# Patient Record
Sex: FEMALE | Race: WHITE | Employment: UNEMPLOYED | URBAN - NONMETROPOLITAN AREA
[De-identification: names, ages, dates, MRNs, and addresses within clinical notes are randomized per-mention and may not be internally consistent; named-entity substitution may affect disease eponyms.]

---

## 2017-01-25 ENCOUNTER — APPOINTMENT (OUTPATIENT)
Dept: ULTRASOUND IMAGING | Age: 32
End: 2017-01-25
Payer: COMMERCIAL

## 2017-01-25 ENCOUNTER — HOSPITAL ENCOUNTER (EMERGENCY)
Age: 32
Discharge: HOME | End: 2017-01-25
Attending: EMERGENCY MEDICINE
Payer: COMMERCIAL

## 2017-01-25 VITALS
HEART RATE: 106 BPM | DIASTOLIC BLOOD PRESSURE: 77 MMHG | OXYGEN SATURATION: 99 % | BODY MASS INDEX: 25.52 KG/M2 | SYSTOLIC BLOOD PRESSURE: 102 MMHG | TEMPERATURE: 98.6 F | WEIGHT: 130 LBS | HEIGHT: 60 IN | RESPIRATION RATE: 18 BRPM

## 2017-01-25 DIAGNOSIS — Z33.1 IUP (INTRAUTERINE PREGNANCY), INCIDENTAL: Primary | ICD-10-CM

## 2017-01-25 LAB
ANION GAP SERPL CALCULATED.3IONS-SCNC: 14 MMOL/L (ref 7–19)
BACTERIA: ABNORMAL /HPF
BASOPHILS ABSOLUTE: 0 K/UL (ref 0–0.2)
BASOPHILS RELATIVE PERCENT: 0.1 % (ref 0–1)
BILIRUBIN URINE: NEGATIVE
BLOOD, URINE: NEGATIVE
BUN BLDV-MCNC: 11 MG/DL (ref 6–20)
CALCIUM SERPL-MCNC: 9.4 MG/DL (ref 8.6–10)
CHLORIDE BLD-SCNC: 99 MMOL/L (ref 98–111)
CLARITY: ABNORMAL
CO2: 23 MMOL/L (ref 22–29)
COLOR: YELLOW
CREAT SERPL-MCNC: 0.5 MG/DL (ref 0.5–0.9)
EOSINOPHILS ABSOLUTE: 0.3 K/UL (ref 0–0.6)
EOSINOPHILS RELATIVE PERCENT: 2.9 % (ref 0–5)
EPITHELIAL CELLS, UA: 4 /HPF (ref 0–5)
GFR NON-AFRICAN AMERICAN: >60
GLUCOSE BLD-MCNC: 76 MG/DL (ref 74–109)
GLUCOSE URINE: NEGATIVE MG/DL
GONADOTROPIN, CHORIONIC (HCG) QUANT: ABNORMAL MIU/ML (ref 0–5.3)
HCT VFR BLD CALC: 39.8 % (ref 37–47)
HEMOGLOBIN: 13.5 G/DL (ref 12–16)
HYALINE CASTS: 6 /HPF (ref 0–8)
KETONES, URINE: NEGATIVE MG/DL
LEUKOCYTE ESTERASE, URINE: ABNORMAL
LYMPHOCYTES ABSOLUTE: 1.3 K/UL (ref 1.1–4.5)
LYMPHOCYTES RELATIVE PERCENT: 12.6 % (ref 20–40)
MCH RBC QN AUTO: 30.7 PG (ref 27–31)
MCHC RBC AUTO-ENTMCNC: 33.9 G/DL (ref 33–37)
MCV RBC AUTO: 90.5 FL (ref 81–99)
MONOCYTES ABSOLUTE: 0.5 K/UL (ref 0–0.9)
MONOCYTES RELATIVE PERCENT: 5.2 % (ref 0–10)
NEUTROPHILS ABSOLUTE: 8 K/UL (ref 1.5–7.5)
NEUTROPHILS RELATIVE PERCENT: 78.8 % (ref 50–65)
NITRITE, URINE: POSITIVE
PDW BLD-RTO: 12.6 % (ref 11.5–14.5)
PH UA: 7.5
PLATELET # BLD: 178 K/UL (ref 130–400)
PMV BLD AUTO: 10.3 FL (ref 7.4–10.4)
POTASSIUM SERPL-SCNC: 4.1 MMOL/L (ref 3.5–5)
PROTEIN UA: NEGATIVE MG/DL
RBC # BLD: 4.4 M/UL (ref 4.2–5.4)
RBC UA: 2 /HPF (ref 0–4)
SODIUM BLD-SCNC: 136 MMOL/L (ref 136–145)
SPECIFIC GRAVITY UA: 1.01
UROBILINOGEN, URINE: 0.2 E.U./DL
WBC # BLD: 10.2 K/UL (ref 4.8–10.8)
WBC UA: 32 /HPF (ref 0–5)

## 2017-01-25 PROCEDURE — 84702 CHORIONIC GONADOTROPIN TEST: CPT

## 2017-01-25 PROCEDURE — 76815 OB US LIMITED FETUS(S): CPT

## 2017-01-25 PROCEDURE — 99284 EMERGENCY DEPT VISIT MOD MDM: CPT

## 2017-01-25 PROCEDURE — 85025 COMPLETE CBC W/AUTO DIFF WBC: CPT

## 2017-01-25 PROCEDURE — 81001 URINALYSIS AUTO W/SCOPE: CPT

## 2017-01-25 PROCEDURE — 99282 EMERGENCY DEPT VISIT SF MDM: CPT | Performed by: EMERGENCY MEDICINE

## 2017-01-25 PROCEDURE — 87185 SC STD ENZYME DETCJ PER NZM: CPT

## 2017-01-25 PROCEDURE — 80048 BASIC METABOLIC PNL TOTAL CA: CPT

## 2017-01-25 PROCEDURE — 36415 COLL VENOUS BLD VENIPUNCTURE: CPT

## 2017-01-25 PROCEDURE — 87086 URINE CULTURE/COLONY COUNT: CPT

## 2017-01-25 ASSESSMENT — ENCOUNTER SYMPTOMS
EYES NEGATIVE: 1
ALLERGIC/IMMUNOLOGIC NEGATIVE: 1
GASTROINTESTINAL NEGATIVE: 1
RESPIRATORY NEGATIVE: 1

## 2017-01-27 LAB
ORGANISM: ABNORMAL
URINE CULTURE, ROUTINE: ABNORMAL
URINE CULTURE, ROUTINE: ABNORMAL

## 2017-02-21 LAB
EXTERNAL ABO GROUPING: NORMAL
EXTERNAL ANTIBODY SCREEN: NEGATIVE
EXTERNAL CHLAMYDIA SCREEN: NEGATIVE
EXTERNAL GONORRHEA SCREEN: NEGATIVE
EXTERNAL GROUP B STREP ANTIGEN: NEGATIVE
EXTERNAL HEPATITIS B SURFACE ANTIGEN: NEGATIVE
EXTERNAL HERPES PCR: NORMAL
EXTERNAL RH FACTOR: POSITIVE
EXTERNAL RUBELLA QUALITATIVE: NORMAL
EXTERNAL SYPHILIS RPR SCREEN: NEGATIVE
HIV1 AB SPEC QL IA.RAPID: NEGATIVE

## 2017-03-05 ENCOUNTER — HOSPITAL ENCOUNTER (INPATIENT)
Age: 32
LOS: 2 days | Discharge: HOME | DRG: 194 | End: 2017-03-07
Attending: EMERGENCY MEDICINE | Admitting: FAMILY MEDICINE
Payer: COMMERCIAL

## 2017-03-05 ENCOUNTER — APPOINTMENT (OUTPATIENT)
Dept: GENERAL RADIOLOGY | Age: 32
DRG: 194 | End: 2017-03-05
Payer: COMMERCIAL

## 2017-03-05 DIAGNOSIS — E87.1 HYPONATREMIA: ICD-10-CM

## 2017-03-05 DIAGNOSIS — J04.0 LARYNGITIS: ICD-10-CM

## 2017-03-05 DIAGNOSIS — J10.1 INFLUENZA B: Primary | ICD-10-CM

## 2017-03-05 DIAGNOSIS — R06.1 STRIDOR: ICD-10-CM

## 2017-03-05 PROBLEM — J02.9 ACUTE PHARYNGITIS: Status: ACTIVE | Noted: 2017-03-05

## 2017-03-05 LAB
ALBUMIN SERPL-MCNC: 3.6 G/DL (ref 3.5–5.2)
ALP BLD-CCNC: 50 U/L (ref 35–104)
ALT SERPL-CCNC: 18 U/L (ref 5–33)
ANION GAP SERPL CALCULATED.3IONS-SCNC: 16 MMOL/L (ref 7–19)
AST SERPL-CCNC: 24 U/L (ref 5–32)
BILIRUB SERPL-MCNC: 0.3 MG/DL (ref 0.2–1.2)
BILIRUBIN URINE: NEGATIVE
BLOOD, URINE: NEGATIVE
BUN BLDV-MCNC: 6 MG/DL (ref 6–20)
CALCIUM SERPL-MCNC: 7.9 MG/DL (ref 8.6–10)
CHLORIDE BLD-SCNC: 92 MMOL/L (ref 98–111)
CLARITY: CLEAR
CO2: 20 MMOL/L (ref 22–29)
COLOR: YELLOW
CREAT SERPL-MCNC: 0.4 MG/DL (ref 0.5–0.9)
GFR NON-AFRICAN AMERICAN: >60
GLOBULIN: 2.7 G/DL
GLUCOSE BLD-MCNC: 95 MG/DL (ref 74–109)
GLUCOSE URINE: NEGATIVE MG/DL
HCT VFR BLD CALC: 35.8 % (ref 37–47)
HEMOGLOBIN: 11.9 G/DL (ref 12–16)
KETONES, URINE: 40 MG/DL
LEUKOCYTE ESTERASE, URINE: NEGATIVE
MCH RBC QN AUTO: 30.6 PG (ref 27–31)
MCHC RBC AUTO-ENTMCNC: 33.2 G/DL (ref 33–37)
MCV RBC AUTO: 92 FL (ref 81–99)
NITRITE, URINE: NEGATIVE
PDW BLD-RTO: 13.7 % (ref 11.5–14.5)
PERFORMED ON: NORMAL
PH UA: 6
PLATELET # BLD: 138 K/UL (ref 130–400)
PMV BLD AUTO: 10.8 FL (ref 7.4–10.4)
POC TROPONIN I: 0 NG/ML (ref 0–0.08)
POTASSIUM SERPL-SCNC: 3.6 MMOL/L (ref 3.5–5)
PROTEIN UA: NEGATIVE MG/DL
RAPID INFLUENZA  B AGN: POSITIVE
RAPID INFLUENZA A AGN: NEGATIVE
RBC # BLD: 3.89 M/UL (ref 4.2–5.4)
S PYO AG THROAT QL: NEGATIVE
SODIUM BLD-SCNC: 128 MMOL/L (ref 136–145)
SPECIFIC GRAVITY UA: 1
TOTAL PROTEIN: 6.3 G/DL (ref 6.6–8.7)
UROBILINOGEN, URINE: 0.2 E.U./DL
WBC # BLD: 11.3 K/UL (ref 4.8–10.8)

## 2017-03-05 PROCEDURE — 84484 ASSAY OF TROPONIN QUANT: CPT

## 2017-03-05 PROCEDURE — 70360 X-RAY EXAM OF NECK: CPT

## 2017-03-05 PROCEDURE — 99223 1ST HOSP IP/OBS HIGH 75: CPT | Performed by: FAMILY MEDICINE

## 2017-03-05 PROCEDURE — 80053 COMPREHEN METABOLIC PANEL: CPT

## 2017-03-05 PROCEDURE — 6370000000 HC RX 637 (ALT 250 FOR IP): Performed by: FAMILY MEDICINE

## 2017-03-05 PROCEDURE — 6360000002 HC RX W HCPCS: Performed by: EMERGENCY MEDICINE

## 2017-03-05 PROCEDURE — 87081 CULTURE SCREEN ONLY: CPT

## 2017-03-05 PROCEDURE — 87880 STREP A ASSAY W/OPTIC: CPT

## 2017-03-05 PROCEDURE — 87804 INFLUENZA ASSAY W/OPTIC: CPT

## 2017-03-05 PROCEDURE — 2580000003 HC RX 258: Performed by: EMERGENCY MEDICINE

## 2017-03-05 PROCEDURE — 96365 THER/PROPH/DIAG IV INF INIT: CPT

## 2017-03-05 PROCEDURE — 6370000000 HC RX 637 (ALT 250 FOR IP): Performed by: EMERGENCY MEDICINE

## 2017-03-05 PROCEDURE — 1210000000 HC MED SURG R&B

## 2017-03-05 PROCEDURE — 36415 COLL VENOUS BLD VENIPUNCTURE: CPT

## 2017-03-05 PROCEDURE — 99284 EMERGENCY DEPT VISIT MOD MDM: CPT

## 2017-03-05 PROCEDURE — 71020 XR CHEST STANDARD TWO VW: CPT

## 2017-03-05 PROCEDURE — 85027 COMPLETE CBC AUTOMATED: CPT

## 2017-03-05 PROCEDURE — 99284 EMERGENCY DEPT VISIT MOD MDM: CPT | Performed by: EMERGENCY MEDICINE

## 2017-03-05 PROCEDURE — 93005 ELECTROCARDIOGRAM TRACING: CPT

## 2017-03-05 PROCEDURE — 2580000003 HC RX 258: Performed by: FAMILY MEDICINE

## 2017-03-05 PROCEDURE — 96375 TX/PRO/DX INJ NEW DRUG ADDON: CPT

## 2017-03-05 PROCEDURE — 94640 AIRWAY INHALATION TREATMENT: CPT

## 2017-03-05 RX ORDER — NITROFURANTOIN 25; 75 MG/1; MG/1
100 CAPSULE ORAL 2 TIMES DAILY
Status: ON HOLD | COMMUNITY
End: 2017-03-07 | Stop reason: HOSPADM

## 2017-03-05 RX ORDER — NITROFURANTOIN 25; 75 MG/1; MG/1
100 CAPSULE ORAL 2 TIMES DAILY
Status: DISCONTINUED | OUTPATIENT
Start: 2017-03-05 | End: 2017-03-07 | Stop reason: HOSPADM

## 2017-03-05 RX ORDER — 0.9 % SODIUM CHLORIDE 0.9 %
500 INTRAVENOUS SOLUTION INTRAVENOUS ONCE
Status: COMPLETED | OUTPATIENT
Start: 2017-03-05 | End: 2017-03-05

## 2017-03-05 RX ORDER — ONDANSETRON 2 MG/ML
4 INJECTION INTRAMUSCULAR; INTRAVENOUS EVERY 6 HOURS PRN
Status: DISCONTINUED | OUTPATIENT
Start: 2017-03-05 | End: 2017-03-07 | Stop reason: HOSPADM

## 2017-03-05 RX ORDER — PRENATAL VIT/IRON FUM/FOLIC AC 27MG-0.8MG
1 TABLET ORAL DAILY
Status: DISCONTINUED | OUTPATIENT
Start: 2017-03-06 | End: 2017-03-07 | Stop reason: HOSPADM

## 2017-03-05 RX ORDER — DIPHENHYDRAMINE HCL 25 MG
25 TABLET ORAL NIGHTLY PRN
Status: DISCONTINUED | OUTPATIENT
Start: 2017-03-05 | End: 2017-03-07 | Stop reason: HOSPADM

## 2017-03-05 RX ORDER — SODIUM CHLORIDE 0.9 % (FLUSH) 0.9 %
10 SYRINGE (ML) INJECTION EVERY 12 HOURS SCHEDULED
Status: DISCONTINUED | OUTPATIENT
Start: 2017-03-05 | End: 2017-03-07 | Stop reason: HOSPADM

## 2017-03-05 RX ORDER — ACETAMINOPHEN 325 MG/1
650 TABLET ORAL EVERY 4 HOURS PRN
Status: DISCONTINUED | OUTPATIENT
Start: 2017-03-05 | End: 2017-03-07 | Stop reason: HOSPADM

## 2017-03-05 RX ORDER — SODIUM CHLORIDE 9 MG/ML
INJECTION, SOLUTION INTRAVENOUS CONTINUOUS
Status: DISCONTINUED | OUTPATIENT
Start: 2017-03-05 | End: 2017-03-07 | Stop reason: HOSPADM

## 2017-03-05 RX ORDER — ACETAMINOPHEN 325 MG/1
650 TABLET ORAL ONCE
Status: COMPLETED | OUTPATIENT
Start: 2017-03-05 | End: 2017-03-05

## 2017-03-05 RX ORDER — OSELTAMIVIR PHOSPHATE 75 MG/1
75 CAPSULE ORAL 2 TIMES DAILY
Status: DISCONTINUED | OUTPATIENT
Start: 2017-03-05 | End: 2017-03-07 | Stop reason: HOSPADM

## 2017-03-05 RX ORDER — DEXAMETHASONE SODIUM PHOSPHATE 10 MG/ML
10 INJECTION INTRAMUSCULAR; INTRAVENOUS ONCE
Status: COMPLETED | OUTPATIENT
Start: 2017-03-05 | End: 2017-03-05

## 2017-03-05 RX ORDER — SODIUM CHLORIDE 0.9 % (FLUSH) 0.9 %
10 SYRINGE (ML) INJECTION PRN
Status: DISCONTINUED | OUTPATIENT
Start: 2017-03-05 | End: 2017-03-07 | Stop reason: HOSPADM

## 2017-03-05 RX ADMIN — DEXAMETHASONE SODIUM PHOSPHATE 10 MG: 10 INJECTION INTRAMUSCULAR; INTRAVENOUS at 20:09

## 2017-03-05 RX ADMIN — OSELTAMIVIR PHOSPHATE 75 MG: 75 CAPSULE ORAL at 20:36

## 2017-03-05 RX ADMIN — DIPHENHYDRAMINE HCL 25 MG: 25 TABLET ORAL at 23:37

## 2017-03-05 RX ADMIN — CEFTRIAXONE SODIUM 2 G: 1 INJECTION, POWDER, FOR SOLUTION INTRAMUSCULAR; INTRAVENOUS at 20:45

## 2017-03-05 RX ADMIN — NITROFURANTOIN (MONOHYDRATE/MACROCRYSTALS) 100 MG: 75; 25 CAPSULE ORAL at 22:19

## 2017-03-05 RX ADMIN — ACETAMINOPHEN 650 MG: 325 TABLET, FILM COATED ORAL at 20:09

## 2017-03-05 RX ADMIN — SODIUM CHLORIDE: 9 INJECTION, SOLUTION INTRAVENOUS at 22:18

## 2017-03-05 RX ADMIN — SODIUM CHLORIDE 500 ML: 9 INJECTION, SOLUTION INTRAVENOUS at 20:38

## 2017-03-05 RX ADMIN — RACEPINEPHRINE HYDROCHLORIDE 11.25 MG: 11.25 SOLUTION RESPIRATORY (INHALATION) at 20:06

## 2017-03-05 RX ADMIN — BENZOCAINE 6 MG-MENTHOL 10 MG LOZENGES 1 LOZENGE: at 20:50

## 2017-03-05 ASSESSMENT — ENCOUNTER SYMPTOMS
SHORTNESS OF BREATH: 1
EYE PAIN: 0
COUGH: 1
VOMITING: 0
SORE THROAT: 1
VOICE CHANGE: 1
ABDOMINAL PAIN: 0
DIARRHEA: 0

## 2017-03-05 ASSESSMENT — PAIN SCALES - GENERAL
PAINLEVEL_OUTOF10: 9
PAINLEVEL_OUTOF10: 0

## 2017-03-06 PROBLEM — E87.6 HYPOKALEMIA: Status: ACTIVE | Noted: 2017-03-06

## 2017-03-06 PROBLEM — I95.9 HYPOTENSION: Status: ACTIVE | Noted: 2017-03-06

## 2017-03-06 PROBLEM — Z3A.20 20 WEEKS GESTATION OF PREGNANCY: Status: ACTIVE | Noted: 2017-03-06

## 2017-03-06 PROBLEM — J04.0 ACUTE LARYNGITIS: Status: ACTIVE | Noted: 2017-03-06

## 2017-03-06 LAB
ANION GAP SERPL CALCULATED.3IONS-SCNC: 11 MMOL/L (ref 7–19)
BASOPHILS ABSOLUTE: 0 K/UL (ref 0–0.2)
BASOPHILS RELATIVE PERCENT: 0.1 % (ref 0–1)
BUN BLDV-MCNC: 4 MG/DL (ref 6–20)
CALCIUM SERPL-MCNC: 7.7 MG/DL (ref 8.6–10)
CHLORIDE BLD-SCNC: 103 MMOL/L (ref 98–111)
CO2: 22 MMOL/L (ref 22–29)
CREAT SERPL-MCNC: 0.4 MG/DL (ref 0.5–0.9)
EOSINOPHILS ABSOLUTE: 0 K/UL (ref 0–0.6)
EOSINOPHILS RELATIVE PERCENT: 0 % (ref 0–5)
GFR NON-AFRICAN AMERICAN: >60
GLUCOSE BLD-MCNC: 157 MG/DL (ref 74–109)
HCT VFR BLD CALC: 35 % (ref 37–47)
HEMOGLOBIN: 11.6 G/DL (ref 12–16)
LYMPHOCYTES ABSOLUTE: 0.5 K/UL (ref 1.1–4.5)
LYMPHOCYTES RELATIVE PERCENT: 4.1 % (ref 20–40)
MCH RBC QN AUTO: 30.4 PG (ref 27–31)
MCHC RBC AUTO-ENTMCNC: 33.1 G/DL (ref 33–37)
MCV RBC AUTO: 91.9 FL (ref 81–99)
MONOCYTES ABSOLUTE: 0.3 K/UL (ref 0–0.9)
MONOCYTES RELATIVE PERCENT: 2.8 % (ref 0–10)
NEUTROPHILS ABSOLUTE: 10.1 K/UL (ref 1.5–7.5)
NEUTROPHILS RELATIVE PERCENT: 92.5 % (ref 50–65)
PDW BLD-RTO: 13.9 % (ref 11.5–14.5)
PLATELET # BLD: 139 K/UL (ref 130–400)
PMV BLD AUTO: 10.6 FL (ref 7.4–10.4)
POTASSIUM SERPL-SCNC: 3.4 MMOL/L (ref 3.5–5)
RBC # BLD: 3.81 M/UL (ref 4.2–5.4)
SODIUM BLD-SCNC: 136 MMOL/L (ref 136–145)
WBC # BLD: 10.9 K/UL (ref 4.8–10.8)

## 2017-03-06 PROCEDURE — 36415 COLL VENOUS BLD VENIPUNCTURE: CPT

## 2017-03-06 PROCEDURE — 99253 IP/OBS CNSLTJ NEW/EST LOW 45: CPT | Performed by: OTOLARYNGOLOGY

## 2017-03-06 PROCEDURE — 0CJS8ZZ INSPECTION OF LARYNX, VIA NATURAL OR ARTIFICIAL OPENING ENDOSCOPIC: ICD-10-PCS | Performed by: OTOLARYNGOLOGY

## 2017-03-06 PROCEDURE — 85025 COMPLETE CBC W/AUTO DIFF WBC: CPT

## 2017-03-06 PROCEDURE — 6370000000 HC RX 637 (ALT 250 FOR IP): Performed by: FAMILY MEDICINE

## 2017-03-06 PROCEDURE — 87070 CULTURE OTHR SPECIMN AEROBIC: CPT

## 2017-03-06 PROCEDURE — 80048 BASIC METABOLIC PNL TOTAL CA: CPT

## 2017-03-06 PROCEDURE — 99232 SBSQ HOSP IP/OBS MODERATE 35: CPT | Performed by: HOSPITALIST

## 2017-03-06 PROCEDURE — 2580000003 HC RX 258: Performed by: PHYSICIAN ASSISTANT

## 2017-03-06 PROCEDURE — 6370000000 HC RX 637 (ALT 250 FOR IP): Performed by: EMERGENCY MEDICINE

## 2017-03-06 PROCEDURE — 6360000002 HC RX W HCPCS: Performed by: FAMILY MEDICINE

## 2017-03-06 PROCEDURE — 6370000000 HC RX 637 (ALT 250 FOR IP): Performed by: OBSTETRICS & GYNECOLOGY

## 2017-03-06 PROCEDURE — 1210000000 HC MED SURG R&B

## 2017-03-06 PROCEDURE — 81003 URINALYSIS AUTO W/O SCOPE: CPT

## 2017-03-06 PROCEDURE — 2580000003 HC RX 258: Performed by: FAMILY MEDICINE

## 2017-03-06 PROCEDURE — 99253 IP/OBS CNSLTJ NEW/EST LOW 45: CPT | Performed by: NURSE PRACTITIONER

## 2017-03-06 RX ORDER — 0.9 % SODIUM CHLORIDE 0.9 %
500 INTRAVENOUS SOLUTION INTRAVENOUS ONCE
Status: COMPLETED | OUTPATIENT
Start: 2017-03-06 | End: 2017-03-06

## 2017-03-06 RX ORDER — POTASSIUM CHLORIDE 20 MEQ/1
40 TABLET, EXTENDED RELEASE ORAL ONCE
Status: COMPLETED | OUTPATIENT
Start: 2017-03-06 | End: 2017-03-06

## 2017-03-06 RX ADMIN — OSELTAMIVIR PHOSPHATE 75 MG: 75 CAPSULE ORAL at 20:48

## 2017-03-06 RX ADMIN — SODIUM CHLORIDE 500 ML: 9 INJECTION, SOLUTION INTRAVENOUS at 08:51

## 2017-03-06 RX ADMIN — POTASSIUM CHLORIDE 40 MEQ: 20 TABLET, EXTENDED RELEASE ORAL at 08:52

## 2017-03-06 RX ADMIN — ENOXAPARIN SODIUM 40 MG: 40 INJECTION SUBCUTANEOUS at 08:51

## 2017-03-06 RX ADMIN — OSELTAMIVIR PHOSPHATE 75 MG: 75 CAPSULE ORAL at 08:52

## 2017-03-06 RX ADMIN — PRENATAL VIT W/ FE FUMARATE-FA TAB 27-0.8 MG 1 TABLET: 27-0.8 TAB at 08:52

## 2017-03-06 RX ADMIN — NITROFURANTOIN (MONOHYDRATE/MACROCRYSTALS) 100 MG: 75; 25 CAPSULE ORAL at 08:52

## 2017-03-06 RX ADMIN — NITROFURANTOIN (MONOHYDRATE/MACROCRYSTALS) 100 MG: 75; 25 CAPSULE ORAL at 20:48

## 2017-03-06 RX ADMIN — CHLORASEPTIC 1 SPRAY: 1.5 LIQUID ORAL at 19:35

## 2017-03-06 RX ADMIN — CEFTRIAXONE SODIUM 1 G: 1 INJECTION, POWDER, FOR SOLUTION INTRAMUSCULAR; INTRAVENOUS at 20:48

## 2017-03-06 RX ADMIN — Medication 10 ML: at 08:53

## 2017-03-06 RX ADMIN — SODIUM CHLORIDE 500 ML: 9 INJECTION, SOLUTION INTRAVENOUS at 19:35

## 2017-03-07 VITALS
BODY MASS INDEX: 28.07 KG/M2 | RESPIRATION RATE: 18 BRPM | OXYGEN SATURATION: 94 % | WEIGHT: 143 LBS | TEMPERATURE: 99 F | DIASTOLIC BLOOD PRESSURE: 53 MMHG | HEART RATE: 103 BPM | HEIGHT: 60 IN | SYSTOLIC BLOOD PRESSURE: 82 MMHG

## 2017-03-07 LAB
ANION GAP SERPL CALCULATED.3IONS-SCNC: 12 MMOL/L (ref 7–19)
BUN BLDV-MCNC: 2 MG/DL (ref 6–20)
CALCIUM SERPL-MCNC: 7.2 MG/DL (ref 8.6–10)
CHLORIDE BLD-SCNC: 102 MMOL/L (ref 98–111)
CO2: 20 MMOL/L (ref 22–29)
CREAT SERPL-MCNC: 0.3 MG/DL (ref 0.5–0.9)
GFR NON-AFRICAN AMERICAN: >60
GLUCOSE BLD-MCNC: 95 MG/DL (ref 74–109)
HCT VFR BLD CALC: 31.3 % (ref 37–47)
HEMOGLOBIN: 10.3 G/DL (ref 12–16)
MCH RBC QN AUTO: 30.5 PG (ref 27–31)
MCHC RBC AUTO-ENTMCNC: 32.9 G/DL (ref 33–37)
MCV RBC AUTO: 92.6 FL (ref 81–99)
PDW BLD-RTO: 14.3 % (ref 11.5–14.5)
PLATELET # BLD: 128 K/UL (ref 130–400)
PMV BLD AUTO: 10.9 FL (ref 7.4–10.4)
POTASSIUM SERPL-SCNC: 3.7 MMOL/L (ref 3.5–5)
RBC # BLD: 3.38 M/UL (ref 4.2–5.4)
S PYO THROAT QL CULT: NORMAL
SODIUM BLD-SCNC: 134 MMOL/L (ref 136–145)
WBC # BLD: 6.3 K/UL (ref 4.8–10.8)

## 2017-03-07 PROCEDURE — 6370000000 HC RX 637 (ALT 250 FOR IP): Performed by: FAMILY MEDICINE

## 2017-03-07 PROCEDURE — 6370000000 HC RX 637 (ALT 250 FOR IP): Performed by: EMERGENCY MEDICINE

## 2017-03-07 PROCEDURE — 80048 BASIC METABOLIC PNL TOTAL CA: CPT

## 2017-03-07 PROCEDURE — 85027 COMPLETE CBC AUTOMATED: CPT

## 2017-03-07 PROCEDURE — 2580000003 HC RX 258: Performed by: FAMILY MEDICINE

## 2017-03-07 PROCEDURE — 99239 HOSP IP/OBS DSCHRG MGMT >30: CPT | Performed by: HOSPITALIST

## 2017-03-07 PROCEDURE — 6360000002 HC RX W HCPCS: Performed by: FAMILY MEDICINE

## 2017-03-07 PROCEDURE — 36415 COLL VENOUS BLD VENIPUNCTURE: CPT

## 2017-03-07 RX ORDER — ACETAMINOPHEN 325 MG/1
650 TABLET ORAL EVERY 4 HOURS PRN
Qty: 120 TABLET | Refills: 0 | Status: SHIPPED | OUTPATIENT
Start: 2017-03-07 | End: 2017-08-01 | Stop reason: ALTCHOICE

## 2017-03-07 RX ORDER — OSELTAMIVIR PHOSPHATE 75 MG/1
75 CAPSULE ORAL 2 TIMES DAILY
Qty: 4 CAPSULE | Refills: 0 | Status: SHIPPED | OUTPATIENT
Start: 2017-03-07 | End: 2017-03-12

## 2017-03-07 RX ADMIN — NITROFURANTOIN (MONOHYDRATE/MACROCRYSTALS) 100 MG: 75; 25 CAPSULE ORAL at 08:30

## 2017-03-07 RX ADMIN — OSELTAMIVIR PHOSPHATE 75 MG: 75 CAPSULE ORAL at 08:30

## 2017-03-07 RX ADMIN — ENOXAPARIN SODIUM 40 MG: 40 INJECTION SUBCUTANEOUS at 08:30

## 2017-03-07 RX ADMIN — Medication 10 ML: at 08:30

## 2017-03-07 RX ADMIN — PRENATAL VIT W/ FE FUMARATE-FA TAB 27-0.8 MG 1 TABLET: 27-0.8 TAB at 08:30

## 2017-03-09 LAB — THROAT CULTURE: NORMAL

## 2017-03-13 LAB
EKG P AXIS: 52 DEGREES
EKG P-R INTERVAL: 146 MS
EKG Q-T INTERVAL: 300 MS
EKG QRS DURATION: 82 MS
EKG QTC CALCULATION (BAZETT): 406 MS
EKG T AXIS: 20 DEGREES

## 2017-04-05 ENCOUNTER — TELEPHONE (OUTPATIENT)
Dept: OTOLARYNGOLOGY | Age: 32
End: 2017-04-05

## 2017-05-01 ENCOUNTER — HOSPITAL ENCOUNTER (OUTPATIENT)
Facility: HOSPITAL | Age: 32
Setting detail: OBSERVATION
Discharge: HOME | End: 2017-05-01
Attending: OBSTETRICS & GYNECOLOGY | Admitting: OBSTETRICS & GYNECOLOGY

## 2017-05-01 VITALS
RESPIRATION RATE: 18 BRPM | HEART RATE: 103 BPM | TEMPERATURE: 97.4 F | BODY MASS INDEX: 29.84 KG/M2 | WEIGHT: 152 LBS | SYSTOLIC BLOOD PRESSURE: 99 MMHG | HEIGHT: 60 IN | DIASTOLIC BLOOD PRESSURE: 61 MMHG

## 2017-05-01 PROBLEM — Z34.90 PREGNANCY: Status: ACTIVE | Noted: 2017-05-01

## 2017-05-01 PROCEDURE — 25010000002 ONDANSETRON PER 1 MG: Performed by: OBSTETRICS & GYNECOLOGY

## 2017-05-01 PROCEDURE — 96374 THER/PROPH/DIAG INJ IV PUSH: CPT

## 2017-05-01 PROCEDURE — G0378 HOSPITAL OBSERVATION PER HR: HCPCS

## 2017-05-01 PROCEDURE — G0463 HOSPITAL OUTPT CLINIC VISIT: HCPCS

## 2017-05-01 RX ORDER — ACETAMINOPHEN 325 MG/1
650 TABLET ORAL EVERY 4 HOURS PRN
Status: DISCONTINUED | OUTPATIENT
Start: 2017-05-01 | End: 2017-05-01 | Stop reason: HOSPADM

## 2017-05-01 RX ORDER — PRENATAL VIT NO.126/IRON/FOLIC 28MG-0.8MG
1 TABLET ORAL DAILY
COMMUNITY

## 2017-05-01 RX ADMIN — SODIUM CHLORIDE, POTASSIUM CHLORIDE, SODIUM LACTATE AND CALCIUM CHLORIDE 1000 ML: 600; 310; 30; 20 INJECTION, SOLUTION INTRAVENOUS at 01:58

## 2017-05-01 RX ADMIN — ONDANSETRON HYDROCHLORIDE 6 MG: 2 SOLUTION INTRAMUSCULAR; INTRAVENOUS at 01:57

## 2017-06-03 ENCOUNTER — HOSPITAL ENCOUNTER (OUTPATIENT)
Facility: HOSPITAL | Age: 32
Setting detail: OBSERVATION
Discharge: HOME | End: 2017-06-03
Attending: OBSTETRICS & GYNECOLOGY | Admitting: OBSTETRICS & GYNECOLOGY

## 2017-06-03 VITALS
TEMPERATURE: 98.2 F | HEIGHT: 60 IN | WEIGHT: 160.6 LBS | SYSTOLIC BLOOD PRESSURE: 109 MMHG | OXYGEN SATURATION: 99 % | BODY MASS INDEX: 31.53 KG/M2 | HEART RATE: 84 BPM | RESPIRATION RATE: 16 BRPM | DIASTOLIC BLOOD PRESSURE: 62 MMHG

## 2017-06-03 PROBLEM — O47.9 THREATENED LABOR: Status: ACTIVE | Noted: 2017-06-03

## 2017-06-03 LAB
BACTERIA UR QL AUTO: ABNORMAL /HPF
BILIRUB UR QL STRIP: NEGATIVE
CLARITY UR: CLEAR
COLOR UR: YELLOW
GLUCOSE UR STRIP-MCNC: NEGATIVE MG/DL
HGB UR QL STRIP.AUTO: ABNORMAL
HYALINE CASTS UR QL AUTO: ABNORMAL /LPF
KETONES UR QL STRIP: ABNORMAL
LEUKOCYTE ESTERASE UR QL STRIP.AUTO: ABNORMAL
NITRITE UR QL STRIP: NEGATIVE
PH UR STRIP.AUTO: 6.5 [PH] (ref 5–8)
PROT UR QL STRIP: NEGATIVE
RBC # UR: ABNORMAL /HPF
REF LAB TEST METHOD: ABNORMAL
SP GR UR STRIP: 1.01 (ref 1–1.03)
SQUAMOUS #/AREA URNS HPF: ABNORMAL /HPF
UROBILINOGEN UR QL STRIP: ABNORMAL
WBC UR QL AUTO: ABNORMAL /HPF

## 2017-06-03 PROCEDURE — G0463 HOSPITAL OUTPT CLINIC VISIT: HCPCS

## 2017-06-03 PROCEDURE — G0378 HOSPITAL OBSERVATION PER HR: HCPCS

## 2017-06-03 PROCEDURE — 81001 URINALYSIS AUTO W/SCOPE: CPT | Performed by: OBSTETRICS & GYNECOLOGY

## 2017-06-03 RX ORDER — SODIUM CHLORIDE, SODIUM LACTATE, POTASSIUM CHLORIDE, CALCIUM CHLORIDE 600; 310; 30; 20 MG/100ML; MG/100ML; MG/100ML; MG/100ML
999 INJECTION, SOLUTION INTRAVENOUS CONTINUOUS
Status: DISCONTINUED | OUTPATIENT
Start: 2017-06-03 | End: 2017-06-03 | Stop reason: HOSPADM

## 2017-06-03 RX ORDER — CHOLECALCIFEROL (VITAMIN D3) 125 MCG
500 CAPSULE ORAL DAILY
COMMUNITY
End: 2017-07-23 | Stop reason: HOSPADM

## 2017-06-03 RX ADMIN — SODIUM CHLORIDE, POTASSIUM CHLORIDE, SODIUM LACTATE AND CALCIUM CHLORIDE 999 ML/HR: 600; 310; 30; 20 INJECTION, SOLUTION INTRAVENOUS at 02:30

## 2017-07-13 ENCOUNTER — APPOINTMENT (OUTPATIENT)
Dept: PREADMISSION TESTING | Facility: HOSPITAL | Age: 32
End: 2017-07-13

## 2017-07-20 ENCOUNTER — HOSPITAL ENCOUNTER (OUTPATIENT)
Facility: HOSPITAL | Age: 32
Setting detail: OBSERVATION
Discharge: LEFT BEFORE DISCHARGE | End: 2017-07-20
Attending: OBSTETRICS & GYNECOLOGY | Admitting: OBSTETRICS & GYNECOLOGY

## 2017-07-20 VITALS — OXYGEN SATURATION: 98 % | HEIGHT: 60 IN | TEMPERATURE: 98.2 F | WEIGHT: 171.5 LBS | BODY MASS INDEX: 33.67 KG/M2

## 2017-07-20 LAB — A1 MICROGLOB PLACENTAL VAG QL: POSITIVE

## 2017-07-20 PROCEDURE — G0463 HOSPITAL OUTPT CLINIC VISIT: HCPCS

## 2017-07-20 PROCEDURE — 84112 EVAL AMNIOTIC FLUID PROTEIN: CPT | Performed by: OBSTETRICS & GYNECOLOGY

## 2017-07-20 PROCEDURE — G0378 HOSPITAL OBSERVATION PER HR: HCPCS

## 2017-07-20 NOTE — NURSING NOTE
"JIGNESH Lind, SALAZAR and ANNMARIE Horta RN at bedside for the following conversations.  0240:  Discussed with patient Dr. Cotto's plan of care to proceed with  Section.  Patient states \"No.  I refuse to have an unnecessary .\"  Informed patient that Dr. Cotto would not start pitocin because she has had a previous  delivery, and since the patient is ruptured and not in labor, a repeat  delivery is advised. Risks and benefits explained. Patient adamant that she will not have the  and states \"I understand all the potential risks.\"  Informed the patient that she has the right to refuse treatment and may sign out against medical advice.    0243:  Dr. Cotto notified of patient's refusal of  Section.  AMA form printed.    0245:  AMA form presented to patient.  Patient read and signed form, stating she understands the risks and is comfortable leaving.  Risks again discussed with patient, and suggested to seek care for the health of herself and her baby.  "

## 2017-07-20 NOTE — NURSING NOTE
0230:  updated on patient arrival and want to .  stated she informed the patient she would not start pitocin on the patient if she was not already in labor.  stated the patient was well aware and they discussed this in the office.  says to get her ready for C/S.

## 2017-07-21 ENCOUNTER — ANESTHESIA (OUTPATIENT)
Dept: LABOR AND DELIVERY | Facility: HOSPITAL | Age: 32
End: 2017-07-21

## 2017-07-21 ENCOUNTER — ANESTHESIA EVENT (OUTPATIENT)
Dept: LABOR AND DELIVERY | Facility: HOSPITAL | Age: 32
End: 2017-07-21

## 2017-07-21 ENCOUNTER — HOSPITAL ENCOUNTER (INPATIENT)
Facility: HOSPITAL | Age: 32
LOS: 2 days | Discharge: HOME | End: 2017-07-23
Attending: OBSTETRICS & GYNECOLOGY | Admitting: OBSTETRICS & GYNECOLOGY

## 2017-07-21 DIAGNOSIS — Z3A.40 40 WEEKS GESTATION OF PREGNANCY: Primary | ICD-10-CM

## 2017-07-21 DIAGNOSIS — Z98.891 S/P REPEAT LOW TRANSVERSE C-SECTION: ICD-10-CM

## 2017-07-21 DIAGNOSIS — O41.1230: ICD-10-CM

## 2017-07-21 PROBLEM — O47.9 THREATENED LABOR: Status: RESOLVED | Noted: 2017-06-03 | Resolved: 2017-07-21

## 2017-07-21 LAB
A1 MICROGLOB PLACENTAL VAG QL: POSITIVE
ABO GROUP BLD: NORMAL
AMPHET+METHAMPHET UR QL: NEGATIVE
BARBITURATES UR QL SCN: NEGATIVE
BENZODIAZ UR QL SCN: NEGATIVE
BLD GP AB SCN SERPL QL: NEGATIVE
CANNABINOIDS SERPL QL: NEGATIVE
COCAINE UR QL: NEGATIVE
DEPRECATED RDW RBC AUTO: 45.5 FL (ref 40–54)
ERYTHROCYTE [DISTWIDTH] IN BLOOD BY AUTOMATED COUNT: 14.1 % (ref 12–15)
HCT VFR BLD AUTO: 35.3 % (ref 37–47)
HGB BLD-MCNC: 11.5 G/DL (ref 12–16)
MCH RBC QN AUTO: 28.7 PG (ref 28–32)
MCHC RBC AUTO-ENTMCNC: 32.6 G/DL (ref 33–36)
MCV RBC AUTO: 88 FL (ref 82–98)
METHADONE UR QL SCN: NEGATIVE
OPIATES UR QL: NEGATIVE
PCP UR QL SCN: NEGATIVE
PLATELET # BLD AUTO: 223 10*3/MM3 (ref 130–400)
PMV BLD AUTO: 10.9 FL (ref 6–12)
RBC # BLD AUTO: 4.01 10*6/MM3 (ref 4.2–5.4)
RH BLD: POSITIVE
WBC NRBC COR # BLD: 13.25 10*3/MM3 (ref 4.8–10.8)

## 2017-07-21 PROCEDURE — 80307 DRUG TEST PRSMV CHEM ANLYZR: CPT | Performed by: OBSTETRICS & GYNECOLOGY

## 2017-07-21 PROCEDURE — 88307 TISSUE EXAM BY PATHOLOGIST: CPT | Performed by: OBSTETRICS & GYNECOLOGY

## 2017-07-21 PROCEDURE — 87070 CULTURE OTHR SPECIMN AEROBIC: CPT | Performed by: OBSTETRICS & GYNECOLOGY

## 2017-07-21 PROCEDURE — 86850 RBC ANTIBODY SCREEN: CPT | Performed by: OBSTETRICS & GYNECOLOGY

## 2017-07-21 PROCEDURE — 86900 BLOOD TYPING SEROLOGIC ABO: CPT | Performed by: OBSTETRICS & GYNECOLOGY

## 2017-07-21 PROCEDURE — 25010000002 KETOROLAC TROMETHAMINE PER 15 MG: Performed by: NURSE ANESTHETIST, CERTIFIED REGISTERED

## 2017-07-21 PROCEDURE — 86901 BLOOD TYPING SEROLOGIC RH(D): CPT | Performed by: OBSTETRICS & GYNECOLOGY

## 2017-07-21 PROCEDURE — 63710000001 PROMETHAZINE PER 25 MG: Performed by: OBSTETRICS & GYNECOLOGY

## 2017-07-21 PROCEDURE — C1765 ADHESION BARRIER: HCPCS | Performed by: OBSTETRICS & GYNECOLOGY

## 2017-07-21 PROCEDURE — 25010000002 GENTAMICIN PER 80 MG: Performed by: OBSTETRICS & GYNECOLOGY

## 2017-07-21 PROCEDURE — 25010000002 HYDROMORPHONE PER 4 MG: Performed by: NURSE ANESTHETIST, CERTIFIED REGISTERED

## 2017-07-21 PROCEDURE — 36415 COLL VENOUS BLD VENIPUNCTURE: CPT | Performed by: OBSTETRICS & GYNECOLOGY

## 2017-07-21 PROCEDURE — 87176 TISSUE HOMOGENIZATION CULTR: CPT | Performed by: OBSTETRICS & GYNECOLOGY

## 2017-07-21 PROCEDURE — 25010000002 FENTANYL CITRATE (PF) 100 MCG/2ML SOLUTION: Performed by: NURSE ANESTHETIST, CERTIFIED REGISTERED

## 2017-07-21 PROCEDURE — 87205 SMEAR GRAM STAIN: CPT | Performed by: OBSTETRICS & GYNECOLOGY

## 2017-07-21 PROCEDURE — 85027 COMPLETE CBC AUTOMATED: CPT | Performed by: OBSTETRICS & GYNECOLOGY

## 2017-07-21 PROCEDURE — 94799 UNLISTED PULMONARY SVC/PX: CPT

## 2017-07-21 PROCEDURE — 87075 CULTR BACTERIA EXCEPT BLOOD: CPT | Performed by: OBSTETRICS & GYNECOLOGY

## 2017-07-21 PROCEDURE — 25010000002 ONDANSETRON PER 1 MG: Performed by: NURSE ANESTHETIST, CERTIFIED REGISTERED

## 2017-07-21 PROCEDURE — 51703 INSERT BLADDER CATH COMPLEX: CPT

## 2017-07-21 PROCEDURE — 84112 EVAL AMNIOTIC FLUID PROTEIN: CPT | Performed by: OBSTETRICS & GYNECOLOGY

## 2017-07-21 RX ORDER — BUTORPHANOL TARTRATE 1 MG/ML
0.5 INJECTION, SOLUTION INTRAMUSCULAR; INTRAVENOUS EVERY 6 HOURS PRN
Status: DISCONTINUED | OUTPATIENT
Start: 2017-07-21 | End: 2017-07-23 | Stop reason: HOSPADM

## 2017-07-21 RX ORDER — LIDOCAINE HYDROCHLORIDE 10 MG/ML
5 INJECTION, SOLUTION INFILTRATION; PERINEURAL AS NEEDED
Status: DISCONTINUED | OUTPATIENT
Start: 2017-07-21 | End: 2017-07-21 | Stop reason: HOSPADM

## 2017-07-21 RX ORDER — ONDANSETRON 2 MG/ML
4 INJECTION INTRAMUSCULAR; INTRAVENOUS EVERY 6 HOURS PRN
Status: DISCONTINUED | OUTPATIENT
Start: 2017-07-21 | End: 2017-07-21

## 2017-07-21 RX ORDER — MISOPROSTOL 200 UG/1
800 TABLET ORAL AS NEEDED
Status: DISCONTINUED | OUTPATIENT
Start: 2017-07-21 | End: 2017-07-21 | Stop reason: HOSPADM

## 2017-07-21 RX ORDER — PRENATAL VIT/IRON FUM/FOLIC AC 27MG-0.8MG
1 TABLET ORAL DAILY
Status: DISCONTINUED | OUTPATIENT
Start: 2017-07-22 | End: 2017-07-23 | Stop reason: HOSPADM

## 2017-07-21 RX ORDER — NALOXONE HCL 0.4 MG/ML
0.4 VIAL (ML) INJECTION
Status: ACTIVE | OUTPATIENT
Start: 2017-07-21 | End: 2017-07-22

## 2017-07-21 RX ORDER — IBUPROFEN 200 MG
600 TABLET ORAL EVERY 8 HOURS PRN
Status: DISCONTINUED | OUTPATIENT
Start: 2017-07-21 | End: 2017-07-23 | Stop reason: HOSPADM

## 2017-07-21 RX ORDER — POLYETHYLENE GLYCOL 3350 17 G/17G
17 POWDER, FOR SOLUTION ORAL DAILY
Status: DISCONTINUED | OUTPATIENT
Start: 2017-07-22 | End: 2017-07-23 | Stop reason: HOSPADM

## 2017-07-21 RX ORDER — BUPIVACAINE HYDROCHLORIDE 7.5 MG/ML
INJECTION, SOLUTION EPIDURAL; RETROBULBAR AS NEEDED
Status: DISCONTINUED | OUTPATIENT
Start: 2017-07-21 | End: 2017-07-21 | Stop reason: SURG

## 2017-07-21 RX ORDER — OXYCODONE AND ACETAMINOPHEN 10; 325 MG/1; MG/1
1 TABLET ORAL EVERY 4 HOURS PRN
Status: DISCONTINUED | OUTPATIENT
Start: 2017-07-22 | End: 2017-07-23 | Stop reason: HOSPADM

## 2017-07-21 RX ORDER — FENTANYL CITRATE 50 UG/ML
INJECTION, SOLUTION INTRAMUSCULAR; INTRAVENOUS AS NEEDED
Status: DISCONTINUED | OUTPATIENT
Start: 2017-07-21 | End: 2017-07-21 | Stop reason: SURG

## 2017-07-21 RX ORDER — PROMETHAZINE HYDROCHLORIDE 25 MG/ML
12.5 INJECTION, SOLUTION INTRAMUSCULAR; INTRAVENOUS EVERY 6 HOURS PRN
Status: DISCONTINUED | OUTPATIENT
Start: 2017-07-21 | End: 2017-07-23 | Stop reason: HOSPADM

## 2017-07-21 RX ORDER — ONDANSETRON 2 MG/ML
INJECTION INTRAMUSCULAR; INTRAVENOUS AS NEEDED
Status: DISCONTINUED | OUTPATIENT
Start: 2017-07-21 | End: 2017-07-21 | Stop reason: SURG

## 2017-07-21 RX ORDER — SODIUM CHLORIDE, SODIUM LACTATE, POTASSIUM CHLORIDE, CALCIUM CHLORIDE 600; 310; 30; 20 MG/100ML; MG/100ML; MG/100ML; MG/100ML
125 INJECTION, SOLUTION INTRAVENOUS CONTINUOUS
Status: DISCONTINUED | OUTPATIENT
Start: 2017-07-21 | End: 2017-07-23 | Stop reason: HOSPADM

## 2017-07-21 RX ORDER — CLINDAMYCIN PHOSPHATE 900 MG/50ML
900 INJECTION INTRAVENOUS ONCE
Status: COMPLETED | OUTPATIENT
Start: 2017-07-21 | End: 2017-07-21

## 2017-07-21 RX ORDER — DIPHENHYDRAMINE HCL 25 MG
25 CAPSULE ORAL EVERY 4 HOURS PRN
Status: DISCONTINUED | OUTPATIENT
Start: 2017-07-21 | End: 2017-07-23 | Stop reason: HOSPADM

## 2017-07-21 RX ORDER — OXYTOCIN 10 [USP'U]/ML
INJECTION, SOLUTION INTRAMUSCULAR; INTRAVENOUS AS NEEDED
Status: DISCONTINUED | OUTPATIENT
Start: 2017-07-21 | End: 2017-07-21 | Stop reason: SURG

## 2017-07-21 RX ORDER — MISOPROSTOL 200 UG/1
600 TABLET ORAL ONCE
Status: DISCONTINUED | OUTPATIENT
Start: 2017-07-21 | End: 2017-07-23 | Stop reason: HOSPADM

## 2017-07-21 RX ORDER — PHENYLEPHRINE HCL IN 0.9% NACL 0.8MG/10ML
SYRINGE (ML) INTRAVENOUS AS NEEDED
Status: DISCONTINUED | OUTPATIENT
Start: 2017-07-21 | End: 2017-07-21 | Stop reason: SURG

## 2017-07-21 RX ORDER — OXYCODONE AND ACETAMINOPHEN 7.5; 325 MG/1; MG/1
1 TABLET ORAL EVERY 4 HOURS PRN
Status: ACTIVE | OUTPATIENT
Start: 2017-07-21 | End: 2017-07-22

## 2017-07-21 RX ORDER — CALCIUM CARBONATE 200(500)MG
2 TABLET,CHEWABLE ORAL EVERY 6 HOURS PRN
Status: DISCONTINUED | OUTPATIENT
Start: 2017-07-21 | End: 2017-07-23 | Stop reason: HOSPADM

## 2017-07-21 RX ORDER — HYDROMORPHONE HCL 110MG/55ML
2 PATIENT CONTROLLED ANALGESIA SYRINGE INTRAVENOUS EVERY 4 HOURS PRN
Status: DISCONTINUED | OUTPATIENT
Start: 2017-07-22 | End: 2017-07-23 | Stop reason: HOSPADM

## 2017-07-21 RX ORDER — KETOROLAC TROMETHAMINE 30 MG/ML
INJECTION, SOLUTION INTRAMUSCULAR; INTRAVENOUS AS NEEDED
Status: DISCONTINUED | OUTPATIENT
Start: 2017-07-21 | End: 2017-07-21 | Stop reason: SURG

## 2017-07-21 RX ORDER — METHYLERGONOVINE MALEATE 0.2 MG/ML
200 INJECTION INTRAVENOUS AS NEEDED
Status: DISCONTINUED | OUTPATIENT
Start: 2017-07-21 | End: 2017-07-21 | Stop reason: HOSPADM

## 2017-07-21 RX ORDER — CARBOPROST TROMETHAMINE 250 UG/ML
250 INJECTION, SOLUTION INTRAMUSCULAR AS NEEDED
Status: DISCONTINUED | OUTPATIENT
Start: 2017-07-21 | End: 2017-07-21 | Stop reason: HOSPADM

## 2017-07-21 RX ORDER — SIMETHICONE 80 MG
80 TABLET,CHEWABLE ORAL 4 TIMES DAILY PRN
Status: DISCONTINUED | OUTPATIENT
Start: 2017-07-21 | End: 2017-07-23 | Stop reason: HOSPADM

## 2017-07-21 RX ORDER — PROMETHAZINE HYDROCHLORIDE 12.5 MG/1
12.5 SUPPOSITORY RECTAL EVERY 6 HOURS PRN
Status: DISCONTINUED | OUTPATIENT
Start: 2017-07-21 | End: 2017-07-23 | Stop reason: HOSPADM

## 2017-07-21 RX ORDER — DOCUSATE SODIUM 100 MG/1
100 CAPSULE, LIQUID FILLED ORAL 2 TIMES DAILY PRN
Status: DISCONTINUED | OUTPATIENT
Start: 2017-07-21 | End: 2017-07-23 | Stop reason: HOSPADM

## 2017-07-21 RX ORDER — ACETAMINOPHEN 325 MG/1
650 TABLET ORAL EVERY 4 HOURS PRN
Status: DISCONTINUED | OUTPATIENT
Start: 2017-07-21 | End: 2017-07-23 | Stop reason: HOSPADM

## 2017-07-21 RX ORDER — NALOXONE HCL 0.4 MG/ML
0.1 VIAL (ML) INJECTION
Status: DISCONTINUED | OUTPATIENT
Start: 2017-07-22 | End: 2017-07-23 | Stop reason: HOSPADM

## 2017-07-21 RX ORDER — NALBUPHINE HCL 10 MG/ML
2.5 AMPUL (ML) INJECTION EVERY 4 HOURS PRN
Status: ACTIVE | OUTPATIENT
Start: 2017-07-21 | End: 2017-07-22

## 2017-07-21 RX ORDER — CARBOPROST TROMETHAMINE 250 UG/ML
250 INJECTION, SOLUTION INTRAMUSCULAR ONCE
Status: DISCONTINUED | OUTPATIENT
Start: 2017-07-21 | End: 2017-07-23 | Stop reason: HOSPADM

## 2017-07-21 RX ORDER — METHYLERGONOVINE MALEATE 0.2 MG/ML
200 INJECTION INTRAVENOUS ONCE
Status: DISCONTINUED | OUTPATIENT
Start: 2017-07-21 | End: 2017-07-23 | Stop reason: HOSPADM

## 2017-07-21 RX ORDER — PRENATAL VIT/IRON FUM/FOLIC AC 27MG-0.8MG
1 TABLET ORAL DAILY
Status: DISCONTINUED | OUTPATIENT
Start: 2017-07-22 | End: 2017-07-21 | Stop reason: SDUPTHER

## 2017-07-21 RX ORDER — SODIUM CHLORIDE, SODIUM LACTATE, POTASSIUM CHLORIDE, CALCIUM CHLORIDE 600; 310; 30; 20 MG/100ML; MG/100ML; MG/100ML; MG/100ML
125 INJECTION, SOLUTION INTRAVENOUS CONTINUOUS
Status: DISCONTINUED | OUTPATIENT
Start: 2017-07-21 | End: 2017-07-22

## 2017-07-21 RX ORDER — FAMOTIDINE 10 MG/ML
20 INJECTION, SOLUTION INTRAVENOUS ONCE AS NEEDED
Status: COMPLETED | OUTPATIENT
Start: 2017-07-21 | End: 2017-07-21

## 2017-07-21 RX ORDER — TRISODIUM CITRATE DIHYDRATE AND CITRIC ACID MONOHYDRATE 500; 334 MG/5ML; MG/5ML
15 SOLUTION ORAL ONCE
Status: COMPLETED | OUTPATIENT
Start: 2017-07-21 | End: 2017-07-21

## 2017-07-21 RX ORDER — OXYCODONE AND ACETAMINOPHEN 7.5; 325 MG/1; MG/1
2 TABLET ORAL EVERY 4 HOURS PRN
Status: DISPENSED | OUTPATIENT
Start: 2017-07-21 | End: 2017-07-22

## 2017-07-21 RX ORDER — ONDANSETRON 2 MG/ML
4 INJECTION INTRAMUSCULAR; INTRAVENOUS EVERY 6 HOURS PRN
Status: DISCONTINUED | OUTPATIENT
Start: 2017-07-21 | End: 2017-07-23 | Stop reason: HOSPADM

## 2017-07-21 RX ORDER — PROMETHAZINE HYDROCHLORIDE 25 MG/1
25 TABLET ORAL EVERY 6 HOURS PRN
Status: DISCONTINUED | OUTPATIENT
Start: 2017-07-21 | End: 2017-07-23 | Stop reason: HOSPADM

## 2017-07-21 RX ORDER — ONDANSETRON 4 MG/1
4 TABLET, FILM COATED ORAL EVERY 8 HOURS PRN
Status: DISCONTINUED | OUTPATIENT
Start: 2017-07-21 | End: 2017-07-23 | Stop reason: HOSPADM

## 2017-07-21 RX ORDER — SODIUM CHLORIDE 0.9 % (FLUSH) 0.9 %
1-10 SYRINGE (ML) INJECTION AS NEEDED
Status: DISCONTINUED | OUTPATIENT
Start: 2017-07-21 | End: 2017-07-21 | Stop reason: HOSPADM

## 2017-07-21 RX ORDER — OXYCODONE HYDROCHLORIDE AND ACETAMINOPHEN 5; 325 MG/1; MG/1
1 TABLET ORAL EVERY 4 HOURS PRN
Status: DISCONTINUED | OUTPATIENT
Start: 2017-07-22 | End: 2017-07-23 | Stop reason: HOSPADM

## 2017-07-21 RX ADMIN — ONDANSETRON HYDROCHLORIDE 4 MG: 2 SOLUTION INTRAMUSCULAR; INTRAVENOUS at 17:50

## 2017-07-21 RX ADMIN — CLINDAMYCIN PHOSPHATE 900 MG: 18 INJECTION, SOLUTION INTRAVENOUS at 16:31

## 2017-07-21 RX ADMIN — GENTAMICIN SULFATE 290 MG: 40 INJECTION, SOLUTION INTRAMUSCULAR; INTRAVENOUS at 16:57

## 2017-07-21 RX ADMIN — KETOROLAC TROMETHAMINE 30 MG: 30 INJECTION, SOLUTION INTRAMUSCULAR at 17:50

## 2017-07-21 RX ADMIN — OXYTOCIN 20 UNITS: 10 INJECTION INTRAVENOUS at 17:22

## 2017-07-21 RX ADMIN — SODIUM CHLORIDE, POTASSIUM CHLORIDE, SODIUM LACTATE AND CALCIUM CHLORIDE 125 ML/HR: 600; 310; 30; 20 INJECTION, SOLUTION INTRAVENOUS at 15:59

## 2017-07-21 RX ADMIN — PROMETHAZINE HYDROCHLORIDE 25 MG: 25 TABLET ORAL at 20:17

## 2017-07-21 RX ADMIN — SODIUM CHLORIDE, POTASSIUM CHLORIDE, SODIUM LACTATE AND CALCIUM CHLORIDE: 600; 310; 30; 20 INJECTION, SOLUTION INTRAVENOUS at 17:22

## 2017-07-21 RX ADMIN — OXYTOCIN 10 UNITS: 10 INJECTION INTRAVENOUS at 17:09

## 2017-07-21 RX ADMIN — DEXTROSE MONOHYDRATE 450 MG: 50 INJECTION, SOLUTION INTRAVENOUS at 22:22

## 2017-07-21 RX ADMIN — OXYTOCIN 10 UNITS: 10 INJECTION INTRAVENOUS at 17:10

## 2017-07-21 RX ADMIN — BUPIVACAINE HYDROCHLORIDE 1.5 ML: 7.5 INJECTION, SOLUTION EPIDURAL; RETROBULBAR at 16:46

## 2017-07-21 RX ADMIN — OXYCODONE HYDROCHLORIDE AND ACETAMINOPHEN 1 TABLET: 7.5; 325 TABLET ORAL at 19:50

## 2017-07-21 RX ADMIN — HYDROMORPHONE HYDROCHLORIDE 100 MCG: 1 INJECTION, SOLUTION INTRAMUSCULAR; INTRAVENOUS; SUBCUTANEOUS at 16:46

## 2017-07-21 RX ADMIN — FAMOTIDINE 20 MG: 10 INJECTION INTRAVENOUS at 16:31

## 2017-07-21 RX ADMIN — SODIUM CHLORIDE, POTASSIUM CHLORIDE, SODIUM LACTATE AND CALCIUM CHLORIDE 125 ML/HR: 600; 310; 30; 20 INJECTION, SOLUTION INTRAVENOUS at 22:18

## 2017-07-21 RX ADMIN — Medication 80 MCG: at 17:25

## 2017-07-21 RX ADMIN — CETIRIZINE HYDROCHLORIDE 10 MG: 1 SOLUTION ORAL at 16:31

## 2017-07-21 RX ADMIN — SODIUM CITRATE AND CITRIC ACID MONOHYDRATE 15 ML: 500; 334 SOLUTION ORAL at 16:31

## 2017-07-21 RX ADMIN — FENTANYL CITRATE 15 MCG: 50 INJECTION, SOLUTION INTRAMUSCULAR; INTRAVENOUS at 16:46

## 2017-07-21 RX ADMIN — SODIUM CHLORIDE, POTASSIUM CHLORIDE, SODIUM LACTATE AND CALCIUM CHLORIDE 125 ML/HR: 600; 310; 30; 20 INJECTION, SOLUTION INTRAVENOUS at 16:34

## 2017-07-21 NOTE — ANESTHESIA PREPROCEDURE EVALUATION
Anesthesia Evaluation     Patient summary reviewed and Nursing notes reviewed   no history of anesthetic complications:  NPO Solid Status: > 6 hours  NPO Liquid Status: < 2 hours     Airway   Mallampati: II  TM distance: >3 FB  Neck ROM: full  Dental      Pulmonary    (+) a smoker Former,   Cardiovascular - negative cardio ROS        Neuro/Psych  GI/Hepatic/Renal/Endo    (+)  diabetes mellitus gestational,     Musculoskeletal     Abdominal    Substance History      OB/GYN    (+) Pregnant,     Comment: SROM at 40wks      Other                                 Lab Results   Component Value Date    WBC 13.25 (H) 07/21/2017    HGB 11.5 (L) 07/21/2017    HCT 35.3 (L) 07/21/2017    MCV 88.0 07/21/2017     07/21/2017              Anesthesia Plan    ASA 2     spinal     Anesthetic plan and risks discussed with patient and spouse/significant other.

## 2017-07-21 NOTE — NURSING NOTE
Housekeeping staff, Ana Maria and myself moved patient belongings from birthing room 4 to recovery room [facility]. We doubled check room for more belongings and noted none left in birthing room 4.

## 2017-07-21 NOTE — ANESTHESIA PROCEDURE NOTES
Spinal Block    Patient location during procedure: OB  Performed By  CRNA: LO HAWLEY  Preanesthetic Checklist  Completed: patient identified, surgical consent, pre-op evaluation, timeout performed, IV checked, risks and benefits discussed and monitors and equipment checked  Spinal Block Prep:  Patient Position:sitting  Sterile Tech:cap, gloves, mask and sterile barriers  Prep:Chloraprep  Patient Monitoring:blood pressure monitoring and continuous pulse oximetry  Spinal Block Procedure  Approach:midline  Guidance:palpation technique  Location:L3-L4  Needle Type:Pencan  Needle Gauge:25 G  Placement of Spinal needle event:Free flowing CSF  Paresthesia: no  Fluid Appearance:clear  Post Assessment  Patient Tolerance:patient tolerated the procedure well with no apparent complications  Complications no

## 2017-07-21 NOTE — H&P
[facility]  Obstetric History and Physical    Chief Complaint   Patient presents with   • Rupture of Membranes     Ruptured 17 at 2000.  Verified in LDR        Subjective 33    Patient is a 31 y.o. female  currently at 40w3d, who presents with ROM since 2017 at 10pm,without any temp or subsequent contractions..    Her prenatal care is complicated by  prior   desires  with ROM for 36 hours now without progressing to active labor. Patient sign out AMA  yesterday and comes back today stating she knows it is in the best interest of the baby to proceed with a repeat  section.  Her previous obstetric/gynecological history is noted for prior  section for failure to progress and a prior vaginal delivery.    The following portions of the patients history were reviewed and updated as appropriate: current medications, allergies, past medical history, past surgical history, past family history, past social history and problem list .       Prenatal Information:   Maternal Prenatal Labs  Blood Type No results found for: ABO   Rh Status No results found for: RH   Antibody Screen No results found for: ABSCRN   Gonnorhea No results found for: GCCX   Chlamydia No results found for: CLAMYDCU   RPR No results found for: RPR   Syphilis Antibody No results found for: SYPHILIS   Rubella No results found for: RUBELLAIGGIN   Hepatitis B Surface Antigen No results found for: HEPBSAG   HIV-1 Antibody No results found for: LABHIV1   Hepatitis C Antibody No results found for: HEPCAB   Rapid Urin Drug Screen No results found for: AMPMETHU, BARBITSCNUR, LABBENZSCN, LABMETHSCN, LABOPIASCN, THCURSCR, COCAINEUR, AMPHETSCREEN, PROPOXSCN, BUPRENORSCNU, METAMPSCNUR, OXYCODONESCN, TRICYCLICSCN   Group B Strep Culture No results found for: GBSANTIGEN           External Prenatal Results         Pregnancy Outside Results - these were transcribed from office records.  See scanned records for details. Date Time    Hgb      Hct      ABO ^ O  17    Rh ^ Positive  17    Antibody Screen ^ Negative  17    Glucose Fasting GTT      Glucose Tolerance Test 1 hour      Glucose Tolerance Test 3 hour      Gonorrhea (discrete) ^ Negative  17    Chlamydia (discrete) ^ Negative  17    RPR ^ Negative  17    VDRL      Syphillis Antibody      Rubella ^ Immune  17    HBsAg ^ Negative  17    Herpes Simplex Virus PCR ^ HSV 1  17    Herpes Simplex VIrus Culture      HIV ^ Negative  17    Hep C RNA Quant PCR      Hep C Antibody      Urine Drug Screen      AFP      Group B Strep ^ Negative  17    GBS Susceptibility to Clindamycin      GBS Susceptibility to Eythromycin      Fetal Fibronectin      Genetic Testing, Maternal Blood             Legend: ^: Historical            Past OB History:     Obstetric History       T2      TAB0   SAB2   E0   M0   L2       # Outcome Date GA Lbr Paresh/2nd Weight Sex Delivery Anes PTL Lv   5 Current            4 SAB 13           3 Term 07/29/10    M CS-LTranv Spinal  Y   2 SAB 09           1 Term 05     Vag-Spont EPI  Y          Past Medical History: Past Medical History:   Diagnosis Date   • Anemia    • Gestational diabetes     with pregnancy      Past Surgical History Past Surgical History:   Procedure Laterality Date   •  SECTION        Family History: Family History   Problem Relation Age of Onset   • Stroke Mother    • Coronary artery disease Mother       Social History:  reports that she quit smoking about 4 weeks ago. She does not have any smokeless tobacco history on file.   reports that she does not drink alcohol.   reports that she does not use illicit drugs.          ROS:   Constitutional: negative for chills, fatigue, fevers, night sweats and weight loss   Ears, nose, mouth, throat, and face: negative for earaches, hearing loss and  sore throat   Respiratory: negative for cough, dyspnea on exertion and  pleurisy/chest pain    Cardiovascular: negative for chest pain, chest pressure/discomfort, exertional  chest pressure/discomfort, fatigue, irregular heart beat, lower extremity edema,  orthopnea, palpitations, syncope and tachypnea   Gastrointestinal: negative for change in bowel habits, constipation, diarrhea,  dyspepsia, dysphagia, jaundice, melena, nausea and vomiting   Genitourinary:negative for genital lesions, hot flashes, sexual problems and  vaginal discharge   Integument/breast: negative for breast lump, breast tenderness, dryness, nipple  discharge, skin color change and skin lesion(s)   Hematologic/lymphatic: negative for easy bruising and lymphadenopathy   Musculoskeletal:negative for arthralgias, back pain, muscle weakness,  myalgias, neck pain and stiff joints   Neurological: negative for coordination problems, dizziness, gait problems,  headaches, seizures, speech problems, tremors, vertigo and weakness   Endocrine: negative for diabetic symptoms including blurry vision, polydipsia,  polyuria, pruritus and skin dryness   Allergic/Immunologic: negative     Objective     Vital Signs Range for the last 24 hours  Temperature:     Temp Source:     BP:     Pulse:     Respirations:     SPO2:     O2 Amount (l/min):     O2 Devices O2 Device: room air   Weight: Weight:  [171 lb (77.6 kg)] 171 lb (77.6 kg)     Physical Examination: General appearance - alert, well appearing, and in no distress and oriented to person, place, and time  Mental status - alert, oriented to person, place, and time  Eyes - pupils equal and reactive, extraocular eye movements intact  Ears - bilateral TM's and external ear canals normal  Nose - normal and patent, no erythema, discharge or polyps  Mouth - mucous membranes moist, pharynx normal without lesions  Neck - supple, no significant adenopathy, thyroid exam: thyroid is normal in size without nodules or tenderness  Lymphatics - no palpable lymphadenopathy, no  hepatosplenomegaly  Chest - clear to auscultation, no wheezes, rales or rhonchi, symmetric air entry  Heart - normal rate and regular rhythm  Abdomen - soft, nontender, nondistended, no masses or organomegaly. Uterus minimally tender, irregular contractions, vertex presentation. EFW 7 pounds  Breasts - breasts appear normal, no suspicious masses, no skin or nipple changes or axillary nodes  Pelvic - normal external genitalia, vulva, vagina, cervix, uterus and adnexa  Rectal - normal rectal, no masses  Back exam - full range of motion, no tenderness, palpable spasm or pain on motion  Neurological - alert, oriented, normal speech, no focal findings or movement disorder noted  Musculoskeletal - no joint tenderness, deformity or swelling  Extremities - peripheral pulses normal, no pedal edema, no clubbing or cyanosis  Skin - normal coloration and turgor, no rashes, no suspicious skin lesions noted    Presentation: vertex   Cervix: Exam by: Method: sterile exam per RN   Dilation: Dilation: 4   Effacement: Cervical Effacement: 70%   Station: Station: -2     Fetal Heart Rate Assessment   Method: Fetal HR Assessment Method: external   Beats/min: Fetal HR (Beats/Min): 130   Baseline: Fetal HR Baseline: normal range (110-160 bpm)   Varibility: Fetal HR Variability: moderate (amplitude range 6 to 25 bpm)   Accels: Fetal HR Accelerations: greater than/equal to 15 bpm, lasting at least 15 seconds   Decels: Fetal HR Decelerations: early   Tracing Category:       Uterine Assessment   Method: Method: TOCO (external toco transducer), palpation   Frequency (min):     Ctx Count in 10 min:     Duration:     Intensity: Contraction Intensity: mild by palpation   Intensity by IUPC:     Resting Tone: Uterine Resting Tone: soft by palpation   Resting Tone by IUPC:     [place] Units:       Laboratory Results: Reviewed  Radiology Review: Yes      Assessment/Plan       Assessment:  1.  Intrauterine pregnancy at 40w3d weeks gestation with  nonreactive fetal status, repeat  section, prolonged ROM.    2.  PPROM  rule out chorioamnionitis  3.  Obstetrical history significant for is remarkable for .primary  section secondary to failure to progress 7 years ago. Now with ROM for 36 hours not in active labor. Pt agrees to proceed on with a repeat  section for the benefit of the fetus.  4.  GBS status:negative    Plan:  1. Antibiotics for prolong ROM and prophylaxis  2. Plan of care has been reviewed with patient and significant other.  Options and alternative treatments have been discussed. She states she knows she needs a repeat  section now.  3.  Risks, benefits of treatment plan have been discussed.  4.  All questions have been answered.  5.  Repeat section no tubal at this time.      Chato Ramirez MD  2017  4:11 PM

## 2017-07-21 NOTE — NURSING NOTE
Pt presents with ROM and contractions.  Pt left AMA on 17 .  Rupture verified in LDR and pt refused

## 2017-07-21 NOTE — L&D DELIVERY NOTE
[facility]  Vaginal Delivery Note    Delivery     Delivery: , Low Transverse     YOB: 2016    Time of Birth: 5:08 PM      Anesthesia: Spinal     Delivering clinician: Chato Ramirez    Forceps?   Yes  Forcep Delivery   Forceps type:      Application location: Outlet    Number of pulls:      Total application time:      Applied by: CHATO RAMIREZ MD    Failed? No         Vacuum? No    Shoulder dystocia present: No        Delivery narrative:  Repeat  section    Infant    Findings: male  infant     Infant observations: Weight: 7 lb 0.9 oz (3.2 kg)   Length:    in  Observations/Comments:         Apgars: 5   @ 1 minute /    8   @ 5 minutes   Infant Name:      Placenta, Cord, and Fluid    Placenta delivered  Spontaneous  at     5:09 PM     Cord: 3 vessels  present.   Nuchal Cord?  no   Cord blood obtained: Yes    Cord gases obtained:  No    Cord gas results: Venous:  No results found for: PHCVEN    Arterial:  No results found for: PHCART     Repair    Episiotomy: Not recorded    Lacerations: No   Estimated Blood Loss:  750 ccs       Suture used for repair: 0 Vicryl and 2-0 chromic gut     Complications  Prolong ROM  In a repeat  section    Disposition  Mother to Mother Baby/Postpartum  in stable condition currently.  Baby to remains with mom  in stable condition currently.      Chato Ramirez MD  17  5:43 PM

## 2017-07-21 NOTE — PROGRESS NOTES
SW has been notified of PT leaving AMA on 7/20/17 with confirmed ROM. SW has notified state  of potential risk of harm to get documentation started, as it is late afternoon on Friday (reference # [id number]). SW will follow and notify the state accordingly if this child is delivered with any mal-effects from delay in care due to mother leaving AMA. Please notify SW of any concerns or issus as they arise. Janice Hollins MSW

## 2017-07-21 NOTE — OP NOTE
[facility]  Taylor Galindo  : 1985  MRN: [id number]  CSN: [id number]  Date: 2017    Operative Note     SECTION REPEAT      Pre-op Diagnosis:  PREVIOUS  DELIVERY    Post-op Diagnosis:  Same with viable male infant   Procedure: Procedure(s):   SECTION REPEAT WITHOUT TUBAL LIGATION    Surgeon: Dr Ramirez           Estimated Blood Loss: 750   mls   Fluids: 1500   mls   UOP: 350   mls   Drains: none   ABx: yes     Specimens:  placenta   Findings: Live viable male, abdominal pelvic adhesions, uterine fibroids   Complications: none      INDICATION: Taylor Galindo is a 31 y.o. female  At term, ROM, early labor, repeat  section.     PROCEDURE: After informed consent was obtained, the patient was taken to the operating room where spinal anesthesia was administered. Time out procedure was completed and perioperative antibiotics were administered. She was placed in the supine position with leftward tilt and her abdomen was prepped and draped in normal sterile fashion after a Rosario catheter was placed by nursing staff.   After confirming adequate anesthesia, a Pfannenstiel incision was made with a scalpel 2 fingerbreadths above the pubic symphysis.  This was carried down sharply to the underlying fascia which was incised in the midline.  The fascial incision was extended laterally with Ridley scissors.  The fascial edges were then elevated and the underlying rectus muscles dissected off with sharp technique.  The rectus muscles were sharply  in the midline and the underlying peritoneum identified and entered sharply. Noted multiple peritoneal adhesion and bladder flap.  The peritoneal incision was then extended and the bladder blade inserted.  The vesicouterine peritoneum was sharply incised with Metzenbaum scissors to create a bladder flap.  A low transverse uterine incision was made with a clean scalpel.  The uterine incision was bluntly extended and no amniotic fluid was  noted..  The head of the infant was delivered through the incision with the aide of forceps and the remainder the infant delivered with fundal pressure.  The infant was handed over to the  nurses, the cord was clamped and cut. Cultures were taken on the amnion side and the chorion side.  The placenta spontaneously expressed.The uterus was exteriorized and cleared of clot and debris with a moist laparotomy sponge.  The uterine incision was closed with a running inter-locked suture of 0 Vicryl.  A second imbricating layer of 0 Vicryl was placed for reinforcement.  The uterine incision was hemostatic.  The cul-de-sac was then irrigated with normal saline and the uterus gently returned to the abdomen.  The uterine incision was reinspected with hemostasis noted.  The parietal peritoneum was then closed with a running suture of 0 Chromic.  The subfascial spaces and rectus muscles were inspected with hemostasis noted.  The fascia was then closed with a running suture of 0 Chromic.  The subcutaneous tissues were irrigated and made hemostatic with Bovie cautery.  The subcutaneous tissues were reapproximated with running suture of 2-0 Vicryl.  The skin was closed with metallic clips.  A sterile dressing was then applied.    After expressing the uterus the patient was transitioned to the stretcher and taken to the recovery room in stable condition.  She tolerated the procedure well without complications.  All sponge, needle, and instrument counts were correct ×3 per the OR staff.    Chato Ramirez MD   2017  5:48 PM

## 2017-07-22 LAB
BASOPHILS # BLD AUTO: 0.01 10*3/MM3 (ref 0–0.2)
BASOPHILS NFR BLD AUTO: 0.1 % (ref 0–2)
DEPRECATED RDW RBC AUTO: 46.1 FL (ref 40–54)
EOSINOPHIL # BLD AUTO: 0.08 10*3/MM3 (ref 0–0.7)
EOSINOPHIL NFR BLD AUTO: 0.6 % (ref 0–4)
ERYTHROCYTE [DISTWIDTH] IN BLOOD BY AUTOMATED COUNT: 14.2 % (ref 12–15)
HCT VFR BLD AUTO: 30.1 % (ref 37–47)
HGB BLD-MCNC: 9.8 G/DL (ref 12–16)
IMM GRANULOCYTES # BLD: 0.05 10*3/MM3 (ref 0–0.03)
IMM GRANULOCYTES NFR BLD: 0.4 % (ref 0–5)
LYMPHOCYTES # BLD AUTO: 1.02 10*3/MM3 (ref 0.72–4.86)
LYMPHOCYTES NFR BLD AUTO: 7.9 % (ref 15–45)
MCH RBC QN AUTO: 29 PG (ref 28–32)
MCHC RBC AUTO-ENTMCNC: 32.6 G/DL (ref 33–36)
MCV RBC AUTO: 89.1 FL (ref 82–98)
MONOCYTES # BLD AUTO: 0.49 10*3/MM3 (ref 0.19–1.3)
MONOCYTES NFR BLD AUTO: 3.8 % (ref 4–12)
NEUTROPHILS # BLD AUTO: 11.22 10*3/MM3 (ref 1.87–8.4)
NEUTROPHILS NFR BLD AUTO: 87.2 % (ref 39–78)
PLATELET # BLD AUTO: 181 10*3/MM3 (ref 130–400)
PMV BLD AUTO: 10.5 FL (ref 6–12)
RBC # BLD AUTO: 3.38 10*6/MM3 (ref 4.2–5.4)
WBC NRBC COR # BLD: 12.87 10*3/MM3 (ref 4.8–10.8)

## 2017-07-22 PROCEDURE — 85025 COMPLETE CBC W/AUTO DIFF WBC: CPT | Performed by: OBSTETRICS & GYNECOLOGY

## 2017-07-22 PROCEDURE — 94799 UNLISTED PULMONARY SVC/PX: CPT

## 2017-07-22 PROCEDURE — 25010000002 GENTAMICIN PER 80 MG: Performed by: OBSTETRICS & GYNECOLOGY

## 2017-07-22 RX ORDER — MEPERIDINE HYDROCHLORIDE 50 MG/1
50 TABLET ORAL EVERY 4 HOURS PRN
Status: DISCONTINUED | OUTPATIENT
Start: 2017-07-22 | End: 2017-07-23 | Stop reason: HOSPADM

## 2017-07-22 RX ADMIN — DEXTROSE MONOHYDRATE 450 MG: 50 INJECTION, SOLUTION INTRAVENOUS at 16:26

## 2017-07-22 RX ADMIN — GENTAMICIN SULFATE 60 MG: 40 INJECTION, SOLUTION INTRAMUSCULAR; INTRAVENOUS at 18:22

## 2017-07-22 RX ADMIN — SODIUM CHLORIDE, POTASSIUM CHLORIDE, SODIUM LACTATE AND CALCIUM CHLORIDE 125 ML/HR: 600; 310; 30; 20 INJECTION, SOLUTION INTRAVENOUS at 10:09

## 2017-07-22 RX ADMIN — DEXTROSE MONOHYDRATE 450 MG: 50 INJECTION, SOLUTION INTRAVENOUS at 11:42

## 2017-07-22 RX ADMIN — PRENATAL VIT W/ FE FUMARATE-FA TAB 27-0.8 MG 1 TABLET: 27-0.8 TAB at 10:00

## 2017-07-22 RX ADMIN — OXYCODONE HYDROCHLORIDE AND ACETAMINOPHEN 2 TABLET: 7.5; 325 TABLET ORAL at 10:00

## 2017-07-22 RX ADMIN — GENTAMICIN SULFATE 60 MG: 40 INJECTION, SOLUTION INTRAMUSCULAR; INTRAVENOUS at 01:24

## 2017-07-22 RX ADMIN — DOCUSATE SODIUM 100 MG: 100 CAPSULE, LIQUID FILLED ORAL at 10:00

## 2017-07-22 RX ADMIN — OXYCODONE HYDROCHLORIDE AND ACETAMINOPHEN 2 TABLET: 7.5; 325 TABLET ORAL at 14:46

## 2017-07-22 RX ADMIN — MEPERIDINE HYDROCHLORIDE 50 MG: 50 TABLET ORAL at 20:06

## 2017-07-22 RX ADMIN — GENTAMICIN SULFATE 60 MG: 40 INJECTION, SOLUTION INTRAMUSCULAR; INTRAVENOUS at 10:00

## 2017-07-22 RX ADMIN — IBUPROFEN 600 MG: 200 TABLET, FILM COATED ORAL at 17:56

## 2017-07-22 RX ADMIN — DEXTROSE MONOHYDRATE 450 MG: 50 INJECTION, SOLUTION INTRAVENOUS at 04:02

## 2017-07-22 RX ADMIN — OXYCODONE HYDROCHLORIDE AND ACETAMINOPHEN 2 TABLET: 7.5; 325 TABLET ORAL at 03:00

## 2017-07-22 RX ADMIN — IBUPROFEN 600 MG: 200 TABLET, FILM COATED ORAL at 10:00

## 2017-07-22 NOTE — LACTATION NOTE
This note was copied from a baby's chart.  Male infant, Derrick, delivered via  at 1708. Birth weight 7-0.9 (3200g). Called to LDR to assist. Mother reports she breast fed her second child for 3.5 years. Infant very sleepy. Hand expressed colostrum into infant's mouth. Infant latched after several attempts. Intermittent sucking and swallowing noted. Constant stimulation needed. Gave and reviewed initial breastfeeding packet. Encouraged frequent feedings, hand expression, and skin to skin. Questions denied.    Maternal Hx: , Former Smoker, Anemia, Gestational Diabetes  Prenatal Medications: PNV, Vit-B12  Pump: No. Rx faxed to [facility].

## 2017-07-22 NOTE — PROGRESS NOTES
[facility]   PROGRESS NOTE    Post-Op Day 1 S/P   Subjective     Patient reports:  Pain is well controlled with ibuprofen (OTC).  She is  ambulating. Tolerating diet. Tolerating po -- normal.  Intake -- c/o of tolerating po solids.   Voiding - without difficulty; no flatus reported..  Vaginal bleeding is as much as expected.      Objective      Vitals: Vital Signs Range for the last 24 hours  Temperature: Temp:  [97.3 °F (36.3 °C)-98.6 °F (37 °C)] 98 °F (36.7 °C)   Temp Source: Temp src: Temporal Artery    BP: BP: ()/(47-75) 92/54   Pulse: Heart Rate:  [] 87   Respirations: Resp:  [16-18] 16   SPO2: SpO2:  [94 %-99 %] 97 %   O2 Amount (l/min):     O2 Devices O2 Device: room air   Weight: Weight:  [171 lb (77.6 kg)] 171 lb (77.6 kg)            Physical Exam    Lungs clear to auscultation bilaterally   Abdomen Soft, non-tender, normal bowel sounds; no bruits, organomegaly or masses.   Incision  healing well   Extremities extremities normal, atraumatic, no cyanosis or edema     Labs:   WBC WBC   Date Value Ref Range Status   2017 12.87 (H) 4.80 - 10.80 10*3/mm3 Final   2017 13.25 (H) 4.80 - 10.80 10*3/mm3 Final      HGB Hemoglobin   Date Value Ref Range Status   2017 9.8 (L) 12.0 - 16.0 g/dL Final   2017 11.5 (L) 12.0 - 16.0 g/dL Final      HCT Hematocrit   Date Value Ref Range Status   2017 30.1 (L) 37.0 - 47.0 % Final   2017 35.3 (L) 37.0 - 47.0 % Final      Platlets Platelets   Date Value Ref Range Status   2017 181 130 - 400 10*3/mm3 Final   2017 223 130 - 400 10*3/mm3 Final            I reviewed the patient's new clinical results.    Assessment/Plan      Active Problems:    Pregnancy    S/P repeat low transverse       Assessment:    Taylor M Young is Day 1  post-partum  , Low Transverse    .       Plan:  saline lock IV fluids, advance diet  normal diet as tolerated, continue post op care and make sure patient gets her  stool softners - colace, senekot, and bambi..        Chato Ramirez MD  7/22/2017  6:32 AM

## 2017-07-22 NOTE — ANESTHESIA POSTPROCEDURE EVALUATION
"Patient: Taylor Galindo    Procedure Summary     Date Anesthesia Start Anesthesia Stop Room / Location    17 1508 1548 [facility] LABOR DELIVERY 1 /  [facility] LABOR DELIVERY       Procedure Diagnosis Surgeon Provider     SECTION REPEAT WITHOUT TUBAL LIGATION  (N/A Abdomen) (PREVIOUS  DELIVERY ) MD Seema Dai, CRNA          Anesthesia Type: spinal  Last vitals  BP        Temp        Pulse       Resp        SpO2          Post Anesthesia Care and Evaluation    Patient location during evaluation: bedside  Patient participation: complete - patient participated  Level of consciousness: awake and alert  Pain management: adequate  Airway patency: patent  Anesthetic complications: No anesthetic complications    Cardiovascular status: acceptable  Respiratory status: acceptable  Hydration status: acceptable  Post Neuraxial Block status: Motor and sensory function returned to baseline and No signs or symptoms of PDPH  Comments: Blood pressure 98/60, pulse 96, temperature 98 °F (36.7 °C), temperature source Temporal Artery , resp. rate 16, height 60\" (152.4 cm), weight 171 lb (77.6 kg), SpO2 98 %, currently breastfeeding.        "

## 2017-07-22 NOTE — PLAN OF CARE
Problem: Patient Care Overview (Adult)  Goal: Plan of Care Review  Outcome: Ongoing (interventions implemented as appropriate)    17 0604   Coping/Psychosocial Response Interventions   Plan Of Care Reviewed With patient   Patient Care Overview   Progress improving   Outcome Evaluation   Outcome Summary/Follow up Plan vitals stable, pain control with PO meds, breastfeeding well, infant in room at all times,  care education continues       Goal: Adult Individualization and Mutuality  Outcome: Ongoing (interventions implemented as appropriate)  Goal: Discharge Needs Assessment  Outcome: Ongoing (interventions implemented as appropriate)    Problem: Labor (Cervical Ripen, Induct, Augment) (Adult,Obstetrics,Pediatric)  Goal: Signs and Symptoms of Listed Potential Problems Will be Absent or Manageable (Labor)  Outcome: Outcome(s) achieved Date Met:  17    Problem: Postpartum ( Delivery) (Adult)  Goal: Signs and Symptoms of Listed Potential Problems Will be Absent or Manageable (Postpartum)  Outcome: Ongoing (interventions implemented as appropriate)    Problem: Anesthesia/Analgesia, Neuraxial (Obstetrics)  Goal: Signs and Symptoms of Listed Potential Problems Will be Absent or Manageable (Anesthesia/Analgesia, Neuraxial)  Outcome: Outcome(s) achieved Date Met:  17    Problem: Fall Risk  (Adult,Obstetrics,Pediatric)  Goal: Identify Related Risk Factors and Signs and Symptoms  Outcome: Ongoing (interventions implemented as appropriate)  Goal: Absence of Maternal Fall  Outcome: Ongoing (interventions implemented as appropriate)  Goal: Absence of Elora Fall/Drop  Outcome: Ongoing (interventions implemented as appropriate)    Problem: Breastfeeding (Adult,NICU,,Obstetrics,Pediatric)  Goal: Signs and Symptoms of Listed Potential Problems Will be Absent or Manageable (Breastfeeding)  Outcome: Ongoing (interventions implemented as appropriate)

## 2017-07-23 VITALS
OXYGEN SATURATION: 96 % | HEART RATE: 95 BPM | BODY MASS INDEX: 33.57 KG/M2 | TEMPERATURE: 97.7 F | SYSTOLIC BLOOD PRESSURE: 113 MMHG | WEIGHT: 171 LBS | HEIGHT: 60 IN | DIASTOLIC BLOOD PRESSURE: 67 MMHG | RESPIRATION RATE: 18 BRPM

## 2017-07-23 PROBLEM — O42.90 RUPTURE OF MEMBRANES WITH DELAY OF DELIVERY: Status: ACTIVE | Noted: 2017-07-23

## 2017-07-23 PROCEDURE — 94799 UNLISTED PULMONARY SVC/PX: CPT

## 2017-07-23 RX ORDER — IBUPROFEN 600 MG/1
600 TABLET ORAL EVERY 8 HOURS PRN
Qty: 30 TABLET | Refills: 0 | Status: SHIPPED | OUTPATIENT
Start: 2017-07-23

## 2017-07-23 RX ORDER — MEPERIDINE HYDROCHLORIDE 50 MG/1
50 TABLET ORAL EVERY 6 HOURS PRN
Qty: 30 TABLET | Refills: 0 | Status: SHIPPED | OUTPATIENT
Start: 2017-07-23 | End: 2017-09-28

## 2017-07-23 RX ADMIN — IBUPROFEN 600 MG: 200 TABLET, FILM COATED ORAL at 06:53

## 2017-07-23 RX ADMIN — IBUPROFEN 600 MG: 200 TABLET, FILM COATED ORAL at 15:46

## 2017-07-23 RX ADMIN — PRENATAL VIT W/ FE FUMARATE-FA TAB 27-0.8 MG 1 TABLET: 27-0.8 TAB at 09:55

## 2017-07-23 RX ADMIN — DOCUSATE SODIUM 100 MG: 100 CAPSULE, LIQUID FILLED ORAL at 09:55

## 2017-07-23 RX ADMIN — MEPERIDINE HYDROCHLORIDE 50 MG: 50 TABLET ORAL at 11:01

## 2017-07-23 RX ADMIN — MEPERIDINE HYDROCHLORIDE 50 MG: 50 TABLET ORAL at 15:46

## 2017-07-23 RX ADMIN — MEPERIDINE HYDROCHLORIDE 50 MG: 50 TABLET ORAL at 00:13

## 2017-07-23 RX ADMIN — MEPERIDINE HYDROCHLORIDE 50 MG: 50 TABLET ORAL at 06:53

## 2017-07-23 NOTE — PLAN OF CARE
Problem: Patient Care Overview (Adult)  Goal: Adult Individualization and Mutuality  Outcome: Ongoing (interventions implemented as appropriate)  Goal: Discharge Needs Assessment  Outcome: Ongoing (interventions implemented as appropriate)    Problem: Postpartum ( Delivery) (Adult)  Goal: Signs and Symptoms of Listed Potential Problems Will be Absent or Manageable (Postpartum)  Outcome: Ongoing (interventions implemented as appropriate)    Problem: Breastfeeding (Adult,NICU,,Obstetrics,Pediatric)  Goal: Signs and Symptoms of Listed Potential Problems Will be Absent or Manageable (Breastfeeding)  Outcome: Ongoing (interventions implemented as appropriate)

## 2017-07-23 NOTE — PROGRESS NOTES
[facility]   PROGRESS NOTE    Post-Op Day 2 S/P   Subjective     Patient reports:  Pain is well controlled with narcotic analgesics including demerol.  She is  ambulating. Tolerating diet. Tolerating po -- normal.  Intake -- c/o of tolerating po solids.   Voiding - without difficulty; flatus reported..  Vaginal bleeding is as much as expected.      Objective      Vitals: Vital Signs Range for the last 24 hours  Temperature: Temp:  [97.8 °F (36.6 °C)-98.1 °F (36.7 °C)] 98 °F (36.7 °C)   Temp Source: Temp src: Temporal Artery    BP: BP: (95-98)/(54-61) 95/55   Pulse: Heart Rate:  [84-99] 84   Respirations: Resp:  [16-18] 18   SPO2: SpO2:  [98 %-100 %] 99 %   O2 Amount (l/min):     O2 Devices O2 Device: room air   Weight:              Physical Exam    Lungs clear to auscultation bilaterally   Abdomen Soft, non-tender, normal bowel sounds; no bruits, organomegaly or masses.   Incision  healing well   Extremities extremities normal, atraumatic, no cyanosis or edema     Labs:   WBC WBC   Date Value Ref Range Status   2017 12.87 (H) 4.80 - 10.80 10*3/mm3 Final   2017 13.25 (H) 4.80 - 10.80 10*3/mm3 Final      HGB Hemoglobin   Date Value Ref Range Status   2017 9.8 (L) 12.0 - 16.0 g/dL Final   2017 11.5 (L) 12.0 - 16.0 g/dL Final      HCT Hematocrit   Date Value Ref Range Status   2017 30.1 (L) 37.0 - 47.0 % Final   2017 35.3 (L) 37.0 - 47.0 % Final      Platlets Platelets   Date Value Ref Range Status   2017 181 130 - 400 10*3/mm3 Final   2017 223 130 - 400 10*3/mm3 Final            I reviewed the patient's new clinical results.    Assessment/Plan      Active Problems:    Pregnancy    S/P repeat low transverse       Assessment:    Taylor Galindo is Day 2  post-partum  , Low Transverse    .       Plan:  plan for discharge today.        Chato Ramirez MD  2017  6:48 AM

## 2017-07-23 NOTE — DISCHARGE SUMMARY
Discharge Summary     [place]  Taylor Galindo  : 1985  MRN: [id number]  CSN: [id number]    Date of Admission: 2017   Date of Discharge:  2017   Delivering Physician: Isadora Cotto MD       Admission Diagnosis: 1. Pregnancy [Z33.1]   Discharge Diagnosis: 1. Pregnancy at 40w3d - delivered       Procedures: 1. Repeat     Hospital Course  See the completed delivery note for details regarding antepartum course and delivery.    Her post-partum course was unremarkable.  On POD # 2 she felt like she ready ready for D/C.  She was evaluated by , who agreed she was ready to be discharged home.  She had no febrile morbidity. She had normal bowel and bladder function and was hemodynamically stable.  Her wound was healing well without obvious signs of infections.    Infant  male  fetus weighing 7 lb 0.9 oz (3.2 kg)   Apgars -  5  @ 1 minute /  8  @ 5 minutes.    Discharge labs  Lab Results   Component Value Date    WBC 12.87 (H) 2017    HGB 9.8 (L) 2017    HCT 30.1 (L) 2017     2017       Discharge Medications   Taylor Galindo   Home Medication Instructions JIGAR:[id number]    Printed on:17 0649   Medication Information                      Prenatal Vit-Fe Fumarate-FA (PRENATAL, CLASSIC, VITAMIN) 28-0.8 MG tablet tablet  Take 1 tablet by mouth.             vitamin B-12 (CYANOCOBALAMIN) 500 MCG tablet  Take 500 mcg by mouth Daily.                 Discharge Disposition    Condition on Discharge: good   Follow-up: Call office next week for an appointment to remove clips.      Chato Ramirez MD  2017

## 2017-07-23 NOTE — NURSING NOTE
1900  To pt room for pain med adm explained order for 2 percocet 7.5 is no longer active  But can have one percocet 7.5 pt states the 2 tablets did not do any good last time so it is pointless to take one.  Reported back to pt's nurse what pt states

## 2017-07-23 NOTE — PLAN OF CARE
Problem: Patient Care Overview (Adult)  Goal: Plan of Care Review  Outcome: Ongoing (interventions implemented as appropriate)    17 0206   Coping/Psychosocial Response Interventions   Plan Of Care Reviewed With patient   Patient Care Overview   Progress improving   Outcome Evaluation   Outcome Summary/Follow up Plan VSS, po pain meds changed and working better, FF-[id number]-ML small rubra, voiding, up ad demi, breastfeeding       Goal: Adult Individualization and Mutuality  Outcome: Ongoing (interventions implemented as appropriate)  Goal: Discharge Needs Assessment  Outcome: Ongoing (interventions implemented as appropriate)    Problem: Postpartum ( Delivery) (Adult)  Goal: Signs and Symptoms of Listed Potential Problems Will be Absent or Manageable (Postpartum)  Outcome: Ongoing (interventions implemented as appropriate)    Problem: Breastfeeding (Adult,NICU,Talmo,Obstetrics,Pediatric)  Goal: Signs and Symptoms of Listed Potential Problems Will be Absent or Manageable (Breastfeeding)  Outcome: Ongoing (interventions implemented as appropriate)

## 2017-07-23 NOTE — PLAN OF CARE
Problem: Patient Care Overview (Adult)  Goal: Plan of Care Review  Outcome: Ongoing (interventions implemented as appropriate)  Refusing percocet this pm. States not helping. Did take motrin though and used heat to abd. States it helped some. Was going to take it till  came back. Anesthesia saw and Dr. Ramirez called. No change in orders. Voiding well. Had shower. Incision healing well. Staples is place. Not been out in [place] as yet. Had nap earlier. Breastfeeding all the time.    07/22/17 1887   Coping/Psychosocial Response Interventions   Plan Of Care Reviewed With patient;spouse   Patient Care Overview   Progress progress toward functional goals as expected

## 2017-07-24 NOTE — LACTATION NOTE
Pt, man, and infant presented for weight check and BFing help.  They were not scheduled. Mother w difficulty ambulating due to c/s.  Both were frustrated; did not have DC instructions. I offered to look at same to verify where they should be. Saw pt in outpatient clinic for weight check at their request. Infant, Derrick Riley, weighed 6-11; was 6-12.4 at DC. Was 7-0.0 at birth. Mother says milk has come in today. She latched infant in my presence well, but no feednig cues by infant even though he was awake. She then remembered that she nursed infant while in lobby. Gave her lactation card for future appointment if desired. Found DC instructions per [facility], noting that appointment had [facility] at 10:30.  Called them to inform them of all. Spoke with Sarah Camerno.  Gave father [facility] number to reschedule missed appointment also. Informed Nisha of weights.

## 2017-07-24 NOTE — PAYOR COMM NOTE
"[facility]    TEX    [phone]  OR   [phone]    Taylor Galindo (31 y.o. Female)     Date of Birth Social Security Number Address Home Phone MRN    1985  [address] [place] [address] [phone] [id number]    [employer] Marital Status          None Single       Admission Date Admission Type Admitting Provider Attending Provider Department, Room/Bed    17 Elective Isadora Cotto MD  [facility] MOTHER BABY 2A, M223/1    Discharge Date Discharge Disposition Discharge Destination        2017 Home or Self Care             Attending Provider: (none)    Allergies:  Penicillins, Zoloft [Sertraline Hcl]    Isolation:  None   Infection:  None   Code Status:  Prior    Ht:  60\" (152.4 cm)   Wt:  171 lb (77.6 kg)    Admission Cmt:  None   Principal Problem:  None                Active Insurance as of 2017     Primary Coverage     Payor Plan Insurance Group Employer/Plan Group    PASSPORT PASSPORT      Payor Plan Address Payor Plan Phone Number Effective From Effective To    [address] [phone] 2017     [place] [address]       Subscriber Name Subscriber Birth Date Member ID       TAYLOR GALINDO 1985 [id number]                 Emergency Contacts      (Rel.) Home Phone Work Phone Mobile Phone    Marco Antonio Mcbride (Significant Other) [phone] -- [phone]        Chato Ramirez MD Physician Signed OB Delivered L&D Delivery Note Date of Service: 2017  5:43 PM           [facility]  Vaginal Delivery Note     Delivery      Delivery: , Low Transverse     YOB: 2016    Time of Birth: 5:08 PM    Anesthesia: Spinal     Delivering clinician: Chato Ramirez    Forceps?   Yes  Forcep Delivery   Forceps type:      Application location: Outlet    Number of pulls:      Total application time:      Applied by: CHATO RAMIREZ MD    Failed? No        Vacuum? No  "   Shoulder dystocia present: No         Delivery narrative:  Repeat  section     Infant     Findings: male  infant   Infant observations: Weight: 7 lb 0.9 oz (3.2 kg)   Length:    in  Observations/Comments:         Apgars: 5   @ 1 minute /     8   @ 5 minutes   Infant Name:              Placenta, Cord, and Fluid      Placenta delivered  Spontaneous  at     5:09 PM      Cord: 3 vessels  present.   Nuchal Cord?  no    Cord blood obtained: Yes     Cord gases obtained:                    No     Cord gas results: Venous:  No results found for: PHCVEN      Arterial:  No results found for: PHCART       Repair     Episiotomy: Not recorded    Lacerations: No   Estimated Blood Loss:  750 ccs      Suture used for repair: 0 Vicryl and 2-0 chromic gut      Complications  Prolong ROM  In a repeat  section     Disposition  Mother to Mother Baby/Postpartum  in stable condition currently.  Baby to remains with mom  in stable condition currently.        Chato Ramirez MD  17  5:43 PM                            MOM AND INFANT DISCHARGED ON 17         History & Physical      Chato Ramirez MD at 2017  4:11 PM          [facility]  Obstetric History and Physical    Chief Complaint   Patient presents with   • Rupture of Membranes     Ruptured 17 at 2000.  Verified in LDR        Subjective 33    Patient is a 31 y.o. female  currently at 40w3d, who presents with ROM since 2017 at 10pm,without any temp or subsequent contractions..    Her prenatal care is complicated by  prior   desires  with ROM for 36 hours now without progressing to active labor. Patient sign out AMA  yesterday and comes back today stating she knows it is in the best interest of the baby to proceed with a repeat  section.  Her previous obstetric/gynecological history is noted for prior  section for failure to progress and a prior vaginal delivery.    The following portions of the  patients history were reviewed and updated as appropriate: current medications, allergies, past medical history, past surgical history, past family history, past social history and problem list .       Prenatal Information:   Maternal Prenatal Labs  Blood Type No results found for: ABO   Rh Status No results found for: RH   Antibody Screen No results found for: ABSCRN   Gonnorhea No results found for: GCCX   Chlamydia No results found for: CLAMYDCU   RPR No results found for: RPR   Syphilis Antibody No results found for: SYPHILIS   Rubella No results found for: RUBELLAIGGIN   Hepatitis B Surface Antigen No results found for: HEPBSAG   HIV-1 Antibody No results found for: LABHIV1   Hepatitis C Antibody No results found for: HEPCAB   Rapid Urin Drug Screen No results found for: AMPMETHU, BARBITSCNUR, LABBENZSCN, LABMETHSCN, LABOPIASCN, THCURSCR, COCAINEUR, AMPHETSCREEN, PROPOXSCN, BUPRENORSCNU, METAMPSCNUR, OXYCODONESCN, TRICYCLICSCN   Group B Strep Culture No results found for: GBSANTIGEN           External Prenatal Results         Pregnancy Outside Results - these were transcribed from office records.  See scanned records for details. Date Time   Hgb      Hct      ABO ^ O  02/21/17    Rh ^ Positive  02/21/17    Antibody Screen ^ Negative  02/21/17    Glucose Fasting GTT      Glucose Tolerance Test 1 hour      Glucose Tolerance Test 3 hour      Gonorrhea (discrete) ^ Negative  02/21/17    Chlamydia (discrete) ^ Negative  02/21/17    RPR ^ Negative  02/21/17    VDRL      Syphillis Antibody      Rubella ^ Immune  02/21/17    HBsAg ^ Negative  02/21/17    Herpes Simplex Virus PCR ^ HSV 1  02/21/17    Herpes Simplex VIrus Culture      HIV ^ Negative  02/21/17    Hep C RNA Quant PCR      Hep C Antibody      Urine Drug Screen      AFP      Group B Strep ^ Negative  02/21/17    GBS Susceptibility to Clindamycin      GBS Susceptibility to Eythromycin      Fetal Fibronectin      Genetic Testing, Maternal Blood              Legend: ^: Historical            Past OB History:     Obstetric History       T2      TAB0   SAB2   E0   M0   L2       # Outcome Date GA Lbr Paresh/2nd Weight Sex Delivery Anes PTL Lv   5 Current            4 SAB 13           3 Term 07/29/10    M CS-LTranv Spinal  Y   2 SAB 09           1 Term 05     Vag-Spont EPI  Y          Past Medical History: Past Medical History:   Diagnosis Date   • Anemia    • Gestational diabetes     with pregnancy      Past Surgical History Past Surgical History:   Procedure Laterality Date   •  SECTION        Family History: Family History   Problem Relation Age of Onset   • Stroke Mother    • Coronary artery disease Mother       Social History:  reports that she quit smoking about 4 weeks ago. She does not have any smokeless tobacco history on file.   reports that she does not drink alcohol.   reports that she does not use illicit drugs.          ROS:   Constitutional: negative for chills, fatigue, fevers, night sweats and weight loss   Ears, nose, mouth, throat, and face: negative for earaches, hearing loss and  sore throat   Respiratory: negative for cough, dyspnea on exertion and pleurisy/chest pain    Cardiovascular: negative for chest pain, chest pressure/discomfort, exertional  chest pressure/discomfort, fatigue, irregular heart beat, lower extremity edema,  orthopnea, palpitations, syncope and tachypnea   Gastrointestinal: negative for change in bowel habits, constipation, diarrhea,  dyspepsia, dysphagia, jaundice, melena, nausea and vomiting   Genitourinary:negative for genital lesions, hot flashes, sexual problems and  vaginal discharge   Integument/breast: negative for breast lump, breast tenderness, dryness, nipple  discharge, skin color change and skin lesion(s)   Hematologic/lymphatic: negative for easy bruising and lymphadenopathy   Musculoskeletal:negative for arthralgias, back pain, muscle weakness,  myalgias, neck pain and stiff  joints   Neurological: negative for coordination problems, dizziness, gait problems,  headaches, seizures, speech problems, tremors, vertigo and weakness   Endocrine: negative for diabetic symptoms including blurry vision, polydipsia,  polyuria, pruritus and skin dryness   Allergic/Immunologic: negative     Objective     Vital Signs Range for the last 24 hours  Temperature:     Temp Source:     BP:     Pulse:     Respirations:     SPO2:     O2 Amount (l/min):     O2 Devices O2 Device: room air   Weight: Weight:  [171 lb (77.6 kg)] 171 lb (77.6 kg)     Physical Examination: General appearance - alert, well appearing, and in no distress and oriented to person, place, and time  Mental status - alert, oriented to person, place, and time  Eyes - pupils equal and reactive, extraocular eye movements intact  Ears - bilateral TM's and external ear canals normal  Nose - normal and patent, no erythema, discharge or polyps  Mouth - mucous membranes moist, pharynx normal without lesions  Neck - supple, no significant adenopathy, thyroid exam: thyroid is normal in size without nodules or tenderness  Lymphatics - no palpable lymphadenopathy, no hepatosplenomegaly  Chest - clear to auscultation, no wheezes, rales or rhonchi, symmetric air entry  Heart - normal rate and regular rhythm  Abdomen - soft, nontender, nondistended, no masses or organomegaly. Uterus minimally tender, irregular contractions, vertex presentation. EFW 7 pounds  Breasts - breasts appear normal, no suspicious masses, no skin or nipple changes or axillary nodes  Pelvic - normal external genitalia, vulva, vagina, cervix, uterus and adnexa  Rectal - normal rectal, no masses  Back exam - full range of motion, no tenderness, palpable spasm or pain on motion  Neurological - alert, oriented, normal speech, no focal findings or movement disorder noted  Musculoskeletal - no joint tenderness, deformity or swelling  Extremities - peripheral pulses normal, no pedal edema,  no clubbing or cyanosis  Skin - normal coloration and turgor, no rashes, no suspicious skin lesions noted    Presentation: vertex   Cervix: Exam by: Method: sterile exam per RN   Dilation: Dilation: 4   Effacement: Cervical Effacement: 70%   Station: Station: -2     Fetal Heart Rate Assessment   Method: Fetal HR Assessment Method: external   Beats/min: Fetal HR (Beats/Min): 130   Baseline: Fetal HR Baseline: normal range (110-160 bpm)   Varibility: Fetal HR Variability: moderate (amplitude range 6 to 25 bpm)   Accels: Fetal HR Accelerations: greater than/equal to 15 bpm, lasting at least 15 seconds   Decels: Fetal HR Decelerations: early   Tracing Category:       Uterine Assessment   Method: Method: TOCO (external toco transducer), palpation   Frequency (min):     Ctx Count in 10 min:     Duration:     Intensity: Contraction Intensity: mild by palpation   Intensity by IUPC:     Resting Tone: Uterine Resting Tone: soft by palpation   Resting Tone by IUPC:     [place] Units:       Laboratory Results: Reviewed  Radiology Review: Yes      Assessment/Plan       Assessment:  1.  Intrauterine pregnancy at 40w3d weeks gestation with nonreactive fetal status, repeat  section, prolonged ROM.    2.  PPROM  rule out chorioamnionitis  3.  Obstetrical history significant for is remarkable for .primary  section secondary to failure to progress 7 years ago. Now with ROM for 36 hours not in active labor. Pt agrees to proceed on with a repeat  section for the benefit of the fetus.  4.  GBS status:negative    Plan:  1. Antibiotics for prolong ROM and prophylaxis  2. Plan of care has been reviewed with patient and significant other.  Options and alternative treatments have been discussed. She states she knows she needs a repeat  section now.  3.  Risks, benefits of treatment plan have been discussed.  4.  All questions have been answered.  5.  Repeat section no tubal at this time.      Chato Paris  MD [place]  7/21/2017  4:11 PM         Electronically signed by Chato Ramirez MD at 7/21/2017  4:39 PM

## 2017-07-26 LAB
BACTERIA SPEC AEROBE CULT: NORMAL
BACTERIA SPEC ANAEROBE CULT: NORMAL
CYTO UR: NORMAL
GRAM STN SPEC: NORMAL
GRAM STN SPEC: NORMAL
LAB AP CASE REPORT: NORMAL
LAB AP CLINICAL INFORMATION: NORMAL
Lab: NORMAL
PATH REPORT.FINAL DX SPEC: NORMAL
PATH REPORT.GROSS SPEC: NORMAL

## 2017-07-27 ENCOUNTER — HOSPITAL ENCOUNTER (OUTPATIENT)
Dept: LACTATION | Facility: HOSPITAL | Age: 32
Discharge: HOME | End: 2017-07-27

## 2017-08-01 ENCOUNTER — HOSPITAL ENCOUNTER (EMERGENCY)
Age: 32
Discharge: HOME | End: 2017-08-01
Attending: EMERGENCY MEDICINE
Payer: COMMERCIAL

## 2017-08-01 VITALS
SYSTOLIC BLOOD PRESSURE: 122 MMHG | HEART RATE: 78 BPM | OXYGEN SATURATION: 99 % | BODY MASS INDEX: 31.41 KG/M2 | RESPIRATION RATE: 20 BRPM | TEMPERATURE: 98 F | WEIGHT: 160 LBS | DIASTOLIC BLOOD PRESSURE: 76 MMHG | HEIGHT: 60 IN

## 2017-08-01 DIAGNOSIS — T81.31XA DEHISCENCE OF OPERATIVE WOUND, INITIAL ENCOUNTER: Primary | ICD-10-CM

## 2017-08-01 PROCEDURE — 99284 EMERGENCY DEPT VISIT MOD MDM: CPT | Performed by: EMERGENCY MEDICINE

## 2017-08-01 PROCEDURE — 99283 EMERGENCY DEPT VISIT LOW MDM: CPT

## 2017-08-02 ASSESSMENT — ENCOUNTER SYMPTOMS
DIARRHEA: 0
ABDOMINAL PAIN: 1
VOMITING: 0

## 2017-08-04 ENCOUNTER — OFFICE VISIT (OUTPATIENT)
Dept: OBGYN | Age: 32
End: 2017-08-04
Payer: COMMERCIAL

## 2017-08-04 VITALS
HEART RATE: 83 BPM | SYSTOLIC BLOOD PRESSURE: 113 MMHG | DIASTOLIC BLOOD PRESSURE: 80 MMHG | HEIGHT: 60 IN | TEMPERATURE: 98.7 F | WEIGHT: 168 LBS | BODY MASS INDEX: 32.98 KG/M2

## 2017-08-04 DIAGNOSIS — Z76.89 ESTABLISHING CARE WITH NEW DOCTOR, ENCOUNTER FOR: Primary | ICD-10-CM

## 2017-08-04 DIAGNOSIS — Z98.891 STATUS POST CESAREAN DELIVERY: ICD-10-CM

## 2017-08-04 PROCEDURE — 99203 OFFICE O/P NEW LOW 30 MIN: CPT | Performed by: NURSE PRACTITIONER

## 2017-08-04 ASSESSMENT — ENCOUNTER SYMPTOMS
CONSTIPATION: 0
APNEA: 0
NAUSEA: 0
TROUBLE SWALLOWING: 0
WHEEZING: 0
DIARRHEA: 0
SHORTNESS OF BREATH: 0
SORE THROAT: 0
ABDOMINAL PAIN: 0

## 2017-09-01 ENCOUNTER — OFFICE VISIT (OUTPATIENT)
Dept: OBGYN | Age: 32
End: 2017-09-01
Payer: COMMERCIAL

## 2017-09-01 ENCOUNTER — TELEPHONE (OUTPATIENT)
Dept: OBGYN | Age: 32
End: 2017-09-01

## 2017-09-01 VITALS
BODY MASS INDEX: 30.23 KG/M2 | HEART RATE: 72 BPM | HEIGHT: 60 IN | SYSTOLIC BLOOD PRESSURE: 113 MMHG | WEIGHT: 154 LBS | DIASTOLIC BLOOD PRESSURE: 72 MMHG | RESPIRATION RATE: 18 BRPM

## 2017-09-01 DIAGNOSIS — Z98.891 STATUS POST CESAREAN SECTION: ICD-10-CM

## 2017-09-01 DIAGNOSIS — Z30.8 ENCOUNTER FOR TUBAL LIGATION COUNSELING: Primary | ICD-10-CM

## 2017-09-01 ASSESSMENT — ENCOUNTER SYMPTOMS
APNEA: 0
ABDOMINAL PAIN: 0
SORE THROAT: 0
TROUBLE SWALLOWING: 0
CONSTIPATION: 0
WHEEZING: 0
NAUSEA: 0
SHORTNESS OF BREATH: 0
DIARRHEA: 0

## 2017-09-18 ENCOUNTER — TELEPHONE (OUTPATIENT)
Dept: OBGYN | Age: 32
End: 2017-09-18

## 2017-09-28 ENCOUNTER — APPOINTMENT (OUTPATIENT)
Dept: PREADMISSION TESTING | Facility: HOSPITAL | Age: 32
End: 2017-09-28

## 2017-09-28 VITALS
WEIGHT: 153 LBS | HEIGHT: 62 IN | HEART RATE: 89 BPM | RESPIRATION RATE: 18 BRPM | DIASTOLIC BLOOD PRESSURE: 70 MMHG | BODY MASS INDEX: 28.16 KG/M2 | OXYGEN SATURATION: 98 % | SYSTOLIC BLOOD PRESSURE: 110 MMHG

## 2017-09-28 LAB
ALBUMIN SERPL-MCNC: 4.3 G/DL (ref 3.5–5)
ALBUMIN/GLOB SERPL: 1.6 G/DL (ref 1.1–2.5)
ALP SERPL-CCNC: 85 U/L (ref 24–120)
ALT SERPL W P-5'-P-CCNC: 49 U/L (ref 0–54)
ANION GAP SERPL CALCULATED.3IONS-SCNC: 13 MMOL/L (ref 4–13)
AST SERPL-CCNC: 31 U/L (ref 7–45)
BASOPHILS # BLD AUTO: 0.03 10*3/MM3 (ref 0–0.2)
BASOPHILS NFR BLD AUTO: 0.3 % (ref 0–2)
BILIRUB SERPL-MCNC: 0.3 MG/DL (ref 0.1–1)
BUN BLD-MCNC: 17 MG/DL (ref 5–21)
BUN/CREAT SERPL: 19.1 (ref 7–25)
CALCIUM SPEC-SCNC: 9.5 MG/DL (ref 8.4–10.4)
CHLORIDE SERPL-SCNC: 103 MMOL/L (ref 98–110)
CO2 SERPL-SCNC: 26 MMOL/L (ref 24–31)
CREAT BLD-MCNC: 0.89 MG/DL (ref 0.5–1.4)
DEPRECATED RDW RBC AUTO: 51.3 FL (ref 40–54)
EOSINOPHIL # BLD AUTO: 0.41 10*3/MM3 (ref 0–0.7)
EOSINOPHIL NFR BLD AUTO: 4.2 % (ref 0–4)
ERYTHROCYTE [DISTWIDTH] IN BLOOD BY AUTOMATED COUNT: 15.9 % (ref 12–15)
GFR SERPL CREATININE-BSD FRML MDRD: 74 ML/MIN/1.73
GLOBULIN UR ELPH-MCNC: 2.7 GM/DL
GLUCOSE BLD-MCNC: 97 MG/DL (ref 70–100)
HCT VFR BLD AUTO: 40.7 % (ref 37–47)
HGB BLD-MCNC: 13.1 G/DL (ref 12–16)
IMM GRANULOCYTES # BLD: 0.02 10*3/MM3 (ref 0–0.03)
IMM GRANULOCYTES NFR BLD: 0.2 % (ref 0–5)
LYMPHOCYTES # BLD AUTO: 1.41 10*3/MM3 (ref 0.72–4.86)
LYMPHOCYTES NFR BLD AUTO: 14.3 % (ref 15–45)
MCH RBC QN AUTO: 28.5 PG (ref 28–32)
MCHC RBC AUTO-ENTMCNC: 32.2 G/DL (ref 33–36)
MCV RBC AUTO: 88.5 FL (ref 82–98)
MONOCYTES # BLD AUTO: 0.59 10*3/MM3 (ref 0.19–1.3)
MONOCYTES NFR BLD AUTO: 6 % (ref 4–12)
NEUTROPHILS # BLD AUTO: 7.37 10*3/MM3 (ref 1.87–8.4)
NEUTROPHILS NFR BLD AUTO: 75 % (ref 39–78)
PLATELET # BLD AUTO: 222 10*3/MM3 (ref 130–400)
PMV BLD AUTO: 10.8 FL (ref 6–12)
POTASSIUM BLD-SCNC: 4.5 MMOL/L (ref 3.5–5.3)
PROT SERPL-MCNC: 7 G/DL (ref 6.3–8.7)
RBC # BLD AUTO: 4.6 10*6/MM3 (ref 4.2–5.4)
SODIUM BLD-SCNC: 142 MMOL/L (ref 135–145)
WBC NRBC COR # BLD: 9.83 10*3/MM3 (ref 4.8–10.8)

## 2017-09-28 PROCEDURE — 85025 COMPLETE CBC W/AUTO DIFF WBC: CPT | Performed by: OBSTETRICS & GYNECOLOGY

## 2017-09-28 PROCEDURE — 80053 COMPREHEN METABOLIC PANEL: CPT | Performed by: OBSTETRICS & GYNECOLOGY

## 2017-10-02 ENCOUNTER — ANESTHESIA (OUTPATIENT)
Dept: PERIOP | Facility: HOSPITAL | Age: 32
End: 2017-10-02

## 2017-10-02 ENCOUNTER — HOSPITAL ENCOUNTER (OUTPATIENT)
Facility: HOSPITAL | Age: 32
Setting detail: HOSPITAL OUTPATIENT SURGERY
Discharge: HOME | End: 2017-10-02
Attending: OBSTETRICS & GYNECOLOGY | Admitting: OBSTETRICS & GYNECOLOGY

## 2017-10-02 ENCOUNTER — ANESTHESIA EVENT (OUTPATIENT)
Dept: PERIOP | Facility: HOSPITAL | Age: 32
End: 2017-10-02

## 2017-10-02 VITALS
OXYGEN SATURATION: 100 % | SYSTOLIC BLOOD PRESSURE: 102 MMHG | RESPIRATION RATE: 20 BRPM | HEART RATE: 73 BPM | DIASTOLIC BLOOD PRESSURE: 71 MMHG | TEMPERATURE: 96.6 F

## 2017-10-02 DIAGNOSIS — Z30.2 REQUEST FOR STERILIZATION: ICD-10-CM

## 2017-10-02 PROBLEM — Z98.891 S/P REPEAT LOW TRANSVERSE C-SECTION: Status: RESOLVED | Noted: 2017-07-21 | Resolved: 2017-10-02

## 2017-10-02 PROBLEM — Z34.90 PREGNANCY: Status: RESOLVED | Noted: 2017-05-01 | Resolved: 2017-10-02

## 2017-10-02 PROBLEM — O42.90 RUPTURE OF MEMBRANES WITH DELAY OF DELIVERY: Status: RESOLVED | Noted: 2017-07-23 | Resolved: 2017-10-02

## 2017-10-02 LAB — B-HCG UR QL: NEGATIVE

## 2017-10-02 PROCEDURE — 88302 TISSUE EXAM BY PATHOLOGIST: CPT | Performed by: OBSTETRICS & GYNECOLOGY

## 2017-10-02 PROCEDURE — 81025 URINE PREGNANCY TEST: CPT | Performed by: OBSTETRICS & GYNECOLOGY

## 2017-10-02 PROCEDURE — 25010000002 ONDANSETRON PER 1 MG: Performed by: ANESTHESIOLOGY

## 2017-10-02 PROCEDURE — 25010000002 SUCCINYLCHOLINE PER 20 MG: Performed by: NURSE ANESTHETIST, CERTIFIED REGISTERED

## 2017-10-02 PROCEDURE — 25010000002 HYDROMORPHONE PER 4 MG: Performed by: ANESTHESIOLOGY

## 2017-10-02 PROCEDURE — 25010000002 FENTANYL CITRATE (PF) 100 MCG/2ML SOLUTION: Performed by: NURSE ANESTHETIST, CERTIFIED REGISTERED

## 2017-10-02 PROCEDURE — 25010000002 DEXAMETHASONE PER 1 MG: Performed by: ANESTHESIOLOGY

## 2017-10-02 PROCEDURE — 25010000002 PROPOFOL 10 MG/ML EMULSION: Performed by: NURSE ANESTHETIST, CERTIFIED REGISTERED

## 2017-10-02 PROCEDURE — 25010000002 MIDAZOLAM PER 1 MG: Performed by: ANESTHESIOLOGY

## 2017-10-02 RX ORDER — MIDAZOLAM HYDROCHLORIDE 1 MG/ML
2 INJECTION INTRAMUSCULAR; INTRAVENOUS
Status: DISCONTINUED | OUTPATIENT
Start: 2017-10-02 | End: 2017-10-02 | Stop reason: HOSPADM

## 2017-10-02 RX ORDER — METOCLOPRAMIDE HYDROCHLORIDE 5 MG/ML
5 INJECTION INTRAMUSCULAR; INTRAVENOUS
Status: DISCONTINUED | OUTPATIENT
Start: 2017-10-02 | End: 2017-10-02 | Stop reason: HOSPADM

## 2017-10-02 RX ORDER — FENTANYL CITRATE 50 UG/ML
INJECTION, SOLUTION INTRAMUSCULAR; INTRAVENOUS AS NEEDED
Status: DISCONTINUED | OUTPATIENT
Start: 2017-10-02 | End: 2017-10-02 | Stop reason: SURG

## 2017-10-02 RX ORDER — SODIUM CHLORIDE, SODIUM LACTATE, POTASSIUM CHLORIDE, CALCIUM CHLORIDE 600; 310; 30; 20 MG/100ML; MG/100ML; MG/100ML; MG/100ML
1000 INJECTION, SOLUTION INTRAVENOUS CONTINUOUS
Status: DISCONTINUED | OUTPATIENT
Start: 2017-10-02 | End: 2017-10-02 | Stop reason: HOSPADM

## 2017-10-02 RX ORDER — SODIUM CHLORIDE 0.9 % (FLUSH) 0.9 %
1-10 SYRINGE (ML) INJECTION AS NEEDED
Status: DISCONTINUED | OUTPATIENT
Start: 2017-10-02 | End: 2017-10-02 | Stop reason: HOSPADM

## 2017-10-02 RX ORDER — ONDANSETRON 2 MG/ML
4 INJECTION INTRAMUSCULAR; INTRAVENOUS AS NEEDED
Status: DISCONTINUED | OUTPATIENT
Start: 2017-10-02 | End: 2017-10-02 | Stop reason: HOSPADM

## 2017-10-02 RX ORDER — DEXAMETHASONE SODIUM PHOSPHATE 4 MG/ML
4 INJECTION, SOLUTION INTRA-ARTICULAR; INTRALESIONAL; INTRAMUSCULAR; INTRAVENOUS; SOFT TISSUE ONCE AS NEEDED
Status: COMPLETED | OUTPATIENT
Start: 2017-10-02 | End: 2017-10-02

## 2017-10-02 RX ORDER — OXYCODONE HYDROCHLORIDE AND ACETAMINOPHEN 5; 325 MG/1; MG/1
1 TABLET ORAL ONCE
Status: COMPLETED | OUTPATIENT
Start: 2017-10-02 | End: 2017-10-02

## 2017-10-02 RX ORDER — MORPHINE SULFATE 2 MG/ML
2 INJECTION, SOLUTION INTRAMUSCULAR; INTRAVENOUS AS NEEDED
Status: DISCONTINUED | OUTPATIENT
Start: 2017-10-02 | End: 2017-10-02 | Stop reason: HOSPADM

## 2017-10-02 RX ORDER — SUCCINYLCHOLINE CHLORIDE 20 MG/ML
INJECTION INTRAMUSCULAR; INTRAVENOUS AS NEEDED
Status: DISCONTINUED | OUTPATIENT
Start: 2017-10-02 | End: 2017-10-02 | Stop reason: SURG

## 2017-10-02 RX ORDER — LIDOCAINE HYDROCHLORIDE 20 MG/ML
INJECTION, SOLUTION INFILTRATION; PERINEURAL AS NEEDED
Status: DISCONTINUED | OUTPATIENT
Start: 2017-10-02 | End: 2017-10-02 | Stop reason: SURG

## 2017-10-02 RX ORDER — LABETALOL HYDROCHLORIDE 5 MG/ML
5 INJECTION, SOLUTION INTRAVENOUS
Status: DISCONTINUED | OUTPATIENT
Start: 2017-10-02 | End: 2017-10-02 | Stop reason: HOSPADM

## 2017-10-02 RX ORDER — FLUMAZENIL 0.1 MG/ML
0.2 INJECTION INTRAVENOUS AS NEEDED
Status: DISCONTINUED | OUTPATIENT
Start: 2017-10-02 | End: 2017-10-02 | Stop reason: HOSPADM

## 2017-10-02 RX ORDER — GLYCOPYRROLATE 0.2 MG/ML
INJECTION INTRAMUSCULAR; INTRAVENOUS AS NEEDED
Status: DISCONTINUED | OUTPATIENT
Start: 2017-10-02 | End: 2017-10-02 | Stop reason: SURG

## 2017-10-02 RX ORDER — NALOXONE HCL 0.4 MG/ML
0.04 VIAL (ML) INJECTION AS NEEDED
Status: DISCONTINUED | OUTPATIENT
Start: 2017-10-02 | End: 2017-10-02 | Stop reason: HOSPADM

## 2017-10-02 RX ORDER — ROCURONIUM BROMIDE 10 MG/ML
INJECTION, SOLUTION INTRAVENOUS AS NEEDED
Status: DISCONTINUED | OUTPATIENT
Start: 2017-10-02 | End: 2017-10-02 | Stop reason: SURG

## 2017-10-02 RX ORDER — MEPERIDINE HYDROCHLORIDE 25 MG/ML
12.5 INJECTION INTRAMUSCULAR; INTRAVENOUS; SUBCUTANEOUS
Status: DISCONTINUED | OUTPATIENT
Start: 2017-10-02 | End: 2017-10-02 | Stop reason: HOSPADM

## 2017-10-02 RX ORDER — SODIUM CHLORIDE, SODIUM LACTATE, POTASSIUM CHLORIDE, CALCIUM CHLORIDE 600; 310; 30; 20 MG/100ML; MG/100ML; MG/100ML; MG/100ML
100 INJECTION, SOLUTION INTRAVENOUS CONTINUOUS
Status: DISCONTINUED | OUTPATIENT
Start: 2017-10-02 | End: 2017-10-02 | Stop reason: HOSPADM

## 2017-10-02 RX ORDER — IBUPROFEN 600 MG/1
600 TABLET ORAL EVERY 6 HOURS PRN
Qty: 50 TABLET | Refills: 0 | Status: SHIPPED | OUTPATIENT
Start: 2017-10-02

## 2017-10-02 RX ORDER — MIDAZOLAM HYDROCHLORIDE 1 MG/ML
1 INJECTION INTRAMUSCULAR; INTRAVENOUS
Status: DISCONTINUED | OUTPATIENT
Start: 2017-10-02 | End: 2017-10-02 | Stop reason: HOSPADM

## 2017-10-02 RX ORDER — OXYCODONE HYDROCHLORIDE AND ACETAMINOPHEN 5; 325 MG/1; MG/1
1 TABLET ORAL EVERY 4 HOURS PRN
Qty: 20 TABLET | Refills: 0 | Status: SHIPPED | OUTPATIENT
Start: 2017-10-02

## 2017-10-02 RX ORDER — IPRATROPIUM BROMIDE AND ALBUTEROL SULFATE 2.5; .5 MG/3ML; MG/3ML
3 SOLUTION RESPIRATORY (INHALATION) ONCE AS NEEDED
Status: DISCONTINUED | OUTPATIENT
Start: 2017-10-02 | End: 2017-10-02 | Stop reason: HOSPADM

## 2017-10-02 RX ORDER — LIDOCAINE HYDROCHLORIDE 40 MG/ML
SOLUTION TOPICAL AS NEEDED
Status: DISCONTINUED | OUTPATIENT
Start: 2017-10-02 | End: 2017-10-02 | Stop reason: SURG

## 2017-10-02 RX ORDER — PROPOFOL 10 MG/ML
VIAL (ML) INTRAVENOUS AS NEEDED
Status: DISCONTINUED | OUTPATIENT
Start: 2017-10-02 | End: 2017-10-02 | Stop reason: SURG

## 2017-10-02 RX ORDER — SODIUM CHLORIDE 0.9 % (FLUSH) 0.9 %
3 SYRINGE (ML) INJECTION AS NEEDED
Status: DISCONTINUED | OUTPATIENT
Start: 2017-10-02 | End: 2017-10-02 | Stop reason: HOSPADM

## 2017-10-02 RX ORDER — HYDRALAZINE HYDROCHLORIDE 20 MG/ML
5 INJECTION INTRAMUSCULAR; INTRAVENOUS
Status: DISCONTINUED | OUTPATIENT
Start: 2017-10-02 | End: 2017-10-02 | Stop reason: HOSPADM

## 2017-10-02 RX ADMIN — HYDROMORPHONE HYDROCHLORIDE 1 MG: 1 INJECTION, SOLUTION INTRAMUSCULAR; INTRAVENOUS; SUBCUTANEOUS at 13:08

## 2017-10-02 RX ADMIN — FENTANYL CITRATE 100 MCG: 50 INJECTION, SOLUTION INTRAMUSCULAR; INTRAVENOUS at 12:01

## 2017-10-02 RX ADMIN — ONDANSETRON 4 MG: 2 INJECTION, SOLUTION INTRAMUSCULAR; INTRAVENOUS at 12:58

## 2017-10-02 RX ADMIN — SODIUM CHLORIDE, POTASSIUM CHLORIDE, SODIUM LACTATE AND CALCIUM CHLORIDE 1000 ML: 600; 310; 30; 20 INJECTION, SOLUTION INTRAVENOUS at 11:16

## 2017-10-02 RX ADMIN — LIDOCAINE HYDROCHLORIDE 0.5 ML: 10 INJECTION, SOLUTION EPIDURAL; INFILTRATION; INTRACAUDAL; PERINEURAL at 11:15

## 2017-10-02 RX ADMIN — LIDOCAINE HYDROCHLORIDE 47 MG: 20 INJECTION, SOLUTION INFILTRATION; PERINEURAL at 12:01

## 2017-10-02 RX ADMIN — MIDAZOLAM HYDROCHLORIDE 2 MG: 1 INJECTION, SOLUTION INTRAMUSCULAR; INTRAVENOUS at 11:55

## 2017-10-02 RX ADMIN — SUCCINYLCHOLINE CHLORIDE 100 MG: 20 INJECTION, SOLUTION INTRAMUSCULAR; INTRAVENOUS at 12:01

## 2017-10-02 RX ADMIN — GLYCOPYRROLATE 0.2 MG: 0.2 INJECTION, SOLUTION INTRAMUSCULAR; INTRAVENOUS at 12:00

## 2017-10-02 RX ADMIN — LIDOCAINE HYDROCHLORIDE 1 EACH: 40 SOLUTION TOPICAL at 12:01

## 2017-10-02 RX ADMIN — DEXAMETHASONE SODIUM PHOSPHATE 4 MG: 4 INJECTION, SOLUTION INTRAMUSCULAR; INTRAVENOUS at 11:55

## 2017-10-02 RX ADMIN — PROPOFOL 200 MG: 10 INJECTION, EMULSION INTRAVENOUS at 12:01

## 2017-10-02 RX ADMIN — OXYCODONE HYDROCHLORIDE AND ACETAMINOPHEN 1 TABLET: 5; 325 TABLET ORAL at 14:32

## 2017-10-02 RX ADMIN — ROCURONIUM BROMIDE 10 MG: 10 INJECTION INTRAVENOUS at 12:01

## 2017-10-02 NOTE — ANESTHESIA PREPROCEDURE EVALUATION
Anesthesia Evaluation     Patient summary reviewed   no history of anesthetic complications:  NPO Solid Status: > 8 hours  NPO Liquid Status: > 8 hours     Airway   Mallampati: II  TM distance: >3 FB  Neck ROM: full  no difficulty expected  Dental - normal exam     Pulmonary - normal exam   (+) a smoker (< 1 pack per week) Current Smoked day of surgery,   (-) asthma, sleep apnea  Cardiovascular - normal exam  Exercise tolerance: good (4-7 METS)        Neuro/Psych  (-) seizures, TIA, CVA  GI/Hepatic/Renal/Endo    (-) liver disease, no renal disease, diabetes    Musculoskeletal     Abdominal    Substance History      OB/GYN          Other                                        Anesthesia Plan    ASA 2     general     intravenous induction   Anesthetic plan and risks discussed with patient.

## 2017-10-02 NOTE — DISCHARGE INSTRUCTIONS

## 2017-10-02 NOTE — ANESTHESIA PROCEDURE NOTES
Airway  Airway not difficult    General Information and Staff    Patient location during procedure: OR  CRNA: KORI ROSE    Indications and Patient Condition  Indications for airway management: airway protection    Preoxygenated: yes  MILS not maintained throughout  Mask difficulty assessment: 1 - vent by mask    Final Airway Details  Final airway type: endotracheal airway      Successful airway: ETT  Cuffed: yes   Successful intubation technique: direct laryngoscopy  Facilitating devices/methods: intubating stylet  Endotracheal tube insertion site: oral  Blade: Easley  Blade size: #2  ETT size: 7.5 mm  Cormack-Lehane Classification: grade I - full view of glottis  Placement verified by: chest auscultation and bronchoscopy   Measured from: lips  ETT to lips (cm): 21  Number of attempts at approach: 1

## 2017-10-02 NOTE — PLAN OF CARE
Problem: Patient Care Overview (Adult)  Goal: Plan of Care Review  Outcome: Ongoing (interventions implemented as appropriate)    10/02/17 1330   Coping/Psychosocial Response Interventions   Plan Of Care Reviewed With patient   Patient Care Overview   Progress improving   Outcome Evaluation   Outcome Summary/Follow up Plan dc criteria met at transfer         Problem: Perioperative Period (Adult)  Goal: Signs and Symptoms of Listed Potential Problems Will be Absent or Manageable (Perioperative Period)  Outcome: Ongoing (interventions implemented as appropriate)

## 2017-10-02 NOTE — PLAN OF CARE
Problem: Patient Care Overview (Adult)  Goal: Plan of Care Review  Outcome: Ongoing (interventions implemented as appropriate)    10/02/17 1154   Coping/Psychosocial Response Interventions   Plan Of Care Reviewed With patient   Patient Care Overview   Progress no change         Problem: Perioperative Period (Adult)  Goal: Signs and Symptoms of Listed Potential Problems Will be Absent or Manageable (Perioperative Period)  Outcome: Ongoing (interventions implemented as appropriate)    10/02/17 1154   Perioperative Period   Problems Assessed (Perioperative Period) hypothermia;physiologic stress response;situational response   Problems Present (Perioperative Period) none

## 2017-10-02 NOTE — PLAN OF CARE
Problem: Patient Care Overview (Adult)  Goal: Plan of Care Review  Outcome: Outcome(s) achieved Date Met:  10/02/17    10/02/17 3990   Coping/Psychosocial Response Interventions   Plan Of Care Reviewed With patient;significant other   Patient Care Overview   Progress improving   Outcome Evaluation   Outcome Summary/Follow up Plan Meets criteria for DC. Waiting for ride home. Pain tolerable. Voided. Lidoderm patch x 2 to abdomen. Jaylin pad in place. Home soon.          Problem: Perioperative Period (Adult)  Goal: Signs and Symptoms of Listed Potential Problems Will be Absent or Manageable (Perioperative Period)  Outcome: Outcome(s) achieved Date Met:  10/02/17

## 2017-10-02 NOTE — ANESTHESIA POSTPROCEDURE EVALUATION
Patient: Taylor Galindo    Procedure Summary     Date Anesthesia Start Anesthesia Stop Room / Location    10/02/17 1159 1236 [facility] OR 14 / [facility] OR       Procedure Diagnosis Surgeon Provider    SALPINGECTOMY LAPAROSCOPIC (Bilateral Abdomen) (STERILIZATION) MD Vin Guevara CRNA          Anesthesia Type: general  Last vitals  BP   102/71 (10/02/17 1445)    Temp        Pulse   73 (10/02/17 1445)   Resp   20 (10/02/17 1446)    SpO2   100 % (10/02/17 1445)      Post Anesthesia Care and Evaluation      Comments: Patient d/c from PACU prior to anes eval based on Roger score.  Please see RN notes for details of d/c criteria.

## 2017-10-04 LAB
CYTO UR: NORMAL
LAB AP CASE REPORT: NORMAL
Lab: NORMAL
Lab: NORMAL
PATH REPORT.FINAL DX SPEC: NORMAL

## 2018-04-18 ENCOUNTER — OFFICE VISIT (OUTPATIENT)
Dept: PRIMARY CARE CLINIC | Age: 33
End: 2018-04-18
Payer: COMMERCIAL

## 2018-04-18 VITALS
HEIGHT: 62 IN | TEMPERATURE: 97.6 F | OXYGEN SATURATION: 98 % | WEIGHT: 148.38 LBS | BODY MASS INDEX: 27.3 KG/M2 | HEART RATE: 86 BPM | SYSTOLIC BLOOD PRESSURE: 118 MMHG | DIASTOLIC BLOOD PRESSURE: 60 MMHG

## 2018-04-18 DIAGNOSIS — R07.0 THROAT PAIN: ICD-10-CM

## 2018-04-18 DIAGNOSIS — Z76.89 ENCOUNTER TO ESTABLISH CARE: ICD-10-CM

## 2018-04-18 DIAGNOSIS — H92.02 LEFT EAR PAIN: Primary | ICD-10-CM

## 2018-04-18 PROCEDURE — 99203 OFFICE O/P NEW LOW 30 MIN: CPT | Performed by: FAMILY MEDICINE

## 2018-04-18 ASSESSMENT — ENCOUNTER SYMPTOMS
EYE ITCHING: 0
DIARRHEA: 0
EYE REDNESS: 0
RHINORRHEA: 0
CHEST TIGHTNESS: 0
VOMITING: 0
EYE DISCHARGE: 0
COUGH: 0
SORE THROAT: 1
SINUS PRESSURE: 0
COLOR CHANGE: 0
NAUSEA: 0
WHEEZING: 0
ABDOMINAL PAIN: 0

## 2018-04-18 ASSESSMENT — PATIENT HEALTH QUESTIONNAIRE - PHQ9
SUM OF ALL RESPONSES TO PHQ9 QUESTIONS 1 & 2: 0
SUM OF ALL RESPONSES TO PHQ QUESTIONS 1-9: 0
2. FEELING DOWN, DEPRESSED OR HOPELESS: 0
1. LITTLE INTEREST OR PLEASURE IN DOING THINGS: 0

## 2018-04-19 ENCOUNTER — TELEPHONE (OUTPATIENT)
Dept: OTOLARYNGOLOGY | Age: 33
End: 2018-04-19

## 2018-06-11 ENCOUNTER — OFFICE VISIT (OUTPATIENT)
Dept: PRIMARY CARE CLINIC | Age: 33
End: 2018-06-11
Payer: COMMERCIAL

## 2018-06-11 VITALS
WEIGHT: 145 LBS | HEIGHT: 62 IN | BODY MASS INDEX: 26.68 KG/M2 | DIASTOLIC BLOOD PRESSURE: 70 MMHG | TEMPERATURE: 97.2 F | OXYGEN SATURATION: 98 % | RESPIRATION RATE: 20 BRPM | HEART RATE: 107 BPM | SYSTOLIC BLOOD PRESSURE: 106 MMHG

## 2018-06-11 DIAGNOSIS — F90.9 ATTENTION DEFICIT HYPERACTIVITY DISORDER (ADHD), UNSPECIFIED ADHD TYPE: Primary | ICD-10-CM

## 2018-06-11 PROCEDURE — 99213 OFFICE O/P EST LOW 20 MIN: CPT | Performed by: FAMILY MEDICINE

## 2018-06-11 ASSESSMENT — ENCOUNTER SYMPTOMS
COLOR CHANGE: 0
NAUSEA: 0
WHEEZING: 0
COUGH: 0
EYE DISCHARGE: 0
VOMITING: 0
DIARRHEA: 0
BACK PAIN: 0
ABDOMINAL PAIN: 0

## 2018-07-16 ENCOUNTER — OFFICE VISIT (OUTPATIENT)
Dept: PSYCHIATRY | Age: 33
End: 2018-07-16
Payer: COMMERCIAL

## 2018-07-16 VITALS
HEART RATE: 103 BPM | BODY MASS INDEX: 28.86 KG/M2 | HEIGHT: 60 IN | OXYGEN SATURATION: 98 % | WEIGHT: 147 LBS | SYSTOLIC BLOOD PRESSURE: 115 MMHG | DIASTOLIC BLOOD PRESSURE: 74 MMHG

## 2018-07-16 DIAGNOSIS — F40.10 PHOBIA, SOCIAL: Primary | ICD-10-CM

## 2018-07-16 PROCEDURE — 90791 PSYCH DIAGNOSTIC EVALUATION: CPT | Performed by: PSYCHOLOGIST

## 2018-08-28 ENCOUNTER — OFFICE VISIT (OUTPATIENT)
Dept: PSYCHIATRY | Age: 33
End: 2018-08-28
Payer: COMMERCIAL

## 2018-08-28 DIAGNOSIS — F90.9 ADULT ADHD: Primary | ICD-10-CM

## 2018-08-28 PROCEDURE — 96101 PR PSYCHOLOGIC TESTING BY PSYCH/PHYS: CPT | Performed by: PSYCHOLOGIST

## 2018-08-28 PROCEDURE — 99999 PR OFFICE/OUTPT VISIT,PROCEDURE ONLY: CPT | Performed by: PSYCHOLOGIST

## 2018-10-01 ENCOUNTER — OFFICE VISIT (OUTPATIENT)
Dept: PSYCHIATRY | Age: 33
End: 2018-10-01
Payer: COMMERCIAL

## 2018-10-01 DIAGNOSIS — F90.9 ADULT ADHD: Primary | ICD-10-CM

## 2018-10-01 PROCEDURE — 90832 PSYTX W PT 30 MINUTES: CPT | Performed by: PSYCHOLOGIST

## 2018-10-15 ENCOUNTER — OFFICE VISIT (OUTPATIENT)
Dept: PSYCHIATRY | Age: 33
End: 2018-10-15
Payer: COMMERCIAL

## 2018-10-15 VITALS
WEIGHT: 141 LBS | DIASTOLIC BLOOD PRESSURE: 76 MMHG | BODY MASS INDEX: 26.62 KG/M2 | HEIGHT: 61 IN | HEART RATE: 100 BPM | SYSTOLIC BLOOD PRESSURE: 116 MMHG | OXYGEN SATURATION: 97 %

## 2018-10-15 DIAGNOSIS — F41.9 ANXIETY: Primary | ICD-10-CM

## 2018-10-15 DIAGNOSIS — F90.0 ATTENTION DEFICIT HYPERACTIVITY DISORDER (ADHD), PREDOMINANTLY INATTENTIVE TYPE: ICD-10-CM

## 2018-10-15 PROBLEM — E87.6 HYPOKALEMIA: Status: RESOLVED | Noted: 2017-03-06 | Resolved: 2018-10-15

## 2018-10-15 PROBLEM — Z3A.20 20 WEEKS GESTATION OF PREGNANCY: Status: RESOLVED | Noted: 2017-03-06 | Resolved: 2018-10-15

## 2018-10-15 PROBLEM — I95.9 HYPOTENSION: Status: RESOLVED | Noted: 2017-03-06 | Resolved: 2018-10-15

## 2018-10-15 PROBLEM — J10.1 INFLUENZA B: Status: RESOLVED | Noted: 2017-03-05 | Resolved: 2018-10-15

## 2018-10-15 PROBLEM — J04.0 ACUTE LARYNGITIS: Status: RESOLVED | Noted: 2017-03-06 | Resolved: 2018-10-15

## 2018-10-15 PROBLEM — I95.0 IDIOPATHIC HYPOTENSION: Status: ACTIVE | Noted: 2017-03-06

## 2018-10-15 LAB
AMPHETAMINE SCREEN, URINE: NEGATIVE
BARBITURATE SCREEN URINE: NEGATIVE
BENZODIAZEPINE SCREEN, URINE: NEGATIVE
CANNABINOID SCREEN URINE: NEGATIVE
COCAINE METABOLITE SCREEN URINE: NEGATIVE
Lab: NORMAL
OPIATE SCREEN URINE: NEGATIVE

## 2018-10-15 PROCEDURE — 90792 PSYCH DIAG EVAL W/MED SRVCS: CPT | Performed by: NURSE PRACTITIONER

## 2018-10-15 RX ORDER — METHYLPHENIDATE HYDROCHLORIDE 5 MG/1
5 TABLET ORAL 2 TIMES DAILY
Qty: 60 TABLET | Refills: 0 | Status: SHIPPED | OUTPATIENT
Start: 2018-10-15 | End: 2018-11-14

## 2018-10-15 NOTE — PROGRESS NOTES
Place of service: [facility]    Date: 10/15/2018  Time: 2:17 PM    Identifying information: Yennifer Mcneal is a , female who is 29y.o. years old    CC:   Chief Complaint   Patient presents with    ADHD    Anxiety    Psychiatric Evaluation     \"I am here for ADHD medications. \"       History of Present Illness: The patient reported current symptoms of anxiety and inattentive, problems with attention, including: recent testing showing ADHD diagnosis. Psychosocial Stressors: At risk for anxiety/stress issues and At risk for social isolation    The patient previously received psychiatric care by [facility] for depression and anxiety in adolescence. Prior psychiatric admission:  1-5 admissions    Personal History:   Social History     Social History    Marital status: Single     Spouse name: N/A    Number of children: 3    Years of education: 12+     Occupational History               Social History Main Topics    Smoking status: Current Some Day Smoker     Packs/day: 0.25    Smokeless tobacco: Never Used    Alcohol use 2.4 oz/week     2 Cans of beer, 2 Shots of liquor per week    Drug use: Yes     Types: Marijuana      Comment: as a teenager    Sexual activity: Yes     Partners: Male     Birth control/ protection: Surgical     Other Topics Concern    None     Social History Narrative    Was admitted to an inpatient mental health setting as a teenager. Was admitted in [place]. I was having breakup with boyfriend and stress with mother. Was in the crisis clinic several times in the same unit. 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 \"        Has been on a few different antidepressants. \"I don't respond well to SSRIs. I have been on a few antianxiety medications. \"         I don't tend to be too pelletier unless there is a lot going on. My sleep in a little touch and go. I have a lot of to do list. 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                 Family History:   Family History   Problem Relation Age of Onset    Heart Attack Mother     Stroke Mother     High Blood Pressure Mother     High Cholesterol Mother     Diabetes Mother     Cancer Mother         thyroid    No Known Problems Father     Alcohol Abuse Brother         recently out of rehab from what she was told    Diabetes Maternal Grandmother     Cancer Maternal Grandmother         Pancreatic     ADHD Son         with Trouettes    No Known Problems Son        Medical History:   Past Medical History:   Diagnosis Date    Anxiety     Hx of partial seizures     UTI (urinary tract infection)     PT states she gets constant UTI's \"That is pretty much undercontrol now. \"         Allergies: Allergies   Allergen Reactions    Penicillins Shortness Of Breath    Zoloft [Sertraline Hcl] Itching       ROS: Review of Systems - Negative except as listed  History obtained from chart review and the patient  General ROS: positive for  - sleep disturbance  Psychological ROS: positive for - anxiety, concentration difficulties and sleep disturbances    EXAM    /76 (Site: Right Upper Arm, Position: Sitting, Cuff Size: Medium Adult)   Pulse 100   Ht 5' 1\" (1.549 m)   Wt 141 lb (64 kg)   SpO2 97%   BMI 26.64 kg/m²     General Appearance: Appears healthy. Alert; in no acute distress. Pleasant. Mood & Affect: brightens    Orientation:  person, place, time/date, situation, day of week, month of year and year    Speech:  Within normal limits    Thought Process: goal directed    Thought Content: normal    Hallucinations: Absent    Delusions: Absent    Suicidal: denies suicidal ideation    Homicidal: Negative for homicidal ideation       Memory: recent:  good and remote:  good    Estimated Intelligence: average    Attention/Concentration: able to follow flow of conversation and provide appropriate responses    Judgment/Insight: good    Gait and station: normal gait and

## 2018-11-16 ENCOUNTER — OFFICE VISIT (OUTPATIENT)
Dept: PSYCHIATRY | Age: 33
End: 2018-11-16
Payer: COMMERCIAL

## 2018-11-16 VITALS
WEIGHT: 138 LBS | BODY MASS INDEX: 26.06 KG/M2 | SYSTOLIC BLOOD PRESSURE: 136 MMHG | HEART RATE: 127 BPM | DIASTOLIC BLOOD PRESSURE: 77 MMHG | OXYGEN SATURATION: 94 % | HEIGHT: 61 IN

## 2018-11-16 DIAGNOSIS — F90.0 ADHD (ATTENTION DEFICIT HYPERACTIVITY DISORDER), INATTENTIVE TYPE: Primary | ICD-10-CM

## 2018-11-16 PROCEDURE — 99213 OFFICE O/P EST LOW 20 MIN: CPT | Performed by: NURSE PRACTITIONER

## 2018-11-16 RX ORDER — METHYLPHENIDATE HYDROCHLORIDE 27 MG/1
27 TABLET ORAL DAILY
Qty: 30 TABLET | Refills: 0 | Status: SHIPPED | OUTPATIENT
Start: 2018-11-16 | End: 2018-12-14 | Stop reason: SDUPTHER

## 2018-11-16 NOTE — PROGRESS NOTES
Progress Note    11/16/2018          Dominique Friends 1985     Time in: 0920   Time out: 0930      Chief Complaint   Patient presents with    Medication Refill       History obtained via chart review and patient interview    Subjective:  Patient is a 29 y.o. 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 Farhana Rojas is not reporting any side effects from prescribed medications and is  medication complaint. Today Farhana Rojas states, \"I did not feel much difference with the medications. I could feel it wear off in about 3 hours. I had some limited focus and was able to get a little bit more done than usually. I was more sound sensitive. \"    Psychosocial Stressors: None identified    Review of Systems - Review of Systems - Negative except as listed  Psychological ROS: positive for - concentration difficulties    Objective:     Family History   Problem Relation Age of Onset    Heart Attack Mother     Stroke Mother     High Blood Pressure Mother     High Cholesterol Mother     Diabetes Mother     Cancer Mother         thyroid    No Known Problems Father     Alcohol Abuse Brother         recently out of rehab from what she was told    Diabetes Maternal Grandmother     Cancer Maternal Grandmother         Pancreatic     ADHD Son         with Trouettes    No Known Problems Son      Social History     Social History    Marital status: Single     Spouse name: N/A    Number of children: 3    Years of education: 12+     Occupational History               Social History Main Topics    Smoking status: Current Some Day Smoker     Packs/day: 0.25    Smokeless tobacco: Never Used    Alcohol use 2.4 oz/week     2 Cans of beer, 2 Shots of liquor per week    Drug use: Yes     Types: Marijuana      Comment: as a teenager    Sexual activity: Yes     Partners: Male     Birth control/ protection: Surgical     Other Topics Concern    None     Social History Narrative    Was admitted to an inpatient mental health setting as a teenager. Was admitted in [place]. I was having breakup with boyfriend and stress with mother. Was in the crisis clinic several times in the same unit. 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 \"        Has been on a few different antidepressants. \"I don't respond well to SSRIs. I have been on a few antianxiety medications. \"         I don't tend to be too pelletier unless there is a lot going on. My sleep in a little touch and go. I have a lot of to do list. 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 Labs: reviewed No results for input(s): WBC, HGB, HCT, MCV, PLT in the last 720 hours. No results found for: LABA1C  No results found for: EAG     Lab Results   Component Value Date     (L) 03/07/2017    K 3.7 03/07/2017     03/07/2017    CO2 20 (L) 03/07/2017    BUN 2 (L) 03/07/2017    CREATININE 0.3 (L) 03/07/2017    GLUCOSE 95 03/07/2017    CALCIUM 7.2 (L) 03/07/2017    PROT 6.3 (L) 03/05/2017    LABALBU 3.6 03/05/2017    BILITOT 0.3 03/05/2017    ALKPHOS 50 03/05/2017    AST 24 03/05/2017    ALT 18 03/05/2017    LABGLOM >60 03/07/2017    GLOB 2.7 03/05/2017         /77 (Site: Right Upper Arm, Position: Sitting, Cuff Size: Medium Adult)   Pulse 127   Ht 5' 1\" (1.549 m)   Wt 138 lb (62.6 kg)   SpO2 94%   BMI 26.07 kg/m²       Current Meds:    No current outpatient prescriptions on file. No current facility-administered medications for this visit. MSE:  General Appearance: Appears healthy. Alert; in no acute distress. Pleasant. Mood & Affect: brightens    Orientation:  person, place, time/date, situation, day of week, month of year and year    Speech:  Within normal limits    Thought Process: within

## 2018-12-14 ENCOUNTER — OFFICE VISIT (OUTPATIENT)
Dept: PSYCHIATRY | Age: 33
End: 2018-12-14
Payer: COMMERCIAL

## 2018-12-14 VITALS
HEART RATE: 112 BPM | WEIGHT: 140 LBS | HEIGHT: 61 IN | SYSTOLIC BLOOD PRESSURE: 127 MMHG | OXYGEN SATURATION: 98 % | DIASTOLIC BLOOD PRESSURE: 72 MMHG | BODY MASS INDEX: 26.43 KG/M2

## 2018-12-14 DIAGNOSIS — F90.0 ADHD (ATTENTION DEFICIT HYPERACTIVITY DISORDER), INATTENTIVE TYPE: Primary | ICD-10-CM

## 2018-12-14 DIAGNOSIS — F40.10 SOCIAL ANXIETY DISORDER: ICD-10-CM

## 2018-12-14 PROCEDURE — 99213 OFFICE O/P EST LOW 20 MIN: CPT | Performed by: NURSE PRACTITIONER

## 2018-12-14 RX ORDER — METHYLPHENIDATE HYDROCHLORIDE 36 MG/1
36 TABLET ORAL DAILY
Qty: 30 TABLET | Refills: 0 | Status: SHIPPED | OUTPATIENT
Start: 2018-12-14 | End: 2019-01-11 | Stop reason: SDUPTHER

## 2018-12-14 NOTE — PROGRESS NOTES
Partners: Male     Birth control/ protection: Surgical     Other Topics Concern    Not on file     Social History Narrative    Was admitted to an inpatient mental health setting as a teenager. Was admitted in [place]. I was having breakup with boyfriend and stress with mother. Was in the crisis clinic several times in the same unit. 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 \"        Has been on a few different antidepressants. \"I don't respond well to SSRIs. I have been on a few antianxiety medications. \"         I don't tend to be too pelletier unless there is a lot going on. My sleep in a little touch and go. I have a lot of to do list. 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 Labs: reviewed No results for input(s): WBC, HGB, HCT, MCV, PLT in the last 720 hours. No results found for: LABA1C  No results found for: EAG     Lab Results   Component Value Date     (L) 03/07/2017    K 3.7 03/07/2017     03/07/2017    CO2 20 (L) 03/07/2017    BUN 2 (L) 03/07/2017    CREATININE 0.3 (L) 03/07/2017    GLUCOSE 95 03/07/2017    CALCIUM 7.2 (L) 03/07/2017    PROT 6.3 (L) 03/05/2017    LABALBU 3.6 03/05/2017    BILITOT 0.3 03/05/2017    ALKPHOS 50 03/05/2017    AST 24 03/05/2017    ALT 18 03/05/2017    LABGLOM >60 03/07/2017    GLOB 2.7 03/05/2017         There were no vitals taken for this visit. Current Meds:    Current Outpatient Prescriptions   Medication Sig Dispense Refill    methylphenidate (CONCERTA) 27 MG extended release tablet Take 1 tablet by mouth daily for 30 days. . 30 tablet 0     No current facility-administered medications for this visit. MSE:  General Appearance: Appears healthy. Alert; in no acute distress. Pleasant. Mood & Affect: brightens, calm and spontaneous    Orientation:  person, place, time/date, situation, day of week, month of year and year    Speech:  Within normal limits    Thought Process: within normal limits    Thought Content:

## 2019-01-11 DIAGNOSIS — F90.0 ADHD (ATTENTION DEFICIT HYPERACTIVITY DISORDER), INATTENTIVE TYPE: ICD-10-CM

## 2019-01-11 RX ORDER — METHYLPHENIDATE HYDROCHLORIDE 36 MG/1
36 TABLET ORAL DAILY
Qty: 30 TABLET | Refills: 0 | Status: SHIPPED | OUTPATIENT
Start: 2019-01-11 | End: 2019-02-08 | Stop reason: SDUPTHER

## 2019-02-08 ENCOUNTER — OFFICE VISIT (OUTPATIENT)
Dept: PSYCHIATRY | Age: 34
End: 2019-02-08
Payer: COMMERCIAL

## 2019-02-08 VITALS
SYSTOLIC BLOOD PRESSURE: 119 MMHG | WEIGHT: 130 LBS | BODY MASS INDEX: 24.55 KG/M2 | OXYGEN SATURATION: 96 % | DIASTOLIC BLOOD PRESSURE: 75 MMHG | HEIGHT: 61 IN | HEART RATE: 117 BPM

## 2019-02-08 DIAGNOSIS — F90.0 ADHD (ATTENTION DEFICIT HYPERACTIVITY DISORDER), INATTENTIVE TYPE: ICD-10-CM

## 2019-02-08 DIAGNOSIS — F90.2 ATTENTION DEFICIT HYPERACTIVITY DISORDER (ADHD), COMBINED TYPE: Primary | ICD-10-CM

## 2019-02-08 DIAGNOSIS — F40.10 SOCIAL PHOBIA: ICD-10-CM

## 2019-02-08 DIAGNOSIS — Z79.899 HIGH RISK MEDICATION USE: ICD-10-CM

## 2019-02-08 PROCEDURE — 99213 OFFICE O/P EST LOW 20 MIN: CPT | Performed by: NURSE PRACTITIONER

## 2019-02-08 RX ORDER — METHYLPHENIDATE HYDROCHLORIDE 36 MG/1
36 TABLET ORAL DAILY
Qty: 30 TABLET | Refills: 0 | Status: SHIPPED | OUTPATIENT
Start: 2019-02-08 | End: 2019-03-08 | Stop reason: SDUPTHER

## 2019-03-08 ENCOUNTER — OFFICE VISIT (OUTPATIENT)
Dept: PSYCHIATRY | Age: 34
End: 2019-03-08
Payer: COMMERCIAL

## 2019-03-08 VITALS
WEIGHT: 124 LBS | DIASTOLIC BLOOD PRESSURE: 75 MMHG | BODY MASS INDEX: 23.41 KG/M2 | SYSTOLIC BLOOD PRESSURE: 115 MMHG | HEART RATE: 116 BPM | OXYGEN SATURATION: 100 % | HEIGHT: 61 IN

## 2019-03-08 DIAGNOSIS — Z79.899 HIGH RISK MEDICATION USE: ICD-10-CM

## 2019-03-08 DIAGNOSIS — F90.2 ATTENTION DEFICIT HYPERACTIVITY DISORDER (ADHD), COMBINED TYPE: Primary | ICD-10-CM

## 2019-03-08 DIAGNOSIS — F90.0 ADHD (ATTENTION DEFICIT HYPERACTIVITY DISORDER), INATTENTIVE TYPE: ICD-10-CM

## 2019-03-08 DIAGNOSIS — F40.10 SOCIAL PHOBIA: ICD-10-CM

## 2019-03-08 PROCEDURE — 99213 OFFICE O/P EST LOW 20 MIN: CPT | Performed by: NURSE PRACTITIONER

## 2019-03-08 RX ORDER — METHYLPHENIDATE HYDROCHLORIDE 36 MG/1
36 TABLET ORAL DAILY
Qty: 30 TABLET | Refills: 0 | Status: SHIPPED | OUTPATIENT
Start: 2019-03-08 | End: 2019-04-02 | Stop reason: SDUPTHER

## 2019-03-08 RX ORDER — TRAZODONE HYDROCHLORIDE 50 MG/1
25 TABLET ORAL NIGHTLY PRN
Qty: 15 TABLET | Refills: 1 | Status: SHIPPED | OUTPATIENT
Start: 2019-03-08 | End: 2019-05-02 | Stop reason: SDUPTHER

## 2019-03-08 RX ORDER — METHYLPHENIDATE HYDROCHLORIDE 5 MG/1
5 TABLET ORAL DAILY
Qty: 30 TABLET | Refills: 0 | Status: SHIPPED | OUTPATIENT
Start: 2019-03-08 | End: 2019-04-02 | Stop reason: SDUPTHER

## 2019-04-02 ENCOUNTER — OFFICE VISIT (OUTPATIENT)
Dept: PSYCHIATRY | Age: 34
End: 2019-04-02
Payer: COMMERCIAL

## 2019-04-02 VITALS
BODY MASS INDEX: 22.45 KG/M2 | OXYGEN SATURATION: 98 % | SYSTOLIC BLOOD PRESSURE: 108 MMHG | HEART RATE: 101 BPM | DIASTOLIC BLOOD PRESSURE: 73 MMHG | WEIGHT: 122 LBS | HEIGHT: 62 IN

## 2019-04-02 DIAGNOSIS — F90.2 ATTENTION DEFICIT HYPERACTIVITY DISORDER (ADHD), COMBINED TYPE: ICD-10-CM

## 2019-04-02 DIAGNOSIS — F90.0 ADHD (ATTENTION DEFICIT HYPERACTIVITY DISORDER), INATTENTIVE TYPE: ICD-10-CM

## 2019-04-02 DIAGNOSIS — F90.0 ATTENTION DEFICIT HYPERACTIVITY DISORDER (ADHD), PREDOMINANTLY INATTENTIVE TYPE: Primary | ICD-10-CM

## 2019-04-02 PROCEDURE — 99213 OFFICE O/P EST LOW 20 MIN: CPT | Performed by: NURSE PRACTITIONER

## 2019-04-02 RX ORDER — METHYLPHENIDATE HYDROCHLORIDE 5 MG/1
5 TABLET ORAL DAILY
Qty: 30 TABLET | Refills: 0 | Status: SHIPPED | OUTPATIENT
Start: 2019-04-02 | End: 2019-06-04 | Stop reason: SDUPTHER

## 2019-04-02 RX ORDER — METHYLPHENIDATE HYDROCHLORIDE 36 MG/1
36 TABLET ORAL DAILY
Qty: 30 TABLET | Refills: 0 | Status: SHIPPED | OUTPATIENT
Start: 2019-04-02 | End: 2019-05-16 | Stop reason: SDUPTHER

## 2019-04-02 NOTE — PROGRESS NOTES
Worry: None     Inability: None    Transportation needs:     Medical: None     Non-medical: None   Tobacco Use    Smoking status: Current Some Day Smoker     Packs/day: 0.25    Smokeless tobacco: Never Used   Substance and Sexual Activity    Alcohol use: Yes     Alcohol/week: 2.4 oz     Types: 2 Cans of beer, 2 Shots of liquor per week    Drug use: Yes     Types: Marijuana     Comment: as a teenager    Sexual activity: Yes     Partners: Male     Birth control/protection: Surgical   Lifestyle    Physical activity:     Days per week: None     Minutes per session: None    Stress: None   Relationships    Social connections:     Talks on phone: None     Gets together: None     Attends [employer] service: None     Active member of club or organization: None     Attends meetings of clubs or organizations: None     Relationship status: None    Intimate partner violence:     Fear of current or ex partner: None     Emotionally abused: None     Physically abused: None     Forced sexual activity: None   Other Topics Concern    None   Social History Narrative    Was admitted to an inpatient mental health setting as a teenager. Was admitted in [place]. I was having breakup with boyfriend and stress with mother. Was in the crisis clinic several times in the same unit. 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 \"        Has been on a few different antidepressants. \"I don't respond well to SSRIs. I have been on a few antianxiety medications. \"         I don't tend to be too pelletier unless there is a lot going on. My sleep in a little touch and go. I have a lot of to do list. 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 Labs: reviewed No results for input(s): WBC, HGB, HCT, MCV, PLT in the last 720 hours.    No results found for: LABA1C  No results found for: EAG     Lab Results   Component Value Date     (L) 03/07/2017    K 3.7 03/07/2017     03/07/2017 CO2 20 (L) 03/07/2017    BUN 2 (L) 03/07/2017    CREATININE 0.3 (L) 03/07/2017    GLUCOSE 95 03/07/2017    CALCIUM 7.2 (L) 03/07/2017    PROT 6.3 (L) 03/05/2017    LABALBU 3.6 03/05/2017    BILITOT 0.3 03/05/2017    ALKPHOS 50 03/05/2017    AST 24 03/05/2017    ALT 18 03/05/2017    LABGLOM >60 03/07/2017    GLOB 2.7 03/05/2017         /73 (Site: Right Upper Arm, Position: Sitting, Cuff Size: Medium Adult)   Pulse 101   Ht 5' 2\" (1.575 m)   Wt 122 lb (55.3 kg)   SpO2 98%   BMI 22.31 kg/m²       Current Meds:    Current Outpatient Medications   Medication Sig Dispense Refill    methylphenidate (CONCERTA) 36 MG extended release tablet Take 1 tablet by mouth daily for 30 days. 30 tablet 0    traZODone (DESYREL) 50 MG tablet Take 0.5 tablets by mouth nightly as needed for Sleep 15 tablet 1    methylphenidate (RITALIN) 5 MG tablet Take 1 tablet by mouth daily for 30 days. Take at 1:00 pm daily 30 tablet 0     No current facility-administered medications for this visit. MSE:  General Appearance: Appears healthy. Alert; in no acute distress. Pleasant. Mood & Affect: brightens    Orientation:  person, place, time/date, situation, day of week, month of year and year    Speech: Within normal limits    Thought Process: within normal limits    Thought Content: normal    Hallucinations: Absent    Delusions: Absent    Suicidal: denies suicidal ideation    Homicidal: Negative for homicidal ideation       Memory: recent:  good and remote:  good    Estimated Intelligence: average    Attention/Concentration: good. 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  The risks,

## 2019-05-02 RX ORDER — TRAZODONE HYDROCHLORIDE 50 MG/1
25 TABLET ORAL NIGHTLY PRN
Qty: 15 TABLET | Refills: 1 | Status: SHIPPED | OUTPATIENT
Start: 2019-05-02 | End: 2021-07-08

## 2019-05-02 NOTE — TELEPHONE ENCOUNTER
education: 12+    Highest education level: Not on file   Occupational History     Comment:    Social Needs    Financial resource strain: Not on file    Food insecurity:     Worry: Not on file     Inability: Not on file    Transportation needs:     Medical: Not on file     Non-medical: Not on file   Tobacco Use    Smoking status: Current Some Day Smoker     Packs/day: 0.25    Smokeless tobacco: Never Used   Substance and Sexual Activity    Alcohol use: Yes     Alcohol/week: 2.4 oz     Types: 2 Cans of beer, 2 Shots of liquor per week    Drug use: Yes     Types: Marijuana     Comment: as a teenager    Sexual activity: Yes     Partners: Male     Birth control/protection: Surgical   Lifestyle    Physical activity:     Days per week: Not on file     Minutes per session: Not on file    Stress: Not on file   Relationships    Social connections:     Talks on phone: Not on file     Gets together: Not on file     Attends [employer] service: Not on file     Active member of club or organization: Not on file     Attends meetings of clubs or organizations: Not on file     Relationship status: Not on file    Intimate partner violence:     Fear of current or ex partner: Not on file     Emotionally abused: Not on file     Physically abused: Not on file     Forced sexual activity: Not on file   Other Topics Concern    Not on file   Social History Narrative    Was admitted to an inpatient mental health setting as a teenager. Was admitted in [place]. I was having breakup with boyfriend and stress with mother. Was in the crisis clinic several times in the same unit. 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 \"        Has been on a few different antidepressants. \"I don't respond well to SSRIs. I have been on a few antianxiety medications. \"         I don't tend to be too pellteier unless there is a lot going on. My sleep in a little touch and go. I have a lot of to do list. 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 Labs: reviewed No results for input(s): WBC, HGB, HCT, MCV, PLT in the last 720 hours. No results found for: LABA1C  No results found for: EAG     Lab Results   Component Value Date     (L) 03/07/2017    K 3.7 03/07/2017     03/07/2017    CO2 20 (L) 03/07/2017    BUN 2 (L) 03/07/2017    CREATININE 0.3 (L) 03/07/2017    GLUCOSE 95 03/07/2017    CALCIUM 7.2 (L) 03/07/2017    PROT 6.3 (L) 03/05/2017    LABALBU 3.6 03/05/2017    BILITOT 0.3 03/05/2017    ALKPHOS 50 03/05/2017    AST 24 03/05/2017    ALT 18 03/05/2017    LABGLOM >60 03/07/2017    GLOB 2.7 03/05/2017         /73 (Site: Right Upper Arm, Position: Sitting, Cuff Size: Medium Adult)   Pulse 101   Ht 5' 2\" (1.575 m)   Wt 122 lb (55.3 kg)   SpO2 98%   BMI 22.31 kg/m²       Current Meds:    Current Outpatient Medications   Medication Sig Dispense Refill    methylphenidate (RITALIN) 5 MG tablet Take 1 tablet by mouth daily for 30 days. Take at 1:00 pm daily 30 tablet 0    methylphenidate (CONCERTA) 36 MG extended release tablet Take 1 tablet by mouth daily for 30 days. 30 tablet 0    traZODone (DESYREL) 50 MG tablet Take 0.5 tablets by mouth nightly as needed for Sleep 15 tablet 1     No current facility-administered medications for this visit. MSE:  General Appearance: Appears healthy. Alert; in no acute distress. Pleasant. Mood & Affect: brightens    Orientation:  person, place, time/date, situation, day of week, month of year and year    Speech: Within normal limits    Thought Process: within normal limits    Thought Content: normal    Hallucinations: Absent    Delusions: Absent    Suicidal: denies suicidal ideation    Homicidal: Negative for homicidal ideation       Memory: recent:  good and remote:  good    Estimated Intelligence: average    Attention/Concentration: good.  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 The risks, benefits, side effects, indications, contraindications, and adverse effects of the medications have been discussed. 2. [facility]  has verbalized understanding and has capacity to give informed consent. 3. The [facility] report has been reviewed according to [facility] regulations. 4. Has not started individual therapy but plans to in the future  5. Follow up in 2 months  6. The patient has been advised to call with any problems.   7.The above listed medications have been continued, changes in meds and other orders/labs as follows:       Continue Concerta 36 mg daily with Ritalin 5 mg in the afternoon    Has Trazodone PRN for sleep

## 2019-05-03 NOTE — TELEPHONE ENCOUNTER
Sent to pharmacy as: traZODone HCl 50 MG Oral Tablet    E-Prescribing Status: Receipt confirmed by pharmacy (5/2/2019  4:50 PM CDT)

## 2019-05-15 DIAGNOSIS — F90.0 ADHD (ATTENTION DEFICIT HYPERACTIVITY DISORDER), INATTENTIVE TYPE: ICD-10-CM

## 2019-05-15 NOTE — TELEPHONE ENCOUNTER
Last OV:  04.02.19  Next OV:   06.04.19    Requested Prescriptions     Pending Prescriptions Disp Refills    methylphenidate (CONCERTA) 36 MG extended release tablet 30 tablet 0     Sig: Take 1 tablet by mouth daily for 30 days. Progress Note    5/15/2019          Laura Elizabeth 1985     Time in: 7088   Time out: 46      Chief Complaint   Patient presents with    Medication Refill       History obtained via chart review and patient interview     Subjective:  Patient is a 29 y.o. 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 Annabelle Parra is not reporting any side effects from prescribed medications and is  medication complaint. Today Annabelle Parra state she is doing well and is here for medication refills. Sleeping okay, \"I think all my sleeps issues are child related. \"    Psychosocial Stressors:  At risk for social isolation    Review of Systems - Review of Systems - Negative except as listed  History obtained from chart review and the patient  General ROS: positive for  - sleep disturbance  Psychological ROS: positive for - concentration difficulties and sleep disturbances    Objective:     Family History   Problem Relation Age of Onset    Heart Attack Mother     Stroke Mother     High Blood Pressure Mother     High Cholesterol Mother     Diabetes Mother     Cancer Mother         thyroid    No Known Problems Father     Alcohol Abuse Brother         recently out of rehab from what she was told    Diabetes Maternal Grandmother     Cancer Maternal Grandmother         Pancreatic     ADHD Son         with [place]    No Known Problems Son      Social History     Socioeconomic History    Marital status: Single     Spouse name: Not on file    Number of children: 3  Years of education: 12+    Highest education level: Not on file   Occupational History     Comment:    Social Needs    Financial resource strain: Not on file    Food insecurity:     Worry: Not on file     Inability: Not on file    Transportation needs:     Medical: Not on file     Non-medical: Not on file   Tobacco Use    Smoking status: Current Some Day Smoker     Packs/day: 0.25    Smokeless tobacco: Never Used   Substance and Sexual Activity    Alcohol use: Yes     Alcohol/week: 2.4 oz     Types: 2 Cans of beer, 2 Shots of liquor per week    Drug use: Yes     Types: Marijuana     Comment: as a teenager    Sexual activity: Yes     Partners: Male     Birth control/protection: Surgical   Lifestyle    Physical activity:     Days per week: Not on file     Minutes per session: Not on file    Stress: Not on file   Relationships    Social connections:     Talks on phone: Not on file     Gets together: Not on file     Attends Mandaeism service: Not on file     Active member of club or organization: Not on file     Attends meetings of clubs or organizations: Not on file     Relationship status: Not on file    Intimate partner violence:     Fear of current or ex partner: Not on file     Emotionally abused: Not on file     Physically abused: Not on file     Forced sexual activity: Not on file   Other Topics Concern    Not on file   Social History Narrative    Was admitted to an inpatient mental health setting as a teenager. Was admitted in [place]. I was having breakup with boyfriend and stress with mother. Was in the crisis clinic several times in the same unit. 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 \"        Has been on a few different antidepressants. \"I don't respond well to SSRIs. I have been on a few antianxiety medications. \"         I don't tend to be too pelletier unless there is a lot going on. My sleep in a little touch and go. I have a lot of to do list. 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 Labs: reviewed No results for input(s): WBC, HGB, HCT, MCV, PLT in the last 720 hours. No results found for: LABA1C  No results found for: EAG     Lab Results   Component Value Date     (L) 03/07/2017    K 3.7 03/07/2017     03/07/2017    CO2 20 (L) 03/07/2017    BUN 2 (L) 03/07/2017    CREATININE 0.3 (L) 03/07/2017    GLUCOSE 95 03/07/2017    CALCIUM 7.2 (L) 03/07/2017    PROT 6.3 (L) 03/05/2017    LABALBU 3.6 03/05/2017    BILITOT 0.3 03/05/2017    ALKPHOS 50 03/05/2017    AST 24 03/05/2017    ALT 18 03/05/2017    LABGLOM >60 03/07/2017    GLOB 2.7 03/05/2017         /73 (Site: Right Upper Arm, Position: Sitting, Cuff Size: Medium Adult)   Pulse 101   Ht 5' 2\" (1.575 m)   Wt 122 lb (55.3 kg)   SpO2 98%   BMI 22.31 kg/m²       Current Meds:    Current Outpatient Medications   Medication Sig Dispense Refill    traZODone (DESYREL) 50 MG tablet Take 0.5 tablets by mouth nightly as needed for Sleep 15 tablet 1    methylphenidate (RITALIN) 5 MG tablet Take 1 tablet by mouth daily for 30 days. Take at 1:00 pm daily 30 tablet 0    methylphenidate (CONCERTA) 36 MG extended release tablet Take 1 tablet by mouth daily for 30 days. 30 tablet 0     No current facility-administered medications for this visit. MSE:  General Appearance: Appears healthy. Alert; in no acute distress. Pleasant. Mood & Affect: brightens    Orientation:  person, place, time/date, situation, day of week, month of year and year    Speech: Within normal limits    Thought Process: within normal limits    Thought Content: normal    Hallucinations: Absent    Delusions: Absent    Suicidal: denies suicidal ideation    Homicidal: Negative for homicidal ideation       Memory: recent:  good and remote:  good    Estimated Intelligence: average    Attention/Concentration: good.  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 The risks, benefits, side effects, indications, contraindications, and adverse effects of the medications have been discussed. 2. Fernanda [place]  has verbalized understanding and has capacity to give informed consent. 3. The Nara Shahid report has been reviewed according to [facility] regulations. 4. Has not started individual therapy but plans to in the future  5. Follow up in 2 months  6. The patient has been advised to call with any problems.   7.The above listed medications have been continued, changes in meds and other orders/labs as follows:       Continue Concerta 36 mg daily with Ritalin 5 mg in the afternoon    Has Trazodone PRN for sleep

## 2019-05-16 RX ORDER — METHYLPHENIDATE HYDROCHLORIDE 36 MG/1
36 TABLET ORAL DAILY
Qty: 30 TABLET | Refills: 0 | Status: SHIPPED | OUTPATIENT
Start: 2019-05-16 | End: 2019-06-18 | Stop reason: ALTCHOICE

## 2019-06-04 ENCOUNTER — OFFICE VISIT (OUTPATIENT)
Dept: PSYCHIATRY | Age: 34
End: 2019-06-04
Payer: COMMERCIAL

## 2019-06-04 VITALS
HEART RATE: 103 BPM | HEIGHT: 62 IN | WEIGHT: 117 LBS | OXYGEN SATURATION: 100 % | DIASTOLIC BLOOD PRESSURE: 80 MMHG | SYSTOLIC BLOOD PRESSURE: 121 MMHG | BODY MASS INDEX: 21.53 KG/M2

## 2019-06-04 DIAGNOSIS — F90.2 ATTENTION DEFICIT HYPERACTIVITY DISORDER (ADHD), COMBINED TYPE: ICD-10-CM

## 2019-06-04 DIAGNOSIS — F32.A DEPRESSION, UNSPECIFIED DEPRESSION TYPE: ICD-10-CM

## 2019-06-04 DIAGNOSIS — F32.A DEPRESSION, UNSPECIFIED DEPRESSION TYPE: Primary | ICD-10-CM

## 2019-06-04 LAB — TSH REFLEX FT4: 1.23 UIU/ML (ref 0.35–5.5)

## 2019-06-04 PROCEDURE — 99214 OFFICE O/P EST MOD 30 MIN: CPT | Performed by: NURSE PRACTITIONER

## 2019-06-04 RX ORDER — METHYLPHENIDATE HYDROCHLORIDE 5 MG/1
5 TABLET ORAL DAILY
Qty: 30 TABLET | Refills: 0 | Status: SHIPPED | OUTPATIENT
Start: 2019-06-04 | End: 2019-07-26 | Stop reason: SDUPTHER

## 2019-06-04 SDOH — HEALTH STABILITY: MENTAL HEALTH: HOW MANY STANDARD DRINKS CONTAINING ALCOHOL DO YOU HAVE ON A TYPICAL DAY?: 1 OR 2

## 2019-06-04 SDOH — HEALTH STABILITY: MENTAL HEALTH: HOW OFTEN DO YOU HAVE A DRINK CONTAINING ALCOHOL?: 2-4 TIMES A MONTH

## 2019-06-04 NOTE — PROGRESS NOTES
6/4/2019 9:04 AM   Progress Note    IN:  6153  OUT: 0900      Cyndy Lundborg 1985      Chief Complaint   Patient presents with    Other     poor focus, concentration         Subjective:  Patient is a 29 y.o. female diagnosed with Adult ADHD and presents today for follow-up. Last seen in clinic on 4/2/19 with Nat Irving and prior records were reviewed. Today patient states, \"Definitely better on meds than off, but I'm still buddy scattered. \"    Patient reports side effects as follows: none. No evidence of EPS, no cogwheeling or abnormal motor movements. Absent  suicidal ideation. Reports compliance with medications as good .      Current Substance Use:  See history    BP: /80 (Site: Right Upper Arm, Position: Sitting, Cuff Size: Medium Adult)   Pulse 103   Ht 5' 2\" (1.575 m)   Wt 117 lb (53.1 kg)   SpO2 100%   BMI 21.40 kg/m²       Review of Systems - 14 point review:  Negative except for: inattention, scattered thoughts, insomnia    Constitutional: (fevers, chills, night sweats, wt loss/gain, change in appetite, fatigue, somnolence)    HEENT: (ear pain or discharge, hearing loss, ear ringing, sinus pressure, nosebleed, nasal discharge, sore throat, oral sores, tooth pain, bleeding gums, hoarse voice, neck pain)      Cardiovascular: (HTN, chest pain, elevated cholesterol/lipids, palpitations, leg swelling, leg pain with walking)    Respiratory: (cough, wheezing, snoring, SOB with activity (dyspnea), SOB while lying flat (orthopnea), awakening with severe SOB (paroxysmal nocturnal dyspnea))    Gastrointestinal: (NVD, constipation, abdominal pain, bright red stools, black tarry stools, stool incontinence)     Genitourinary:  (pelvic pain, burning or frequency of urination, urinary urgency, blood in urine incomplete bladder emptying, urinary incontinence, STD; MEN: testicular pain or swelling, erectile dysfunction; WOMEN: LMP, heavy menstrual bleeding (menorrhagia), irregular periods, postmenopausal bleeding, menstrual pain (dymenorrhea, vaginal discharge)    Musculoskeletal: (bone pain/fracture, joint pain or swelling, musle pain)    Integumentary: (rashes, acne, non-healing sores, itching, breast lumps, breast pain, nipple discharge, hair loss)    Neurologic: (HA, muscle weakness, paresthesias (numbness, coldness, crawling or prickling), memory loss, seizure, dizziness)    Psychiatric:  (anxiety, sadness, irritability/anger, insomnia, suicidality)    Endocrine: (heat or cold intolerance, excessive thirst (polydipsia), excessive hunger (polyphagia))    Immune/Allergic: (hives, seasonal or environmental allergies, HIV exposure)    Hematologic/Lymphatic: (lymph node enlargement, easy bleeding or bruising)    History obtained via chart review and patient    PCP is Kaiser Gates MD       Current Meds:    Prior to Admission medications    Medication Sig Start Date End Date Taking? Authorizing Provider   methylphenidate (RITALIN) 5 MG tablet Take 1 tablet by mouth daily for 30 days. Take at 1:00 pm daily 6/4/19 7/4/19 Yes OLIVIA Coates NP   methylphenidate (CONCERTA) 36 MG extended release tablet Take 1 tablet by mouth daily for 30 days. 5/16/19 6/15/19 Yes OLIVIA Coates NP   traZODone (DESYREL) 50 MG tablet Take 0.5 tablets by mouth nightly as needed for Sleep 5/2/19 6/1/19  OLIVIA Burks         MSE:  Patient is  A & O x 4. Appearance:  well-appearing appropriately dressed for season and age. Cognition:  Recent memory intact , remote memory intact , good fund of knowledge, average  intelligence level. Speech:  normal  Language: Naming: intact;  Word Finding: intact  Conversation no evidence of delusions  Behavior:  Cooperative and Good eye contact  Mood: euthymic  Affect: congruent with mood  Thought Content: no evidence of overt psychosis, delusional thought or suicidal /homicidal ideation or plan  Thought Process: linear, goal directed, coherent and scattered, having trouble making cohesive sentences  Associations: logical connections  Attention Span and concentration: HX of ADHD   Judgement Insight:  normal and appropriate  Gait and Station:normal gait and station   Sleep: avg 8-9 hrs   Appetite: ok    Cognition: Can spell \"world\" backwards: Yes                    Can do serial 7's: Yes    Lab Results   Component Value Date     (L) 03/07/2017    K 3.7 03/07/2017     03/07/2017    CO2 20 (L) 03/07/2017    BUN 2 (L) 03/07/2017    CREATININE 0.3 (L) 03/07/2017    GLUCOSE 95 03/07/2017    CALCIUM 7.2 (L) 03/07/2017    PROT 6.3 (L) 03/05/2017    LABALBU 3.6 03/05/2017    BILITOT 0.3 03/05/2017    ALKPHOS 50 03/05/2017    AST 24 03/05/2017    ALT 18 03/05/2017    LABGLOM >60 03/07/2017    GLOB 2.7 03/05/2017     Lab Results   Component Value Date     03/07/2017    K 3.7 03/07/2017     03/07/2017    CO2 20 03/07/2017    BUN 2 03/07/2017    CREATININE 0.3 03/07/2017    GLUCOSE 95 03/07/2017    CALCIUM 7.2 03/07/2017      No results found for: CHOL  No results found for: TRIG  No results found for: HDL  No results found for: LDLCHOLESTEROL, LDLCALC  No results found for: LABVLDL, VLDL  No results found for: CHOLHDLRATIO  No results found for: LABA1C  No results found for: EAG  No results found for: TSHFT4, TSH  No results found for: VITD25    Assessments Administered:    PHQ9: 7, mild    Assessment:   1. Depression, unspecified depression type    2. Attention deficit hyperactivity disorder (ADHD), combined type        Plan:  Continue:  Concerta, 49RL, daily in the morning (at the next refill, she wants to try Adderall XR)  Ritalin, 5mg, daily in the afternoon  Trazodone, 50mg, 1/2 tab as needed for sleep      Follow up: Return in about 3 months (around 9/4/2019). 1. The risks, benefits, side effects, indications, contraindications, and adverse effects of the medications have been discussed.  Yes.  2. The pt has verbalized understanding and has capacity to give informed consent. 3. The Violette Hamper report has been reviewed according to [facility] regulations. 4. Supportive therapy offered. 5. Follow up: Return in about 3 months (around 9/4/2019). 6. The patient has been advised to call with any problems. 7. Controlled substance Treatment Plan: this is a maintenance dose. .  8. The above listed medications have been continued, modifications in meds and other orders/labs as follows:      Orders Placed This Encounter   Medications    methylphenidate (RITALIN) 5 MG tablet     Sig: Take 1 tablet by mouth daily for 30 days. Take at 1:00 pm daily     Dispense:  30 tablet     Refill:  0        Orders Placed This Encounter   Procedures    TSH WITH REFLEX TO FT4     Standing Status:   Future     Standing Expiration Date:   6/4/2020       9. Additional comments: Will have her thyroid checked as she has a family history of thyroid, Hashimoto's thyroid and the patient's eyes are open very wide. Her PHQ9 indicates mild depression, so will check thyroid to see if there is a connection. She would like to switch to Adderall to see if it works better than Concerta. She takes the afternoon methylphenidate to help her manage her children in the afternoon. Will follow up in 3 months. 10.Over 50% of the total visit time of   20  minutes was spent on counseling and/or coordination of care of  Adult ADHD.                                     Psychotherapy Topics: mood/medication effectiveness       Lc Rodríguez PMHNP-BC

## 2019-06-18 ENCOUNTER — TELEPHONE (OUTPATIENT)
Dept: PSYCHIATRY | Age: 34
End: 2019-06-18

## 2019-06-18 DIAGNOSIS — F90.9 ADULT ADHD: Primary | ICD-10-CM

## 2019-06-18 RX ORDER — DEXTROAMPHETAMINE SACCHARATE, AMPHETAMINE ASPARTATE MONOHYDRATE, DEXTROAMPHETAMINE SULFATE AND AMPHETAMINE SULFATE 5; 5; 5; 5 MG/1; MG/1; MG/1; MG/1
20 CAPSULE, EXTENDED RELEASE ORAL EVERY MORNING
Qty: 30 CAPSULE | Refills: 0 | Status: SHIPPED | OUTPATIENT
Start: 2019-06-18 | End: 2019-07-26 | Stop reason: SDUPTHER

## 2019-06-19 NOTE — PROGRESS NOTES
6/18/19 2100    Switched Concerta, 22SX, to Adderall XR, 20mg, at the patient's request. Will need to switch Methylphenidate, 5mg, to Adderall 10mg at the next refill of Methylphenidate.     OLIVIA Mejia-NP

## 2019-06-19 NOTE — TELEPHONE ENCOUNTER
6/18/19 2100    Switched Concerta, 65ID, to Adderall XR, 20mg. Printed script. Will need to switch Methylphenidate, 5mg, to Adderall 10mg at the next refill. Will ask office staff to notify the patient to call when her script of Methylphenidate expires.     OLIVIA Rincon-NP

## 2019-07-26 ENCOUNTER — TELEPHONE (OUTPATIENT)
Dept: PSYCHIATRY | Age: 34
End: 2019-07-26

## 2019-07-26 DIAGNOSIS — F90.9 ADULT ADHD: ICD-10-CM

## 2019-07-26 DIAGNOSIS — F90.2 ATTENTION DEFICIT HYPERACTIVITY DISORDER (ADHD), COMBINED TYPE: ICD-10-CM

## 2019-07-26 RX ORDER — DEXTROAMPHETAMINE SACCHARATE, AMPHETAMINE ASPARTATE MONOHYDRATE, DEXTROAMPHETAMINE SULFATE AND AMPHETAMINE SULFATE 5; 5; 5; 5 MG/1; MG/1; MG/1; MG/1
20 CAPSULE, EXTENDED RELEASE ORAL EVERY MORNING
Qty: 30 CAPSULE | Refills: 0 | Status: SHIPPED | OUTPATIENT
Start: 2019-07-26 | End: 2020-04-01 | Stop reason: SDUPTHER

## 2019-07-26 RX ORDER — METHYLPHENIDATE HYDROCHLORIDE 5 MG/1
10 TABLET ORAL DAILY
Qty: 30 TABLET | Refills: 0 | Status: SHIPPED | OUTPATIENT
Start: 2019-07-26 | End: 2019-07-31 | Stop reason: ALTCHOICE

## 2019-07-31 DIAGNOSIS — F90.9 ADULT ADHD: Primary | ICD-10-CM

## 2019-07-31 RX ORDER — DEXTROAMPHETAMINE SACCHARATE, AMPHETAMINE ASPARTATE, DEXTROAMPHETAMINE SULFATE AND AMPHETAMINE SULFATE 1.25; 1.25; 1.25; 1.25 MG/1; MG/1; MG/1; MG/1
5 TABLET ORAL DAILY
Qty: 30 TABLET | Refills: 0 | Status: SHIPPED | OUTPATIENT
Start: 2019-07-31 | End: 2020-04-01 | Stop reason: SDUPTHER

## 2020-04-01 ENCOUNTER — TELEMEDICINE (OUTPATIENT)
Dept: PRIMARY CARE CLINIC | Age: 35
End: 2020-04-01
Payer: COMMERCIAL

## 2020-04-01 PROCEDURE — 99213 OFFICE O/P EST LOW 20 MIN: CPT | Performed by: FAMILY MEDICINE

## 2020-04-01 RX ORDER — DEXTROAMPHETAMINE SACCHARATE, AMPHETAMINE ASPARTATE MONOHYDRATE, DEXTROAMPHETAMINE SULFATE AND AMPHETAMINE SULFATE 5; 5; 5; 5 MG/1; MG/1; MG/1; MG/1
20 CAPSULE, EXTENDED RELEASE ORAL EVERY MORNING
Qty: 30 CAPSULE | Refills: 0 | Status: SHIPPED | OUTPATIENT
Start: 2020-04-01 | End: 2020-04-29 | Stop reason: SDUPTHER

## 2020-04-01 RX ORDER — DEXTROAMPHETAMINE SACCHARATE, AMPHETAMINE ASPARTATE, DEXTROAMPHETAMINE SULFATE AND AMPHETAMINE SULFATE 1.25; 1.25; 1.25; 1.25 MG/1; MG/1; MG/1; MG/1
5 TABLET ORAL DAILY
Qty: 30 TABLET | Refills: 0 | Status: SHIPPED | OUTPATIENT
Start: 2020-04-01 | End: 2020-05-30 | Stop reason: SDUPTHER

## 2020-04-01 ASSESSMENT — ENCOUNTER SYMPTOMS
ABDOMINAL PAIN: 0
WHEEZING: 0
EYE DISCHARGE: 0
VOMITING: 0
COUGH: 0
COLOR CHANGE: 0
BACK PAIN: 0
NAUSEA: 0
DIARRHEA: 0

## 2020-04-01 NOTE — PROGRESS NOTES
[address]  Phone:  [phone]      2020     TELEHEALTH EVALUATION -- Audio/Visual (During [id number] public health emergency)    HPI:    Angelo Johnson (:  1985) has requested an audio/video evaluation for the following concern(s):    Patient was seen today via video visit for getting her medications filled. She states that she has been seeing behavioral health but the psychiatry nurse practitioner that she had been seeing has quit. She has been on Adderall XR 20 mg in the morning and 5 mg in the afternoon for several years. She states that this dose has worked fairly well for her. She would like to continue this current dose if possible. She states that she does not feel like the dose needs to be adjusted. 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 She states that she does not have any side effects the medication. She states that she does not have any chest pain or palpitations. She states that her appetite is good. Review of Systems   Constitutional: Negative for activity change, appetite change and fever. HENT: Negative for congestion and nosebleeds. Eyes: Negative for discharge. Respiratory: Negative for cough and wheezing. Cardiovascular: Negative for chest pain and leg swelling. Gastrointestinal: Negative for abdominal pain, diarrhea, nausea and vomiting. Genitourinary: Negative for difficulty urinating, frequency and urgency. Musculoskeletal: Negative for back pain and gait problem. Skin: Negative for color change and rash. Neurological: Negative for dizziness and headaches. Hematological: Does not bruise/bleed easily. Psychiatric/Behavioral: Negative for sleep disturbance and suicidal ideas. Prior to Visit Medications    Medication Sig Taking?  Authorizing Provider   amphetamine-dextroamphetamine (ADDERALL XR) 20 MG extended release capsule Take 1 capsule by mouth every morning for 30 days. Yes Wanda Kolb MD   amphetamine-dextroamphetamine (ADDERALL, 5MG,) 5 MG tablet Take 1 tablet by mouth daily for 30 days. Yes Wanda Kolb MD   traZODone (DESYREL) 50 MG tablet Take 0.5 tablets by mouth nightly as needed for Sleep  [place] [facility], APRN       Social History     Tobacco Use    Smoking status: Current Some Day Smoker     Packs/day: 0.25    Smokeless tobacco: Never Used    Tobacco comment: working on quitting as of 6/4/19   Substance Use Topics    Alcohol use: Yes     Alcohol/week: 4.0 standard drinks     Types: 2 Cans of beer, 2 Shots of liquor per week     Frequency: 2-4 times a month     Drinks per session: 1 or 2    Drug use: Not Currently     Types: Marijuana     Comment: last use was around age 33            PHYSICAL EXAMINATION:  Physical Exam  Constitutional:       General: She is not in acute distress. Appearance: Normal appearance. She is not ill-appearing. HENT:      Head: Normocephalic and atraumatic. Nose: Nose normal.   Eyes:      Extraocular Movements: Extraocular movements intact. Conjunctiva/sclera: Conjunctivae normal.   Neck:      Musculoskeletal: Normal range of motion. Skin:     Findings: No rash. Neurological:      General: No focal deficit present. Mental Status: She is alert and oriented to person, place, and time. Mental status is at baseline. Cranial Nerves: No cranial nerve deficit. Psychiatric:         Mood and Affect: Mood normal.         Behavior: Behavior normal.         Thought Content: Thought content normal.         Judgment: Judgment normal.         Due to this being a TeleHealth encounter, evaluation of the following organ systems is limited: Vitals/Constitutional/EENT/Resp/CV/GI//MS/Neuro/Skin/Heme-Lymph-Imm. ASSESSMENT/PLAN:  1. Adult ADHD  Continue current meds. I have refilled these today.    - amphetamine-dextroamphetamine (ADDERALL XR) 20 MG extended release capsule; Take 1 capsule by mouth every morning for 30 days. Dispense: 30 capsule; Refill: 0  - amphetamine-dextroamphetamine (ADDERALL, 5MG,) 5 MG tablet; Take 1 tablet by mouth daily for 30 days. Dispense: 30 tablet; Refill: 0      Return in about 6 months (around 10/1/2020) for 6 month follow up for adhd. An  electronic signature was used to authenticate this note. --Moni Plummer MD on 4/1/2020 at 2:00 PM      Pursuant to the emergency declaration under the [address] [place] [address], [facility] waiver authority and the [employer] Act, this Virtual  Visit was conducted, with patient's consent, to reduce the patient's risk of exposure to COVID-19 and provide continuity of care for an established patient. Services were provided through a video synchronous discussion virtually to substitute for in-person clinic visit.

## 2020-04-30 RX ORDER — DEXTROAMPHETAMINE SACCHARATE, AMPHETAMINE ASPARTATE MONOHYDRATE, DEXTROAMPHETAMINE SULFATE AND AMPHETAMINE SULFATE 5; 5; 5; 5 MG/1; MG/1; MG/1; MG/1
20 CAPSULE, EXTENDED RELEASE ORAL EVERY MORNING
Qty: 30 CAPSULE | Refills: 0 | Status: SHIPPED | OUTPATIENT
Start: 2020-04-30 | End: 2020-05-30 | Stop reason: SDUPTHER

## 2020-06-01 RX ORDER — DEXTROAMPHETAMINE SACCHARATE, AMPHETAMINE ASPARTATE, DEXTROAMPHETAMINE SULFATE AND AMPHETAMINE SULFATE 1.25; 1.25; 1.25; 1.25 MG/1; MG/1; MG/1; MG/1
5 TABLET ORAL DAILY
Qty: 30 TABLET | Refills: 0 | Status: SHIPPED | OUTPATIENT
Start: 2020-06-01 | End: 2020-12-17 | Stop reason: SDUPTHER

## 2020-06-01 RX ORDER — DEXTROAMPHETAMINE SACCHARATE, AMPHETAMINE ASPARTATE MONOHYDRATE, DEXTROAMPHETAMINE SULFATE AND AMPHETAMINE SULFATE 5; 5; 5; 5 MG/1; MG/1; MG/1; MG/1
20 CAPSULE, EXTENDED RELEASE ORAL EVERY MORNING
Qty: 30 CAPSULE | Refills: 0 | Status: SHIPPED | OUTPATIENT
Start: 2020-06-01 | End: 2020-07-01 | Stop reason: SDUPTHER

## 2020-07-01 RX ORDER — DEXTROAMPHETAMINE SACCHARATE, AMPHETAMINE ASPARTATE MONOHYDRATE, DEXTROAMPHETAMINE SULFATE AND AMPHETAMINE SULFATE 5; 5; 5; 5 MG/1; MG/1; MG/1; MG/1
20 CAPSULE, EXTENDED RELEASE ORAL EVERY MORNING
Qty: 30 CAPSULE | Refills: 0 | Status: SHIPPED | OUTPATIENT
Start: 2020-07-01 | End: 2020-08-14 | Stop reason: SDUPTHER

## 2020-08-14 RX ORDER — DEXTROAMPHETAMINE SACCHARATE, AMPHETAMINE ASPARTATE MONOHYDRATE, DEXTROAMPHETAMINE SULFATE AND AMPHETAMINE SULFATE 5; 5; 5; 5 MG/1; MG/1; MG/1; MG/1
20 CAPSULE, EXTENDED RELEASE ORAL EVERY MORNING
Qty: 30 CAPSULE | Refills: 0 | Status: SHIPPED | OUTPATIENT
Start: 2020-08-14 | End: 2020-09-15 | Stop reason: SDUPTHER

## 2020-09-15 RX ORDER — DEXTROAMPHETAMINE SACCHARATE, AMPHETAMINE ASPARTATE MONOHYDRATE, DEXTROAMPHETAMINE SULFATE AND AMPHETAMINE SULFATE 5; 5; 5; 5 MG/1; MG/1; MG/1; MG/1
20 CAPSULE, EXTENDED RELEASE ORAL EVERY MORNING
Qty: 30 CAPSULE | Refills: 0 | Status: SHIPPED | OUTPATIENT
Start: 2020-09-15 | End: 2020-10-17 | Stop reason: SDUPTHER

## 2020-10-19 RX ORDER — DEXTROAMPHETAMINE SACCHARATE, AMPHETAMINE ASPARTATE MONOHYDRATE, DEXTROAMPHETAMINE SULFATE AND AMPHETAMINE SULFATE 5; 5; 5; 5 MG/1; MG/1; MG/1; MG/1
20 CAPSULE, EXTENDED RELEASE ORAL EVERY MORNING
Qty: 30 CAPSULE | Refills: 0 | Status: SHIPPED | OUTPATIENT
Start: 2020-10-19 | End: 2020-12-17 | Stop reason: SDUPTHER

## 2020-12-17 ENCOUNTER — OFFICE VISIT (OUTPATIENT)
Dept: PRIMARY CARE CLINIC | Age: 35
End: 2020-12-17
Payer: COMMERCIAL

## 2020-12-17 VITALS
RESPIRATION RATE: 16 BRPM | TEMPERATURE: 99.1 F | BODY MASS INDEX: 28.62 KG/M2 | OXYGEN SATURATION: 97 % | HEIGHT: 60 IN | SYSTOLIC BLOOD PRESSURE: 104 MMHG | DIASTOLIC BLOOD PRESSURE: 62 MMHG | WEIGHT: 145.8 LBS | HEART RATE: 94 BPM

## 2020-12-17 LAB
ALCOHOL URINE: NORMAL
AMPHETAMINE SCREEN, URINE: NORMAL
BARBITURATE SCREEN, URINE: NORMAL
BENZODIAZEPINE SCREEN, URINE: NORMAL
BUPRENORPHINE URINE: NORMAL
C-REACTIVE PROTEIN: 0.04 MG/DL (ref 0–0.5)
COCAINE METABOLITE SCREEN URINE: NORMAL
FENTANYL SCREEN, URINE: NORMAL
GABAPENTIN SCREEN, URINE: NORMAL
MDMA URINE: NORMAL
METHADONE SCREEN, URINE: NORMAL
METHAMPHETAMINE, URINE: NORMAL
OPIATE SCREEN URINE: NORMAL
OXYCODONE SCREEN URINE: NORMAL
PHENCYCLIDINE SCREEN URINE: NORMAL
PROPOXYPHENE SCREEN, URINE: NORMAL
RHEUMATOID FACTOR: <10 IU/ML
SEDIMENTATION RATE, ERYTHROCYTE: 3 MM/HR (ref 0–20)
SYNTHETIC CANNABINOIDS(K2) SCREEN, URINE: NORMAL
THC SCREEN, URINE: NORMAL
TRAMADOL SCREEN URINE: NORMAL
TRICYCLIC ANTIDEPRESSANTS, UR: NORMAL

## 2020-12-17 PROCEDURE — 80305 DRUG TEST PRSMV DIR OPT OBS: CPT | Performed by: NURSE PRACTITIONER

## 2020-12-17 PROCEDURE — 99213 OFFICE O/P EST LOW 20 MIN: CPT | Performed by: NURSE PRACTITIONER

## 2020-12-17 PROCEDURE — 36415 COLL VENOUS BLD VENIPUNCTURE: CPT | Performed by: NURSE PRACTITIONER

## 2020-12-17 RX ORDER — IBUPROFEN 800 MG/1
800 TABLET ORAL EVERY 6 HOURS PRN
COMMUNITY
End: 2022-01-10

## 2020-12-17 RX ORDER — DEXTROAMPHETAMINE SACCHARATE, AMPHETAMINE ASPARTATE MONOHYDRATE, DEXTROAMPHETAMINE SULFATE AND AMPHETAMINE SULFATE 5; 5; 5; 5 MG/1; MG/1; MG/1; MG/1
20 CAPSULE, EXTENDED RELEASE ORAL EVERY MORNING
Qty: 30 CAPSULE | Refills: 0 | Status: SHIPPED | OUTPATIENT
Start: 2020-12-17 | End: 2021-01-20 | Stop reason: SDUPTHER

## 2020-12-17 RX ORDER — ACETAMINOPHEN 500 MG
500 TABLET ORAL EVERY 6 HOURS PRN
COMMUNITY
End: 2022-02-21

## 2020-12-17 RX ORDER — DEXTROAMPHETAMINE SACCHARATE, AMPHETAMINE ASPARTATE, DEXTROAMPHETAMINE SULFATE AND AMPHETAMINE SULFATE 1.25; 1.25; 1.25; 1.25 MG/1; MG/1; MG/1; MG/1
5 TABLET ORAL DAILY
Qty: 30 TABLET | Refills: 0 | Status: SHIPPED | OUTPATIENT
Start: 2020-12-17 | End: 2021-01-20 | Stop reason: SDUPTHER

## 2020-12-17 ASSESSMENT — PATIENT HEALTH QUESTIONNAIRE - PHQ9
2. FEELING DOWN, DEPRESSED OR HOPELESS: 0
1. LITTLE INTEREST OR PLEASURE IN DOING THINGS: 0
SUM OF ALL RESPONSES TO PHQ QUESTIONS 1-9: 0
SUM OF ALL RESPONSES TO PHQ9 QUESTIONS 1 & 2: 0

## 2020-12-17 ASSESSMENT — ENCOUNTER SYMPTOMS
EYE DISCHARGE: 0
COUGH: 0
VOMITING: 0
DIARRHEA: 0
EYE PAIN: 0
ABDOMINAL PAIN: 0
NAUSEA: 0
EYE ITCHING: 0
SHORTNESS OF BREATH: 0
SORE THROAT: 0
ALLERGIC/IMMUNOLOGIC NEGATIVE: 1

## 2020-12-17 NOTE — PATIENT INSTRUCTIONS
Patient Education        Hand Arthritis: Exercises  Introduction  Here are some examples of exercises for you to try. The exercises may be suggested for a condition or for rehabilitation. Start each exercise slowly. Ease off the exercises if you start to have pain. You will be told when to start these exercises and which ones will work best for you. How to do the exercises  Tendon glides   1. In this exercise, the steps follow one another to a make a continuous movement. 2. With your affected hand, point your fingers and thumb straight up. Your wrist should be relaxed, following the line of your fingers and thumb. 3. Curl your fingers so that the top two joints in them are bent, and your fingers wrap down. Your fingertips should touch or be near the base of your fingers. Your fingers will look like a hook. 4. Make a fist by bending your knuckles. Your thumb can gently rest against your index (pointing) finger. 5. Unwind your fingers slightly so that your fingertips can touch the base of your palm. Your thumb can rest against your index finger. 6. Move back to your starting position, with your fingers and thumb pointing up. 7. Repeat the series of motions 8 to 12 times. 8. Switch hands and repeat steps 1 through 6, even if only one hand is sore. Intrinsic flexion   1. Rest your affected hand on a table and bend the large joints where your fingers connect to your hand. Keep your thumb and the other joints in your fingers straight. 2. Slowly straighten your fingers. Your wrist should be relaxed, following the line of your fingers and thumb. 3. Move back to your starting position, with your hand bent. 4. Repeat 8 to 12 times. 5. Switch hands and repeat steps 1 through 4, even if only one hand is sore. Finger extension   1. Place your affected hand flat on a table. 2. Lift and then lower one finger at a time off the table. 3. Repeat 8 to 12 times. 4. Switch hands and repeat steps 1 through 3, even if only one hand is sore. MP extension   1. Place your good hand on a table, palm up. Put your affected hand on top of your good hand with your fingers wrapped around the thumb of your good hand like you are making a fist.  2. Slowly uncurl the joints of your affected hand where your fingers connect to your hand so that only the top two joints of your fingers are bent. Your fingers will look like a hook. 3. Move back to your starting position, with your fingers wrapped around your good thumb. 4. Repeat 8 to 12 times. 5. Switch hands and repeat steps 1 through 4, even if only one hand is sore. PIP extension (with MP extension)   1. Place your good hand on a table, palm up. Put your affected hand on top of your good hand, palm up. 2. Use the thumb and fingers of your good hand to grasp below the middle joint of one finger of your affected hand. 3. Straighten the last two joints of that finger. 4. Repeat 8 to 12 times. 5. Repeat steps 1 through 4 with each finger. 6. Switch hands and repeat steps 1 through 5, even if only one hand is sore. DIP flexion   1. With your good hand, grasp one finger of your affected hand. Your thumb will be on the top side of your finger just below the joint that is closest to your fingernail. 2. Slowly bend your affected finger only at the joint closest to your fingernail. 3. Repeat 8 to 12 times. 4. Repeat steps 1 through 3 with each finger. 5. Switch hands and repeat steps 1 through 4, even if only one hand is sore. Follow-up care is a key part of your treatment and safety. Be sure to make and go to all appointments, and call your doctor if you are having problems. It's also a good idea to know your test results and keep a list of the medicines you take. Where can you learn more? Go to [email or web address] and sign in to your [employer] account. Enter A265 in the [facility] box to learn more about \"Hand Arthritis: Exercises. \"     If you do not have an account, please click on the \"Sign Up Now\" link. Current as of: March 2, 2020               Content Version: 12.6  © 6958-4482 [employer]. Care instructions adapted under license by [employer] ([facility]). If you have questions about a medical condition or this instruction, always ask your healthcare professional. [address] any warranty or liability for your use of this information. Patient Education         Exercising Safely With Arthritis (02:02)  Your health professional recommends that you watch this short online health video. Learn how activity can help reduce joint pain and how to exercise safely when you have arthritis. How to watch the video    Scan the QR code   OR Visit the website    [email or web address]   Current as of: December 9, 2019               Content Version: 12.6  © 2006-2020 [employer]. Care instructions adapted under license by [employer] ([facility]). If you have questions about a medical condition or this instruction, always ask your healthcare professional. [address] any warranty or liability for your use of this information. Volaten cream to site per box instructions.     Bio Freeze

## 2020-12-17 NOTE — PROGRESS NOTES
[facility] PHYSICIAN SERVICES  [facility]  [address] [place] [address]  [address] [place] [address]  Dept: [phone]  Dept Fax: [phone]  Loc: [phone]    Parth Estevez is a 30 y.o. female who presents today for her medical conditions/complaints as noted below. Parth Estevez is c/o of Medication Refill (Pt is here to have  Adderal refilled. Pt cites no concerns.)        HPI:     HPI pt presents today for medication refills of her Adderal.she states that she has been on this medication for some time and it is effective but feels she still has some trouble focusing with the kids at home doing virtual learning. She does report feeling a bit overwhelmed but does not feel that she is hypervigilant on negative issues. She states that she is eating well and sleeping ok. She denies any concerns with her medication. She does report concerns with worsening arthritis. She states that she was diagnosed while a teenager but does not know what type of arthritis she has. She takes ibuprofen 800 mg multiple times a day and her  told her she had to get something done. Chief Complaint   Patient presents with    Medication Refill     Pt is here to have  Adderal refilled. Pt cites no concerns. Past Medical History:   Diagnosis Date    Anxiety     Hx of partial seizures     UTI (urinary tract infection)     PT states she gets constant UTI's \"That is pretty much undercontrol now. \"      Past Surgical History:   Procedure Laterality Date     SECTION      x2    TUBAL LIGATION         Vitals 2020 2020 2019 2019 3/8/2019 9198   SYSTOLIC [phone]   DIASTOLIC 62 92 [phone] 73 75 75   Site - Left Upper Arm Right Upper Arm Right Upper Arm Right Upper Arm Right Upper Arm   Position - Sitting Sitting Sitting Sitting Sitting   Cuff Size - Medium Adult Medium Adult Medium Adult Medium Adult Medium Adult   Pulse - 94 103 101 116 117   Temp - 99.1 - - - -   Resp - 16 - - - - SpO2 - 97 100 98 100 96   Weight - 145 lb 12.8 oz 117 lb 122 lb 124 lb 130 lb   Height - 5' 0\" 5' 2\" 5' 2\" 5' 1\" 5' 1\"   Body mass index - 28.47 kg/m2 21.4 kg/m2 22.31 kg/m2 23.43 kg/m2 24.56 kg/m2   Some recent data might be hidden       Family History   Problem Relation Age of Onset    Heart Attack Mother     Stroke Mother     High Blood Pressure Mother     High Cholesterol Mother     Diabetes Mother     Cancer Mother         thyroid    No Known Problems Father     Alcohol Abuse Brother         recently out of rehab from what she was told    Diabetes Maternal Grandmother     Cancer Maternal Grandmother         Pancreatic     ADHD Son         with [employer]    No Known Problems Son        Social History     Tobacco Use    Smoking status: Current Some Day Smoker     Packs/day: 0.25    Smokeless tobacco: Never Used    Tobacco comment: working on quitting as of 6/4/19   Substance Use Topics    Alcohol use: Yes     Alcohol/week: 4.0 standard drinks     Types: 2 Cans of beer, 2 Shots of liquor per week     Frequency: 2-4 times a month     Drinks per session: 1 or 2      Current Outpatient Medications   Medication Sig Dispense Refill    ibuprofen (ADVIL;MOTRIN) 800 MG tablet Take 800 mg by mouth every 6 hours as needed for Pain      acetaminophen (TYLENOL) 500 MG tablet Take 500 mg by mouth every 6 hours as needed for Pain      amphetamine-dextroamphetamine (ADDERALL XR) 20 MG extended release capsule Take 1 capsule by mouth every morning for 30 days. 30 capsule 0    amphetamine-dextroamphetamine (ADDERALL, 5MG,) 5 MG tablet Take 1 tablet by mouth daily for 30 days. 30 tablet 0    traZODone (DESYREL) 50 MG tablet Take 0.5 tablets by mouth nightly as needed for Sleep 15 tablet 1     No current facility-administered medications for this visit.       Allergies   Allergen Reactions    Penicillins Shortness Of Breath    Zoloft [Sertraline Hcl] Itching       Health Maintenance   Topic Date Due  Varicella vaccine (1 of 2 - 2-dose childhood series) 07/28/1987    Pneumococcal 0-64 years Vaccine (1 of 1 - PPSV23) 07/28/1992    HIV screen  07/28/2001    DTaP/Tdap/Td vaccine (1 - Tdap) 07/28/2005    Cervical cancer screen  07/28/2007    Flu vaccine (1) 09/01/2020    Hepatitis A vaccine  Aged Out    Hepatitis B vaccine  Aged Out    Hib vaccine  Aged Out    Meningococcal (ACWY) vaccine  Aged Out       Subjective:      Review of Systems   Constitutional: Negative for chills, fatigue and fever. HENT: Negative for congestion and sore throat. Eyes: Negative for pain, discharge and itching. Respiratory: Negative for cough and shortness of breath. Cardiovascular: Negative for chest pain and palpitations. Gastrointestinal: Negative for abdominal pain, diarrhea, nausea and vomiting. Endocrine: Negative. Genitourinary: Negative for difficulty urinating and dysuria. Musculoskeletal: Positive for arthralgias and joint swelling. Skin: Negative. Allergic/Immunologic: Negative. Neurological: Negative for dizziness, light-headedness and headaches. Psychiatric/Behavioral: Positive for decreased concentration. Negative for dysphoric mood, self-injury, sleep disturbance and suicidal ideas. The patient is nervous/anxious. Objective:     Physical Exam  Vitals signs and nursing note reviewed. Constitutional:       General: She is not in acute distress. Appearance: Normal appearance. She is not ill-appearing or toxic-appearing. HENT:      Head: Normocephalic and atraumatic. Nose: Nose normal.   Eyes:      Extraocular Movements: Extraocular movements intact. Conjunctiva/sclera: Conjunctivae normal.      Pupils: Pupils are equal, round, and reactive to light. Neck:      Musculoskeletal: Normal range of motion. Cardiovascular:      Rate and Rhythm: Normal rate and regular rhythm. Pulses: Normal pulses. Heart sounds: Normal heart sounds. No murmur. No friction rub. No gallop. Pulmonary:      Effort: Pulmonary effort is normal. No respiratory distress. Breath sounds: Normal breath sounds. No stridor. No wheezing, rhonchi or rales. Abdominal:      General: Abdomen is flat. Bowel sounds are normal. There is no distension. Palpations: Abdomen is soft. Tenderness: There is no abdominal tenderness. There is no guarding. Musculoskeletal: Normal range of motion. General: No swelling, deformity or signs of injury. Right hand: She exhibits tenderness. She exhibits normal range of motion, normal capillary refill, no deformity and no swelling. Normal sensation noted. Normal strength noted. Left hand: She exhibits tenderness. She exhibits normal range of motion, normal capillary refill, no deformity and no swelling. Normal sensation noted. Normal strength noted. Skin:     General: Skin is warm and dry. Coloration: Skin is not jaundiced or pale. Findings: No bruising, erythema or lesion. Neurological:      Mental Status: She is alert and oriented to person, place, and time. Psychiatric:         Attention and Perception: Attention normal.         Mood and Affect: Mood and affect normal.         Speech: Speech normal.         Behavior: Behavior normal. Behavior is cooperative. Thought Content: Thought content normal.         Cognition and Memory: Cognition and memory normal.         Judgment: Judgment normal.       /62   Pulse 94   Temp 99.1 °F (37.3 °C) (Temporal)   Resp 16   Ht 5' (1.524 m)   Wt 145 lb 12.8 oz (66.1 kg)   LMP 11/17/2020 (Approximate)   SpO2 97%   BMI 28.47 kg/m²     Assessment:       Diagnosis Orders   1. Medication management  POCT Rapid Drug Screen   2.  Polyarthralgia  EJREMY Screen with Reflex    Rheumatoid Factor    C-Reactive Protein    Sedimentation Rate         Plan: More than 50% of the time was spent counseling and coordinating care for a total time of 15 min face to face. 1. Stable. Continue current Adderall doses. Dr. Rosina Stout will send. LINDSAY today. Contract updated today. IRMA current. 2. Labs today. Exercises provided. Voltaren cream.    Pt's BP initially was elevated in office but repeat was improved. Check BP and send results through [employer]. Patient given educational materials -see patient instructions. Discussed use, benefit, and side effects of prescribed medications. All patient questions answered. Pt voiced understanding. Reviewed health maintenance. Instructed to continue currentmedications, diet and exercise. Patient agreed with treatment plan. Follow up as directed. MEDICATIONS:  No orders of the defined types were placed in this encounter. ORDERS:  Orders Placed This Encounter   Procedures    JEREMY Screen with Reflex    Rheumatoid Factor    C-Reactive Protein    Sedimentation Rate    POCT Rapid Drug Screen       Follow-up:  Return if symptoms worsen or fail to improve. PATIENT INSTRUCTIONS:  Patient Instructions       Patient Education        Hand Arthritis: Exercises  Introduction  Here are some examples of exercises for you to try. The exercises may be suggested for a condition or for rehabilitation. Start each exercise slowly. Ease off the exercises if you start to have pain. You will be told when to start these exercises and which ones will work best for you. How to do the exercises  Tendon glides   1. In this exercise, the steps follow one another to a make a continuous movement. 2. With your affected hand, point your fingers and thumb straight up. Your wrist should be relaxed, following the line of your fingers and thumb. 3. Curl your fingers so that the top two joints in them are bent, and your fingers wrap down. Your fingertips should touch or be near the base of your fingers. Your fingers will look like a hook. 4. Make a fist by bending your knuckles. Your thumb can gently rest against your index (pointing) finger. 5. Unwind your fingers slightly so that your fingertips can touch the base of your palm. Your thumb can rest against your index finger. 6. Move back to your starting position, with your fingers and thumb pointing up. 7. Repeat the series of motions 8 to 12 times. 8. Switch hands and repeat steps 1 through 6, even if only one hand is sore. Intrinsic flexion   1. Rest your affected hand on a table and bend the large joints where your fingers connect to your hand. Keep your thumb and the other joints in your fingers straight. 2. Slowly straighten your fingers. Your wrist should be relaxed, following the line of your fingers and thumb. 3. Move back to your starting position, with your hand bent. 4. Repeat 8 to 12 times. 5. Switch hands and repeat steps 1 through 4, even if only one hand is sore. Finger extension   1. Place your affected hand flat on a table. 2. Lift and then lower one finger at a time off the table. 3. Repeat 8 to 12 times. 4. Switch hands and repeat steps 1 through 3, even if only one hand is sore. MP extension   1. Place your good hand on a table, palm up. Put your affected hand on top of your good hand with your fingers wrapped around the thumb of your good hand like you are making a fist.  2. Slowly uncurl the joints of your affected hand where your fingers connect to your hand so that only the top two joints of your fingers are bent. Your fingers will look like a hook. 3. Move back to your starting position, with your fingers wrapped around your good thumb. 4. Repeat 8 to 12 times. 5. Switch hands and repeat steps 1 through 4, even if only one hand is sore. PIP extension (with MP extension)   1. Place your good hand on a table, palm up. Put your affected hand on top of your good hand, palm up. 2. Use the thumb and fingers of your good hand to grasp below the middle joint of one finger of your affected hand. 3. Straighten the last two joints of that finger. 4. Repeat 8 to 12 times. 5. Repeat steps 1 through 4 with each finger. 6. Switch hands and repeat steps 1 through 5, even if only one hand is sore. DIP flexion   1. With your good hand, grasp one finger of your affected hand. Your thumb will be on the top side of your finger just below the joint that is closest to your fingernail. 2. Slowly bend your affected finger only at the joint closest to your fingernail. 3. Repeat 8 to 12 times. 4. Repeat steps 1 through 3 with each finger. 5. Switch hands and repeat steps 1 through 4, even if only one hand is sore. Follow-up care is a key part of your treatment and safety. Be sure to make and go to all appointments, and call your doctor if you are having problems. It's also a good idea to know your test results and keep a list of the medicines you take. Where can you learn more? Go to [email or web address] and sign in to your [employer] account. Enter F434 in the [email or web address].com box to learn more about \"Hand Arthritis: Exercises. \"     If you do not have an account, please click on the \"Sign Up Now\" link. Current as of: March 2, 2020               Content Version: 12.6  © 8502-1522 [employer]. Care instructions adapted under license by [facility] ([facility]). If you have questions about a medical condition or this instruction, always ask your healthcare professional. [place] [address] any warranty or liability for your use of this information. Patient Education         Exercising Safely With Arthritis (02:02)  Your health professional recommends that you watch this short online health video. Learn how activity can help reduce joint pain and how to exercise safely when you have arthritis.      How to watch the video    Scan the QR code OR Visit the website    [email or web address]   Current as of: December 9, 2019               Content Version: 12.6  © 5621-2241 [employer]. Care instructions adapted under license by [facility] ([facility]). If you have questions about a medical condition or this instruction, always ask your healthcare professional. [address] any warranty or liability for your use of this information. Volaten cream to site per box instructions. Bio Freeze      Electronically signed by OLIVIA Muse NP on 12/17/2020 at 4:53 PM    EMR Dragon/transcription disclaimer:  Much of thisencounter note is electronic transcription/translation of spoken language to printed texts. The electronic translation of spoken language may be erroneous, or at times, nonsensical words or phrases may be inadvertentlytranscribed.   Although I have reviewed the note for such errors, some may still exist.

## 2020-12-20 LAB — ANA IGG, ELISA: NORMAL

## 2021-01-20 DIAGNOSIS — F90.9 ADULT ADHD: ICD-10-CM

## 2021-01-20 RX ORDER — DEXTROAMPHETAMINE SACCHARATE, AMPHETAMINE ASPARTATE MONOHYDRATE, DEXTROAMPHETAMINE SULFATE AND AMPHETAMINE SULFATE 5; 5; 5; 5 MG/1; MG/1; MG/1; MG/1
20 CAPSULE, EXTENDED RELEASE ORAL EVERY MORNING
Qty: 30 CAPSULE | Refills: 0 | Status: SHIPPED | OUTPATIENT
Start: 2021-01-20 | End: 2021-02-19 | Stop reason: SDUPTHER

## 2021-01-20 RX ORDER — DEXTROAMPHETAMINE SACCHARATE, AMPHETAMINE ASPARTATE, DEXTROAMPHETAMINE SULFATE AND AMPHETAMINE SULFATE 1.25; 1.25; 1.25; 1.25 MG/1; MG/1; MG/1; MG/1
5 TABLET ORAL DAILY
Qty: 30 TABLET | Refills: 0 | Status: SHIPPED | OUTPATIENT
Start: 2021-01-20 | End: 2021-02-19 | Stop reason: SDUPTHER

## 2021-02-19 DIAGNOSIS — F90.9 ADULT ADHD: ICD-10-CM

## 2021-02-19 RX ORDER — DEXTROAMPHETAMINE SACCHARATE, AMPHETAMINE ASPARTATE, DEXTROAMPHETAMINE SULFATE AND AMPHETAMINE SULFATE 1.25; 1.25; 1.25; 1.25 MG/1; MG/1; MG/1; MG/1
5 TABLET ORAL DAILY
Qty: 30 TABLET | Refills: 0 | Status: SHIPPED | OUTPATIENT
Start: 2021-02-19 | End: 2021-03-25 | Stop reason: SDUPTHER

## 2021-02-19 RX ORDER — DEXTROAMPHETAMINE SACCHARATE, AMPHETAMINE ASPARTATE MONOHYDRATE, DEXTROAMPHETAMINE SULFATE AND AMPHETAMINE SULFATE 5; 5; 5; 5 MG/1; MG/1; MG/1; MG/1
20 CAPSULE, EXTENDED RELEASE ORAL EVERY MORNING
Qty: 30 CAPSULE | Refills: 0 | Status: SHIPPED | OUTPATIENT
Start: 2021-02-19 | End: 2021-03-25 | Stop reason: SDUPTHER

## 2021-03-25 DIAGNOSIS — F90.9 ADULT ADHD: ICD-10-CM

## 2021-03-25 RX ORDER — DEXTROAMPHETAMINE SACCHARATE, AMPHETAMINE ASPARTATE, DEXTROAMPHETAMINE SULFATE AND AMPHETAMINE SULFATE 1.25; 1.25; 1.25; 1.25 MG/1; MG/1; MG/1; MG/1
5 TABLET ORAL DAILY
Qty: 30 TABLET | Refills: 0 | Status: SHIPPED | OUTPATIENT
Start: 2021-03-25 | End: 2021-04-26 | Stop reason: SDUPTHER

## 2021-03-25 RX ORDER — DEXTROAMPHETAMINE SACCHARATE, AMPHETAMINE ASPARTATE MONOHYDRATE, DEXTROAMPHETAMINE SULFATE AND AMPHETAMINE SULFATE 5; 5; 5; 5 MG/1; MG/1; MG/1; MG/1
20 CAPSULE, EXTENDED RELEASE ORAL EVERY MORNING
Qty: 30 CAPSULE | Refills: 0 | Status: SHIPPED | OUTPATIENT
Start: 2021-03-25 | End: 2021-04-26 | Stop reason: SDUPTHER

## 2021-04-26 DIAGNOSIS — F90.9 ADULT ADHD: ICD-10-CM

## 2021-04-27 RX ORDER — DEXTROAMPHETAMINE SACCHARATE, AMPHETAMINE ASPARTATE, DEXTROAMPHETAMINE SULFATE AND AMPHETAMINE SULFATE 1.25; 1.25; 1.25; 1.25 MG/1; MG/1; MG/1; MG/1
5 TABLET ORAL DAILY
Qty: 30 TABLET | Refills: 0 | Status: SHIPPED | OUTPATIENT
Start: 2021-04-27 | End: 2021-07-08 | Stop reason: DRUGHIGH

## 2021-04-27 RX ORDER — DEXTROAMPHETAMINE SACCHARATE, AMPHETAMINE ASPARTATE MONOHYDRATE, DEXTROAMPHETAMINE SULFATE AND AMPHETAMINE SULFATE 5; 5; 5; 5 MG/1; MG/1; MG/1; MG/1
20 CAPSULE, EXTENDED RELEASE ORAL EVERY MORNING
Qty: 30 CAPSULE | Refills: 0 | Status: SHIPPED | OUTPATIENT
Start: 2021-04-27 | End: 2021-05-26 | Stop reason: SDUPTHER

## 2021-04-27 NOTE — TELEPHONE ENCOUNTER
LAST OV: 12/17/20    LAST IRMA: 2/19/21    LAST UDS: 12/17/20    NEXT SCHEDULED OV: none
quit smoking 1985

## 2021-05-26 DIAGNOSIS — F90.9 ADULT ADHD: ICD-10-CM

## 2021-05-26 RX ORDER — DEXTROAMPHETAMINE SACCHARATE, AMPHETAMINE ASPARTATE MONOHYDRATE, DEXTROAMPHETAMINE SULFATE AND AMPHETAMINE SULFATE 5; 5; 5; 5 MG/1; MG/1; MG/1; MG/1
20 CAPSULE, EXTENDED RELEASE ORAL EVERY MORNING
Qty: 30 CAPSULE | Refills: 0 | Status: SHIPPED | OUTPATIENT
Start: 2021-05-26 | End: 2021-07-08 | Stop reason: SDUPTHER

## 2021-07-08 ENCOUNTER — OFFICE VISIT (OUTPATIENT)
Dept: PRIMARY CARE CLINIC | Age: 36
End: 2021-07-08
Payer: COMMERCIAL

## 2021-07-08 VITALS
BODY MASS INDEX: 27 KG/M2 | HEIGHT: 61 IN | TEMPERATURE: 97.9 F | DIASTOLIC BLOOD PRESSURE: 72 MMHG | SYSTOLIC BLOOD PRESSURE: 110 MMHG | HEART RATE: 113 BPM | OXYGEN SATURATION: 98 % | WEIGHT: 143 LBS | RESPIRATION RATE: 16 BRPM

## 2021-07-08 DIAGNOSIS — Z79.899 MEDICATION MANAGEMENT: ICD-10-CM

## 2021-07-08 DIAGNOSIS — Z12.4 SCREENING FOR CERVICAL CANCER: ICD-10-CM

## 2021-07-08 DIAGNOSIS — F90.9 ADULT ADHD: Primary | ICD-10-CM

## 2021-07-08 LAB
ALCOHOL URINE: NORMAL
AMPHETAMINE SCREEN, URINE: POSITIVE
BARBITURATE SCREEN, URINE: NORMAL
BENZODIAZEPINE SCREEN, URINE: NORMAL
BUPRENORPHINE URINE: NORMAL
COCAINE METABOLITE SCREEN URINE: NORMAL
FENTANYL SCREEN, URINE: NORMAL
GABAPENTIN SCREEN, URINE: NORMAL
MDMA URINE: NORMAL
METHADONE SCREEN, URINE: NORMAL
METHAMPHETAMINE, URINE: NORMAL
OPIATE SCREEN URINE: NORMAL
OXYCODONE SCREEN URINE: NORMAL
PHENCYCLIDINE SCREEN URINE: NORMAL
PROPOXYPHENE SCREEN, URINE: NORMAL
SYNTHETIC CANNABINOIDS(K2) SCREEN, URINE: NORMAL
THC SCREEN, URINE: NORMAL
TRAMADOL SCREEN URINE: NORMAL
TRICYCLIC ANTIDEPRESSANTS, UR: NORMAL

## 2021-07-08 PROCEDURE — 80305 DRUG TEST PRSMV DIR OPT OBS: CPT | Performed by: FAMILY MEDICINE

## 2021-07-08 PROCEDURE — 99213 OFFICE O/P EST LOW 20 MIN: CPT | Performed by: FAMILY MEDICINE

## 2021-07-08 RX ORDER — DEXTROAMPHETAMINE SACCHARATE, AMPHETAMINE ASPARTATE, DEXTROAMPHETAMINE SULFATE AND AMPHETAMINE SULFATE 2.5; 2.5; 2.5; 2.5 MG/1; MG/1; MG/1; MG/1
10 TABLET ORAL DAILY
Qty: 30 TABLET | Refills: 0 | Status: SHIPPED | OUTPATIENT
Start: 2021-07-08 | End: 2021-09-07 | Stop reason: SDUPTHER

## 2021-07-08 RX ORDER — DEXTROAMPHETAMINE SACCHARATE, AMPHETAMINE ASPARTATE MONOHYDRATE, DEXTROAMPHETAMINE SULFATE AND AMPHETAMINE SULFATE 5; 5; 5; 5 MG/1; MG/1; MG/1; MG/1
20 CAPSULE, EXTENDED RELEASE ORAL EVERY MORNING
Qty: 30 CAPSULE | Refills: 0 | Status: SHIPPED | OUTPATIENT
Start: 2021-07-08 | End: 2021-08-04 | Stop reason: SDUPTHER

## 2021-07-08 ASSESSMENT — ENCOUNTER SYMPTOMS
COUGH: 0
EYE DISCHARGE: 0
NAUSEA: 0
COLOR CHANGE: 0
BACK PAIN: 0
ABDOMINAL PAIN: 0
VOMITING: 0
DIARRHEA: 0
WHEEZING: 0

## 2021-07-08 ASSESSMENT — PATIENT HEALTH QUESTIONNAIRE - PHQ9
1. LITTLE INTEREST OR PLEASURE IN DOING THINGS: 0
2. FEELING DOWN, DEPRESSED OR HOPELESS: 0
SUM OF ALL RESPONSES TO PHQ QUESTIONS 1-9: 0
SUM OF ALL RESPONSES TO PHQ9 QUESTIONS 1 & 2: 0

## 2021-07-08 NOTE — PROGRESS NOTES
SUBJECTIVE:    Patient ID: Yani Segal is a 30 y.o. female. HPI:   Patient is seen today for a follow-up on ADHD. She is on Adderall XR 20 mg. She states that she is tolerating her medication well. She states that she is not having any side effects to it. She does feel like her booster dose that she takes needs to be increased slightly. She was previously on 5 mg. She states that she does not feel like this is working as well as it has previously. She really would like to try increasing the dose if possible to help with her symptoms. She states that she is still struggling to get stuff done in the afternoons. She is tolerating her medications well and denies any side effects to them. She states that she is sleeping well. She states that she is not having any chest pain or palpitations. Past Medical History:   Diagnosis Date    Anxiety     Hx of partial seizures     UTI (urinary tract infection)     PT states she gets constant UTI's \"That is pretty much undercontrol now. \"      Current Outpatient Medications on File Prior to Visit   Medication Sig Dispense Refill    ibuprofen (ADVIL;MOTRIN) 800 MG tablet Take 800 mg by mouth every 6 hours as needed for Pain      acetaminophen (TYLENOL) 500 MG tablet Take 500 mg by mouth every 6 hours as needed for Pain       No current facility-administered medications on file prior to visit. Allergies   Allergen Reactions    Penicillins Shortness Of Breath    Zoloft [Sertraline Hcl] Itching       Review of Systems   Constitutional: Negative for activity change, appetite change and fever. HENT: Negative for congestion and nosebleeds. Eyes: Negative for discharge. Respiratory: Negative for cough and wheezing. Cardiovascular: Negative for chest pain and leg swelling. Gastrointestinal: Negative for abdominal pain, diarrhea, nausea and vomiting. Genitourinary: Negative for difficulty urinating, frequency and urgency.    Musculoskeletal: Negative for back pain and gait problem. Skin: Negative for color change and rash. Neurological: Negative for dizziness and headaches. Hematological: Does not bruise/bleed easily. Psychiatric/Behavioral: Negative for sleep disturbance and suicidal ideas. OBJECTIVE:    Physical Exam  Vitals reviewed. Constitutional:       General: She is not in acute distress. Appearance: Normal appearance. She is well-developed. She is not diaphoretic. HENT:      Head: Normocephalic and atraumatic. Right Ear: External ear normal.      Left Ear: External ear normal.   Cardiovascular:      Rate and Rhythm: Normal rate and regular rhythm. Pulses: Normal pulses. Heart sounds: Normal heart sounds. No murmur heard. Pulmonary:      Effort: Pulmonary effort is normal. No respiratory distress. Breath sounds: Normal breath sounds. Musculoskeletal:      Cervical back: Normal range of motion and neck supple. Skin:     General: Skin is warm and dry. Neurological:      General: No focal deficit present. Mental Status: She is alert and oriented to person, place, and time. Mental status is at baseline. Psychiatric:         Mood and Affect: Mood normal.         Behavior: Behavior normal.         Thought Content: Thought content normal.         Judgment: Judgment normal.        /72 (Site: Left Upper Arm, Position: Sitting, Cuff Size: Medium Adult)   Pulse 113   Temp 97.9 °F (36.6 °C) (Temporal)   Resp 16   Ht 5' 1\" (1.549 m)   Wt 143 lb (64.9 kg)   SpO2 98%   BMI 27.02 kg/m²      ASSESSMENT/PLAN:    Lori Francisco was seen today for medication refill. Diagnoses and all orders for this visit:    Adult ADHD  -     amphetamine-dextroamphetamine (ADDERALL XR) 20 MG extended release capsule; Take 1 capsule by mouth every morning for 30 days. -     amphetamine-dextroamphetamine (ADDERALL, 10MG,) 10 MG tablet; Take 1 tablet by mouth daily for 30 doses.     Screening for cervical cancer  -     [facility] Areli Donovan, NP, OB/GYN, [place]    Medication management  -     POCT Rapid Drug Screen        Problem List     Adult ADHD - Primary    Relevant Medications    amphetamine-dextroamphetamine (ADDERALL XR) 20 MG extended release capsule    amphetamine-dextroamphetamine (ADDERALL, 10MG,) 10 MG tablet            I have sent Adderall XR and Adderall immediate release for a booster dose over to the pharmacy. Continue 20 mg of XR and then we are going to increase her booster dose to 10 mg.  Urine drug screen was obtained today and was normal for what she is taking. Referral has been put in for GYN for Pap. Follow-up with us in 6 months for a physical and fasting labs unless needed sooner. EMR Dragon/transcription disclaimer:  Much of this encounter note is electronic transcription/translation of spoken language toprinted texts. The electronic translation of spoken language may be erroneous, or at times, nonsensical words or phrases may be inadvertently transcribed.   Although I have reviewed the note for such errors, some may stillexist.

## 2021-08-04 DIAGNOSIS — F90.9 ADULT ADHD: ICD-10-CM

## 2021-08-04 RX ORDER — DEXTROAMPHETAMINE SACCHARATE, AMPHETAMINE ASPARTATE MONOHYDRATE, DEXTROAMPHETAMINE SULFATE AND AMPHETAMINE SULFATE 5; 5; 5; 5 MG/1; MG/1; MG/1; MG/1
20 CAPSULE, EXTENDED RELEASE ORAL EVERY MORNING
Qty: 30 CAPSULE | Refills: 0 | Status: SHIPPED | OUTPATIENT
Start: 2021-08-04 | End: 2021-09-07 | Stop reason: SDUPTHER

## 2021-08-30 ENCOUNTER — OFFICE VISIT (OUTPATIENT)
Dept: OBGYN CLINIC | Age: 36
End: 2021-08-30
Payer: COMMERCIAL

## 2021-08-30 VITALS
BODY MASS INDEX: 26.87 KG/M2 | HEIGHT: 62 IN | DIASTOLIC BLOOD PRESSURE: 81 MMHG | HEART RATE: 116 BPM | WEIGHT: 146 LBS | SYSTOLIC BLOOD PRESSURE: 126 MMHG

## 2021-08-30 DIAGNOSIS — Z11.51 SCREENING FOR HPV (HUMAN PAPILLOMAVIRUS): ICD-10-CM

## 2021-08-30 DIAGNOSIS — Z01.419 WELL WOMAN EXAM: Primary | ICD-10-CM

## 2021-08-30 DIAGNOSIS — Z12.4 SCREENING FOR CERVICAL CANCER: ICD-10-CM

## 2021-08-30 DIAGNOSIS — N92.6 IRREGULAR MENSES: ICD-10-CM

## 2021-08-30 DIAGNOSIS — Z87.42 HISTORY OF OVARIAN CYST: ICD-10-CM

## 2021-08-30 PROCEDURE — 99203 OFFICE O/P NEW LOW 30 MIN: CPT | Performed by: NURSE PRACTITIONER

## 2021-08-30 PROCEDURE — 99385 PREV VISIT NEW AGE 18-39: CPT | Performed by: NURSE PRACTITIONER

## 2021-08-30 ASSESSMENT — ENCOUNTER SYMPTOMS
RESPIRATORY NEGATIVE: 1
GASTROINTESTINAL NEGATIVE: 1
DIARRHEA: 0
EYES NEGATIVE: 1
ALLERGIC/IMMUNOLOGIC NEGATIVE: 1
CONSTIPATION: 0

## 2021-08-30 NOTE — PATIENT INSTRUCTIONS
Patient Education        Breast Self-Exam: Care Instructions  Your Care Instructions     A breast self-exam is when you check your breasts for lumps or changes. This regular exam helps you learn how your breasts normally look and feel. Most breast problems or changes are not because of cancer. Breast self-exam is not a substitute for a mammogram. Having regular breast exams by your doctor and regular mammograms improve your chances of finding any problems with your breasts. Some women set a time each month to do a step-by-step breast self-exam. Other women like a less formal system. They might look at their breasts as they brush their teeth, or feel their breasts once in a while in the shower. If you notice a change in your breast, tell your doctor. Follow-up care is a key part of your treatment and safety. Be sure to make and go to all appointments, and call your doctor if you are having problems. It's also a good idea to know your test results and keep a list of the medicines you take. How do you do a breast self-exam?  · The best time to examine your breasts is usually one week after your menstrual period begins. Your breasts should not be tender then. If you do not have periods, you might do your exam on a day of the month that is easy to remember. · To examine your breasts:  ? Remove all your clothes above the waist and lie down. When you are lying down, your breast tissue spreads evenly over your chest wall, which makes it easier to feel all your breast tissue. ? Use the padsnot the fingertipsof the 3 middle fingers of your left hand to check your right breast. Move your fingers slowly in small coin-sized circles that overlap. ? Use three levels of pressure to feel of all your breast tissue. Use light pressure to feel the tissue close to the skin surface. Use medium pressure to feel a little deeper. Use firm pressure to feel your tissue close to your breastbone and ribs.  Use each pressure level to feel your breast tissue before moving on to the next spot. ? Check your entire breast, moving up and down as if following a strip from the collarbone to the bra line, and from the armpit to the ribs. Repeat until you have covered the entire breast.  ? Repeat this procedure for your left breast, using the pads of the 3 middle fingers of your right hand. · To examine your breasts while in the shower:  ? Place one arm over your head and lightly soap your breast on that side. ? Using the pads of your fingers, gently move your hand over your breast (in the strip pattern described above), feeling carefully for any lumps or changes. ? Repeat for the other breast.  · Have your doctor inspect anything you notice to see if you need further testing. Where can you learn more? Go to https://chelidaeb.[employer]. org and sign in to your [employer] account. Enter P148 in the [employer] box to learn more about \"Breast Self-Exam: Care Instructions. \"     If you do not have an account, please click on the \"Sign Up Now\" link. Current as of: December 17, 2020               Content Version: 12.9  © 5350-4653 [employer]. Care instructions adapted under license by [facility] ([facility]). If you have questions about a medical condition or this instruction, always ask your healthcare professional. [place] [address] any warranty or liability for your use of this information. Patient Education        Well Visit, Ages 25 to 48: Care Instructions  Overview     Well visits can help you stay healthy. Your doctor has checked your overall health and may have suggested ways to take good care of yourself. Your doctor also may have recommended tests. At home, you can help prevent illness with healthy eating, regular exercise, and other steps. Follow-up care is a key part of your treatment and safety. Be sure to make and go to all appointments, and call your doctor if you are having problems.  It's also a good idea to know your test results and keep a list of the medicines you take. How can you care for yourself at home? · Get screening tests that you and your doctor decide on. Screening helps find diseases before any symptoms appear. · Eat healthy foods. Choose fruits, vegetables, whole grains, protein, and low-fat dairy foods. Limit fat, especially saturated fat. Reduce salt in your diet. · Limit alcohol. If you are a man, have no more than 2 drinks a day or 14 drinks a week. If you are a woman, have no more than 1 drink a day or 7 drinks a week. · Get at least 30 minutes of physical activity on most days of the week. Walking is a good choice. You also may want to do other activities, such as running, swimming, cycling, or playing tennis or team sports. Discuss any changes in your exercise program with your doctor. · Reach and stay at a healthy weight. This will lower your risk for many problems, such as obesity, diabetes, heart disease, and high blood pressure. · Do not smoke or allow others to smoke around you. If you need help quitting, talk to your doctor about stop-smoking programs and medicines. These can increase your chances of quitting for good. · Care for your mental health. It is easy to get weighed down by worry and stress. Learn strategies to manage stress, like deep breathing and mindfulness, and stay connected with your family and community. If you find you often feel sad or hopeless, talk with your doctor. Treatment can help. · Talk to your doctor about whether you have any risk factors for sexually transmitted infections (STIs). You can help prevent STIs if you wait to have sex with a new partner (or partners) until you've each been tested for STIs. It also helps if you use condoms (male or female condoms) and if you limit your sex partners to one person who only has sex with you. Vaccines are available for some STIs, such as HPV.   · Use birth control if it's important to you to for certain cancers, such as cervical or anal cancer. Having one type of HPV doesn't lead to having another type. Many women who have HPV may not know that they're infected until it's found with a cervical cancer screening test, such as an HPV test.  If an HPV screening test finds that you have the types of HPV that might lead to cancer, your doctor may suggest more tests. This doesn't mean you'll get cancer. But it means that you may have an increased risk. Abnormal cell changes caused by HPV often go away on their own. If they don't, they can be treated. Follow-up care is a key part of your treatment and safety. Be sure to make and go to all appointments, and call your doctor if you are having problems. It's also a good idea to know your test results and keep a list of the medicines you take. How can you care for yourself at home? · Do not smoke. Smoking increases the risk for cervical problems and cervical cancer. If you need help quitting, talk to your doctor about stop-smoking programs and medicines. These can increase your chances of quitting for good. · Use condoms every time you have sex. Use them from the beginning to the end of sexual contact. · Be sure to tell your sexual partner or partners that you have HPV. Even if you do not have symptoms, you can still pass HPV to others. · Having one sex partner (who does not have STIs and does not have sex with anyone else) can decrease your risk of getting STIs. When should you call for help? Watch closely for changes in your health, and be sure to contact your doctor if:    · You have vaginal pain during or after sex.     · You have vaginal bleeding when you are not in your menstrual period. Where can you learn more? Go to https://nyla.[facility]. org and sign in to your [employer] account. Enter F690 in the [facility] box to learn more about \"Human Papillomavirus (HPV): Care Instructions. \"     If you do not have an account, please click on the \"Sign Up Now\" link. Current as of: February 11, 2021               Content Version: 12.9  © 2006-2021 [employer]. Care instructions adapted under license by [facility] ([facility]). If you have questions about a medical condition or this instruction, always ask your healthcare professional. [address] any warranty or liability for your use of this information.

## 2021-08-30 NOTE — PROGRESS NOTES
Pt presents today as a new patient for pap smear and breast exam. She has c/o irregular periods lately.      Last mammogram:  never  Last pap smear:    Contraception:  tubal  :  3  Para:  3  AB:  0  Last bone density:  never  Last colonoscopy: never

## 2021-09-01 LAB
Lab: NORMAL
REPORT: NORMAL
THIS TEST SENT TO: NORMAL

## 2021-09-04 LAB — MISCELLANEOUS LAB TEST ORDER: NORMAL

## 2021-09-07 DIAGNOSIS — F90.9 ADULT ADHD: ICD-10-CM

## 2021-09-07 RX ORDER — DEXTROAMPHETAMINE SACCHARATE, AMPHETAMINE ASPARTATE, DEXTROAMPHETAMINE SULFATE AND AMPHETAMINE SULFATE 2.5; 2.5; 2.5; 2.5 MG/1; MG/1; MG/1; MG/1
10 TABLET ORAL DAILY
Qty: 30 TABLET | Refills: 0 | Status: SHIPPED | OUTPATIENT
Start: 2021-09-07 | End: 2021-10-14 | Stop reason: SDUPTHER

## 2021-09-07 RX ORDER — DEXTROAMPHETAMINE SACCHARATE, AMPHETAMINE ASPARTATE MONOHYDRATE, DEXTROAMPHETAMINE SULFATE AND AMPHETAMINE SULFATE 5; 5; 5; 5 MG/1; MG/1; MG/1; MG/1
20 CAPSULE, EXTENDED RELEASE ORAL EVERY MORNING
Qty: 30 CAPSULE | Refills: 0 | Status: SHIPPED | OUTPATIENT
Start: 2021-09-07 | End: 2021-10-14 | Stop reason: SDUPTHER

## 2021-09-16 ENCOUNTER — HOSPITAL ENCOUNTER (OUTPATIENT)
Dept: ULTRASOUND IMAGING | Age: 36
Discharge: HOME | End: 2021-09-16
Payer: COMMERCIAL

## 2021-09-16 ENCOUNTER — OFFICE VISIT (OUTPATIENT)
Dept: OBGYN CLINIC | Age: 36
End: 2021-09-16
Payer: COMMERCIAL

## 2021-09-16 VITALS
DIASTOLIC BLOOD PRESSURE: 80 MMHG | HEART RATE: 90 BPM | BODY MASS INDEX: 26.81 KG/M2 | HEIGHT: 61 IN | WEIGHT: 142 LBS | SYSTOLIC BLOOD PRESSURE: 117 MMHG

## 2021-09-16 DIAGNOSIS — Z87.42 HISTORY OF OVARIAN CYST: ICD-10-CM

## 2021-09-16 DIAGNOSIS — N92.6 IRREGULAR MENSES: Primary | ICD-10-CM

## 2021-09-16 DIAGNOSIS — N92.6 IRREGULAR MENSES: ICD-10-CM

## 2021-09-16 PROCEDURE — 99213 OFFICE O/P EST LOW 20 MIN: CPT | Performed by: NURSE PRACTITIONER

## 2021-09-16 PROCEDURE — 76830 TRANSVAGINAL US NON-OB: CPT

## 2021-09-16 ASSESSMENT — ENCOUNTER SYMPTOMS
CONSTIPATION: 0
RESPIRATORY NEGATIVE: 1
EYES NEGATIVE: 1
DIARRHEA: 0
ALLERGIC/IMMUNOLOGIC NEGATIVE: 1
GASTROINTESTINAL NEGATIVE: 1

## 2021-09-16 NOTE — PROGRESS NOTES
Virginie King is a 29 y.o. female who presents today for her medical conditions/ complaints as noted below. Virginie King is c/o of [place] ([place] )        HPI  Pt presents for f/u on irregular periods. Had labs and u/s done. Normal pap and exam last visit. Patient's last menstrual period was 2021 (exact date). [id number]    Past Medical History:   Diagnosis Date    Anxiety     Hx of partial seizures     UTI (urinary tract infection)     PT states she gets constant UTI's \"That is pretty much undercontrol now. \"     Past Surgical History:   Procedure Laterality Date     SECTION      x2    TUBAL LIGATION       Family History   Problem Relation Age of Onset    Heart Attack Mother     Stroke Mother     High Blood Pressure Mother     High Cholesterol Mother     Diabetes Mother     Cancer Mother         thyroid    No Known Problems Father     Alcohol Abuse Brother         recently out of rehab from what she was told    Diabetes Maternal Grandmother     Cancer Maternal Grandmother         Pancreatic     ADHD Son         with Trouettes    No Known Problems Son      Social History     Tobacco Use    Smoking status: Current Some Day Smoker     Packs/day: 0.25    Smokeless tobacco: Never Used    Tobacco comment: working on quitting as of 20   Substance Use Topics    Alcohol use: Yes     Alcohol/week: 4.0 standard drinks     Types: 2 Cans of beer, 2 Shots of liquor per week       Current Outpatient Medications   Medication Sig Dispense Refill    amphetamine-dextroamphetamine (ADDERALL, 10MG,) 10 MG tablet Take 1 tablet by mouth daily for 30 doses. 30 tablet 0    amphetamine-dextroamphetamine (ADDERALL XR) 20 MG extended release capsule Take 1 capsule by mouth every morning for 30 days.  30 capsule 0    ibuprofen (ADVIL;MOTRIN) 800 MG tablet Take 800 mg by mouth every 6 hours as needed for Pain      acetaminophen (TYLENOL) 500 MG tablet Take 500 mg by mouth every 6 hours as needed for Pain No current facility-administered medications for this visit. Allergies   Allergen Reactions    Penicillins Shortness Of Breath    Zoloft [Sertraline Hcl] Itching     Vitals:    09/16/21 1350   BP: 117/80   Pulse: 90     Body mass index is 26.83 kg/m². Review of Systems   Constitutional: Negative. HENT: Negative. Eyes: Negative. Respiratory: Negative. Cardiovascular: Negative. Gastrointestinal: Negative. Negative for constipation and diarrhea. Endocrine: Negative. Genitourinary: Positive for menstrual problem. Negative for frequency and urgency. Musculoskeletal: Negative. Skin: Negative. Allergic/Immunologic: Negative. Neurological: Negative. Hematological: Negative. Psychiatric/Behavioral: Negative. All other systems reviewed and are negative. Physical Exam  Vitals and nursing note reviewed. Constitutional:       Appearance: She is well-developed. HENT:      Head: Normocephalic. Right Ear: External ear normal.      Left Ear: External ear normal.      Nose: Nose normal.   Musculoskeletal:         General: Normal range of motion. Cervical back: Normal range of motion. Skin:     General: Skin is warm and dry. Neurological:      Mental Status: She is alert and oriented to person, place, and time. Psychiatric:         Attention and Perception: Attention normal.         Mood and Affect: Mood normal.         Speech: Speech normal.         Behavior: Behavior normal.         Thought Content: Thought content normal.         Cognition and Memory: Cognition normal.         Judgment: Judgment normal.          Diagnosis Orders   1. Irregular menses  US NON OB TRANSVAGINAL   2. History of ovarian cyst         MEDICATIONS:  No orders of the defined types were placed in this encounter.       ORDERS:  Orders Placed This Encounter   Procedures    US NON OB TRANSVAGINAL       PLAN:  Discussed thickened lining on u/s  Normal labs  Discussed Mitalior, vs IUD vs surgical  Pt has had Mirena in the past and done well- will check benefits and f/u for insertion  Recheck lining in several months    Patient Instructions     Patient Education        Abnormal Uterine Bleeding: Care Instructions  Your Care Instructions     Abnormal uterine bleeding is irregular bleeding from the uterus that is longer or heavier than usual or does not occur at your regular time. Sometimes it is caused by changes in hormone levels. It can also be caused by growths in the uterus, such as fibroids or polyps. Sometimes a cause cannot be found. You may have heavy bleeding when you are not expecting your period. Your doctor may suggest a pregnancy test, if you think you are pregnant. Follow-up care is a key part of your treatment and safety. Be sure to make and go to all appointments, and call your doctor if you are having problems. It's also a good idea to know your test results and keep a list of the medicines you take. How can you care for yourself at home? · Be safe with medicines. Take pain medicines exactly as directed. ? If the doctor gave you a prescription medicine for pain, take it as prescribed. ? If you are not taking a prescription pain medicine, ask your doctor if you can take an over-the-counter medicine. · You may be low in iron because of blood loss. Eat a balanced diet that is high in iron and vitamin C. Foods rich in iron include red meat, shellfish, eggs, beans, and leafy green vegetables. Talk to your doctor about whether you need to take iron pills or a multivitamin. When should you call for help? Call 911 anytime you think you may need emergency care. For example, call if:    · You passed out (lost consciousness). Call your doctor now or seek immediate medical care if:    · You have new or worse belly or pelvic pain.     · You have severe vaginal bleeding.     · You feel dizzy or lightheaded, or you feel like you may faint.    Watch closely for changes in your Foodlve, and be sure to contact your doctor if:    · You think you may be pregnant.     · Your bleeding gets worse.     · You do not get better as expected. Where can you learn more? Go to https://[email or web address]. org and sign in to your [employer] account. Enter F113 in the [facility] box to learn more about \"Abnormal Uterine Bleeding: Care Instructions. \"     If you do not have an account, please click on the \"Sign Up Now\" link. Current as of: February 11, 2021               Content Version: 12.9  © 5886-4534 [employer]. Care instructions adapted under license by [facility] ([facility]). If you have questions about a medical condition or this instruction, always ask your healthcare professional. [address] any warranty or liability for your use of this information. Patient Education        levonorgestrel intrauterine system  Pronunciation:  KEVIN mohan IN tra UE ter ine SIS tem  Brand:  Nadia Lares  What is the most important information I should know about levonorgestrel intrauterine system? You should not use this device if you have abnormal vaginal bleeding, a pelvic infection, certain other problems with your uterus or cervix, or if you have breast or uterine cancer, liver disease or liver tumor, or a weak immune system. Do not use during pregnancy. Call your doctor if you think you might be pregnant. What is levonorgestrel intrauterine system? Levonorgestrel is a female hormone that can cause changes in your cervix and uterus. Levonorgestrel intrauterine system is a T-shaped plastic intrauterine device (IUD) that is placed in the uterus where it slowly releases the hormone. Levonorgestrel intrauterine system used to prevent pregnancy for 3 to 6 years. You may use this IUD whether you have children or not.  Mirena is also used to treat heavy menstrual bleeding in women who choose to use an intrauterine form of birth control. Levonorgestrel is a progestin and does not contain estrogen. Levonorgestrel intrauterine system should not be used as emergency birth control. Levonorgestrel intrauterine system may also be used for purposes not listed in this medication guide. What should I discuss with my healthcare provider before using levonorgestrel intrauterine system? An IUD can increase your risk of developing a serious pelvic infection, which may threaten your life or your future ability to have children. Ask your doctor about your personal risk. Do not use during pregnancy. This IUD can cause severe infection, miscarriage, premature birth, or death of the mother if left in place during pregnancy. Tell your doctor right away if you become pregnant. If you continue a pregnancy that occurs while using this IUD, watch for signs such as fever, chills, cramps, vaginal bleeding or discharge. You should not use this device if you are allergic to levonorgestrel, silicone, silica, silver, barium, iron oxide, or polyethylene, or if you have:  · abnormal vaginal bleeding that has not been checked by a doctor;  · an untreated or uncontrolled pelvic infection (vaginal, cervical, uterine);  · endometriosis or a serious pelvic infection following a pregnancy or  in the past 3 months;  · pelvic inflammatory disease (PID), unless you had a normal pregnancy after the infection was treated and cleared;  · uterine fibroid tumors or conditions that affect the shape of the uterus;  · past or present cancer of the breast, cervix, or uterus;  · liver disease or liver tumor (benign or malignant);  · a condition that weakens your immune system, such as AIDS, leukemia, or IV drug abuse; or  · if you have another intrauterine device (IUD) in place.   Tell your doctor if you have ever had:  · high blood pressure, heart problems, a heart valve disorder, a heart attack or stroke;  · bleeding problems;  · migraine headaches; or  · a vaginal infection, pelvic infection, or sexually transmitted disease. You should not use this IUD if you are breastfeeding a baby younger than 7 weeks old. This IUD may be more likely to form a hole or get embedded in the wall of your uterus if you have the device inserted while you are breastfeeding. How is levonorgestrel intrauterine system used? The levonorgestrel IUD is inserted through the vagina and placed into the uterus by a doctor. The device is usually inserted within 7 days after the start of a menstrual period. You may feel pain or dizziness during insertion of the IUD. You may also have minor vaginal bleeding. Tell your doctor if you still have these symptoms longer than 30 minutes. The levonorgestrel device should not interfere with sexual intercourse, wearing tampons, or using other vaginal medications. Your doctor will need to see you within a few weeks after insertion of the device to make sure it is still in place correctly. You will also need regular annual pelvic exams and Pap smears. You may have irregular periods during the first 3 to 6 months of use. Your flow may be lighter or heavier, and you may eventually stop having periods after several months. Tell your doctor if you do not have a period for 6 weeks or if you think you might be pregnant. The IUD may come out by itself. After each menstrual period, make sure you can still feel the removal strings. Wash your hands with soap and water, and insert your clean fingers into the vagina. You should be able to feel the strings at the opening of your cervix. Call your doctor at once if you cannot feel the strings, or if you think the IUD has slipped lower or has come out of your uterus, especially if you also have pain or bleeding. Use a non-hormone method of birth control (condom, diaphragm, cervical cap, or contraceptive sponge) to prevent pregnancy until your doctor is able to replace the IUD.   If you need to have an MRI (magnetic resonance imaging), tell your caregivers ahead of time that you have an IUD in place. Your device may be removed at any time you decide to stop using birth control. The Mirena IUD must be removed at the end of the 6-year wearing time. Abran Short must be removed after 5 years, and Lu or Tani Theresa must be removed after 3 years. Your doctor can insert a new device at that time if you wish to continue using this form of birth control. Only your doctor should remove the IUD. Do not attempt to remove the device yourself. If you wish to continue preventing pregnancy, you may need to start using another birth control method a week before your levonorgestrel intrauterine system is removed. What happens if I miss a dose? Since the IUD continuously releases a low dose of levonorgestrel, missing a dose does not occur when using this form of levonorgestrel. What happens if I overdose? An overdose of levonorgestrel released from the intrauterine system is very unlikely to occur. What should I avoid while using levonorgestrel intrauterine system? Avoid having more than one sex partner. The IUD can increase your risk of developing a serious pelvic infection, which is often caused by sexually transmitted disease. Levonorgestrel intrauterine system will not protect you from sexually transmitted diseases, including HIV and AIDS. Using a condom is the only way to help protect yourself from these diseases. Call your doctor if your sex partner develops HIV or a sexually transmitted disease, or if you have any change in sexual relationships. What are the possible side effects of levonorgestrel intrauterine system? Get emergency medical help if you have signs of an allergic reaction: hives; difficult breathing; swelling of your face, lips, tongue, or throat. Get emergency medical help if you have severe pain in your lower stomach or side.  This could be a sign of a tubal pregnancy (a pregnancy that implants in the fallopian tube instead of the uterus). A tubal pregnancy is a medical emergency. The levonorgestrel IUD may become embedded into the wall of the uterus, or may perforate (form a hole) in the uterus. If this occurs, the device may no longer prevent pregnancy, or it may move outside the uterus and cause scarring, infection, or damage to other organs. Your doctor may need to surgically remove the device. Call your doctor at once if you have:  · severe cramps or pelvic pain, pain during sexual intercourse;  · extreme dizziness or light-headed feeling;  · severe migraine headache;  · heavy or ongoing vaginal bleeding, vaginal sores, vaginal discharge that is watery, foul-smelling discharge, or otherwise unusual;  · pale skin, weakness, easy bruising or bleeding, fever, chills, or other signs of infection;  · jaundice (yellowing of the skin or eyes); or  · sudden numbness or weakness (especially on one side of the body), confusion, problems with vision, sensitivity to light. Common side effects may include:  · pelvic pain, vaginal itching or infection, missed or irregular menstrual periods, changes in bleeding patterns or flow (especially during the first 3 to 6 months);  · temporary pain, bleeding, or dizziness during insertion of the IUD;  · ovarian cysts (pelvic pain that disappears within 3 months);  · stomach pain, nausea, vomiting, bloating;  · headache, migraine, depression, mood changes;  · back pain, breast tenderness or pain;  · weight gain, acne, changes in hair growth, loss of interest in sex; or  · puffiness in your face, hands, ankles, or feet. This is not a complete list of side effects and others may occur. Call your doctor for medical advice about side effects. You may report side effects to FDA at 0-772-FDA-8261. What other drugs will affect levonorgestrel intrauterine system? Some drugs can affect your blood levels of levonorgestrel, which could make this form of birth control less effective.  Tell your doctor about all your other medicines, including prescription and over-the-counter medicines, vitamins, and herbal products. Tell your doctor about all your current medicines and any medicine you start or stop using. Where can I get more information? Your doctor or pharmacist can provide more information about the levonorgestrel intrauterine system. Remember, keep this and all other medicines out of the reach of children, never share your medicines with others, and use this medication only for the indication prescribed. Every effort has been made to ensure that the information provided by [address] [address] is accurate, up-to-date, and complete, but no guarantee is made to that effect. Drug information contained herein may be time sensitive. [facility] information has been compiled for use by healthcare practitioners and consumers in the [place] and therefore [facility] does not warrant that uses outside of the [place] are appropriate, unless specifically indicated otherwise. [facility]'s drug information does not endorse drugs, diagnose patients or recommend therapy. [facility] drug information is an informational resource designed to assist licensed healthcare practitioners in caring for their patients and/or to serve consumers viewing this service as a supplement to, and not a substitute for, the expertise, skill, knowledge and judgment of healthcare practitioners. The absence of a warning for a given drug or drug combination in no way should be construed to indicate that the drug or drug combination is safe, effective or appropriate for any given patient. [facility] does not assume any responsibility for any aspect of healthcare administered with the aid of information [facility] provides. The information contained herein is not intended to cover all possible uses, directions, precautions, warnings, drug interactions, allergic reactions, or adverse effects.  If you have questions about the drugs you are taking, check with your doctor, nurse or pharmacist.  Copyright 7023-3260 [facility] [employer]. Version: 8.01. Revision date: 12/9/2020. Care instructions adapted under license by [facility] ([facility]). If you have questions about a medical condition or this instruction, always ask your healthcare professional. [place] [address] any warranty or liability for your use of this information.

## 2021-09-16 NOTE — PATIENT INSTRUCTIONS
Patient Education        Abnormal Uterine Bleeding: Care Instructions  Your Care Instructions     Abnormal uterine bleeding is irregular bleeding from the uterus that is longer or heavier than usual or does not occur at your regular time. Sometimes it is caused by changes in hormone levels. It can also be caused by growths in the uterus, such as fibroids or polyps. Sometimes a cause cannot be found. You may have heavy bleeding when you are not expecting your period. Your doctor may suggest a pregnancy test, if you think you are pregnant. Follow-up care is a key part of your treatment and safety. Be sure to make and go to all appointments, and call your doctor if you are having problems. It's also a good idea to know your test results and keep a list of the medicines you take. How can you care for yourself at home? · Be safe with medicines. Take pain medicines exactly as directed. ? If the doctor gave you a prescription medicine for pain, take it as prescribed. ? If you are not taking a prescription pain medicine, ask your doctor if you can take an over-the-counter medicine. · You may be low in iron because of blood loss. Eat a balanced diet that is high in iron and vitamin C. Foods rich in iron include red meat, shellfish, eggs, beans, and leafy green vegetables. Talk to your doctor about whether you need to take iron pills or a multivitamin. When should you call for help? Call 911 anytime you think you may need emergency care. For example, call if:    · You passed out (lost consciousness). Call your doctor now or seek immediate medical care if:    · You have new or worse belly or pelvic pain.     · You have severe vaginal bleeding.     · You feel dizzy or lightheaded, or you feel like you may faint. Watch closely for changes in your health, and be sure to contact your doctor if:    · You think you may be pregnant.     · Your bleeding gets worse.     · You do not get better as expected.    Where can you learn more? Go to [email or web address] and sign in to your [employer] account. Enter Y702 in the [facility] box to learn more about \"Abnormal Uterine Bleeding: Care Instructions. \"     If you do not have an account, please click on the \"Sign Up Now\" link. Current as of: February 11, 2021               Content Version: 12.9  © 2006-2021 [employer]. Care instructions adapted under license by [facility] ([facility]). If you have questions about a medical condition or this instruction, always ask your healthcare professional. [address] any warranty or liability for your use of this information. Patient Education        levonorgestrel intrauterine system  Pronunciation:  KEVIN mohan IN tra UE ter ine SIS tem  Brand:  Quinton Gonzalez  What is the most important information I should know about levonorgestrel intrauterine system? You should not use this device if you have abnormal vaginal bleeding, a pelvic infection, certain other problems with your uterus or cervix, or if you have breast or uterine cancer, liver disease or liver tumor, or a weak immune system. Do not use during pregnancy. Call your doctor if you think you might be pregnant. What is levonorgestrel intrauterine system? Levonorgestrel is a female hormone that can cause changes in your cervix and uterus. Levonorgestrel intrauterine system is a T-shaped plastic intrauterine device (IUD) that is placed in the uterus where it slowly releases the hormone. Levonorgestrel intrauterine system used to prevent pregnancy for 3 to 6 years. You may use this IUD whether you have children or not. Mirena is also used to treat heavy menstrual bleeding in women who choose to use an intrauterine form of birth control. Levonorgestrel is a progestin and does not contain estrogen. Levonorgestrel intrauterine system should not be used as emergency birth control.   Levonorgestrel intrauterine system may also be used for purposes not listed in this medication guide. What should I discuss with my healthcare provider before using levonorgestrel intrauterine system? An IUD can increase your risk of developing a serious pelvic infection, which may threaten your life or your future ability to have children. Ask your doctor about your personal risk. Do not use during pregnancy. This IUD can cause severe infection, miscarriage, premature birth, or death of the mother if left in place during pregnancy. Tell your doctor right away if you become pregnant. If you continue a pregnancy that occurs while using this IUD, watch for signs such as fever, chills, cramps, vaginal bleeding or discharge. You should not use this device if you are allergic to levonorgestrel, silicone, silica, silver, barium, iron oxide, or polyethylene, or if you have:  · abnormal vaginal bleeding that has not been checked by a doctor;  · an untreated or uncontrolled pelvic infection (vaginal, cervical, uterine);  · endometriosis or a serious pelvic infection following a pregnancy or  in the past 3 months;  · pelvic inflammatory disease (PID), unless you had a normal pregnancy after the infection was treated and cleared;  · uterine fibroid tumors or conditions that affect the shape of the uterus;  · past or present cancer of the breast, cervix, or uterus;  · liver disease or liver tumor (benign or malignant);  · a condition that weakens your immune system, such as AIDS, leukemia, or IV drug abuse; or  · if you have another intrauterine device (IUD) in place. Tell your doctor if you have ever had:  · high blood pressure, heart problems, a heart valve disorder, a heart attack or stroke;  · bleeding problems;  · migraine headaches; or  · a vaginal infection, pelvic infection, or sexually transmitted disease. You should not use this IUD if you are breastfeeding a baby younger than 7 weeks old.  This IUD may be more likely to form a hole or get embedded in the wall of your uterus if you have the device inserted while you are breastfeeding. How is levonorgestrel intrauterine system used? The levonorgestrel IUD is inserted through the vagina and placed into the uterus by a doctor. The device is usually inserted within 7 days after the start of a menstrual period. You may feel pain or dizziness during insertion of the IUD. You may also have minor vaginal bleeding. Tell your doctor if you still have these symptoms longer than 30 minutes. The levonorgestrel device should not interfere with sexual intercourse, wearing tampons, or using other vaginal medications. Your doctor will need to see you within a few weeks after insertion of the device to make sure it is still in place correctly. You will also need regular annual pelvic exams and Pap smears. You may have irregular periods during the first 3 to 6 months of use. Your flow may be lighter or heavier, and you may eventually stop having periods after several months. Tell your doctor if you do not have a period for 6 weeks or if you think you might be pregnant. The IUD may come out by itself. After each menstrual period, make sure you can still feel the removal strings. Wash your hands with soap and water, and insert your clean fingers into the vagina. You should be able to feel the strings at the opening of your cervix. Call your doctor at once if you cannot feel the strings, or if you think the IUD has slipped lower or has come out of your uterus, especially if you also have pain or bleeding. Use a non-hormone method of birth control (condom, diaphragm, cervical cap, or contraceptive sponge) to prevent pregnancy until your doctor is able to replace the IUD. If you need to have an MRI (magnetic resonance imaging), tell your caregivers ahead of time that you have an IUD in place. Your device may be removed at any time you decide to stop using birth control.  The Mirena IUD must be removed at the end of the 6-year wearing time. Melissa Edgar must be removed after 5 years, and Lu or Remi Pena must be removed after 3 years. Your doctor can insert a new device at that time if you wish to continue using this form of birth control. Only your doctor should remove the IUD. Do not attempt to remove the device yourself. If you wish to continue preventing pregnancy, you may need to start using another birth control method a week before your levonorgestrel intrauterine system is removed. What happens if I miss a dose? Since the IUD continuously releases a low dose of levonorgestrel, missing a dose does not occur when using this form of levonorgestrel. What happens if I overdose? An overdose of levonorgestrel released from the intrauterine system is very unlikely to occur. What should I avoid while using levonorgestrel intrauterine system? Avoid having more than one sex partner. The IUD can increase your risk of developing a serious pelvic infection, which is often caused by sexually transmitted disease. Levonorgestrel intrauterine system will not protect you from sexually transmitted diseases, including HIV and AIDS. Using a condom is the only way to help protect yourself from these diseases. Call your doctor if your sex partner develops HIV or a sexually transmitted disease, or if you have any change in sexual relationships. What are the possible side effects of levonorgestrel intrauterine system? Get emergency medical help if you have signs of an allergic reaction: hives; difficult breathing; swelling of your face, lips, tongue, or throat. Get emergency medical help if you have severe pain in your lower stomach or side. This could be a sign of a tubal pregnancy (a pregnancy that implants in the fallopian tube instead of the uterus). A tubal pregnancy is a medical emergency. The levonorgestrel IUD may become embedded into the wall of the uterus, or may perforate (form a hole) in the uterus.   If this occurs, the device may no longer prevent pregnancy, or it may move outside the uterus and cause scarring, infection, or damage to other organs. Your doctor may need to surgically remove the device. Call your doctor at once if you have:  · severe cramps or pelvic pain, pain during sexual intercourse;  · extreme dizziness or light-headed feeling;  · severe migraine headache;  · heavy or ongoing vaginal bleeding, vaginal sores, vaginal discharge that is watery, foul-smelling discharge, or otherwise unusual;  · pale skin, weakness, easy bruising or bleeding, fever, chills, or other signs of infection;  · jaundice (yellowing of the skin or eyes); or  · sudden numbness or weakness (especially on one side of the body), confusion, problems with vision, sensitivity to light. Common side effects may include:  · pelvic pain, vaginal itching or infection, missed or irregular menstrual periods, changes in bleeding patterns or flow (especially during the first 3 to 6 months);  · temporary pain, bleeding, or dizziness during insertion of the IUD;  · ovarian cysts (pelvic pain that disappears within 3 months);  · stomach pain, nausea, vomiting, bloating;  · headache, migraine, depression, mood changes;  · back pain, breast tenderness or pain;  · weight gain, acne, changes in hair growth, loss of interest in sex; or  · puffiness in your face, hands, ankles, or feet. This is not a complete list of side effects and others may occur. Call your doctor for medical advice about side effects. You may report side effects to FDA at 6-290-FDA-2662. What other drugs will affect levonorgestrel intrauterine system? Some drugs can affect your blood levels of levonorgestrel, which could make this form of birth control less effective. Tell your doctor about all your other medicines, including prescription and over-the-counter medicines, vitamins, and herbal products.  Tell your doctor about all your current medicines and any medicine you start or stop using. Where can I get more information? Your doctor or pharmacist can provide more information about the levonorgestrel intrauterine system. Remember, keep this and all other medicines out of the reach of children, never share your medicines with others, and use this medication only for the indication prescribed. Every effort has been made to ensure that the information provided by [facility] is accurate, up-to-date, and complete, but no guarantee is made to that effect. Drug information contained herein may be time sensitive. [facility] information has been compiled for use by healthcare practitioners and consumers in the [place] and therefore [facility] does not warrant that uses outside of the [place] are appropriate, unless specifically indicated otherwise. [facility]'s drug information does not endorse drugs, diagnose patients or recommend therapy. [facility]'s drug information is an informational resource designed to assist licensed healthcare practitioners in caring for their patients and/or to serve consumers viewing this service as a supplement to, and not a substitute for, the expertise, skill, knowledge and judgment of healthcare practitioners. The absence of a warning for a given drug or drug combination in no way should be construed to indicate that the drug or drug combination is safe, effective or appropriate for any given patient. [facility] does not assume any responsibility for any aspect of healthcare administered with the aid of information [facility] provides. The information contained herein is not intended to cover all possible uses, directions, precautions, warnings, drug interactions, allergic reactions, or adverse effects. If you have questions about the drugs you are taking, check with your doctor, nurse or pharmacist.  Copyright 0639-9066 [address]. Version: 8.01. Revision date: 12/9/2020. Care instructions adapted under license by [facility] ([facility]).  If you have questions about a medical condition or this instruction, always ask your healthcare professional. [place] [address] any warranty or liability for your use of this information.

## 2021-10-14 DIAGNOSIS — F90.9 ADULT ADHD: ICD-10-CM

## 2021-10-14 RX ORDER — DEXTROAMPHETAMINE SACCHARATE, AMPHETAMINE ASPARTATE, DEXTROAMPHETAMINE SULFATE AND AMPHETAMINE SULFATE 2.5; 2.5; 2.5; 2.5 MG/1; MG/1; MG/1; MG/1
10 TABLET ORAL DAILY
Qty: 30 TABLET | Refills: 0 | Status: SHIPPED | OUTPATIENT
Start: 2021-10-14 | End: 2021-11-23 | Stop reason: SDUPTHER

## 2021-10-14 RX ORDER — DEXTROAMPHETAMINE SACCHARATE, AMPHETAMINE ASPARTATE MONOHYDRATE, DEXTROAMPHETAMINE SULFATE AND AMPHETAMINE SULFATE 5; 5; 5; 5 MG/1; MG/1; MG/1; MG/1
20 CAPSULE, EXTENDED RELEASE ORAL EVERY MORNING
Qty: 30 CAPSULE | Refills: 0 | Status: SHIPPED | OUTPATIENT
Start: 2021-10-14 | End: 2021-11-23 | Stop reason: SDUPTHER

## 2021-11-23 DIAGNOSIS — F90.9 ADULT ADHD: ICD-10-CM

## 2021-11-23 RX ORDER — DEXTROAMPHETAMINE SACCHARATE, AMPHETAMINE ASPARTATE, DEXTROAMPHETAMINE SULFATE AND AMPHETAMINE SULFATE 2.5; 2.5; 2.5; 2.5 MG/1; MG/1; MG/1; MG/1
10 TABLET ORAL DAILY
Qty: 30 TABLET | Refills: 0 | Status: SHIPPED | OUTPATIENT
Start: 2021-11-23 | End: 2021-12-24 | Stop reason: SDUPTHER

## 2021-11-23 RX ORDER — DEXTROAMPHETAMINE SACCHARATE, AMPHETAMINE ASPARTATE MONOHYDRATE, DEXTROAMPHETAMINE SULFATE AND AMPHETAMINE SULFATE 5; 5; 5; 5 MG/1; MG/1; MG/1; MG/1
20 CAPSULE, EXTENDED RELEASE ORAL EVERY MORNING
Qty: 30 CAPSULE | Refills: 0 | Status: SHIPPED | OUTPATIENT
Start: 2021-11-23 | End: 2021-12-24 | Stop reason: SDUPTHER

## 2021-11-23 NOTE — TELEPHONE ENCOUNTER
Provider needs to review PDMP    PDMP Monitoring:    Last PDMP Jacky Quiros as Reviewed [facility]):  Review User Review Instant Review Result          Urine Drug Screenings (1 yr)     POCT Rapid Drug Screen  Collected: 7/8/2021 (Final result)    Complete Results          POCT Rapid Drug Screen  Collected: 12/17/2020 (Final result)    Complete Results          Urine Drug Screen  Collected: 3/8/2019  9:04 AM (Final result)    Complete Results          Urine Drug Screen  Collected: 10/15/2018  2:22 PM (Final result)    Complete Results              Medication Contract and Consent for Opioid Use Documents Filed     Patient Documents     Type of Document Status Date Received Received By Description    Medication Contract Signed 7/16/2018  8:57 AM Lyubov KNOWLES benzo & stimulant agreement    Medication Contract Received 12/17/2020  5:15 PM Lyubov KNOWLES Medication Contract 12/17/20

## 2021-12-24 DIAGNOSIS — F90.9 ADULT ADHD: ICD-10-CM

## 2021-12-27 RX ORDER — DEXTROAMPHETAMINE SACCHARATE, AMPHETAMINE ASPARTATE, DEXTROAMPHETAMINE SULFATE AND AMPHETAMINE SULFATE 2.5; 2.5; 2.5; 2.5 MG/1; MG/1; MG/1; MG/1
10 TABLET ORAL DAILY
Qty: 30 TABLET | Refills: 0 | Status: SHIPPED | OUTPATIENT
Start: 2021-12-27 | End: 2022-09-08

## 2021-12-27 RX ORDER — DEXTROAMPHETAMINE SACCHARATE, AMPHETAMINE ASPARTATE MONOHYDRATE, DEXTROAMPHETAMINE SULFATE AND AMPHETAMINE SULFATE 5; 5; 5; 5 MG/1; MG/1; MG/1; MG/1
20 CAPSULE, EXTENDED RELEASE ORAL EVERY MORNING
Qty: 30 CAPSULE | Refills: 0 | Status: SHIPPED | OUTPATIENT
Start: 2021-12-27 | End: 2022-01-30 | Stop reason: SDUPTHER

## 2022-01-06 ENCOUNTER — NURSE ONLY (OUTPATIENT)
Dept: PRIMARY CARE CLINIC | Age: 37
End: 2022-01-06

## 2022-01-06 DIAGNOSIS — Z13.220 SCREENING, LIPID: Primary | ICD-10-CM

## 2022-01-06 DIAGNOSIS — I95.0 IDIOPATHIC HYPOTENSION: ICD-10-CM

## 2022-01-06 LAB
ALBUMIN SERPL-MCNC: 4.4 G/DL (ref 3.5–5.2)
ALP BLD-CCNC: 73 U/L (ref 35–104)
ALT SERPL-CCNC: 16 U/L (ref 5–33)
ANION GAP SERPL CALCULATED.3IONS-SCNC: 11 MMOL/L (ref 7–19)
AST SERPL-CCNC: 14 U/L (ref 5–32)
BILIRUB SERPL-MCNC: <0.2 MG/DL (ref 0.2–1.2)
BUN BLDV-MCNC: 11 MG/DL (ref 6–20)
CALCIUM SERPL-MCNC: 9.2 MG/DL (ref 8.6–10)
CHLORIDE BLD-SCNC: 103 MMOL/L (ref 98–111)
CHOLESTEROL, TOTAL: 202 MG/DL (ref 160–199)
CO2: 27 MMOL/L (ref 22–29)
CREAT SERPL-MCNC: 0.7 MG/DL (ref 0.5–0.9)
GFR AFRICAN AMERICAN: >59
GFR NON-AFRICAN AMERICAN: >60
GLUCOSE BLD-MCNC: 81 MG/DL (ref 74–109)
HCT VFR BLD CALC: 46.3 % (ref 37–47)
HDLC SERPL-MCNC: 63 MG/DL (ref 65–121)
HEMOGLOBIN: 14.6 G/DL (ref 12–16)
LDL CHOLESTEROL CALCULATED: 122 MG/DL
MCH RBC QN AUTO: 30.7 PG (ref 27–31)
MCHC RBC AUTO-ENTMCNC: 31.5 G/DL (ref 33–37)
MCV RBC AUTO: 97.5 FL (ref 81–99)
PDW BLD-RTO: 12.2 % (ref 11.5–14.5)
PLATELET # BLD: 301 K/UL (ref 130–400)
PMV BLD AUTO: 10.5 FL (ref 9.4–12.3)
POTASSIUM SERPL-SCNC: 4.1 MMOL/L (ref 3.5–5)
RBC # BLD: 4.75 M/UL (ref 4.2–5.4)
SODIUM BLD-SCNC: 141 MMOL/L (ref 136–145)
TOTAL PROTEIN: 7 G/DL (ref 6.6–8.7)
TRIGL SERPL-MCNC: 87 MG/DL (ref 0–149)
WBC # BLD: 6.9 K/UL (ref 4.8–10.8)

## 2022-01-10 ENCOUNTER — OFFICE VISIT (OUTPATIENT)
Dept: PRIMARY CARE CLINIC | Age: 37
End: 2022-01-10
Payer: COMMERCIAL

## 2022-01-10 VITALS
RESPIRATION RATE: 16 BRPM | OXYGEN SATURATION: 97 % | BODY MASS INDEX: 30.51 KG/M2 | SYSTOLIC BLOOD PRESSURE: 138 MMHG | DIASTOLIC BLOOD PRESSURE: 88 MMHG | HEART RATE: 105 BPM | WEIGHT: 161.6 LBS | HEIGHT: 61 IN | TEMPERATURE: 98.5 F

## 2022-01-10 DIAGNOSIS — G89.29 CHRONIC LOW BACK PAIN WITHOUT SCIATICA, UNSPECIFIED BACK PAIN LATERALITY: ICD-10-CM

## 2022-01-10 DIAGNOSIS — R30.0 DYSURIA: Primary | ICD-10-CM

## 2022-01-10 DIAGNOSIS — M54.50 CHRONIC LOW BACK PAIN WITHOUT SCIATICA, UNSPECIFIED BACK PAIN LATERALITY: ICD-10-CM

## 2022-01-10 DIAGNOSIS — M54.50 CHRONIC LOW BACK PAIN WITHOUT SCIATICA, UNSPECIFIED BACK PAIN LATERALITY: Primary | ICD-10-CM

## 2022-01-10 DIAGNOSIS — G89.29 CHRONIC LOW BACK PAIN WITHOUT SCIATICA, UNSPECIFIED BACK PAIN LATERALITY: Primary | ICD-10-CM

## 2022-01-10 DIAGNOSIS — Z00.00 ENCOUNTER FOR WELL ADULT EXAM WITHOUT ABNORMAL FINDINGS: ICD-10-CM

## 2022-01-10 LAB
BILIRUBIN, POC: ABNORMAL
BLOOD URINE, POC: ABNORMAL
CLARITY, POC: CLEAR
COLOR, POC: CLEAR
GLUCOSE URINE, POC: ABNORMAL
KETONES, POC: ABNORMAL
LEUKOCYTE EST, POC: ABNORMAL
NITRITE, POC: ABNORMAL
PH, POC: ABNORMAL
PROTEIN, POC: ABNORMAL
SPECIFIC GRAVITY, POC: ABNORMAL
UROBILINOGEN, POC: ABNORMAL

## 2022-01-10 PROCEDURE — 99395 PREV VISIT EST AGE 18-39: CPT | Performed by: FAMILY MEDICINE

## 2022-01-10 PROCEDURE — 81002 URINALYSIS NONAUTO W/O SCOPE: CPT | Performed by: FAMILY MEDICINE

## 2022-01-10 ASSESSMENT — ENCOUNTER SYMPTOMS
ABDOMINAL PAIN: 0
EYE DISCHARGE: 0
WHEEZING: 0
NAUSEA: 0
DIARRHEA: 0
VOMITING: 0
COUGH: 0
COLOR CHANGE: 0
BACK PAIN: 1

## 2022-01-10 NOTE — PROGRESS NOTES
Well Adult Note  Name: Sunshine Amaya Date: 1/10/2022   MRN: [id number] Sex: Female   Age: 29 y.o. Ethnicity: Non- / Non    : 1985 Race: White (non-)      Michael Sellers is here for well adult exam.  History:  Patient is seen today for a physical and checkup. She states that overall she is doing okay. She states that she is currently having problems with low back pain. She states that she fell and hurt her tailbone about a year and a half ago. She states that her back is just not gotten better since that time. She has not really tried doing anything for it to this point. She has not had any x-rays done of her back. She states that she has not had any work-up done and has not tried anything. She is not taking her ibuprofen any longer. She states that she does take Tylenol when needed. She denies any bowel or bladder incontinence. She states that she has had some difficulty urinating. She denies any blood in her urine. She denies any urinary incontinence. Review of Systems   Constitutional: Negative for activity change, appetite change and fever. HENT: Negative for congestion and nosebleeds. Eyes: Negative for discharge. Respiratory: Negative for cough and wheezing. Cardiovascular: Negative for chest pain and leg swelling. Gastrointestinal: Negative for abdominal pain, diarrhea, nausea and vomiting. Genitourinary: Negative for difficulty urinating, frequency and urgency. Musculoskeletal: Positive for arthralgias and back pain. Negative for gait problem. Skin: Negative for color change and rash. Neurological: Negative for dizziness and headaches. Hematological: Does not bruise/bleed easily. Psychiatric/Behavioral: Negative for sleep disturbance and suicidal ideas. Allergies   Allergen Reactions    Penicillins Shortness Of Breath    Zoloft [Sertraline Hcl] Itching         Prior to Visit Medications    Medication Sig Taking?  Authorizing Provider   amphetamine-dextroamphetamine (ADDERALL, 10MG,) 10 MG tablet Take 1 tablet by mouth daily for 30 doses. Yes Andrea Montgomery MD   amphetamine-dextroamphetamine (ADDERALL XR) 20 MG extended release capsule Take 1 capsule by mouth every morning for 30 days. Yes Andrea Montgomery MD   acetaminophen (TYLENOL) 500 MG tablet Take 500 mg by mouth every 6 hours as needed for Pain Yes Historical Provider, MD         Past Medical History:   Diagnosis Date    Anxiety     Hx of partial seizures     UTI (urinary tract infection)     PT states she gets constant UTI's \"That is pretty much undercontrol now. \"       Past Surgical History:   Procedure Laterality Date     SECTION      x2    TUBAL LIGATION           Family History   Problem Relation Age of Onset    Heart Attack Mother     Stroke Mother     High Blood Pressure Mother     High Cholesterol Mother     Diabetes Mother     Cancer Mother         thyroid    No Known Problems Father     Alcohol Abuse Brother         recently out of rehab from what she was told    Diabetes Maternal Grandmother     Cancer Maternal Grandmother         Pancreatic     ADHD Son         with Trouettes    No Known Problems Son        Social History     Tobacco Use    Smoking status: Current Some Day Smoker     Packs/day: 0.25    Smokeless tobacco: Never Used    Tobacco comment: working on quitting as of 20   Vaping Use    Vaping Use: Former    Substances: Always   Substance Use Topics    Alcohol use:  Yes     Alcohol/week: 4.0 standard drinks     Types: 2 Cans of beer, 2 Shots of liquor per week    Drug use: Not Currently     Types: Marijuana Estlewis Estevez)     Comment: last use was around age 33       Objective   /88   Pulse 105   Temp 98.5 °F (36.9 °C) (Temporal)   Resp 16   Ht 5' 1\" (1.549 m)   Wt 161 lb 9.6 oz (73.3 kg)   SpO2 97%   Breastfeeding No   BMI 30.53 kg/m²   Wt Readings from Last 3 Encounters:   01/10/22 161 lb 9.6 oz (73.3 kg)   21 142 lb (64.4 kg)   08/30/21 146 lb (66.2 kg)     Physical Exam  Vitals reviewed. Constitutional:       General: She is not in acute distress. Appearance: Normal appearance. She is well-developed. She is not diaphoretic. HENT:      Head: Normocephalic and atraumatic. Right Ear: External ear normal.      Left Ear: External ear normal.   Cardiovascular:      Rate and Rhythm: Normal rate and regular rhythm. Pulses: Normal pulses. Heart sounds: Normal heart sounds. No murmur heard. Pulmonary:      Effort: Pulmonary effort is normal. No respiratory distress. Breath sounds: Normal breath sounds. Musculoskeletal:      Cervical back: Normal range of motion and neck supple. Skin:     General: Skin is warm and dry. Neurological:      General: No focal deficit present. Mental Status: She is alert and oriented to person, place, and time. Mental status is at baseline. Psychiatric:         Mood and Affect: Mood normal.         Behavior: Behavior normal.         Thought Content: Thought content normal.         Judgment: Judgment normal.           Assessment   Plan   1. Dysuria  -     POCT Urinalysis no Micro  2. Chronic low back pain without sciatica, unspecified back pain laterality  -     XR LUMBAR SPINE (2-3 VIEWS)  3. Encounter for well adult exam without abnormal findings         Personalized Preventive Plan   Current Health Maintenance Status    There is no immunization history on file for this patient.      Health Maintenance   Topic Date Due    Hepatitis C screen  Never done    HIV screen  Never done    COVID-19 Vaccine (1) 07/01/2022 (Originally 7/28/1991)    Pneumococcal 0-64 years Vaccine (1 of 2 - PPSV23) 07/19/2022 (Originally 7/28/1992)    DTaP/Tdap/Td vaccine (1 - Tdap) 01/10/2023 (Originally 7/28/2005)    Flu vaccine (1) 01/10/2023 (Originally 9/1/2021)    Depression Screen  07/08/2022    Cervical cancer screen  08/30/2024    Hepatitis A vaccine  Aged C/ Mirza Juvenal 20 Hepatitis B vaccine  Aged Out    Hib vaccine  Aged Out    Meningococcal (ACWY) vaccine  Aged Out    Varicella vaccine  Discontinued     Recommendations for Preventive Services Due: see orders and patient instructions/AVS.    Return in about 6 weeks (around 2/21/2022) for follow up back pain.

## 2022-01-14 ENCOUNTER — HOSPITAL ENCOUNTER (OUTPATIENT)
Dept: PHYSICAL THERAPY | Age: 37
Setting detail: THERAPIES SERIES
Discharge: HOME | End: 2022-01-14
Payer: COMMERCIAL

## 2022-01-14 PROCEDURE — 97162 PT EVAL MOD COMPLEX 30 MIN: CPT

## 2022-01-14 PROCEDURE — G0283 ELEC STIM OTHER THAN WOUND: HCPCS

## 2022-01-14 ASSESSMENT — PAIN SCALES - GENERAL: PAINLEVEL_OUTOF10: 5

## 2022-01-14 ASSESSMENT — PAIN DESCRIPTION - LOCATION: LOCATION: BACK

## 2022-01-14 ASSESSMENT — PAIN - FUNCTIONAL ASSESSMENT: PAIN_FUNCTIONAL_ASSESSMENT: PREVENTS OR INTERFERES WITH MANY ACTIVE NOT PASSIVE ACTIVITIES

## 2022-01-14 ASSESSMENT — PAIN DESCRIPTION - FREQUENCY: FREQUENCY: CONTINUOUS

## 2022-01-14 ASSESSMENT — PAIN DESCRIPTION - DESCRIPTORS: DESCRIPTORS: ACHING;BURNING;CONSTANT

## 2022-01-14 ASSESSMENT — PAIN DESCRIPTION - PROGRESSION: CLINICAL_PROGRESSION: GRADUALLY WORSENING

## 2022-01-14 ASSESSMENT — PAIN DESCRIPTION - PAIN TYPE: TYPE: CHRONIC PAIN

## 2022-01-14 NOTE — PROGRESS NOTES
Physical Therapy  Initial Assessment  Date: 2022  Patient Name: Martínez Golden  MRN: [id number]  : 1985     Treatment Diagnosis: Low back pain with multiple postural faults and trunk weakness. Restrictions       Subjective   General  Chart Reviewed: Yes  Additional Pertinent Hx: Low back pain, worse after fall 1 1/2 year ago. No treatment. Family / Caregiver Present: No  Referral Date : 01/10/22  Diagnosis: Low back pain  Follows Commands: Within Functional Limits  Subjective  Subjective: She relates having low back pain with fall 1 1/2 years ago. Pain has been getting progressive worse recently. Pain is in low back and upper back. She has most pain with lifting, lying flat on back, sitting on hard surface. She is most comfortable is unknown. Pain at present, 5/10. She  has trouble sleeping due to pain. Pain is worse in am.  Pain with walking. She relates intermittent LE numbness/tingling. Pain Screening  Patient Currently in Pain: Yes  Pain Assessment  Pain Assessment: 0-10  Pain Level: 5  Pain Type: Chronic pain  Pain Location: Back  Pain Descriptors: Aching;Burning;Constant  Pain Frequency: Continuous  Clinical Progression: Gradually worsening  Functional Pain Assessment: Prevents or interferes with many active not passive activities  Vital Signs  Patient Currently in Pain: Yes    Vision/Hearing       Orientation       Social/Functional History  Social/Functional History  Lives With: Family  Home Layout: One level  ADL Assistance: Independent  Homemaking Assistance: Needs assistance  Meal Prep: Moderate  Laundry: Moderate  Vacuuming: Maximal  Cleaning: Moderate  Ambulation Assistance: Independent  Active : No  Occupation: Unemployed  Leisure & Hobbies: wood working; unable due to pain    Objective     Observation/Palpation  Palpation: Tenderness at L5-S1 interspace, less going proximally. Tenderness of right glut max area, no tenderness of parapinal mm today.   Observation: Left shoulder<right, anterior pelvic tilt, protracted shoulders, forward head; right leg longer than left    AROM RLE (degrees)  RLE AROM: WNL  AROM LLE (degrees)  LLE AROM : WNL  Spine  Lumbar: Forward bend (most painful): 3 degrees; Backward bend: 5 degrees; Left side bend: 5 degrees; Right side bend: 25 degrees    Strength RLE  R Hip Flexion: 5/5  R Hip Extension: 4/5  R Knee Flexion: 4+/5  R Knee Extension: 4+/5  R Ankle Dorsiflexion: 5/5  R Ankle Eversion: 5/5  R Great Toe Extension: 5/5  Strength LLE  L Hip Flexion: 5/5  L Hip Extension: 4/5  L Knee Flexion: 4+/5  L Knee Extension: 4+/5  L Ankle Dorsiflexion: 5/5  L Ankle Eversion: 5/5  L Great Toe Extension: 5/5                         Balance  Sitting - Static: Fair  Sitting - Dynamic: Fair  Standing - Static: Fair  Single Leg Stance  Right Leg Eyes Open: 15 seconds  Left Leg Eyes Open: 30 seconds  Exercises  Exercise 1: 6MWT  Exercise 2: Supine Ta series: UE, LE, Alt, 10 reps  Exercise 3: Supine 1 ELADIO, DKC, 10 reps  Exercise 4: Supine lower trunk rot 10 reps  Exercise 5: Supine bridge, 10 reps  Exercise 6: Supine, 90/90 HS stretch, 15 sec hold, 3 reps  Exercise 7: Standing multifidus row, red t band, 10 reps  Exercise 8: Standing Paloff press, red t band, 10 reps,  Exercise 9: Standing hip abd/ext, 10 reps,  Exercise 10: Standing mini squat  Exercise 11: Assess SIJ                      Assessment   Conditions Requiring Skilled Therapeutic Intervention  Body structures, Functions, Activity limitations: Decreased functional mobility ; Increased pain;Decreased ADL status; Decreased posture;Decreased ROM; Decreased strength  Assessment: Ms. Mendez Sneed is seen today for PT eval for progressively increasing low back pain. She demonstrates decreased lumbar ROM in all planes. No focal LE weakness is noted. No positive nerve root signs are noted. She demonstrates tenderness at L5-S1 interspinous space. Multiple postural faults.   Her symptoms are consistent with postural mm

## 2022-01-18 ENCOUNTER — HOSPITAL ENCOUNTER (OUTPATIENT)
Dept: PHYSICAL THERAPY | Age: 37
Setting detail: THERAPIES SERIES
Discharge: HOME | End: 2022-01-18
Payer: COMMERCIAL

## 2022-01-18 PROCEDURE — 97110 THERAPEUTIC EXERCISES: CPT

## 2022-01-18 ASSESSMENT — PAIN DESCRIPTION - FREQUENCY: FREQUENCY: CONTINUOUS

## 2022-01-18 ASSESSMENT — PAIN DESCRIPTION - PAIN TYPE: TYPE: CHRONIC PAIN

## 2022-01-18 ASSESSMENT — PAIN DESCRIPTION - DESCRIPTORS: DESCRIPTORS: ACHING;BURNING

## 2022-01-18 ASSESSMENT — PAIN DESCRIPTION - ORIENTATION: ORIENTATION: MID;LOWER

## 2022-01-18 ASSESSMENT — PAIN DESCRIPTION - PROGRESSION: CLINICAL_PROGRESSION: GRADUALLY WORSENING

## 2022-01-18 ASSESSMENT — PAIN SCALES - GENERAL: PAINLEVEL_OUTOF10: 3

## 2022-01-18 ASSESSMENT — PAIN DESCRIPTION - LOCATION: LOCATION: BACK

## 2022-01-18 NOTE — PROGRESS NOTES
Physical Therapy  Daily Treatment Note  Date: 2022  Patient Name: Portia Gallardo  MRN: [id number]     :   1985    Subjective:   General  Chart Reviewed: Yes  Additional Pertinent Hx: Low back pain, worse after fall 1 1/2 year ago. No treatment. Family / Caregiver Present: No  PT Visit Information  PT Insurance Information: passport  Progress Note Due Date: 22  Subjective  Subjective: No changes  Pain Screening  Patient Currently in Pain: Yes  Pain Assessment  Pain Assessment: 0-10  Pain Level: 3  Pain Type: Chronic pain  Pain Location: Back  Pain Orientation: Mid;Lower  Pain Descriptors: Aching;Burning  Pain Frequency: Continuous  Clinical Progression: Gradually worsening       Treatment Activities:          Exercises  Exercise 1: 6MWT 760 feet  Exercise 2: Supine Ta series 5 x 10 sec alone: UE, LE, Alt, 10 reps  Exercise 3: Supine 1 ELADIO 5 x 5 sec wtih towel, DKC 5 x 5 sec with towel  Exercise 4: Supine lower trunk rot 10 reps  Exercise 5: Supine bridge, 10 reps-limited motion due to pain  Exercise 6: Supine, 90/90 HS stretch, 15 sec hold, 3 reps  Exercise 7: Standing multifidus row, red t band, 10 reps  Exercise 8: Standing Paloff press, red t band, 10 reps,  Exercise 9: Standing hip abd/ext, 10 reps,  Exercise 10: Standing mini squat 1 x 10  Exercise 11: Assess SIJ--left leg longer than right, synergy technques applied accordingly        Assessment:   Conditions Requiring Skilled Therapeutic Intervention  Body structures, Functions, Activity limitations: Decreased functional mobility ; Increased pain;Decreased ADL status; Decreased posture;Decreased ROM; Decreased strength  Assessment: Initiated ex today per primary therapist recommendations. Patient did fair with session. She had to stop with 2 mins left of walk test due to increased hip pain. Also some facial grimmace with some of the ex. Advised patient to work in a pain free range.   She reported pain at 3/10 pre session and 5/10 post.  Treatment Diagnosis: Low back pain with multiple postural faults and trunk weakness. Prognosis: Good  Decision Making: Medium Complexity  REQUIRES PT FOLLOW UP: Yes    Goals:  Short term goals  Time Frame for Short term goals: 3-4 weeks  Short term goal 1: Patient will increase lumbar flex ROM to 15 degrees without pain. Short term goal 2: Patient will increase lumbar extension ROM to 5 degrees without pain. Short term goal 3: Patient witll perform HEP with min verbal cueing. Short term goal 4: Patient will ambulate 0' with 6MWT. Short term goal 5: Patient will increase JENNIFER to 50% impairment demonstrating functional improvement. Long term goals  Time Frame for Long term goals : 6-8 weeks  Long term goal 1: Patient will increase lumbar flex ROM to 40 degrees without pain. Long term goal 2: Patient witll perform HEP independently. Long term goal 3: Patient will increase JENNIFER to 30% impairment demonstrating functional improvement. Long term goal 4: Patient will ambulate 1000' with 6MWT.   Patient Goals   Patient goals : to have less pain and to be able to do more  Plan:    Plan  Times per week: 2  Plan weeks: 6-8  Current Treatment Recommendations: Strengthening,IADL Training,Neuromuscular Re-education,Home Exercise Program,ROM,Manual Therapy - Soft Tissue Mobilization,Balance Training,Manual Therapy - Joint Manipulation,Modalities  Timed Code Treatment Minutes: 46 Minutes     Therapy Time   Individual Concurrent Group Co-treatment   Time In 1400         Time Out 1446         Minutes 46         Timed Code Treatment Minutes: 46 Minutes  Electronically signed by Easton Forbes PTA on 1/18/2022 at 2:59 PM

## 2022-01-21 ENCOUNTER — HOSPITAL ENCOUNTER (OUTPATIENT)
Dept: PHYSICAL THERAPY | Age: 37
Setting detail: THERAPIES SERIES
Discharge: HOME | End: 2022-01-21
Payer: COMMERCIAL

## 2022-01-21 VITALS — HEART RATE: 111 BPM | OXYGEN SATURATION: 98 %

## 2022-01-21 PROCEDURE — G0283 ELEC STIM OTHER THAN WOUND: HCPCS

## 2022-01-21 PROCEDURE — 97110 THERAPEUTIC EXERCISES: CPT

## 2022-01-21 ASSESSMENT — PAIN SCALES - GENERAL
PAINLEVEL_OUTOF10: 4
PAINLEVEL_OUTOF10: 4

## 2022-01-21 ASSESSMENT — PAIN DESCRIPTION - ONSET: ONSET: AWAKENED FROM SLEEP

## 2022-01-21 ASSESSMENT — PAIN DESCRIPTION - PROGRESSION
CLINICAL_PROGRESSION: NOT CHANGED
CLINICAL_PROGRESSION: NOT CHANGED

## 2022-01-21 ASSESSMENT — PAIN DESCRIPTION - DESCRIPTORS
DESCRIPTORS: ACHING
DESCRIPTORS: ACHING

## 2022-01-21 ASSESSMENT — PAIN DESCRIPTION - ORIENTATION
ORIENTATION: POSTERIOR
ORIENTATION: POSTERIOR

## 2022-01-21 ASSESSMENT — PAIN DESCRIPTION - LOCATION
LOCATION: BACK
LOCATION: BACK

## 2022-01-21 ASSESSMENT — PAIN DESCRIPTION - FREQUENCY
FREQUENCY: CONTINUOUS
FREQUENCY: CONTINUOUS

## 2022-01-21 NOTE — PROGRESS NOTES
Physical Therapy  Daily Treatment Note  Date: 2022  Patient Name: Tonie Barber  MRN: [id number]     :   1985    Subjective:   General  Chart Reviewed: Yes  Additional Pertinent Hx: Low back pain, worse after fall 1 1/2 year ago. No treatment. Family / Caregiver Present: No  PT Visit Information  PT Insurance Information: passport  Total # of Visits Approved:  (12-16 per POC)  Total # of Visits to Date: 3  Plan of Care/Certification Expiration Date: 22  Progress Note Due Date: 22  Subjective  Subjective: No changes  Pain Screening  Patient Currently in Pain: Yes  Pain Assessment  Pain Assessment: 0-10  Pain Level: 4  Pain Location: Back  Pain Orientation: Posterior  Pain Descriptors: Aching  Pain Frequency: Continuous  Pain Onset: Awakened from sleep  Clinical Progression: Not changed  Vital Signs  Pulse: 111 (after 6 MWT: 112)  Patient Currently in Pain: Yes  Oxygen Therapy  SpO2: 98 % (after 6MWT: 98)       Treatment Activities:                                    Exercises  Exercise 1: 6MWT 672', pan 6/10, left LBP  Exercise 2: Supine Ta series 5 x 10 sec alone: UE, LE, Alt, 10 reps  Exercise 3: Supine 1 ELADIO 5 x 5 sec wtih towel, DKC 5 x 5 sec with towel  Exercise 4: Supine lower trunk rot 10 reps  Exercise 5: Supine bridge, 10 reps-limited motion due to pain  Exercise 6: Supine, 90/90 HS stretch, 15 sec hold, 3 reps  Exercise 7: Supine partial crunch within pain tolerance, 5 reps  Exercise 8: Standing Paloff press, red t band, 10 reps,  Exercise 9: Standing hip abd/ext, 10 reps,  Exercise 10: Standing mini squat 1 x 10  Exercise 11: Assess SIJ--left leg longer than right, synergy technques applied accordingly  Exercise 12: Standing multifidus row, red t band, 10 reps  Exercise 13: [facility]                               Assessment:   Conditions Requiring Skilled Therapeutic Intervention  Body structures, Functions, Activity limitations: Decreased functional mobility ; Increased pain;Decreased ADL status; Decreased posture;Decreased ROM; Decreased strength  Assessment: She rates LBP at 4/10 upon arrival.  She performs ther ex as noted. Her ability to perform ther ex appears to be improving. She rates pain at 3/10 after PT today  Treatment Diagnosis: Low back pain with multiple postural faults and trunk weakness. Prognosis: Good  Decision Making: Medium Complexity  REQUIRES PT FOLLOW UP: Yes    Goals:  Short term goals  Time Frame for Short term goals: 3-4 weeks  Short term goal 1: Patient will increase lumbar flex ROM to 15 degrees without pain. Short term goal 2: Patient will increase lumbar extension ROM to 5 degrees without pain. Short term goal 3: Patient tom perform HEP with min verbal cueing. Short term goal 4: Patient will ambulate 0' with 6MWT. Short term goal 5: Patient will increase JENNIFER to 50% impairment demonstrating functional improvement. Long term goals  Time Frame for Long term goals : 6-8 weeks  Long term goal 1: Patient will increase lumbar flex ROM to 40 degrees without pain. Long term goal 2: Patient tom perform HEP independently. Long term goal 3: Patient will increase JENNIFER to 30% impairment demonstrating functional improvement. Long term goal 4: Patient will ambulate 1000' with 6MWT.   Patient Goals   Patient goals : to have less pain and to be able to do more  Plan:    Plan  Times per week: 2  Plan weeks: 6-8  Current Treatment Recommendations: Strengthening,IADL Training,Neuromuscular Re-education,Home Exercise Program,ROM,Manual Therapy - Soft Tissue Mobilization,Balance Training,Manual Therapy - Joint Manipulation,Modalities  Timed Code Treatment Minutes: 46 Minutes     Therapy Time   Individual Concurrent Group Co-treatment   Time In 6023         Time Out 1505         Minutes 66         Timed Code Treatment Minutes: 46 Minutes  Electronically signed by Edi Rich PT on 1/21/2022 at 4:24 PM

## 2022-01-25 ENCOUNTER — HOSPITAL ENCOUNTER (OUTPATIENT)
Dept: PHYSICAL THERAPY | Age: 37
Setting detail: THERAPIES SERIES
Discharge: HOME | End: 2022-01-25
Payer: COMMERCIAL

## 2022-01-25 PROCEDURE — 97110 THERAPEUTIC EXERCISES: CPT

## 2022-01-25 PROCEDURE — G0283 ELEC STIM OTHER THAN WOUND: HCPCS

## 2022-01-25 ASSESSMENT — PAIN DESCRIPTION - FREQUENCY: FREQUENCY: CONTINUOUS

## 2022-01-25 ASSESSMENT — PAIN DESCRIPTION - PROGRESSION: CLINICAL_PROGRESSION: NOT CHANGED

## 2022-01-25 ASSESSMENT — PAIN DESCRIPTION - PAIN TYPE: TYPE: CHRONIC PAIN

## 2022-01-25 ASSESSMENT — PAIN DESCRIPTION - LOCATION: LOCATION: BACK

## 2022-01-25 ASSESSMENT — PAIN DESCRIPTION - ORIENTATION: ORIENTATION: POSTERIOR

## 2022-01-25 ASSESSMENT — PAIN DESCRIPTION - DESCRIPTORS: DESCRIPTORS: ACHING

## 2022-01-25 ASSESSMENT — PAIN SCALES - GENERAL: PAINLEVEL_OUTOF10: 3

## 2022-01-25 NOTE — PROGRESS NOTES
Physical Therapy  Daily Treatment Note  Date: 2022  Patient Name: Martínez Golden  MRN: [id number]     :   1985    Subjective:   General  Chart Reviewed: Yes  Additional Pertinent Hx: Low back pain, worse after fall 1 1/2 year ago. No treatment. Family / Caregiver Present: No  PT Visit Information  PT Insurance Information: passport  Total # of Visits Approved:  (12-16 per POC)  Total # of Visits to Date: 4  Plan of Care/Certification Expiration Date: 22  Progress Note Due Date: 22  Subjective  Subjective: I did okay after last session. My tailbone is giving me more issues. Sitting in general is uncomfortable. Pain Screening  Patient Currently in Pain: Yes  Pain Assessment  Pain Assessment: 0-10  Pain Level: 3  Pain Type: Chronic pain  Pain Location: Back  Pain Orientation: Posterior  Pain Descriptors: Aching  Pain Frequency: Continuous  Clinical Progression: Not changed       Treatment Activities:    Exercises  Exercise 1: 6MWT 446', pain 5/10, left LBP and unable to compete all 6 mins  Exercise 2: Supine Ta series 5 x 10 sec alone: UE, LE, Alt, 10 reps  Exercise 3: Supine 1 ELADIO 5 x 5 sec wtih towel, DKC 5 x 5 sec with towel  Exercise 4: Supine lower trunk rot 10 reps  Exercise 5: Supine bridge, x 5 reps-limited motion due to pain  Exercise 6: Supine, 90/90 HS stretch, 15 sec hold, 3 reps  Exercise 7: Supine partial crunch within pain tolerance, 5 reps  Exercise 8: Standing Paloff press, red t band, 10 reps,  Exercise 9: Standing hip abd/ext, 10 reps,  Exercise 10: Standing mini squat 1 x 10  Exercise 11: Assess SIJ--right leg longer than right, synergy technques applied accordingly  Exercise 12: Standing multifidus row, red t band, 10 reps  Exercise 13: IFC wth MH as needed-prone x 20 mins  Exercise 20: HEP issued           Assessment:   Conditions Requiring Skilled Therapeutic Intervention  Body structures, Functions, Activity limitations: Decreased functional mobility ; Increased pain;Decreased ADL status; Decreased posture;Decreased ROM; Decreased strength  Assessment: Patient did fair with session. Had to limit 6MWT due to increase left hip  pain. Therefore, less distance today. Also, after ex, patient preferred to lay prone for her estim wtih moist heat. Pain level at 3/10 pre session and 2-3/10 post.  Treatment Diagnosis: Low back pain with multiple postural faults and trunk weakness. Prognosis: Good  Decision Making: Medium Complexity  REQUIRES PT FOLLOW UP: Yes    Goals:  Short term goals  Time Frame for Short term goals: 3-4 weeks  Short term goal 1: Patient will increase lumbar flex ROM to 15 degrees without pain. Short term goal 2: Patient will increase lumbar extension ROM to 5 degrees without pain. Short term goal 3: Patient witll perform HEP with min verbal cueing. Short term goal 4: Patient will ambulate 0' with 6MWT. Short term goal 5: Patient will increase JENNIFER to 50% impairment demonstrating functional improvement. Long term goals  Time Frame for Long term goals : 6-8 weeks  Long term goal 1: Patient will increase lumbar flex ROM to 40 degrees without pain. Long term goal 2: Patient witll perform HEP independently. Long term goal 3: Patient will increase JENNIFER to 30% impairment demonstrating functional improvement. Long term goal 4: Patient will ambulate 1000' with 6MWT.   Patient Goals   Patient goals : to have less pain and to be able to do more  Plan:    Plan  Times per week: 2  Plan weeks: 6-8  Current Treatment Recommendations: Strengthening,IADL Training,Neuromuscular Re-education,Home Exercise Program,ROM,Manual Therapy - Soft Tissue Mobilization,Balance Training,Manual Therapy - Joint Manipulation,Modalities  Timed Code Treatment Minutes: 40 Minutes (20 mins estim)     Therapy Time   Individual Concurrent Group Co-treatment   Time In 1400         Time Out 1500         Minutes 60         Timed Code Treatment Minutes: 40 Minutes (20 mins estim)  Electronically signed by Cassidy Martinez, PTA on 1/25/2022 at 4:49 PM

## 2022-01-28 ENCOUNTER — HOSPITAL ENCOUNTER (OUTPATIENT)
Dept: PHYSICAL THERAPY | Age: 37
Setting detail: THERAPIES SERIES
End: 2022-01-28
Payer: COMMERCIAL

## 2022-01-30 DIAGNOSIS — F90.9 ADULT ADHD: ICD-10-CM

## 2022-01-31 ENCOUNTER — HOSPITAL ENCOUNTER (OUTPATIENT)
Dept: PHYSICAL THERAPY | Age: 37
Setting detail: THERAPIES SERIES
Discharge: HOME | End: 2022-01-31
Payer: COMMERCIAL

## 2022-01-31 PROCEDURE — 97110 THERAPEUTIC EXERCISES: CPT

## 2022-01-31 PROCEDURE — G0283 ELEC STIM OTHER THAN WOUND: HCPCS

## 2022-01-31 RX ORDER — DEXTROAMPHETAMINE SACCHARATE, AMPHETAMINE ASPARTATE MONOHYDRATE, DEXTROAMPHETAMINE SULFATE AND AMPHETAMINE SULFATE 5; 5; 5; 5 MG/1; MG/1; MG/1; MG/1
20 CAPSULE, EXTENDED RELEASE ORAL EVERY MORNING
Qty: 30 CAPSULE | Refills: 0 | Status: SHIPPED | OUTPATIENT
Start: 2022-01-31 | End: 2022-03-28 | Stop reason: SDUPTHER

## 2022-01-31 ASSESSMENT — PAIN DESCRIPTION - LOCATION: LOCATION: BACK;HIP

## 2022-01-31 ASSESSMENT — PAIN DESCRIPTION - PROGRESSION: CLINICAL_PROGRESSION: NOT CHANGED

## 2022-01-31 ASSESSMENT — PAIN SCALES - GENERAL: PAINLEVEL_OUTOF10: 2

## 2022-01-31 ASSESSMENT — PAIN DESCRIPTION - PAIN TYPE: TYPE: CHRONIC PAIN

## 2022-01-31 ASSESSMENT — PAIN DESCRIPTION - ORIENTATION: ORIENTATION: LOWER;LEFT

## 2022-01-31 ASSESSMENT — PAIN DESCRIPTION - DESCRIPTORS: DESCRIPTORS: ACHING;DULL;DISCOMFORT

## 2022-01-31 NOTE — TELEPHONE ENCOUNTER
PDMP Monitoring:    Last PDMP [place] as Reviewed [facility]):  Review User Review Instant Review Result   Emilio Parnell 11/23/2021 10:21 AM Reviewed PDMP [1]     Urine Drug Screenings (1 yr)     POCT Rapid Drug Screen  Collected: 7/8/2021 (Final result)    Complete Results          POCT Rapid Drug Screen  Collected: 12/17/2020 (Final result)    Complete Results          Urine Drug Screen  Collected: 3/8/2019  9:04 AM (Final result)    Complete Results          Urine Drug Screen  Collected: 10/15/2018  2:22 PM (Final result)    Complete Results              Medication Contract and Consent for Opioid Use Documents Filed     Patient Documents     Type of Document Status Date Received Received By Description    Medication Contract Signed 7/16/2018  8:57 AM Riky KNOWLES benzo & stimulant agreement    Medication Contract Received 12/17/2020  5:15 PM Riky KNOWLES Medication Contract 12/17/20

## 2022-01-31 NOTE — PROGRESS NOTES
Physical Therapy  Daily Treatment Note  Date: 2022  Patient Name: Charlotte Fair  MRN: [id number]     :   1985    Subjective:   General  Chart Reviewed: Yes  Additional Pertinent Hx: Low back pain, worse after fall 1 1/2 year ago. No treatment. Response To Previous Treatment: Patient with no complaints from previous session. Family / Caregiver Present: No  PT Visit Information  PT Insurance Information: passport  Total # of Visits Approved:  (12-16 per POC)  Total # of Visits to Date: 5  Plan of Care/Certification Expiration Date: 22  Progress Note Due Date: 22  Subjective  Subjective: Pain is about the same as usual  Pain Screening  Patient Currently in Pain: Yes  Pain Assessment  Pain Assessment: 0-10  Pain Level: 2  Pain Type: Chronic pain  Pain Location: Back; Hip  Pain Orientation: Lower; Left  Pain Descriptors: Aching;Dull;Discomfort  Clinical Progression: Not changed  Vital Signs  Patient Currently in Pain: Yes       Treatment Activities:       Exercises  Exercise 1: 6MWT 986 feet, pain 4/10 LBP and left hip  Exercise 2: Supine Ta series 5 x 10 sec alone: UE, LE, Alt, 10 reps  Exercise 3: Supine 1 ELADIO 5 x 5 sec wtih towel, DKC 5 x 5 sec with towel  Exercise 4: Supine lower trunk rot 10 reps  Exercise 5: Supine bridge, x 10 reps-limited motion due to pain  Exercise 6: Supine, 90/90 HS stretch, 15 sec hold, 3 reps  Exercise 7: Supine partial crunch within pain tolerance, 5 reps  Exercise 8: Standing Paloff press, red t band, 10 reps,  Exercise 9: Standing hip abd/ext, 10 reps,  Exercise 10: Standing mini squat 1 x 10  Exercise 11: Assess SIJ--right leg longer than right, synergy technques applied accordingly  Exercise 12: Standing multifidus row, red t band, 10 reps  Exercise 13: IFC wth MH as needed-prone x 20 mins  Exercise 20:   HEP issued        Assessment:   Conditions Requiring Skilled Therapeutic Intervention  Body structures, Functions, Activity limitations: Decreased functional mobility ; Increased pain;Decreased ADL status; Decreased posture;Decreased ROM; Decreased strength  Assessment: Patient was able to complete 6 minute walk with one standing rest break. She was able to complete all exerices on program and increase some reps today. E-stim was once again performed in prone per patient's request.  She stated that pain was 6/10 after exericses and 1/10 post e-stim. Treatment Diagnosis: Low back pain with multiple postural faults and trunk weakness. Prognosis: Good  REQUIRES PT FOLLOW UP: Yes  Discharge Recommendations: Continue to assess pending progress    Goals:  Short term goals  Time Frame for Short term goals: 3-4 weeks  Short term goal 1: Patient will increase lumbar flex ROM to 15 degrees without pain. Short term goal 2: Patient will increase lumbar extension ROM to 5 degrees without pain. Short term goal 3: Patient witll perform HEP with min verbal cueing. Short term goal 4: Patient will ambulate 0' with 6MWT. Short term goal 5: Patient will increase JENNIFER to 50% impairment demonstrating functional improvement. Long term goals  Time Frame for Long term goals : 6-8 weeks  Long term goal 1: Patient will increase lumbar flex ROM to 40 degrees without pain. Long term goal 2: Patient witll perform HEP independently. Long term goal 3: Patient will increase JENNIFER to 30% impairment demonstrating functional improvement. Long term goal 4: Patient will ambulate 1000' with 6MWT.   Patient Goals   Patient goals : to have less pain and to be able to do more  Plan:    Plan  Times per week: 2  Plan weeks: 6-8  Current Treatment Recommendations: Strengthening,IADL Training,Neuromuscular Re-education,Home Exercise Program,ROM,Manual Therapy - Soft Tissue Mobilization,Balance Training,Manual Therapy - Joint Manipulation,Modalities  Timed Code Treatment Minutes: 43 Minutes     Therapy Time   Individual Concurrent Group Co-treatment   Time In 1400         Time Out 7449         [id number] Timed Code Treatment Minutes: 43 Minutes  Electronically signed by Alfonso Sales PTA on 1/31/2022 at 3:13 PM

## 2022-02-03 ENCOUNTER — APPOINTMENT (OUTPATIENT)
Dept: PHYSICAL THERAPY | Age: 37
End: 2022-02-03
Payer: COMMERCIAL

## 2022-02-08 ENCOUNTER — HOSPITAL ENCOUNTER (OUTPATIENT)
Dept: PHYSICAL THERAPY | Age: 37
Setting detail: THERAPIES SERIES
Discharge: HOME | End: 2022-02-08
Payer: COMMERCIAL

## 2022-02-08 PROCEDURE — G0283 ELEC STIM OTHER THAN WOUND: HCPCS

## 2022-02-08 PROCEDURE — 97110 THERAPEUTIC EXERCISES: CPT

## 2022-02-08 ASSESSMENT — PAIN DESCRIPTION - PAIN TYPE: TYPE: CHRONIC PAIN

## 2022-02-08 ASSESSMENT — PAIN DESCRIPTION - LOCATION: LOCATION: BACK;HIP

## 2022-02-08 ASSESSMENT — PAIN SCALES - GENERAL: PAINLEVEL_OUTOF10: 2

## 2022-02-08 ASSESSMENT — PAIN DESCRIPTION - ORIENTATION: ORIENTATION: LOWER;LEFT

## 2022-02-08 NOTE — PROGRESS NOTES
Physical Therapy  Daily Treatment Note  Date: 2022  Patient Name: Zain Menjivar  MRN: [id number]     :   1985    Subjective:   PT Visit Information  PT Insurance Information: passport  Total # of Visits Approved:  (12-16)  Total # of Visits to Date: 6  Plan of Care/Certification Expiration Date: 22  Progress Note Due Date: 22  Subjective: today it's not too bad, pain about a 2  Patient Currently in Pain: Yes  Pain Level: 2  Pain Type: Chronic pain  Pain Location: Back; Hip  Pain Orientation: Lower; Left       Treatment Activities:     Exercises  Exercise 1: 6MWT 1148 feet,  Exercise 2: Supine Ta series 5 x 10 sec alone: UE, LE, Alt, 10 reps  Exercise 3: Supine 1 ELADIO 5 x 5 sec wtih towel, DKC 5 x 5 sec with towel  Exercise 4: Supine lower trunk rot 10 reps  Exercise 5: Supine bridge, x 10 reps-limited motion due to pain  Exercise 6: Supine, 90/90 HS stretch, 15 sec hold, 3 reps  Exercise 7: Supine partial crunch within pain tolerance, 5 reps  Exercise 8: Assess SIJ--8: right leg longer than left, synergy technques applied accordingly  5\" x 5 ea  R ext, L flex  Exercise 9: Standing hip abd/ext, 10 reps,  Exercise 10: Standing mini squat 1 x 10  Exercise 11: Standing Paloff press, red t band, 10 reps,  Exercise 12: Standing multifidus row, red t band, 10 reps  Exercise 13: IFC wt MH as needed-prone x 20 mins  Exercise 20:   HEP issued      Assessment:   Conditions Requiring Skilled Therapeutic Intervention  Body structures, Functions, Activity limitations: Decreased functional mobility ; Increased pain;Decreased ADL status; Decreased posture;Decreased ROM; Decreased strength  Assessment: Patient did well again today and met her STG and LTG today for distance during 6 MWT, bridging and abdominal crunch still somewhat painful to perform so had her perform these in as much of a pain free ROM as possible. Pain rating after exercise 3.5/10 and after estim 10.   Treatment Diagnosis: Low back pain with multiple postural faults and trunk weakness. REQUIRES PT FOLLOW UP: Yes        Goals:  Short term goals  Time Frame for Short term goals: 3-4 weeks  Short term goal 1: Patient will increase lumbar flex ROM to 15 degrees without pain. Short term goal 2: Patient will increase lumbar extension ROM to 5 degrees without pain. Short term goal 3: Patient tom perform HEP with min verbal cueing. Short term goal 4: Patient will ambulate 0' with 6MWT. (2/8: 1148')  Short term goal 5: Patient will increase JENNIFER to 50% impairment demonstrating functional improvement. Long term goals  Time Frame for Long term goals : 6-8 weeks  Long term goal 1: Patient will increase lumbar flex ROM to 40 degrees without pain. Long term goal 2: Patient yolall perform HEP independently. Long term goal 3: Patient will increase JENNIFER to 30% impairment demonstrating functional improvement.   Long term goal 4: Patient will ambulate 1000' with 6MWT. (2/8: 1148')  Patient Goals   Patient goals : to have less pain and to be able to do more    Plan:    Times per week: 2  Plan weeks: 6-8  Current Treatment Recommendations: Strengthening,IADL Training,Neuromuscular Re-education,Home Exercise Program,ROM,Manual Therapy - Soft Tissue Mobilization,Balance Training,Manual Therapy - Joint Manipulation,Modalities     Therapy Time   Individual Concurrent Group Co-treatment   Time In 1501         Time Out 1600         Minutes 59         Timed Code Treatment Minutes: 525 [facility]    Electronically signed by Gina Stanford PTA on 2/8/2022 at 4:05 PM

## 2022-02-10 ENCOUNTER — HOSPITAL ENCOUNTER (OUTPATIENT)
Dept: PHYSICAL THERAPY | Age: 37
Setting detail: THERAPIES SERIES
Discharge: HOME | End: 2022-02-10
Payer: COMMERCIAL

## 2022-02-10 PROCEDURE — 97110 THERAPEUTIC EXERCISES: CPT

## 2022-02-10 ASSESSMENT — PAIN SCALES - GENERAL: PAINLEVEL_OUTOF10: 2

## 2022-02-10 ASSESSMENT — PAIN DESCRIPTION - LOCATION: LOCATION: BACK;HIP

## 2022-02-10 ASSESSMENT — PAIN DESCRIPTION - ORIENTATION: ORIENTATION: LEFT;LOWER

## 2022-02-10 NOTE — PROGRESS NOTES
Physical Therapy  Daily Treatment Note/Reassessment  Date: 2/10/2022  Patient Name: Jose Martin  MRN: [id number]     :   1985    Subjective:      PT Visit Information  PT Insurance Information: passport  Total # of Visits Approved:  (12-16)  Total # of Visits to Date: 7  Plan of Care/Certification Expiration Date: 22  Subjective  Subjective: Patient she feels that PT has been helpful, been able to be more active and do more around the house. She has been issued a HEP and reports she has been working on performing them in part. Pain Screening  Patient Currently in Pain: Yes  Pain Assessment  Pain Assessment: 0-10  Pain Level: 2  Pain Location: Back; Hip  Pain Orientation: Left; Lower  Vital Signs  Patient Currently in Pain: Yes       Treatment Activities:                                    Exercises  Exercise 1: 6MWT 1148 feet,  Exercise 2: Supine Ta series 5 x 10 sec alone: UE, LE, Alt, 10 reps  Exercise 3: Supine 1 ELADIO 5 x 5 sec wtih towel, DKC 5 x 5 sec with towel  Exercise 4: Supine lower trunk rot 10 reps  Exercise 5: Supine bridge, x 10 reps-limited motion due to pain  Exercise 6: Supine, 90/90 HS stretch, 15 sec hold, 3 reps  Exercise 7: Supine partial crunch within pain tolerance, 5 reps  Exercise 8: Assess SIJ--2/8: right leg longer than left, synergy technques applied accordingly  5\" x 5 ea  R ext, L flex  Exercise 9: Standing hip abd/ext, 10 reps,  Exercise 10: Standing mini squat 1 x 10  Exercise 11: Standing Paloff press, red t band, 10 reps,  Exercise 12: Standing multifidus row, red t band, 10 reps  Exercise 13: IFC wth MH as needed-prone x 20 mins  Exercise 20:   HEP issued                               Assessment:   Conditions Requiring Skilled Therapeutic Intervention  Body structures, Functions, Activity limitations: Decreased functional mobility ; Increased pain;Decreased ADL status; Decreased posture;Decreased ROM; Decreased strength  Assessment: Ms. Phuc Duvall has made fair progress since her initial eval as determined by today's reassessment. She has scored a little more favorably on the Oswestry, is able to walk further distances before having to sit, has shown increase in both lumbar flexion and extension measurements, and is gradually becoming more active at home. She has her HEP and has been performing them some. See goal section for specific measurements and comments. By her own admission, Ms. Abbey Lopez feels she is improving. Treatment Diagnosis: Low back pain with multiple postural faults and trunk weakness. REQUIRES PT FOLLOW UP: Yes    Goals:  Short term goals  Time Frame for Short term goals: 3-4 weeks  Short term goal 1: Patient will increase lumbar flex ROM to 15 degrees without pain. --met (2/10/22 lumbar extension 35 degrees)  Short term goal 2: Patient will increase lumbar extension ROM to 5 degrees without pain. --met (2/10/22 Patient has 20 degrees lumbar extension.)  Short term goal 3: Patient witll perform HEP with min verbal cueing.--progress (2/10/22 patient has HEP and is performing them in part.)  Short term goal 4: Patient will ambulate 0' with 6MWT. --met (2/8: 1148'. 2/10/22 Patient ambulated 1,150' in 6 MWT.)  Short term goal 5: Patient will increase JENNIFER to 50% impairment demonstrating functional improvement. --progress (2/10/22    51 % impairment)  Long term goals  Time Frame for Long term goals : 6-8 weeks  Long term goal 1: Patient will increase lumbar flex ROM to 40 degrees without pain. --progress (2/10/22 see STG #1)  Long term goal 2: Patient will perform HEP independently. --progress (2/10/22 see STG #3)  Long term goal 3: Patient will increase JENNIFER to 30% impairment demonstrating functional improvement. --progress (2/10/22 see STG #5)  Long term goal 4: Patient will ambulate 1000' with 6MWT. --met (2/8: 1148'.  2/10/22 see STG #4)  Patient Goals   Patient goals : to have less pain and to be able to do more  Plan:    Plan  Times per week: 2  Plan weeks: 6-8  Current Treatment Recommendations: Strengthening,IADL Training,Neuromuscular Re-education,Home Exercise Program,ROM,Manual Therapy - Soft Tissue Mobilization,Balance Training,Manual Therapy - Joint Manipulation,Modalities  Timed Code Treatment Minutes: 38 Minutes (20 mins e-stim)     Therapy Time   Individual Concurrent Group Co-treatment   Time In 8922         Time Out 1355         Minutes 59         Timed Code Treatment Minutes: 38 Minutes (20 mins e-stim)  Electronically signed by Rajni Castaneda PT on 2/10/2022 at 1:56 PM

## 2022-02-15 ENCOUNTER — HOSPITAL ENCOUNTER (OUTPATIENT)
Dept: PHYSICAL THERAPY | Age: 37
Setting detail: THERAPIES SERIES
Discharge: HOME | End: 2022-02-15
Payer: COMMERCIAL

## 2022-02-15 PROCEDURE — G0283 ELEC STIM OTHER THAN WOUND: HCPCS

## 2022-02-15 PROCEDURE — 97110 THERAPEUTIC EXERCISES: CPT

## 2022-02-15 ASSESSMENT — PAIN DESCRIPTION - PAIN TYPE: TYPE: CHRONIC PAIN

## 2022-02-15 ASSESSMENT — PAIN SCALES - GENERAL: PAINLEVEL_OUTOF10: 2

## 2022-02-15 ASSESSMENT — PAIN DESCRIPTION - LOCATION: LOCATION: HIP

## 2022-02-15 ASSESSMENT — PAIN DESCRIPTION - FREQUENCY: FREQUENCY: INTERMITTENT

## 2022-02-15 ASSESSMENT — PAIN DESCRIPTION - ORIENTATION: ORIENTATION: RIGHT;LEFT

## 2022-02-15 NOTE — PROGRESS NOTES
Physical Therapy  Daily Treatment Note  Date: 2/15/2022  Patient Name: Desiree Howard  MRN: [id number]     :   1985    Subjective:   General  Additional Pertinent Hx: Low back pain, worse after fall 1 1/2 year ago. No treatment. PT Visit Information  PT Insurance Information: passport  Total # of Visits Approved:  (12-16)  Total # of Visits to Date: 8  Plan of Care/Certification Expiration Date: 22  Progress Note Due Date: 22  Subjective  Subjective: Doing okay. Right leg is been bothering me more than usual. It gets tingling  when I walk. General Comment  Comments: Advised patient to let MD know about the tingling in her right leg. Pain Screening  Patient Currently in Pain: Yes  Pain Assessment  Pain Assessment: 0-10  Pain Level: 2  Pain Type: Chronic pain  Pain Location: Hip  Pain Orientation: Right;Left  Pain Descriptors: Aching;Dull;Discomfort;Tingling  Pain Frequency: Intermittent       Treatment Activities:          Exercises  Exercise 1: 6MWT 1159 feet,  Exercise 2: Supine Ta series 5 x 10 sec alone: UE, LE, Alt, 10 reps  Exercise 3: Supine 1 ELADIO 5 x 5 sec wtih towel, DKC 5 x 5 sec with towel  Exercise 4: Supine lower trunk rot 10 reps  Exercise 5: Supine bridge, x 10 reps-limited motion due to pain  Exercise 6: Supine, 90/90 HS stretch, 15 sec hold, 3 reps  Exercise 7: Supine partial crunch within pain tolerance, 5 reps  Exercise 8: Assess SIJ--2/8: right leg longer than left, synergy technques applied accordingly  5\" x 5 ea  R ext, L flex  Exercise 9: Standing hip abd/ext, 10 reps,  Exercise 10: Standing mini squat 1 x 10  Exercise 11: Standing Paloff press, red t band, 10 reps,  Exercise 12: Standing multifidus row, red t band, 10 reps  Exercise 13: IFC wt MH as needed-prone x 20 mins  Exercise 20:   HEP issued        Assessment:   Conditions Requiring Skilled Therapeutic Intervention  Body structures, Functions, Activity limitations: Decreased functional mobility ; Increased pain;Decreased ADL status; Decreased posture;Decreased ROM; Decreased strength  Assessment: Patient did fair with todays session. She relates she has been having tingling in her right leg with walking. Advised her to let her MD know about this. She was able to complete all ex asked of her. IFC and moist heat applied in prone position. Pain rating pre and post session at 2/10. Treatment Diagnosis: Low back pain with multiple postural faults and trunk weakness. REQUIRES PT FOLLOW UP: Yes    Goals:  Short term goals  Time Frame for Short term goals: 3-4 weeks  Short term goal 1: Patient will increase lumbar flex ROM to 15 degrees without pain. --met (2/10/22 lumbar extension 35 degrees)  Short term goal 2: Patient will increase lumbar extension ROM to 5 degrees without pain. --met (2/10/22 Patient has 20 degrees lumbar extension.)  Short term goal 3: Patient witll perform HEP with min verbal cueing.--progress (2/10/22 patient has HEP and is performing them in part.)  Short term goal 4: Patient will ambulate 0' with 6MWT. --met (2/8: 1148'. 2/10/22 Patient ambulated 1,150' in 6 MWT.)  Short term goal 5: Patient will increase JENNIFER to 50% impairment demonstrating functional improvement. --progress (2/10/22    51 % impairment)  Long term goals  Time Frame for Long term goals : 6-8 weeks  Long term goal 1: Patient will increase lumbar flex ROM to 40 degrees without pain. --progress (2/10/22 see STG #1)  Long term goal 2: Patient will perform HEP independently. --progress (2/10/22 see STG #3)  Long term goal 3: Patient will increase JENNIFER to 30% impairment demonstrating functional improvement. --progress (2/10/22 see STG #5)  Long term goal 4: Patient will ambulate 1000' with 6MWT. --met (2/8: 1148'.  2/10/22 see STG #4)  Patient Goals   Patient goals : to have less pain and to be able to do more  Plan:    Plan  Times per week: 2  Plan weeks: 6-8  Current Treatment Recommendations: Fortino Lomeli Re-education,Home Exercise Program,ROM,Manual Therapy - Soft Tissue Mobilization,Balance Training,Manual Therapy - Joint Manipulation,Modalities  Timed Code Treatment Minutes: 39 Minutes (20 mins e-stim)     Therapy Time   Individual Concurrent Group Co-treatment   Time In 1400         Time Out 1459         Minutes 59         Timed Code Treatment Minutes: 39 Minutes (20 mins e-stim)  Electronically signed by Ryan Willis PTA on 2/15/2022 at 4:06 PM

## 2022-02-17 ENCOUNTER — HOSPITAL ENCOUNTER (OUTPATIENT)
Dept: PHYSICAL THERAPY | Age: 37
Setting detail: THERAPIES SERIES
Discharge: HOME | End: 2022-02-17
Payer: COMMERCIAL

## 2022-02-17 PROCEDURE — G0283 ELEC STIM OTHER THAN WOUND: HCPCS

## 2022-02-17 PROCEDURE — 97110 THERAPEUTIC EXERCISES: CPT

## 2022-02-17 ASSESSMENT — PAIN DESCRIPTION - ORIENTATION: ORIENTATION: RIGHT

## 2022-02-17 ASSESSMENT — PAIN DESCRIPTION - DESCRIPTORS: DESCRIPTORS: ACHING;DULL;SORE

## 2022-02-17 ASSESSMENT — PAIN DESCRIPTION - PAIN TYPE: TYPE: CHRONIC PAIN

## 2022-02-17 ASSESSMENT — PAIN DESCRIPTION - LOCATION: LOCATION: HIP;LEG;BACK

## 2022-02-17 ASSESSMENT — PAIN SCALES - GENERAL: PAINLEVEL_OUTOF10: 5

## 2022-02-17 NOTE — PROGRESS NOTES
Daily Treatment Note  Date: 2022  Patient Name: Marilee Morales  MRN: [id number]     :   1985    Subjective:   General  Additional Pertinent Hx: Low back pain, worse after fall 1 1/2 year ago. No treatment. PT Visit Information  PT Insurance Information: passport  Total # of Visits Approved:  (12-16)  Total # of Visits to Date: 5  Plan of Care/Certification Expiration Date: 22  Progress Note Due Date: 22  Subjective  Subjective: My back and R leg is hurting me a little more today than it has been. I think its the weather. General Comment  Comments: Advised patient to let MD know about the tingling in her right leg. Pain Screening  Patient Currently in Pain: Yes  Pain Assessment  Pain Assessment: 0-10  Pain Level: 5  Pain Type: Chronic pain  Pain Location: Hip;Leg;Back  Pain Orientation: Right  Pain Descriptors: Aching;Dull; Sore  Vital Signs  Patient Currently in Pain: Yes       Treatment Activities:   Exercises  Exercise 1: 6MWT 975 feet, increased back and leg  Exercise 2: Supine Ta series 5 x 10 sec alone: UE, LE, Alt, 10 reps  Exercise 3: Supine 1 ELADIO 5 x 5 sec wtih towel, DKC 5 x 5 sec with towel  Exercise 4: Supine lower trunk rot 10 reps with 5 sec hold  Exercise 5: Supine bridge, x 10 reps-limited motion due to pain  Exercise 6: Supine, 90/90 HS stretch, 15 sec hold, 4 reps  Exercise 7: Supine partial crunch within pain tolerance, 10 reps  Exercise 8: Assess SIJ--2/8: right leg longer than left, synergy technques applied accordingly  5\" x 5 ea  R ext, L flex; EVEN TODAY  Exercise 9: Standing hip abd/ext, 10 reps,  Exercise 10: Standing mini squat 1 x 10;  B LE marching x 10  Exercise 11: Standing Paloff press, red t band, 15 reps,  Exercise 12: Standing multifidus row, red t band, 15 reps  Exercise 13: IFC wt MH as needed-prone x 20 mins; Sitting today, did not realize she had been doing this prone  Exercise 20: 01  HEP issued     Assessment:   Conditions Requiring Skilled Therapeutic Intervention  Body structures, Functions, Activity limitations: Decreased functional mobility ; Increased pain;Decreased ADL status; Decreased posture;Decreased ROM; Decreased strength  Assessment: Patient with continued low back and R LE pain and symptoms this date that seems to be a little higher than previous sessions. Therapist not advancing tx much this date due to patient having an increased pain rating. She completes exercises with noted mm guarding but is able to complete all exercises. She reports a slight improvement in symptoms post tx. Will continue with current POC and progress patient as she tolerates. Treatment Diagnosis: Low back pain with multiple postural faults and trunk weakness. REQUIRES PT FOLLOW UP: Yes      Goals:  Short term goals  Time Frame for Short term goals: 3-4 weeks  Short term goal 1: Patient will increase lumbar flex ROM to 15 degrees without pain. --met (2/10/22 lumbar extension 35 degrees)  Short term goal 2: Patient will increase lumbar extension ROM to 5 degrees without pain. --met (2/10/22 Patient has 20 degrees lumbar extension.)  Short term goal 3: Patient witll perform HEP with min verbal cueing.--progress (2/10/22 patient has HEP and is performing them in part.)  Short term goal 4: Patient will ambulate 0' with 6MWT. --met (2/8: 1148'. 2/10/22 Patient ambulated 1,150' in 6 MWT.)  Short term goal 5: Patient will increase JENNIFER to 50% impairment demonstrating functional improvement. --progress (2/10/22    51 % impairment)  Long term goals  Time Frame for Long term goals : 6-8 weeks  Long term goal 1: Patient will increase lumbar flex ROM to 40 degrees without pain. --progress (2/10/22 see STG #1)  Long term goal 2: Patient will perform HEP independently. --progress (2/10/22 see STG #3)  Long term goal 3: Patient will increase JENNIFER to 30% impairment demonstrating functional improvement. --progress (2/10/22 see STG #5)  Long term goal 4: Patient will ambulate 1000' with 6MWT. --met (2/8: 1148'.  2/10/22 see STG #4)  Patient Goals   Patient goals : to have less pain and to be able to do more    Plan:    Plan  Times per week: 2  Plan weeks: 6-8  Current Treatment Recommendations: Strengthening,IADL Training,Neuromuscular Re-education,Home Exercise Program,ROM,Manual Therapy - Soft Tissue Mobilization,Balance Training,Manual Therapy - Joint Manipulation,Modalities  Timed Code Treatment Minutes: 40 Minutes     Therapy Time   Individual Concurrent Group Co-treatment   Time In 1300         Time Out 1400         Minutes 60         Timed Code Treatment Minutes: 40 Minutes       Electronically signed by Stefano Gallardo PTA on 2/17/2022 at 2:29 PM

## 2022-02-21 ENCOUNTER — OFFICE VISIT (OUTPATIENT)
Dept: PRIMARY CARE CLINIC | Age: 37
End: 2022-02-21
Payer: COMMERCIAL

## 2022-02-21 VITALS
DIASTOLIC BLOOD PRESSURE: 76 MMHG | RESPIRATION RATE: 18 BRPM | BODY MASS INDEX: 32.1 KG/M2 | HEIGHT: 61 IN | WEIGHT: 170 LBS | TEMPERATURE: 98.7 F | SYSTOLIC BLOOD PRESSURE: 114 MMHG | HEART RATE: 112 BPM | OXYGEN SATURATION: 98 %

## 2022-02-21 DIAGNOSIS — G89.29 CHRONIC BILATERAL LOW BACK PAIN WITH RIGHT-SIDED SCIATICA: ICD-10-CM

## 2022-02-21 DIAGNOSIS — M54.16 LUMBAR RADICULOPATHY: Primary | ICD-10-CM

## 2022-02-21 DIAGNOSIS — M54.41 CHRONIC BILATERAL LOW BACK PAIN WITH RIGHT-SIDED SCIATICA: ICD-10-CM

## 2022-02-21 PROCEDURE — 99213 OFFICE O/P EST LOW 20 MIN: CPT | Performed by: FAMILY MEDICINE

## 2022-02-21 RX ORDER — CYCLOBENZAPRINE HCL 10 MG
10 TABLET ORAL NIGHTLY PRN
Qty: 30 TABLET | Refills: 0 | Status: SHIPPED | OUTPATIENT
Start: 2022-02-21 | End: 2022-03-03

## 2022-02-21 ASSESSMENT — ENCOUNTER SYMPTOMS
WHEEZING: 0
BACK PAIN: 1
DIARRHEA: 0
NAUSEA: 0
ABDOMINAL PAIN: 0
COLOR CHANGE: 0
VOMITING: 0
EYE DISCHARGE: 0
COUGH: 0

## 2022-02-21 NOTE — PROGRESS NOTES
SUBJECTIVE:    Patient ID: Sierra Orourke is a 29 y.o. female. HPI:   Patient is seen today for a 6-week follow-up on her back pain. She states that she is currently doing physical therapy and does feel like this is helping some with her back pain but she states the back pain is now moving down into her right leg and she is also having some numbness and tingling in her right leg. She states that this has gotten worse over the last few weeks. She states that her foot does feel cold and hurts. She states that she has not had any MRIs done of her back. She has had an x-ray which was done here. She is not taking anything currently for the back pain. Past Medical History:   Diagnosis Date    Anxiety     Hx of partial seizures     UTI (urinary tract infection)     PT states she gets constant UTI's \"That is pretty much undercontrol now. \"      Current Outpatient Medications on File Prior to Visit   Medication Sig Dispense Refill    amphetamine-dextroamphetamine (ADDERALL XR) 20 MG extended release capsule Take 1 capsule by mouth every morning for 30 days. 30 capsule 0    amphetamine-dextroamphetamine (ADDERALL, 10MG,) 10 MG tablet Take 1 tablet by mouth daily for 30 doses. 30 tablet 0     No current facility-administered medications on file prior to visit. Allergies   Allergen Reactions    Penicillins Shortness Of Breath    Zoloft [Sertraline Hcl] Itching       Review of Systems   Constitutional: Negative for activity change, appetite change and fever. HENT: Negative for congestion and nosebleeds. Eyes: Negative for discharge. Respiratory: Negative for cough and wheezing. Cardiovascular: Negative for chest pain and leg swelling. Gastrointestinal: Negative for abdominal pain, diarrhea, nausea and vomiting. Genitourinary: Negative for difficulty urinating, frequency and urgency. Musculoskeletal: Positive for arthralgias and back pain. Negative for gait problem.    Skin: Negative for color change and rash. Neurological: Positive for numbness. Negative for dizziness and headaches. Hematological: Does not bruise/bleed easily. Psychiatric/Behavioral: Negative for sleep disturbance and suicidal ideas. OBJECTIVE:    Physical Exam  Vitals reviewed. Constitutional:       General: She is not in acute distress. Appearance: Normal appearance. She is well-developed. She is not diaphoretic. HENT:      Head: Normocephalic and atraumatic. Right Ear: External ear normal.      Left Ear: External ear normal.   Cardiovascular:      Rate and Rhythm: Normal rate and regular rhythm. Pulses: Normal pulses. Heart sounds: Normal heart sounds. No murmur heard. Pulmonary:      Effort: Pulmonary effort is normal. No respiratory distress. Breath sounds: Normal breath sounds. Abdominal:      General: Abdomen is flat. Bowel sounds are normal.      Palpations: Abdomen is soft. Tenderness: There is no abdominal tenderness. Musculoskeletal:      Cervical back: Normal range of motion and neck supple. Skin:     General: Skin is warm and dry. Neurological:      General: No focal deficit present. Mental Status: She is alert and oriented to person, place, and time. Mental status is at baseline. Psychiatric:         Mood and Affect: Mood normal.         Behavior: Behavior normal.         Thought Content: Thought content normal.         Judgment: Judgment normal.        /76 (Site: Left Upper Arm, Position: Sitting, Cuff Size: Large Adult)   Pulse 112   Temp 98.7 °F (37.1 °C) (Temporal)   Resp 18   Ht 5' 1\" (1.549 m)   Wt 170 lb (77.1 kg)   LMP 02/21/2022 (Exact Date)   SpO2 98%   BMI 32.12 kg/m²      ASSESSMENT/PLAN:    1. Lumbar radiculopathy  -     MRI LUMBAR SPINE WO CONTRAST; Future  2. Chronic bilateral low back pain with right-sided sciatica  -     MRI LUMBAR SPINE WO CONTRAST;  Future       I have sent diclofenac and Flexeril to the pharmacy for you to take for the back pain. I am going to get an MRI of the lumbar spine. We will notify her of the results of that. Discussed that if the back pain is getting worse or the numbness and tingling is getting worse or she develops any other red flag symptoms she needs to go to the ER. Patient states understanding. Follow-up with us in 4 weeks for recheck unless needed sooner. PDMP Monitoring:    Last PDMP Tonya Lee as Reviewed [facility]):  Review User Review Instant Review Result   Thedora Fitting 11/23/2021 10:21 AM Reviewed PDMP [1]       Urine Drug Screenings (1 yr)     POCT Rapid Drug Screen  Collected: 7/8/2021 (Final result)    Complete Results          POCT Rapid Drug Screen  Collected: 12/17/2020 (Final result)    Complete Results          Urine Drug Screen  Collected: 3/8/2019  9:04 AM (Final result)    Complete Results          Urine Drug Screen  Collected: 10/15/2018  2:22 PM (Final result)    Complete Results              Medication Contract and Consent for Opioid Use Documents Filed     Patient Documents     Type of Document Status Date Received Received By Description    Medication Contract Signed 7/16/2018  8:57 AM Shanae KNOWLES benzo & stimulant agreement    Medication Contract Received 12/17/2020  5:15 PM Cesar KNOWLES Medication Contract 12/17/20                 EMR Dragon/transcription disclaimer:  Much of this encounter note is electronic transcription/translation of spoken language toprinted texts. The electronic translation of spoken language may be erroneous, or at times, nonsensical words or phrases may be inadvertently transcribed.   Although I have reviewed the note for such errors, some may stillexist.

## 2022-02-22 ENCOUNTER — HOSPITAL ENCOUNTER (OUTPATIENT)
Dept: PHYSICAL THERAPY | Age: 37
Setting detail: THERAPIES SERIES
End: 2022-02-22
Payer: COMMERCIAL

## 2022-02-24 ENCOUNTER — HOSPITAL ENCOUNTER (OUTPATIENT)
Dept: PHYSICAL THERAPY | Age: 37
Setting detail: THERAPIES SERIES
Discharge: HOME | End: 2022-02-24
Payer: COMMERCIAL

## 2022-02-24 PROCEDURE — 97110 THERAPEUTIC EXERCISES: CPT

## 2022-02-24 PROCEDURE — G0283 ELEC STIM OTHER THAN WOUND: HCPCS

## 2022-02-24 ASSESSMENT — PAIN DESCRIPTION - ORIENTATION: ORIENTATION: LEFT

## 2022-02-24 ASSESSMENT — PAIN DESCRIPTION - PAIN TYPE: TYPE: CHRONIC PAIN

## 2022-02-24 ASSESSMENT — PAIN DESCRIPTION - LOCATION: LOCATION: BACK

## 2022-02-24 ASSESSMENT — PAIN DESCRIPTION - FREQUENCY: FREQUENCY: CONTINUOUS

## 2022-02-24 NOTE — PROGRESS NOTES
Physical Therapy  Daily Treatment Note  Date: 2022  Patient Name: Juanjo Cox  MRN: [id number]     :   1985    Subjective:   General  Chart Reviewed: Yes  Additional Pertinent Hx: Low back pain, worse after fall 1 1/2 year ago. No treatment. PT Visit Information  PT Insurance Information: passport  Total # of Visits Approved:  (12-16)  Total # of Visits to Date: 10  Plan of Care/Certification Expiration Date: 22  Progress Note Due Date: 22  Subjective  Subjective: She rates pain at 1/10. General Comment  Comments: Advised patient to let MD know about the tingling in her right leg. Pain Screening  Patient Currently in Pain: Yes  Pain Assessment  Pain Type: Chronic pain  Pain Location: Back  Pain Orientation: Left  Pain Frequency: Continuous  Vital Signs  Patient Currently in Pain: Yes       Treatment Activities:                                    Exercises  Exercise 1: 6 MWT: 5 min 38 sec; 1058', pain-limited  Exercise 2: Supine Ta series 5 x 10 sec alone: UE, LE, Alt, 10 reps  Exercise 3: Supine 1 ELADIO 5 x 5 sec wtih towel, DKC 5 x 5 sec with towel  Exercise 4: Supine lower trunk rot 10 reps with 5 sec hold  Exercise 5: Supine bridge, x 10 reps-limited motion due to pain  Exercise 6: Supine, 90/90 HS stretch, 15 sec hold, 4 reps  Exercise 7: Supine partial crunch within pain tolerance, 10 reps  Exercise 8: Assess SIJ--2/8: right leg longer than left, synergy technques applied accordingly  5\" x 5 ea  R ext, L flex; EVEN TODAY  Exercise 9: Standing hip abd/ext, 10 reps,  Exercise 10: Standing mini squat 1 x 10;  B LE marching x 10  Exercise 11: Standing Paloff press, red t band, 15 reps,  Exercise 12: Standing multifidus row, red t band, 15 reps  Exercise 13: IFC wt MH as needed-prone x 20 mins; Sitting today, did not realize she had been doing this prone                               Assessment:   Conditions Requiring Skilled Therapeutic Intervention  Body structures, Functions, Activity limitations: Decreased functional mobility ; Increased pain;Decreased ADL status; Decreased posture;Decreased ROM; Decreased strength  Assessment: She rates pain at 1/10 before and after PT session. She had left low back pain to 3/10 after walking. She performs ther ex as noted. She tolerates PT session well today. Treatment Diagnosis: Low back pain with multiple postural faults and trunk weakness. Prognosis: Good  REQUIRES PT FOLLOW UP: Yes    Goals:  Short term goals  Time Frame for Short term goals: 3-4 weeks  Short term goal 1: Patient will increase lumbar flex ROM to 15 degrees without pain. --met (2/10/22 lumbar extension 35 degrees)  Short term goal 2: Patient will increase lumbar extension ROM to 5 degrees without pain. --met (2/10/22 Patient has 20 degrees lumbar extension.)  Short term goal 3: Patient witll perform HEP with min verbal cueing.--progress (2/10/22 patient has HEP and is performing them in part.)  Short term goal 4: Patient will ambulate 0' with 6MWT. --met (2/8: 1148'. 2/10/22 Patient ambulated 1,150' in 6 MWT.)  Short term goal 5: Patient will increase JENNIFER to 50% impairment demonstrating functional improvement. --progress (2/10/22    51 % impairment)  Long term goals  Time Frame for Long term goals : 6-8 weeks  Long term goal 1: Patient will increase lumbar flex ROM to 40 degrees without pain. --progress (2/10/22 see STG #1)  Long term goal 2: Patient will perform HEP independently. --progress (2/10/22 see STG #3)  Long term goal 3: Patient will increase JENNIFER to 30% impairment demonstrating functional improvement. --progress (2/10/22 see STG #5)  Long term goal 4: Patient will ambulate 1000' with 6MWT. --met (2/8: 1148'.  2/10/22 see STG #4)  Patient Goals   Patient goals : to have less pain and to be able to do more  Plan:    Plan  Times per week: 2  Plan weeks: 6-8  Current Treatment Recommendations: Strengthening,IADL Training,Neuromuscular Re-education,Home Exercise Program,ROM,Manual Therapy - Soft Tissue Mobilization,Balance Training,Manual Therapy - Joint Manipulation,Modalities  Timed Code Treatment Minutes: 40 Minutes     Therapy Time   Individual Concurrent Group Co-treatment   Time In 1400         Time Out 1500         Minutes 60         Timed Code Treatment Minutes: 40 Minutes  Electronically signed by Edi Rich PT on 2/24/2022 at 3:44 PM

## 2022-02-28 ENCOUNTER — HOSPITAL ENCOUNTER (OUTPATIENT)
Dept: MRI IMAGING | Age: 37
Discharge: HOME | End: 2022-02-28
Payer: COMMERCIAL

## 2022-02-28 DIAGNOSIS — M54.41 CHRONIC BILATERAL LOW BACK PAIN WITH RIGHT-SIDED SCIATICA: ICD-10-CM

## 2022-02-28 DIAGNOSIS — G89.29 CHRONIC BILATERAL LOW BACK PAIN WITH RIGHT-SIDED SCIATICA: ICD-10-CM

## 2022-02-28 DIAGNOSIS — M54.16 LUMBAR RADICULOPATHY: ICD-10-CM

## 2022-02-28 PROCEDURE — 72148 MRI LUMBAR SPINE W/O DYE: CPT

## 2022-03-18 DIAGNOSIS — G89.29 CHRONIC BILATERAL LOW BACK PAIN WITH RIGHT-SIDED SCIATICA: ICD-10-CM

## 2022-03-18 DIAGNOSIS — M54.16 LUMBAR RADICULOPATHY: Primary | ICD-10-CM

## 2022-03-18 DIAGNOSIS — M54.41 CHRONIC BILATERAL LOW BACK PAIN WITH RIGHT-SIDED SCIATICA: ICD-10-CM

## 2022-03-21 ENCOUNTER — OFFICE VISIT (OUTPATIENT)
Dept: PRIMARY CARE CLINIC | Age: 37
End: 2022-03-21
Payer: COMMERCIAL

## 2022-03-21 VITALS
BODY MASS INDEX: 32.47 KG/M2 | HEART RATE: 100 BPM | DIASTOLIC BLOOD PRESSURE: 80 MMHG | HEIGHT: 61 IN | RESPIRATION RATE: 18 BRPM | SYSTOLIC BLOOD PRESSURE: 132 MMHG | OXYGEN SATURATION: 100 % | WEIGHT: 172 LBS | TEMPERATURE: 97.8 F

## 2022-03-21 DIAGNOSIS — G89.29 CHRONIC LOW BACK PAIN WITHOUT SCIATICA, UNSPECIFIED BACK PAIN LATERALITY: ICD-10-CM

## 2022-03-21 DIAGNOSIS — M54.41 CHRONIC BILATERAL LOW BACK PAIN WITH RIGHT-SIDED SCIATICA: ICD-10-CM

## 2022-03-21 DIAGNOSIS — M54.2 NECK PAIN: Primary | ICD-10-CM

## 2022-03-21 DIAGNOSIS — G89.29 CHRONIC BILATERAL LOW BACK PAIN WITH RIGHT-SIDED SCIATICA: ICD-10-CM

## 2022-03-21 DIAGNOSIS — M54.50 CHRONIC LOW BACK PAIN WITHOUT SCIATICA, UNSPECIFIED BACK PAIN LATERALITY: ICD-10-CM

## 2022-03-21 DIAGNOSIS — R51.9 ACUTE NONINTRACTABLE HEADACHE, UNSPECIFIED HEADACHE TYPE: ICD-10-CM

## 2022-03-21 DIAGNOSIS — M54.16 LUMBAR RADICULOPATHY: ICD-10-CM

## 2022-03-21 DIAGNOSIS — M54.12 CERVICAL RADICULOPATHY: ICD-10-CM

## 2022-03-21 PROCEDURE — 99214 OFFICE O/P EST MOD 30 MIN: CPT | Performed by: FAMILY MEDICINE

## 2022-03-21 RX ORDER — KETOROLAC TROMETHAMINE 10 MG/1
10 TABLET, FILM COATED ORAL EVERY 6 HOURS PRN
Qty: 20 TABLET | Refills: 0 | Status: SHIPPED | OUTPATIENT
Start: 2022-03-21 | End: 2022-09-08

## 2022-03-22 ASSESSMENT — ENCOUNTER SYMPTOMS
WHEEZING: 0
COLOR CHANGE: 0
ABDOMINAL PAIN: 0
BACK PAIN: 1
EYE DISCHARGE: 0
NAUSEA: 0
VOMITING: 0
COUGH: 0
DIARRHEA: 0

## 2022-03-22 NOTE — PROGRESS NOTES
SUBJECTIVE:    Patient ID: Sukhwinder Turner is a 29 y.o. female. HPI:   Patient is seen today for 1 month follow-up on her back pain. She states that it is still bothering her. We did put a referral in for her to see the orthopedic [facility] in the last couple of days. She states that they called her to get her scheduled this morning. She is now having problems with neck pain and right arm pain. She states it is radiating down into her right arm. She has not had any neck injury that she is aware of. We have not done any work-up on her neck to this point. She is still taking her anti-inflammatory. She did do physical therapy but that was for her back not for her neck. She is having headaches regularly. Past Medical History:   Diagnosis Date    Anxiety     Hx of partial seizures     UTI (urinary tract infection)     PT states she gets constant UTI's \"That is pretty much undercontrol now. \"      Current Outpatient Medications on File Prior to Visit   Medication Sig Dispense Refill    diclofenac (VOLTAREN) 50 MG EC tablet Take 1 tablet by mouth 2 times daily (with meals) 60 tablet 3    amphetamine-dextroamphetamine (ADDERALL XR) 20 MG extended release capsule Take 1 capsule by mouth every morning for 30 days. 30 capsule 0    amphetamine-dextroamphetamine (ADDERALL, 10MG,) 10 MG tablet Take 1 tablet by mouth daily for 30 doses. 30 tablet 0     No current facility-administered medications on file prior to visit. Allergies   Allergen Reactions    Penicillins Shortness Of Breath    Zoloft [Sertraline Hcl] Itching       Review of Systems   Constitutional: Negative for activity change, appetite change and fever. HENT: Negative for congestion and nosebleeds. Eyes: Negative for discharge. Respiratory: Negative for cough and wheezing. Cardiovascular: Negative for chest pain and leg swelling. Gastrointestinal: Negative for abdominal pain, diarrhea, nausea and vomiting.    Genitourinary: Negative for difficulty urinating, frequency and urgency. Musculoskeletal: Positive for back pain and neck pain. Negative for gait problem. Skin: Negative for color change and rash. Neurological: Positive for headaches. Negative for dizziness. Hematological: Does not bruise/bleed easily. Psychiatric/Behavioral: Negative for sleep disturbance and suicidal ideas. OBJECTIVE:    Physical Exam  Vitals reviewed. Constitutional:       General: She is not in acute distress. Appearance: Normal appearance. She is well-developed. She is not diaphoretic. HENT:      Head: Normocephalic and atraumatic. Right Ear: External ear normal.      Left Ear: External ear normal.   Cardiovascular:      Rate and Rhythm: Normal rate and regular rhythm. Pulses: Normal pulses. Heart sounds: Normal heart sounds. No murmur heard. Pulmonary:      Effort: Pulmonary effort is normal. No respiratory distress. Breath sounds: Normal breath sounds. Musculoskeletal:      Cervical back: Normal range of motion and neck supple. Skin:     General: Skin is warm and dry. Neurological:      General: No focal deficit present. Mental Status: She is alert and oriented to person, place, and time. Mental status is at baseline. Psychiatric:         Mood and Affect: Mood normal.         Behavior: Behavior normal.         Thought Content: Thought content normal.         Judgment: Judgment normal.        /80 (Site: Left Upper Arm, Position: Sitting, Cuff Size: Medium Adult)   Pulse 100   Temp 97.8 °F (36.6 °C) (Temporal)   Resp 18   Ht 5' 1\" (1.549 m)   Wt 172 lb (78 kg)   LMP 02/21/2022 (Exact Date)   SpO2 100%   BMI 32.50 kg/m²      ASSESSMENT/PLAN:    1. Neck pain  -     XR CERVICAL SPINE (2-3 VIEWS)  2. Chronic low back pain without sciatica, unspecified back pain laterality  3. Lumbar radiculopathy  4. Chronic bilateral low back pain with right-sided sciatica  5.  Acute nonintractable headache, unspecified headache type  6. Cervical radiculopathy       We are going to get x-ray of her neck. We will notify her of the results. I have sent Toradol over to the pharmacy for her to use for her headaches when the headaches are at their worst.  I encouraged her not to take this with the diclofenac. She is scheduled to see orthopedics for her back. Encouraged her to discuss the neck with them as well. PDMP Monitoring:    Last PDMP Reggie Bobo as Reviewed [facility]):  Review User Review Instant Review Result   Phillip Tang 11/23/2021 10:21 AM Reviewed PDMP [1]       Urine Drug Screenings (1 yr)     POCT Rapid Drug Screen  Collected: 7/8/2021 (Final result)    Complete Results          POCT Rapid Drug Screen  Collected: 12/17/2020 (Final result)    Complete Results          Urine Drug Screen  Collected: 3/8/2019  9:04 AM (Final result)    Complete Results          Urine Drug Screen  Collected: 10/15/2018  2:22 PM (Final result)    Complete Results              Medication Contract and Consent for Opioid Use Documents Filed     Patient Documents     Type of Document Status Date Received Received By Description    Medication Contract Signed 7/16/2018  8:57 AM Meagan KNOWLES benzo & stimulant agreement    Medication Contract Received 12/17/2020  5:15 PM Heraclio KNOWLES Medication Contract 12/17/20                 EMR Dragon/transcription disclaimer:  Much of this encounter note is electronic transcription/translation of spoken language toprinted texts. The electronic translation of spoken language may be erroneous, or at times, nonsensical words or phrases may be inadvertently transcribed.   Although I have reviewed the note for such errors, some may stillexist.

## 2022-03-28 DIAGNOSIS — F90.9 ADULT ADHD: ICD-10-CM

## 2022-03-28 RX ORDER — DEXTROAMPHETAMINE SACCHARATE, AMPHETAMINE ASPARTATE MONOHYDRATE, DEXTROAMPHETAMINE SULFATE AND AMPHETAMINE SULFATE 5; 5; 5; 5 MG/1; MG/1; MG/1; MG/1
20 CAPSULE, EXTENDED RELEASE ORAL EVERY MORNING
Qty: 30 CAPSULE | Refills: 0 | Status: SHIPPED | OUTPATIENT
Start: 2022-03-28 | End: 2022-09-08

## 2022-03-28 NOTE — TELEPHONE ENCOUNTER
Provider needs to review PDMP    PDMP Monitoring:    Last PDMP [place]y [place] as Reviewed [facility]):  Review User Review Instant Review Result   Julien Dorado 11/23/2021 10:21 AM Reviewed PDMP [1]     Urine Drug Screenings (1 yr)     POCT Rapid Drug Screen  Collected: 7/8/2021 (Final result)    Complete Results          POCT Rapid Drug Screen  Collected: 12/17/2020 (Final result)    Complete Results          Urine Drug Screen  Collected: 3/8/2019  9:04 AM (Final result)    Complete Results          Urine Drug Screen  Collected: 10/15/2018  2:22 PM (Final result)    Complete Results              Medication Contract and Consent for Opioid Use Documents Filed     Patient Documents     Type of Document Status Date Received Received By Description    Medication Contract Signed 7/16/2018  8:57 AM Fernanda KNOWLES benzo & stimulant agreement    Medication Contract Received 12/17/2020  5:15 PM Fernanda KNOWLES Medication Contract 12/17/20

## 2022-04-07 NOTE — PROGRESS NOTES
[facility]  OUTPATIENT PHYSICAL THERAPY  DISCHARGE SUMMARY    Date: 2022  Patient Name: Clara Schafer        MRN: [id number]    ACCOUNT #: [de-identified]  : 1985  (29 y.o.)  Gender: female   Referring Practitioner: Brad Randolph MD  Diagnosis: Low back pain  Referral Date : 01/10/22  Treatment Diagnosis: Low back pain with multiple postural faults and trunk weakness. Total # of Visits Approved:  (12-16)  Total # of Visits to Date: 10    Subjective  Subjective: She (patient) rates pain at 1/10. (Comment made at last session attended 22). Additional Pertinent Hx: Low back pain, worse after fall 1 1/2 year ago. No treatment. Objective  Treatments received included strengthening,IADL Training,Neuromuscular Re-education,Home Exercise Program,ROM,Manual Therapy - Soft Tissue Mobilization,Balance Training,Manual Therapy - Joint Manipulation,Modalities  See objective/subjective data in goals    Assessment  Assessment: As per the last PT assessment note, Ms. Osmel Gamboa rated pain at 1/10 before and after PT session, with left low back pain  3/10 after walking. She performed ther ex as per exercise routine. She was reported as tolerating her PT session well. 22 was the last PT session attended and an MRI was apparently performed . We have not been contacted by patient after  and patient is presumed to be through with PT at this time. Will formally discharge from PT today. Plan: Discharge from PT today pending further contact from patient in the future. Goals  Short term goals  Time Frame for Short term goals: 3-4 weeks  Short term goal 1: Patient will increase lumbar flex ROM to 15 degrees without pain. --met (2/10/22 lumbar extension 35 degrees)  Short term goal 2: Patient will increase lumbar extension ROM to 5 degrees without pain. --met (2/10/22 Patient has 20 degrees lumbar extension.)  Short term goal 3: Patient witll perform HEP with min verbal cueing.--progress (2/10/22 patient has HEP and is performing them in part.)  Short term goal 4: Patient will ambulate 0' with 6MWT. --met (2/8: 1148'. 2/10/22 Patient ambulated 1,150' in 6 MWT.)  Short term goal 5: Patient will increase JENNIFER to 50% impairment demonstrating functional improvement. --progress (2/10/22    51 % impairment)    Long term goals  Time Frame for Long term goals : 6-8 weeks  Long term goal 1: Patient will increase lumbar flex ROM to 40 degrees without pain. --progress (2/10/22 see STG #1)  Long term goal 2: Patient will perform HEP independently. --progress (2/10/22 see STG #3)  Long term goal 3: Patient will increase JENNIFER to 30% impairment demonstrating functional improvement. --progress (2/10/22 see STG #5)  Long term goal 4: Patient will ambulate 1000' with 6MWT. --met (2/8: 1148'.  2/10/22 see STG #4)         Electronically signed by Kristin Jain PT on 4/7/2022 at 11:07 AM

## 2022-09-08 ENCOUNTER — OFFICE VISIT (OUTPATIENT)
Dept: PRIMARY CARE CLINIC | Age: 37
End: 2022-09-08
Payer: COMMERCIAL

## 2022-09-08 VITALS
OXYGEN SATURATION: 97 % | TEMPERATURE: 97.5 F | BODY MASS INDEX: 28.32 KG/M2 | SYSTOLIC BLOOD PRESSURE: 110 MMHG | HEART RATE: 113 BPM | HEIGHT: 61 IN | DIASTOLIC BLOOD PRESSURE: 74 MMHG | WEIGHT: 150 LBS

## 2022-09-08 DIAGNOSIS — M54.10 RADICULOPATHY, UNSPECIFIED SPINAL REGION: ICD-10-CM

## 2022-09-08 DIAGNOSIS — R51.9 NONINTRACTABLE HEADACHE, UNSPECIFIED CHRONICITY PATTERN, UNSPECIFIED HEADACHE TYPE: Primary | ICD-10-CM

## 2022-09-08 PROCEDURE — 99213 OFFICE O/P EST LOW 20 MIN: CPT | Performed by: NURSE PRACTITIONER

## 2022-09-08 RX ORDER — BACLOFEN 10 MG/1
10 TABLET ORAL 3 TIMES DAILY
Qty: 30 TABLET | Refills: 0 | Status: SHIPPED | OUTPATIENT
Start: 2022-09-08 | End: 2022-09-19 | Stop reason: ALTCHOICE

## 2022-09-08 RX ORDER — KETOROLAC TROMETHAMINE 30 MG/ML
60 INJECTION, SOLUTION INTRAMUSCULAR; INTRAVENOUS ONCE
Status: COMPLETED | OUTPATIENT
Start: 2022-09-08 | End: 2022-09-08

## 2022-09-08 RX ORDER — PREDNISONE 10 MG/1
TABLET ORAL
Qty: 18 TABLET | Refills: 0 | Status: SHIPPED | OUTPATIENT
Start: 2022-09-08 | End: 2022-09-19 | Stop reason: ALTCHOICE

## 2022-09-08 RX ORDER — KETOROLAC TROMETHAMINE 10 MG/1
10 TABLET, FILM COATED ORAL EVERY 6 HOURS PRN
Qty: 20 TABLET | Refills: 0 | Status: SHIPPED | OUTPATIENT
Start: 2022-09-08 | End: 2023-09-08

## 2022-09-08 RX ADMIN — KETOROLAC TROMETHAMINE 60 MG: 30 INJECTION, SOLUTION INTRAMUSCULAR; INTRAVENOUS at 16:40

## 2022-09-08 SDOH — ECONOMIC STABILITY: FOOD INSECURITY: WITHIN THE PAST 12 MONTHS, YOU WORRIED THAT YOUR FOOD WOULD RUN OUT BEFORE YOU GOT MONEY TO BUY MORE.: NEVER TRUE

## 2022-09-08 SDOH — ECONOMIC STABILITY: FOOD INSECURITY: WITHIN THE PAST 12 MONTHS, THE FOOD YOU BOUGHT JUST DIDN'T LAST AND YOU DIDN'T HAVE MONEY TO GET MORE.: NEVER TRUE

## 2022-09-08 ASSESSMENT — PATIENT HEALTH QUESTIONNAIRE - PHQ9
SUM OF ALL RESPONSES TO PHQ9 QUESTIONS 1 & 2: 0
SUM OF ALL RESPONSES TO PHQ QUESTIONS 1-9: 0
1. LITTLE INTEREST OR PLEASURE IN DOING THINGS: 0
SUM OF ALL RESPONSES TO PHQ QUESTIONS 1-9: 0
SUM OF ALL RESPONSES TO PHQ QUESTIONS 1-9: 0
2. FEELING DOWN, DEPRESSED OR HOPELESS: 0
SUM OF ALL RESPONSES TO PHQ QUESTIONS 1-9: 0

## 2022-09-08 ASSESSMENT — ENCOUNTER SYMPTOMS
PHOTOPHOBIA: 0
RESPIRATORY NEGATIVE: 1
ALLERGIC/IMMUNOLOGIC NEGATIVE: 1
EYES NEGATIVE: 1
GASTROINTESTINAL NEGATIVE: 1

## 2022-09-08 ASSESSMENT — SOCIAL DETERMINANTS OF HEALTH (SDOH): HOW HARD IS IT FOR YOU TO PAY FOR THE VERY BASICS LIKE FOOD, HOUSING, MEDICAL CARE, AND HEATING?: SOMEWHAT HARD

## 2022-09-08 NOTE — PATIENT INSTRUCTIONS
Sprain/strain   First 24 hours, use ice to injury site for 15 minutes at a time. After 48 hours, may alternate ice/heat 15 minutes each. R.I. C.E. therapy = rest, ice, compression and elevation. May use ace wrap to injured area for compression. Use Ibuprofen or other antiinflammatory for pain and inflammation. If no open skin areas, OTC topical lidocaine such as Icy Hot or capsaicin creams may be applied for pain relief. Slow stretches of area may help reduce spasms and improve range of motion.

## 2022-09-08 NOTE — PROGRESS NOTES
[facility] PHYSICIAN SERVICES  [facility]  [address] [place] [address]  [address] [place] [address]  Dept: [phone]  Dept Fax: [phone]  Loc: [phone]    Stan Fontana is a 40 y.o. female who presents today for her medical conditions/complaints as noted below. Stan Fontana is c/o of Headache (X 2 weeks) and Arm Problem (Tingling like pins and needles off and on in her left arm. She states she has a history of back issues and used to have the same thing in the right arm.)        HPI:     HPI this 67-year-old female presents today for headache that she has had for about 2 weeks. She also states she is having tingling like pins-and-needles down her left arm. She states she does have a history of back issues and neck issues. She states she also has a history of migraines. She states this headache is different than her migraines though because she has not sensitive to light sound or smells. Chief Complaint   Patient presents with    Headache     X 2 weeks    Arm Problem     Tingling like pins and needles off and on in her left arm. She states she has a history of back issues and used to have the same thing in the right arm. Past Medical History:   Diagnosis Date    Anxiety     Hx of partial seizures     UTI (urinary tract infection)     PT states she gets constant UTI's \"That is pretty much undercontrol now. \"      Past Surgical History:   Procedure Laterality Date     SECTION      x2    TUBAL LIGATION         Vitals 2022 3/21/2022 2022 2022 2/15/2022 [id number]   SYSTOLIC 828 485 848 - - -   DIASTOLIC 74 80 76 - - -   Site Left Upper Arm Left Upper Arm Left Upper Arm - - -   Position Sitting Sitting Sitting - - -   Cuff Size Medium Adult Medium Adult Large Adult - - -   Pulse 113 100 112 - - -   Temp 97.5 97.8 98.7 - - -   Resp - 18 18 - - -   SpO2 97 100 98 - - -   Weight 150 lb 172 lb 170 lb - - -   Height 5' 1\" 5' 1\" 5' 1\" - - -   Body mass index 28.34 kg/m2 32.5 kg/m2 32.12 kg/m2 - - -   Pain Level - - - 5 2 2   Some recent data might be hidden       Family History   Problem Relation Age of Onset    Heart Attack Mother     Stroke Mother     High Blood Pressure Mother     High Cholesterol Mother     Diabetes Mother     Cancer Mother         thyroid    No Known Problems Father     Alcohol Abuse Brother         recently out of rehab from what she was told    Diabetes Maternal Grandmother     Cancer Maternal Grandmother         Pancreatic     ADHD Son         with [employer]    No Known Problems Son        Social History     Tobacco Use    Smoking status: Some Days     Packs/day: 0.25     Types: Cigarettes    Smokeless tobacco: Never    Tobacco comments:     working on quitting as of 6/4/19   Substance Use Topics    Alcohol use: Yes     Alcohol/week: 4.0 standard drinks     Types: 2 Cans of beer, 2 Shots of liquor per week      No current outpatient medications on file prior to visit. No current facility-administered medications on file prior to visit. Allergies   Allergen Reactions    Penicillins Shortness Of Breath    Zoloft [Sertraline Hcl] Itching       Health Maintenance   Topic Date Due    COVID-19 Vaccine (1) Never done    Pneumococcal 0-64 years Vaccine (1 - PCV) Never done    HIV screen  Never done    Hepatitis C screen  Never done    Depression Screen  07/08/2022    Flu vaccine (1) Never done    DTaP/Tdap/Td vaccine (1 - Tdap) 01/10/2023 (Originally 7/28/2005)    Cervical cancer screen  08/30/2024    Hepatitis A vaccine  Aged Out    Hepatitis B vaccine  Aged Out    Hib vaccine  Aged Out    Meningococcal (ACWY) vaccine  Aged Out    Varicella vaccine  Discontinued       Subjective:      Review of Systems   Constitutional: Negative. Negative for fatigue and fever. HENT: Negative. Eyes: Negative. Negative for photophobia and visual disturbance. Respiratory: Negative. Cardiovascular: Negative. Gastrointestinal: Negative. Endocrine: Negative.     Genitourinary: Adult)   Pulse (!) 113   Temp 97.5 °F (36.4 °C)   Ht 5' 1\" (1.549 m)   Wt 150 lb (68 kg)   SpO2 97%   BMI 28.34 kg/m²     Assessment:       Diagnosis Orders   1. Nonintractable headache, unspecified chronicity pattern, unspecified headache type        2. Radiculopathy, unspecified spinal region              Plan:   Review cervical x-ray from March 2020 indicated some degenerative change. Toradol injection in office today followed by Toradol tablets to start tomorrow may take up to every 6 hours as needed for pain and inflammation. Conservative therapy in office today as below    . Baclofen muscle relaxers may be taken as directed as needed for muscle spasm. Prednisone Dosepak take as directed to complete    Sprain/strain   First 24 hours, use ice to injury site for 15 minutes at a time. After 48 hours, may alternate ice/heat 15 minutes each. R.I. C.E. therapy = rest, ice, compression and elevation. May use ace wrap to injured area for compression. Use Ibuprofen or other antiinflammatory for pain and inflammation. If no open skin areas, OTC topical lidocaine such as Icy Hot or capsaicin creams may be applied for pain relief. Slow stretches of area may help reduce spasms and improve range of motion. Patient given educational materials -see patient instructions. Discussed use, benefit, and side effects of prescribed medications. All patient questions answered. Pt voiced understanding. Reviewed health maintenance. Instructed to continue currentmedications, diet and exercise. Patient agreed with treatment plan. Follow up as directed. MEDICATIONS:  Orders Placed This Encounter   Medications    ketorolac (TORADOL) injection 60 mg    predniSONE (DELTASONE) 10 MG tablet     Sig: Days 1-3 take 1 PO TID, days 4-6 take 1 PO BID, days 7-9 take 1 PO daily.      Dispense:  18 tablet     Refill:  0    baclofen (LIORESAL) 10 MG tablet     Sig: Take 1 tablet by mouth 3 times daily     Dispense:  30 tablet     Refill:  0    ketorolac (TORADOL) 10 MG tablet     Sig: Take 1 tablet by mouth every 6 hours as needed for Pain     Dispense:  20 tablet     Refill:  0         ORDERS:  No orders of the defined types were placed in this encounter. Follow-up:  Return if symptoms worsen or fail to improve. PATIENT INSTRUCTIONS:  Patient Instructions   Sprain/strain   First 24 hours, use ice to injury site for 15 minutes at a time. After 48 hours, may alternate ice/heat 15 minutes each. R.I. C.E. therapy = rest, ice, compression and elevation. May use ace wrap to injured area for compression. Use Ibuprofen or other antiinflammatory for pain and inflammation. If no open skin areas, OTC topical lidocaine such as Icy Hot or capsaicin creams may be applied for pain relief. Slow stretches of area may help reduce spasms and improve range of motion. Electronically signed by OLIVIA Robertson NP on 9/8/2022 at 5:20 PM    EMR Dragon/transcription disclaimer:  Much of thisencounter note is electronic transcription/translation of spoken language to printed texts. The electronic translation of spoken language may be erroneous, or at times, nonsensical words or phrases may be inadvertentlytranscribed.   Although I have reviewed the note for such errors, some may still exist.

## 2022-09-19 ENCOUNTER — APPOINTMENT (OUTPATIENT)
Dept: CT IMAGING | Age: 37
End: 2022-09-19
Payer: COMMERCIAL

## 2022-09-19 ENCOUNTER — HOSPITAL ENCOUNTER (EMERGENCY)
Age: 37
Discharge: HOME | End: 2022-09-20
Attending: EMERGENCY MEDICINE
Payer: COMMERCIAL

## 2022-09-19 ENCOUNTER — APPOINTMENT (OUTPATIENT)
Dept: GENERAL RADIOLOGY | Age: 37
End: 2022-09-19
Payer: COMMERCIAL

## 2022-09-19 DIAGNOSIS — R51.9 ACUTE NONINTRACTABLE HEADACHE, UNSPECIFIED HEADACHE TYPE: Primary | ICD-10-CM

## 2022-09-19 LAB
ALBUMIN SERPL-MCNC: 4.1 G/DL (ref 3.5–5.2)
ALP BLD-CCNC: 60 U/L (ref 35–104)
ALT SERPL-CCNC: 9 U/L (ref 5–33)
AMPHETAMINE SCREEN, URINE: NEGATIVE
ANION GAP SERPL CALCULATED.3IONS-SCNC: 12 MMOL/L (ref 7–19)
AST SERPL-CCNC: 12 U/L (ref 5–32)
BACTERIA: ABNORMAL /HPF
BARBITURATE SCREEN URINE: NEGATIVE
BASOPHILS ABSOLUTE: 0.1 K/UL (ref 0–0.2)
BASOPHILS RELATIVE PERCENT: 0.6 % (ref 0–1)
BENZODIAZEPINE SCREEN, URINE: NEGATIVE
BILIRUB SERPL-MCNC: <0.2 MG/DL (ref 0.2–1.2)
BILIRUBIN URINE: NEGATIVE
BLOOD, URINE: ABNORMAL
BUN BLDV-MCNC: 11 MG/DL (ref 6–20)
BUPRENORPHINE URINE: NEGATIVE
CALCIUM SERPL-MCNC: 8.6 MG/DL (ref 8.6–10)
CANNABINOID SCREEN URINE: NEGATIVE
CHLORIDE BLD-SCNC: 94 MMOL/L (ref 98–111)
CLARITY: CLEAR
CO2: 25 MMOL/L (ref 22–29)
COCAINE METABOLITE SCREEN URINE: NEGATIVE
COLOR: YELLOW
CREAT SERPL-MCNC: 1 MG/DL (ref 0.5–0.9)
EOSINOPHILS ABSOLUTE: 0.3 K/UL (ref 0–0.6)
EOSINOPHILS RELATIVE PERCENT: 2.5 % (ref 0–5)
EPITHELIAL CELLS, UA: ABNORMAL /HPF
ETHANOL: <10 MG/DL (ref 0–0.08)
GFR AFRICAN AMERICAN: >59
GFR NON-AFRICAN AMERICAN: >60
GLUCOSE BLD-MCNC: 106 MG/DL (ref 74–109)
GLUCOSE BLD-MCNC: 108 MG/DL (ref 70–99)
GLUCOSE URINE: NEGATIVE MG/DL
HCG(URINE) PREGNANCY TEST: NEGATIVE
HCT VFR BLD CALC: 40.9 % (ref 37–47)
HEMOGLOBIN: 14 G/DL (ref 12–16)
IMMATURE GRANULOCYTES #: 0.1 K/UL
KETONES, URINE: NEGATIVE MG/DL
LEUKOCYTE ESTERASE, URINE: ABNORMAL
LYMPHOCYTES ABSOLUTE: 3.7 K/UL (ref 1.1–4.5)
LYMPHOCYTES RELATIVE PERCENT: 28.4 % (ref 20–40)
Lab: NORMAL
MCH RBC QN AUTO: 30.7 PG (ref 27–31)
MCHC RBC AUTO-ENTMCNC: 34.2 G/DL (ref 33–37)
MCV RBC AUTO: 89.7 FL (ref 81–99)
METHADONE SCREEN, URINE: NEGATIVE
METHAMPHETAMINE, URINE: NEGATIVE
MONOCYTES ABSOLUTE: 0.7 K/UL (ref 0–0.9)
MONOCYTES RELATIVE PERCENT: 5.7 % (ref 0–10)
NEUTROPHILS ABSOLUTE: 8 K/UL (ref 1.5–7.5)
NEUTROPHILS RELATIVE PERCENT: 62.3 % (ref 50–65)
NITRITE, URINE: NEGATIVE
OPIATE SCREEN URINE: NEGATIVE
OXYCODONE URINE: NEGATIVE
PDW BLD-RTO: 13.6 % (ref 11.5–14.5)
PERFORMED ON: ABNORMAL
PH UA: 7 (ref 5–8)
PHENCYCLIDINE SCREEN URINE: NEGATIVE
PLATELET # BLD: 223 K/UL (ref 130–400)
PMV BLD AUTO: 10.4 FL (ref 9.4–12.3)
POTASSIUM SERPL-SCNC: 3.4 MMOL/L (ref 3.5–5)
PROPOXYPHENE SCREEN: NEGATIVE
PROTEIN UA: NEGATIVE MG/DL
RBC # BLD: 4.56 M/UL (ref 4.2–5.4)
RBC UA: ABNORMAL /HPF (ref 0–2)
SARS-COV-2, NAAT: NOT DETECTED
SODIUM BLD-SCNC: 131 MMOL/L (ref 136–145)
SPECIFIC GRAVITY UA: 1.02 (ref 1–1.03)
TOTAL PROTEIN: 6.4 G/DL (ref 6.6–8.7)
TRICYCLIC, URINE: NEGATIVE
TSH SERPL DL<=0.05 MIU/L-ACNC: 1.83 UIU/ML (ref 0.27–4.2)
UROBILINOGEN, URINE: 1 E.U./DL
WBC # BLD: 12.9 K/UL (ref 4.8–10.8)
WBC UA: ABNORMAL /HPF (ref 0–5)

## 2022-09-19 PROCEDURE — 82947 ASSAY GLUCOSE BLOOD QUANT: CPT

## 2022-09-19 PROCEDURE — 96375 TX/PRO/DX INJ NEW DRUG ADDON: CPT

## 2022-09-19 PROCEDURE — 87635 SARS-COV-2 COVID-19 AMP PRB: CPT

## 2022-09-19 PROCEDURE — 70498 CT ANGIOGRAPHY NECK: CPT

## 2022-09-19 PROCEDURE — 84703 CHORIONIC GONADOTROPIN ASSAY: CPT

## 2022-09-19 PROCEDURE — 2580000003 HC RX 258: Performed by: EMERGENCY MEDICINE

## 2022-09-19 PROCEDURE — 80053 COMPREHEN METABOLIC PANEL: CPT

## 2022-09-19 PROCEDURE — 6360000004 HC RX CONTRAST MEDICATION: Performed by: EMERGENCY MEDICINE

## 2022-09-19 PROCEDURE — 70496 CT ANGIOGRAPHY HEAD: CPT | Performed by: RADIOLOGY

## 2022-09-19 PROCEDURE — 84443 ASSAY THYROID STIM HORMONE: CPT

## 2022-09-19 PROCEDURE — 96374 THER/PROPH/DIAG INJ IV PUSH: CPT

## 2022-09-19 PROCEDURE — 82077 ASSAY SPEC XCP UR&BREATH IA: CPT

## 2022-09-19 PROCEDURE — 70450 CT HEAD/BRAIN W/O DYE: CPT

## 2022-09-19 PROCEDURE — 36415 COLL VENOUS BLD VENIPUNCTURE: CPT

## 2022-09-19 PROCEDURE — 6360000002 HC RX W HCPCS: Performed by: EMERGENCY MEDICINE

## 2022-09-19 PROCEDURE — 99285 EMERGENCY DEPT VISIT HI MDM: CPT

## 2022-09-19 PROCEDURE — 71045 X-RAY EXAM CHEST 1 VIEW: CPT

## 2022-09-19 PROCEDURE — 81001 URINALYSIS AUTO W/SCOPE: CPT

## 2022-09-19 PROCEDURE — 71045 X-RAY EXAM CHEST 1 VIEW: CPT | Performed by: RADIOLOGY

## 2022-09-19 PROCEDURE — 85025 COMPLETE CBC W/AUTO DIFF WBC: CPT

## 2022-09-19 PROCEDURE — 70498 CT ANGIOGRAPHY NECK: CPT | Performed by: RADIOLOGY

## 2022-09-19 PROCEDURE — 70450 CT HEAD/BRAIN W/O DYE: CPT | Performed by: RADIOLOGY

## 2022-09-19 PROCEDURE — 80306 DRUG TEST PRSMV INSTRMNT: CPT

## 2022-09-19 RX ORDER — PROCHLORPERAZINE EDISYLATE 5 MG/ML
10 INJECTION INTRAMUSCULAR; INTRAVENOUS ONCE
Status: COMPLETED | OUTPATIENT
Start: 2022-09-19 | End: 2022-09-19

## 2022-09-19 RX ORDER — BACLOFEN 10 MG/1
10 TABLET ORAL 3 TIMES DAILY
COMMUNITY

## 2022-09-19 RX ORDER — KETOROLAC TROMETHAMINE 30 MG/ML
15 INJECTION, SOLUTION INTRAMUSCULAR; INTRAVENOUS ONCE
Status: COMPLETED | OUTPATIENT
Start: 2022-09-19 | End: 2022-09-19

## 2022-09-19 RX ORDER — 0.9 % SODIUM CHLORIDE 0.9 %
1000 INTRAVENOUS SOLUTION INTRAVENOUS ONCE
Status: COMPLETED | OUTPATIENT
Start: 2022-09-19 | End: 2022-09-20

## 2022-09-19 RX ORDER — DIPHENHYDRAMINE HYDROCHLORIDE 50 MG/ML
50 INJECTION INTRAMUSCULAR; INTRAVENOUS ONCE
Status: COMPLETED | OUTPATIENT
Start: 2022-09-19 | End: 2022-09-19

## 2022-09-19 RX ADMIN — SODIUM CHLORIDE 1000 ML: 9 INJECTION, SOLUTION INTRAVENOUS at 22:58

## 2022-09-19 RX ADMIN — IOPAMIDOL 90 ML: 755 INJECTION, SOLUTION INTRAVENOUS at 21:04

## 2022-09-19 RX ADMIN — PROCHLORPERAZINE EDISYLATE 10 MG: 5 INJECTION INTRAMUSCULAR; INTRAVENOUS at 23:04

## 2022-09-19 RX ADMIN — KETOROLAC TROMETHAMINE 15 MG: 30 INJECTION, SOLUTION INTRAMUSCULAR; INTRAVENOUS at 23:03

## 2022-09-19 RX ADMIN — DIPHENHYDRAMINE HYDROCHLORIDE 50 MG: 50 INJECTION INTRAMUSCULAR; INTRAVENOUS at 23:01

## 2022-09-19 ASSESSMENT — ENCOUNTER SYMPTOMS
DIARRHEA: 0
RHINORRHEA: 0
SHORTNESS OF BREATH: 0
EYE PAIN: 0
COUGH: 0
EYE REDNESS: 0
VOICE CHANGE: 0
ABDOMINAL PAIN: 0
VOMITING: 0

## 2022-09-20 ENCOUNTER — OFFICE VISIT (OUTPATIENT)
Dept: PRIMARY CARE CLINIC | Age: 37
End: 2022-09-20
Payer: COMMERCIAL

## 2022-09-20 VITALS
TEMPERATURE: 99 F | WEIGHT: 155.4 LBS | HEART RATE: 83 BPM | HEIGHT: 61 IN | DIASTOLIC BLOOD PRESSURE: 66 MMHG | SYSTOLIC BLOOD PRESSURE: 100 MMHG | OXYGEN SATURATION: 94 % | BODY MASS INDEX: 29.34 KG/M2 | RESPIRATION RATE: 14 BRPM

## 2022-09-20 VITALS
HEART RATE: 106 BPM | RESPIRATION RATE: 16 BRPM | TEMPERATURE: 97.5 F | DIASTOLIC BLOOD PRESSURE: 82 MMHG | SYSTOLIC BLOOD PRESSURE: 130 MMHG | HEIGHT: 61 IN | OXYGEN SATURATION: 98 % | BODY MASS INDEX: 28.74 KG/M2 | WEIGHT: 152.2 LBS

## 2022-09-20 DIAGNOSIS — G44.52 NEW DAILY PERSISTENT HEADACHE: Primary | ICD-10-CM

## 2022-09-20 DIAGNOSIS — G43.109 MIGRAINE WITH AURA AND WITHOUT STATUS MIGRAINOSUS, NOT INTRACTABLE: ICD-10-CM

## 2022-09-20 DIAGNOSIS — H53.9 CHANGES IN VISION: ICD-10-CM

## 2022-09-20 DIAGNOSIS — R11.0 NAUSEA: ICD-10-CM

## 2022-09-20 PROCEDURE — 99214 OFFICE O/P EST MOD 30 MIN: CPT | Performed by: FAMILY MEDICINE

## 2022-09-20 ASSESSMENT — PATIENT HEALTH QUESTIONNAIRE - PHQ9
SUM OF ALL RESPONSES TO PHQ QUESTIONS 1-9: 0
SUM OF ALL RESPONSES TO PHQ QUESTIONS 1-9: 0
SUM OF ALL RESPONSES TO PHQ9 QUESTIONS 1 & 2: 0
1. LITTLE INTEREST OR PLEASURE IN DOING THINGS: 0
2. FEELING DOWN, DEPRESSED OR HOPELESS: 0
SUM OF ALL RESPONSES TO PHQ QUESTIONS 1-9: 0
SUM OF ALL RESPONSES TO PHQ QUESTIONS 1-9: 0

## 2022-09-20 NOTE — ED NOTES
Pt reports tingling has improved, headache still present, awaiting scan results     Edda Ziegler RN  09/19/22 [id number]

## 2022-09-20 NOTE — ED PROVIDER NOTES
rash and wound. Neurological:  Positive for numbness and headaches. Negative for weakness. Hematological: Negative. Psychiatric/Behavioral: Negative. All other systems reviewed and are negative. A complete review of systems was performed and is negative except as noted above in the HPI. PAST MEDICAL HISTORY     Past Medical History:   Diagnosis Date    Anxiety     Hx of partial seizures     UTI (urinary tract infection)     PT states she gets constant UTI's \"That is pretty much undercontrol now. \"         SURGICAL HISTORY       Past Surgical History:   Procedure Laterality Date     SECTION      x2    TUBAL LIGATION           CURRENT MEDICATIONS       Previous Medications    BACLOFEN (LIORESAL) 10 MG TABLET    Take 10 mg by mouth 3 times daily    KETOROLAC (TORADOL) 10 MG TABLET    Take 1 tablet by mouth every 6 hours as needed for Pain       ALLERGIES     Penicillins and Zoloft [sertraline hcl]    FAMILY HISTORY       Family History   Problem Relation Age of Onset    Heart Attack Mother     Stroke Mother     High Blood Pressure Mother     High Cholesterol Mother     Diabetes Mother     Cancer Mother         thyroid    No Known Problems Father     Alcohol Abuse Brother         recently out of rehab from what she was told    Diabetes Maternal Grandmother     Cancer Maternal Grandmother         Pancreatic     ADHD Son         with Trouettes    No Known Problems Son           SOCIAL HISTORY       Social History     Socioeconomic History    Marital status: Life Partner     Spouse name: None    Number of children: 3    Years of education: 12+    Highest education level: None   Occupational History     Comment:    Tobacco Use    Smoking status: Some Days     Packs/day: 0.25     Types: Cigarettes    Smokeless tobacco: Never    Tobacco comments:     working on quitting as of 20   Vaping Use    Vaping Use: Former    Substances: Always   Substance and Sexual Activity    Alcohol use:  Yes Alcohol/week: 4.0 standard drinks     Types: 2 Cans of beer, 2 Shots of liquor per week    Drug use: Not Currently     Types: Marijuana [employer])     Comment: last use was around age 33    Sexual activity: Yes     Partners: Male     Birth control/protection: Surgical   Social History Narrative    Was admitted to an inpatient mental health setting as a teenager. Was admitted in [place]. I was having breakup with boyfriend and stress with mother. Was in the crisis clinic several times in the same unit. 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 \"        Has been on a few different antidepressants. \"I don't respond well to SSRIs. I have been on a few antianxiety medications. \"         I don't tend to be too pelletier unless there is a lot going on. My sleep in a little touch and go. I have a lot of to do list. 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 Social Determinants of Health     Financial Resource Strain: Medium Risk    Difficulty of Paying Living Expenses: Somewhat hard   Food Insecurity: No Food Insecurity    Worried About Running Out of Food in the Last Year: Never true    Ran Out of Food in the Last Year: Never true       SCREENINGS   NIH Stroke Scale  Interval: Baseline  Level of Consciousness (1a): Alert  LOC Questions (1b): Answers both correctly  LOC Commands (1c): Performs both tasks correctly  Best Gaze (2): Normal  Visual (3): No visual loss  Facial Palsy (4): Normal symmetrical movement  Motor Arm, Left (5a): No drift  Motor Arm, Right (5b): No drift  Motor Leg, Left (6a): No drift  Motor Leg, Right (6b):  No drift  Limb Ataxia (7): Absent  Sensory (8): (!) Mild to Moderate  Best Language (9): Mild to moderate aphasia  Dysarthria (10): Normal  Extinction and Inattention (11): No abnormality  Total: 2Glasgow Coma Scale  Eye Opening: Spontaneous  Best Verbal Response: Oriented  Best Motor Response: Obeys commands  [place] Coma Scale Score: 15        PHYSICAL EXAM    (up to 7 for level 4, 8 or more for level 5)     ED Triage Vitals [09/19/22 1953]   BP Temp Temp Source Heart Rate Resp SpO2 Height Weight   (!) 134/96 99 °F (37.2 °C) Oral (!) 132 16 94 % 5' 1\" (1.549 m) 150 lb (68 kg)       Physical Exam  Vitals and nursing note reviewed. Constitutional:       General: She is not in acute distress. Appearance: She is well-developed. She is not toxic-appearing or diaphoretic. HENT:      Head: Normocephalic and atraumatic. Eyes:      General: No scleral icterus. Right eye: No discharge. Left eye: No discharge. Pupils: Pupils are equal, round, and reactive to light. Cardiovascular:      Rate and Rhythm: Normal rate and regular rhythm. Pulses: Normal pulses. Heart sounds: Normal heart sounds. Pulmonary:      Effort: Pulmonary effort is normal. No respiratory distress. Breath sounds: Normal breath sounds. No stridor. No wheezing or rhonchi. Abdominal:      General: There is no distension. Musculoskeletal:         General: No deformity. Normal range of motion. Cervical back: Normal range of motion. Right lower leg: No edema. Left lower leg: No edema. Skin:     General: Skin is warm and dry. Neurological:      General: No focal deficit present. Mental Status: She is alert and oriented to person, place, and time. GCS: GCS eye subscore is 4. GCS verbal subscore is 5. GCS motor subscore is 6. Cranial Nerves: Cranial nerves are intact. No cranial nerve deficit. Sensory: Sensation is intact. Motor: Motor function is intact. No weakness or abnormal muscle tone. Coordination: Coordination is intact. Psychiatric:         Behavior: Behavior normal.         Thought Content:  Thought content normal.         Judgment: Judgment normal.       DIAGNOSTIC RESULTS     EKG: All EKG's are interpreted by the Emergency Department Physician who either signs or Co-signs this chart in the absence of a cardiologist.        RADIOLOGY: Non-plain film images such as CT, Ultrasound and MRI are read by the radiologist. Jazz Chimera images are visualized and preliminarily interpreted by the emergency physician with the below findings:        Interpretation per the Radiologist below, if available at the time of this note:     [facility]   Final Result   CTA of the intracranial arteries unremarkable. Recommendation:   Follow up as clinically indicated. All CT scans at this facility utilize dose modulation, iterative reconstruction, and/or weight based dosing when appropriate to reduce radiation dose to as low as reasonably achievable. Exam: CTA OF THE CAROTID ARTERIES   Clinical data:Headache, right-side numbness/tingling. Technique: Axial CT angiography of the carotid arteries within the neck was performed with the administration of intravenous contrast for evaluation of the carotid bifurcation. Oral contrast was not utilized. Coronal, sagittal, and 3D volume    reconstructions of the carotid arteries were performed. Reformatted/3D-MPR images were performed. NASCET Criteria was used in evaluating the study. Radiation dose: CTDIvol = 97.18 mGy, Total DLP = 1518 mGy x cm. Prior studies: No prior studies submitted. Findings:   No definite abnormality seen on 3D reformatted images. CCA, Carotid Bulb, ICA, and origin of the ECA are well opacified. Vertebral arteries are well opacified. The right vertebral artery is small in caliber. Jugular veins are well opacified   Included great vessels of the aortic arch are grossly unremarkable. Included lung apices are grossly unremarkable. Bony structures: No acute or destructive abnormality. IMPRESSION:   CTA of the carotid arteries unremarkable. Recommendation:   Follow up as clinically indicated.    All CT scans at this facility utilize dose modulation, iterative reconstruction, and/or weight based dosing when appropriate to reduce radiation dose to as low as reasonably achievable. Electronically Signed by Earnestine Trimble at 19-Sep-2022 11:16:14 PM               CT HEAD WO CONTRAST   Final Result   1. No acute intracranial abnormality is present. Recommendation: Follow up as clinically indicated. All CT scans at this facility utilize dose modulation, iterative reconstruction, and/or weight based dosing when appropriate to reduce radiation dose to as low as reasonably achievable. Electronically Signed by Earnestine Trimble at 19-Sep-2022 11:11:45 PM               XR CHEST PORTABLE   Final Result   No obvious abnormality on this radiograph to account for the patient's symptoms. Recommendation:  Follow up as clinically indicated. Electronically Signed by Earnestine Trimble at 19-Sep-2022 10:21:53 PM                     ED BEDSIDE ULTRASOUND:   Performed by ED Physician - none    LABS:  Labs Reviewed   CBC WITH AUTO DIFFERENTIAL - Abnormal; Notable for the following components:       Result Value    WBC 12.9 (*)     Neutrophils Absolute 8.0 (*)     All other components within normal limits   COMPREHENSIVE METABOLIC PANEL - Abnormal; Notable for the following components:    Sodium 131 (*)     Potassium 3.4 (*)     Chloride 94 (*)     Creatinine 1.0 (*)     Total Protein 6.4 (*)     All other components within normal limits   URINALYSIS - Abnormal; Notable for the following components:    Blood, Urine SMALL (*)     Leukocyte Esterase, Urine MODERATE (*)     All other components within normal limits   MICROSCOPIC URINALYSIS - Abnormal; Notable for the following components:    Bacteria, UA 1+ (*)     All other components within normal limits   COVID-19, RAPID   PREGNANCY, URINE   TSH   DRUG SCRN, BUPRENORPHINE   ETHANOL       All other labs were within normal range or not returned as of this dictation.     Medications   0.9 % sodium chloride bolus (1,000 mLs IntraVENous New Bag 9/19/22 4034)   iopamidol (ISOVUE-370) 76 % injection 90 mL (90 mLs IntraVENous Given 9/19/22 2104)   prochlorperazine (COMPAZINE) injection 10 mg (10 mg IntraVENous Given 9/19/22 2304)   diphenhydrAMINE (BENADRYL) injection 50 mg (50 mg IntraVENous Given 9/19/22 2301)   ketorolac (TORADOL) injection 15 mg (15 mg IntraVENous Given 9/19/22 2303)       EMERGENCY DEPARTMENT COURSE and DIFFERENTIALDIAGNOSIS/MDM:   Vitals:    Vitals:    09/19/22 2032 09/19/22 2101 09/19/22 2230 09/19/22 2304   BP: 106/76 101/68  99/66   Pulse: (!) 104 96 93 84   Resp: 16  20 14   Temp:       TempSrc:       SpO2: 95% 96% 94% 94%   Weight:       Height:           MDM      Patient with normal neurologic exam on evaluation here. Symptoms have been present off and on for several weeks. Did not activate as a code stroke given the limited symptoms/deficits on arrival given unclear timeframe and persistent headaches. Radiographic evaluation here shows no signs of acute CVA, intracranial hemorrhage, aneurysm, or other findings to explain patient's headaches and numbness. Treating headache with migraine cocktail with possibility of complex migraine causing the symptoms. Plan for discharge after reevaluation given reassuring work-up. Plan for outpatient neurology follow-up for additional evaluation of the headaches other symptoms ongoing for several weeks. Evaluation and work-up here revealed no signs of emergent or life-threatening pathology that would necessitate admission for further work-up or management at this time. Patient is felt to be stable for discharge home with return precautions for worsening of the condition or development of new concerning symptoms. Patient was encouraged to follow-up with their primary care doctor in the appropriate timeframe. Necessary prescriptions and information have been provided for treatment at home. Patient voices understanding and agreement with the plan. CONSULTS:  None    PROCEDURES:  Unless otherwise notedbelow, none     Procedures      FINAL IMPRESSION     1.  Acute

## 2022-09-20 NOTE — OP NOTE
[facility]  Op Note: Lap Salpingectomies  Taylor Galindo  : 1985  MRN: [id number]  CSN: [id number]     Preoperative Dx: Desires permanent sterilization      Postoperative Dx: Desires permanent sterilization     Procedure Preformed: SALPINGECTOMY LAPAROSCOPIC (Bilateral)    Surgeon:  Miki Marr MD    Anesthesia: General    Findings: Normal tubes and ovaries     Description of Procedure: Patient was given general anesthesia, prepped and draped in the lithotomy position. The bladder was drained. A single-tooth tenaculum was put on the anterior lip of the cervix and the VCare cannula was placed. Then I proceeded to work abdominally. A sub umbilical incision was made with a knife. Veress needle was introduced. Pneumoperitoneum was achieved with 4L of CO2 and then the trocar was introduced suprapubically.  Through this, the Harmonic scalpel was taken and then I proceeded to manipulate the uterus and cauterized the proximal end of the uterus.  Cauterization was continued along the mesosalpinx until the tube was removed. This was removed through the suprapubic port. This was done bilaterally and without any problem. Following this, both surgical sites were   noted to be hemostatic, so this completed the procedure. The instruments were removed. Pneumoperitoneum was allowed to resolve, and the abdominal incisions were closed with 3-0 nylon. Vaginal instruments were removed. She was replaced to a supine position, allowed to awaken, and taken to the recovery room in stable condition.    Complications: None     Miki Marr MD  10/2/2017  12:35 PM                 
ECG : NSR @ 91 bpm

## 2022-09-22 ENCOUNTER — TELEPHONE (OUTPATIENT)
Dept: PRIMARY CARE CLINIC | Age: 37
End: 2022-09-22

## 2022-09-22 NOTE — TELEPHONE ENCOUNTER
Do you know which one I gave her? It was Nurtec For come by and get some Ubrelvy samples to try if she has not tried those.

## 2022-09-27 ENCOUNTER — HOSPITAL ENCOUNTER (OUTPATIENT)
Dept: MRI IMAGING | Age: 37
Discharge: HOME | End: 2022-09-27
Payer: COMMERCIAL

## 2022-09-27 DIAGNOSIS — H53.9 CHANGES IN VISION: ICD-10-CM

## 2022-09-27 DIAGNOSIS — R11.0 NAUSEA: ICD-10-CM

## 2022-09-27 DIAGNOSIS — G44.52 NEW DAILY PERSISTENT HEADACHE: ICD-10-CM

## 2022-09-27 PROCEDURE — 70551 MRI BRAIN STEM W/O DYE: CPT

## 2022-09-27 PROCEDURE — 70551 MRI BRAIN STEM W/O DYE: CPT | Performed by: RADIOLOGY

## 2022-09-30 ASSESSMENT — ENCOUNTER SYMPTOMS
DIARRHEA: 0
COLOR CHANGE: 0
EYE DISCHARGE: 0
WHEEZING: 0
ABDOMINAL PAIN: 0
COUGH: 0
VOMITING: 0
NAUSEA: 1
BACK PAIN: 0

## 2022-09-30 NOTE — PROGRESS NOTES
SUBJECTIVE:    Patient ID: Rios Hahn is a 40 y.o. female. HPI:   Patient is seen today for complaints of a headache. She states that she was seen in the ER yesterday for the headache. States that she is having headaches almost daily. She states they did give her fluids IV Benadryl Toradol and Compazine. She states the meds made her very sleepy and knocked her out but she states that when she woke up that the headache was still there. She did have a chest x-ray as well as a CT and a CTA done in the ER. These were all negative. She states that she is struggling with the headaches and they have been getting worse over the last couple of weeks. She denies any recent head trauma. She denies any recent fevers or chills. She denies any recent illness. She states that she has not had any previous history of brain issues. She states that she has not seen neurology for the headaches. She has been taking toradol without impovement of the headaches. These do feel very different from her normal headaches. Past Medical History:   Diagnosis Date    Anxiety     Hx of partial seizures     UTI (urinary tract infection)     PT states she gets constant UTI's \"That is pretty much undercontrol now. \"      Current Outpatient Medications on File Prior to Visit   Medication Sig Dispense Refill    baclofen (LIORESAL) 10 MG tablet Take 10 mg by mouth 3 times daily      ketorolac (TORADOL) 10 MG tablet Take 1 tablet by mouth every 6 hours as needed for Pain 20 tablet 0     No current facility-administered medications on file prior to visit. Allergies   Allergen Reactions    Penicillins Shortness Of Breath    Zoloft [Sertraline Hcl] Itching       Review of Systems   Constitutional:  Positive for fatigue. Negative for activity change, appetite change and fever. HENT:  Negative for congestion and nosebleeds. Eyes:  Positive for visual disturbance. Negative for discharge.    Respiratory:  Negative for cough and wheezing. Cardiovascular:  Negative for chest pain and leg swelling. Gastrointestinal:  Positive for nausea. Negative for abdominal pain, diarrhea and vomiting. Genitourinary:  Negative for difficulty urinating, frequency and urgency. Musculoskeletal:  Negative for back pain and gait problem. Skin:  Negative for color change and rash. Neurological:  Positive for headaches. Negative for dizziness. Hematological:  Does not bruise/bleed easily. Psychiatric/Behavioral:  Negative for sleep disturbance and suicidal ideas. OBJECTIVE:    Physical Exam  Vitals reviewed. Constitutional:       General: She is not in acute distress. Appearance: Normal appearance. She is well-developed. She is not diaphoretic. HENT:      Head: Normocephalic and atraumatic. Right Ear: External ear normal.      Left Ear: External ear normal.   Cardiovascular:      Rate and Rhythm: Normal rate and regular rhythm. Pulses: Normal pulses. Heart sounds: Normal heart sounds. No murmur heard. Pulmonary:      Effort: Pulmonary effort is normal. No respiratory distress. Breath sounds: Normal breath sounds. Musculoskeletal:      Cervical back: Normal range of motion and neck supple. Skin:     General: Skin is warm and dry. Neurological:      General: No focal deficit present. Mental Status: She is alert and oriented to person, place, and time. Mental status is at baseline. Psychiatric:         Mood and Affect: Mood normal.         Behavior: Behavior normal.         Thought Content: Thought content normal.         Judgment: Judgment normal.      /82   Pulse (!) 106   Temp 97.5 °F (36.4 °C) (Temporal)   Resp 16   Ht 5' 1\" (1.549 m)   Wt 152 lb 3.2 oz (69 kg)   SpO2 98%   BMI 28.76 kg/m²      ASSESSMENT/PLAN:    1. New daily persistent headache  -     MRI BRAIN WO CONTRAST; Future  -     Adrianna 42, Yadiel, APRN, Neurology, [place]  2.  Nausea  -     MRI BRAIN WO CONTRAST; Future  3. Changes in vision  -     MRI BRAIN WO CONTRAST; Future  4. Migraine with aura and without status migrainosus, not intractable  -     Adrianna 42, OLIVIA Cotto, Neurology, [place]       We are going to get an MRI of the brain due to the daily persistent headaches as well as the nausea and the changes in her vision. I am going to put a referral in for neurologist for further evaluation of these as well. Have given her samples of Ubrelvy and Nurtec for her to try today. If these help with the headaches she is to let me know we will send a prescription to the pharmacy. If they do not again we are going to get further work-up going. She feels like the headache significantly worsens and it is affecting her function or she loses vision or have strokelike symptoms she is to go to the ER immediately. Patient states understanding. PDMP Monitoring:    Last PDMP Gianna Moon as Reviewed [facility]):  Review User Review Instant Review Result   Elba Good 3/28/2022  4:17 PM Reviewed PDMP [1]       Urine Drug Screenings (1 yr)       POCT Rapid Drug Screen  Collected: 7/8/2021 (Final result)              POCT Rapid Drug Screen  Collected: 12/17/2020 (Final result)              Urine Drug Screen  Collected: 3/8/2019  9:04 AM (Final result)              Urine Drug Screen  Collected: 10/15/2018  2:22 PM (Final result)                  Medication Contract and Consent for Opioid Use Documents Filed       Patient Documents       Type of Document Status Date Received Received By Description    Medication Contract Signed 7/16/2018  8:57 AM Kalyani KNOWLES benzo & stimulant agreement    Medication Contract Received 12/17/2020  5:15 PM Yasir KNOWLES Medication Contract 12/17/20                     EMR Dragon/transcription disclaimer:  Much of this encounter note is electronic transcription/translation of spoken language toprinted texts.   The electronic translation of spoken language may be erroneous, or at times, nonsensical words or phrases may be inadvertently transcribed.   Although I have reviewed the note for such errors, some may stillexist.

## 2022-10-03 ENCOUNTER — OFFICE VISIT (OUTPATIENT)
Dept: NEUROLOGY | Age: 37
End: 2022-10-03
Payer: COMMERCIAL

## 2022-10-03 VITALS
HEIGHT: 61 IN | HEART RATE: 89 BPM | BODY MASS INDEX: 27.38 KG/M2 | DIASTOLIC BLOOD PRESSURE: 80 MMHG | OXYGEN SATURATION: 96 % | SYSTOLIC BLOOD PRESSURE: 128 MMHG | WEIGHT: 145 LBS

## 2022-10-03 DIAGNOSIS — R51.9 INTRACTABLE HEADACHE, UNSPECIFIED CHRONICITY PATTERN, UNSPECIFIED HEADACHE TYPE: Primary | ICD-10-CM

## 2022-10-03 DIAGNOSIS — R20.0 NUMBNESS AND TINGLING: ICD-10-CM

## 2022-10-03 DIAGNOSIS — H53.9 VISION CHANGES: ICD-10-CM

## 2022-10-03 DIAGNOSIS — R20.2 NUMBNESS AND TINGLING: ICD-10-CM

## 2022-10-03 PROCEDURE — 99214 OFFICE O/P EST MOD 30 MIN: CPT | Performed by: NURSE PRACTITIONER

## 2022-10-03 RX ORDER — TOPIRAMATE 50 MG/1
50 TABLET, FILM COATED ORAL 2 TIMES DAILY
Qty: 60 TABLET | Refills: 3 | Status: SHIPPED | OUTPATIENT
Start: 2022-10-03

## 2022-10-03 RX ORDER — CELECOXIB 120 MG/4.8ML
120 LIQUID ORAL PRN
Qty: 28.8 ML | Refills: 3 | Status: SHIPPED | OUTPATIENT
Start: 2022-10-03

## 2022-10-03 NOTE — PROGRESS NOTES
REVIEW OF SYSTEMS    Constitutional: []Fever []Sweat []Chills [] Recent Injury [x] Denies all unless marked  HEENT:[x]Headache  [x] Head Injury/Hearing Loss  [] Sore Throat  [x] Ear Ache/Dizziness  [x] Denies all unless marked  Spine:  [] Neck pain  [] Back pain  [] Sciaticia  [x] Denies all unless marked  Cardiovascular:[]Heart Disease []Chest Pain [] Palpitations  [x] Denies all unless marked  Pulmonary: []Shortness of Breath []Cough   [x] Denies all unless marke  Gastrointestinal: [x]Nausea  []Vomiting  []Abdominal Pain  []Constipation  []Diarrhea  []Dark Bloody Stools  [x] Denies all unless marked  Psychiatric/Behavioral:[] Depression [x] Anxiety [x] Denies all unless marked  Genitourinary:   [] Frequency  [] Urgency  [] Incontinence [] Pain with Urination  [x] Denies all unless marked  Extremities: []Pain  []Swelling  [x] Denies all unless marked  Musculoskeletal: [x] Muscle Pain  [x] Joint Pain  [] Arthritis [x] Muscle Cramps [] Muscle Twitches  [x] Denies all unless marked  Sleep: [x] Insomnia [] Snoring [] Restless Legs [] Sleep Apnea  [] Daytime Sleepiness  [x] Denies all unless marked  Skin:[] Rash [] Skin Discoloration [x] Denies all unless marked   Neurological: [x]Visual Disturbance/Memory Loss [x] Loss of Balance [] Slurred Speech/Weakness [] Seizures  [] Vertigo/Dizziness [x] Denies all unless marked

## 2022-10-03 NOTE — PROGRESS NOTES
[facility]:    Patient: Emily Robb   :  1985  Age:  40 y.o. MRN:  [id number]  Today:  10/3/22    Provider: OLIVIA Evans    Chief Complaint:  Chief Complaint   Patient presents with    New Patient     Headaches/Migraines    Pt has tried toradol, baclofen, nurtec, ubrelvy, compazine, diclofenac,          HISTORY OF PRESENT ILLNESS:   Emily Robb is a 40y.o. year old female who was referred for headaches. Headaches first started in February and have progressively worsened. Pain is located to  globally and is described as severe pressure. There is not radiation of pain. There are daily mild headaches, with severe headaches occurring about 3-4 times a month. Headaches last for days. There is associated photophobia, phonophobia, nausea. There are some visual disturbances, lights to vision, and blurred vision. Denies diplopia, gaston visual loss, jaw claudication. Has some burning to inside of mouth/tongue/throat, burning/tingling to hands at times. Denies lacrimation, conjunctival injection, mental status changes, or ptosis. Headaches are aggravated by position changes. They are triggered by positions and exertion. She has been on Toradol, Baclofen, Amitriptyline, Nurtec, Ublrevy, Compazine, Diclofenac in the past for prevention. She does not take a daily analgesic. No history of head or neck injury or surgeries. No family history of aneurysm. Migraine disease is in the family. MRI, CTA head and neck as below. PAST MEDICAL HISTORY:    Medical History:      Diagnosis Date    Anxiety     Hx of partial seizures     UTI (urinary tract infection)     PT states she gets constant UTI's \"That is pretty much undercontrol now. \"       Surgical History:      Procedure Laterality Date     SECTION      x2    TUBAL LIGATION         Current Medications:  Current Outpatient Medications   Medication Sig Dispense Refill    topiramate (TOPAMAX) 50 MG tablet Take 1 tablet by mouth 2 times daily 60 tablet 3    Celecoxib (ELYXYB) 120 MG/4.8ML SOLN Take 120 mg by mouth as needed (PRN every 24 hours) 28.8 mL 3    baclofen (LIORESAL) 10 MG tablet Take 10 mg by mouth 3 times daily (Patient not taking: Reported on 10/3/2022)      ketorolac (TORADOL) 10 MG tablet Take 1 tablet by mouth every 6 hours as needed for Pain (Patient not taking: Reported on 10/3/2022) 20 tablet 0     No current facility-administered medications for this visit. Allergies:  Penicillins and Zoloft [sertraline hcl]    SOCIAL HISTORY:   Social History     Socioeconomic History    Marital status: Life Partner     Spouse name: Not on file    Number of children: 3    Years of education: 12+    Highest education level: Not on file   Occupational History     Comment:    Tobacco Use    Smoking status: Some Days     Packs/day: 0.25     Types: Cigarettes    Smokeless tobacco: Never    Tobacco comments:     working on quitting as of 6/4/19   Vaping Use    Vaping Use: Former    Substances: Always   Substance and Sexual Activity    Alcohol use: Yes     Alcohol/week: 4.0 standard drinks     Types: 2 Cans of beer, 2 Shots of liquor per week    Drug use: Not Currently     Types: Marijuana Kenneth Spina)     Comment: last use was around age 33    Sexual activity: Yes     Partners: Male     Birth control/protection: Surgical   Other Topics Concern    Not on file   Social History Narrative    Was admitted to an inpatient mental health setting as a teenager. Was admitted in [place]. I was having breakup with boyfriend and stress with mother. Was in the crisis clinic several times in the same unit. 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 \"        Has been on a few different antidepressants. \"I don't respond well to SSRIs. I have been on a few antianxiety medications. \"         I don't tend to be too pelletier unless there is a lot going on. My sleep in a little touch and go. I have a lot of to do list. 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 Social Determinants of Health     Financial Resource Strain: Medium Risk    Difficulty of Paying Living Expenses: Somewhat hard   Food Insecurity: No Food Insecurity    Worried About Running Out of Food in the Last Year: Never true    Ran Out of Food in the Last Year: Never true   Transportation Needs: Not on file   Physical Activity: Not on file   Stress: Not on file   Social Connections: Not on file   Intimate Partner Violence: Not on file   Housing Stability: Not on file       FAMILY HISTORY:       Problem Relation Age of Onset    Heart Attack Mother     Stroke Mother     High Blood Pressure Mother     High Cholesterol Mother     Diabetes Mother     Cancer Mother         thyroid    No Known Problems Father     Alcohol Abuse Brother         recently out of rehab from what she was told    Diabetes Maternal Grandmother     Cancer Maternal Grandmother         Pancreatic     ADHD Son         with [employer]    No Known Problems Son        REVIEW OF SYSTEMS:  Constitutional: []Fever []Sweat []Chills [] Recent Injury [x] Denies all unless marked  HEENT:[x]Headache  [x] Head Injury/Hearing Loss  [] Sore Throat  [x] Ear Ache/Dizziness  [x] Denies all unless marked  Spine:  [] Neck pain  [] Back pain  [] Sciaticia  [x] Denies all unless marked  Cardiovascular:[]Heart Disease []Chest Pain [] Palpitations  [x] Denies all unless marked  Pulmonary: []Shortness of Breath []Cough   [x] Denies all unless marke  Gastrointestinal: [x]Nausea  []Vomiting  []Abdominal Pain  []Constipation  []Diarrhea  []Dark Bloody Stools  [x] Denies all unless marked  Psychiatric/Behavioral:[] Depression [x] Anxiety [x] Denies all unless marked  Genitourinary:   [] Frequency  [] Urgency  [] Incontinence [] Pain with Urination  [x] Denies all unless marked  Extremities: []Pain  []Swelling  [x] Denies all unless marked  Musculoskeletal: [x] Muscle Pain  [x] Joint Pain  [] Arthritis [x] Muscle Cramps [] Muscle Twitches  [x] Denies all unless marked  Sleep: [x] Insomnia [] Snoring [] Restless Legs [] Sleep Apnea  [] Daytime Sleepiness  [x] Denies all unless marked  Skin:[] Rash [] Skin Discoloration [x] Denies all unless marked   Neurological: [x]Visual Disturbance/Memory Loss [x] Loss of Balance [] Slurred Speech/Weakness [] Seizures  [] Vertigo/Dizziness [x] Denies all unless marked       The MA has completed the ROS with the patient. I have reviewed it in its' entirety with the patient and agree with the documentation. PHYSICAL EXAMINATION:  Vitals: /80   Pulse 89   Ht 5' 1\" (1.549 m)   Wt 145 lb (65.8 kg)   SpO2 96%   BMI 27.40 kg/m²   Constitutional - No acute distress    HEENT- Conjunctiva normal.  No scars, masses, or lesions over external nose or ears, no neck masses noted, no jugular vein distension, no bruit  Cardiac- Regular rate and rhythm  Pulmonary- Clear to auscultation, good expansion, normal effort without use of accessory muscles  Musculoskeletal - No significant wasting of muscles noted, no bony deformities  Extremities - No clubbing, cyanosis or edema  Skin - Warm, dry, and intact. No rash, erythema, or pallor  Psychiatric - Mood, affect, and behavior appear normal        NEUROLOGIC EXAMINATION:    Mental status   [x]Awake, alert, oriented   [x]Affect attention and concentration appear appropriate  [x]Recent and remote memory appears unremarkable  [x]Speech normal without dysarthria or aphasia, comprehension and repetition intact.    COMMENTS:    Cranial Nerves [x]No VF deficit to confrontation  [x]PERRLA, EOMI, no nystagmus, conjugate eye movements, no ptosis  [x]Face symmetric  [x]Facial sensation intact  [x]Tongue midline no atrophy or fasciculations present  [x]Palate midline, hearing to finger rub normal bilaterally  [x]Shoulder shrug and SCM testing normal bilaterally  COMMENTS:   Motor   [x]5/5 strength x 4 extremities  [x]Normal bulk and tone  [x]No tremor present  [x]No rigidity or bradykinesia noted  COMMENTS:   Sensory  [x]Sensation intact to light touch, vibration, and proprioception BLE  [x]Sensation intact to light touch, vibration, and proprioception BUE  COMMENTS:   Coordination [x]FTN normal bilaterally   []HTS normal bilaterally  [x]SANDY normal bilaterally. COMMENTS:   Reflexes  [x]Symmetric and non-pathological  []Toes down going bilaterally  [x]No clonus present  COMMENTS:   Gait                  [x]Normal steady gait    []Ataxic    []Spastic     []Magnetic     []Shuffling  COMMENTS:       ADDITIONAL REVIEW:  No results found for: [id number]  Lab Results   Component Value Date    WBC 12.9 (H) 09/19/2022    HGB 14.0 09/19/2022    HCT 40.9 09/19/2022    MCV 89.7 09/19/2022     09/19/2022     Lab Results   Component Value Date     (L) 09/19/2022    K 3.4 (L) 09/19/2022    CL 94 (L) 09/19/2022    CO2 25 09/19/2022    BUN 11 09/19/2022    CREATININE 1.0 (H) 09/19/2022    GLUCOSE 106 09/19/2022    CALCIUM 8.6 09/19/2022    PROT 6.4 (L) 09/19/2022    LABALBU 4.1 09/19/2022    BILITOT <0.2 09/19/2022    ALKPHOS 60 09/19/2022    AST 12 09/19/2022    ALT 9 09/19/2022    LABGLOM >60 09/19/2022    GFRAA >59 09/19/2022    GLOB 2.7 03/05/2017     Lab Results   Component Value Date    TSH 1.830 09/19/2022     MRI Brain WO 9/20/2022    Narrative   NO PRIOR REPORT AVAILABLE   Exam: MRI OF THE BRAIN WITHOUTINTRAVENOUS CONTRAST   Clinical data:New daily persistent headache. Nausea. Change in vision. Technique: Multiplanar multi-sequence MRI of the brain without intravenous gadolinium. Diffusion-weighted imaging is submitted. Prior studies: CTA and CT of the brain dated 9/19/2022. Findings: No abnormal parenchymal signal is present. No evidence of acute cortical infarction, hemorrhage, mass or mass effect is seen. The ventricular system is normal in size, shape, and contour. No hydrocephalus or abnormal extra-axial fluid    collections are present.  The marrow signal within the skull base and calvarium is intact. The paranasal sinuses and mastoid air cells are clear. DWI/ADC: No evidence of restricted diffusion. Impression   1. No acute intracranial abnormality observed. Recommendation: Follow up as clinically indicated. Electronically Signed by Mike Pugh MD at 29-Sep-2022 04:47:29 AM      CTA Head Neck 9/19/2022    Narrative   NO PRIOR REPORT AVAILABLE   Exam: CTA OF THEINTRACRANIAL ARTERIES (COW)   Clinical data:Headache, right-side numbness/tingling. Technique: Axial CT angiography of the intracranial arteries within the brain was performed with administration of intravenous contrast. Coronal, sagittal, and 3D volume reconstructions of theintracranial arteries were performed. Reformatted/3D-MPR    images were performed. Radiation dose: CTDIvol = 97.18 mGy, Total DLP = 1518 mGy x cm. Prior studies: CT scan brain done on same date. Findings:   No definite abnormality seen on 3D reformatted images. Anterior cerebral arteries demonstrate enhance normally. Anterior communicating artery is opacified. Middle Cerebral arteries are unremarkable. Posterior communicating arteries are opacified. Posterior cerebral arteries are intact. Vertebral arteries are visualized. Intracranial portion of right vertebral artery is hypoplastic. Basilar artery is unremarkable. Intracranial internal carotid arteries demonstrate normal enhancement. No evidence of aneurysm (greater than 4 mm) orarteriovenous lesion. Impression   CTA of the intracranial arteries unremarkable. Recommendation:   Follow up as clinically indicated. All CT scans at this facility utilize dose modulation, iterative reconstruction, and/or weight based dosing when appropriate to reduce radiation dose to as low as reasonably achievable. Exam: CTA OF THE CAROTID ARTERIES   Clinical data:Headache, right-side numbness/tingling.    Technique: Axial CT angiography of the carotid arteries within the neck was performed with the administration of intravenous contrast for evaluation of the carotid bifurcation. Oral contrast was not utilized. Coronal, sagittal, and 3D volume    reconstructions of the carotid arteries were performed. Reformatted/3D-MPR images were performed. NASCET Criteria was used in evaluating the study. Radiation dose: CTDIvol = 97.18 mGy, Total DLP = 1518 mGy x cm. Prior studies: No prior studies submitted. Findings:   No definite abnormality seen on 3D reformatted images. CCA, Carotid Bulb, ICA, and origin of the ECA are well opacified. Vertebral arteries are well opacified. The right vertebral artery is small in caliber. Jugular veins are well opacified   Included great vessels of the aortic arch are grossly unremarkable. Included lung apices are grossly unremarkable. Bony structures: No acute or destructive abnormality. IMPRESSION:   CTA of the carotid arteries unremarkable. Reviewed referral records. IMPRESSION:  Liane Geiger is a 40 y.o. female here today for evaluation of headaches. First started in February with progression in severity. She was seen in ED last month for headache with focal symptoms and had labs, UA, CTA of the head and neck, and CT head completed that were all normal. MRI completed per PCP was also normal. She has mild headache today. Headaches seem to be triggered by dependent positions and exertion. She states she cannot lay flat due to pain. She has been on Toradol, Baclofen, Amitriptyline, Nurtec, Ublrevy, Compazine, Diclofenac in the past for prevention. Will need to rule out IIH with ophthalmology exam, referral placed. MRV ordered to rule out any vascular abnormality due to positional factor. Question migraine with possible basilar symptoms. Triptans and Propranolol contraindicated. Will trial Topamax for prevention and Elyxyb PRN. ICD-10-CM    1.  Intractable headache, unspecified chronicity pattern, unspecified headache type R51.9 topiramate (TOPAMAX) 50 MG tablet     MRV HEAD WO CONTRAST     External Referral To Ophthalmology     Celecoxib (ELYXYB) 120 MG/4.8ML SOLN      2. Numbness and tingling  R20.0     R20.2       3. Vision changes  H53.9 MRV HEAD WO CONTRAST     External Referral To Ophthalmology            PLAN:  Opthalmology exam, referral placed. 2. Topamax titration. Side effects discussed. 3. MRV brain WO ordered   4. Elyxyb PRN- samples given. Side effects discussed. 5. Patient advised of the pathophysiology, etiology, and diagnosis of migraines, along with treatment options, and treatment side effects. 6. Return in about 2 months (around 12/3/2022) for Follow up, sooner with worsening symptoms.      OLIVIA Tobar - CNP

## 2022-10-03 NOTE — PATIENT INSTRUCTIONS
Topamax titration:     Week 1: Take 1/2 tablet in the morning   Week 2: Take 1/2 tablet in the morning and 1/2 tablet at night   Week 3: Take 1 tablet in the morning and 1/2 tablet at night   Week 4 and on:  Take 1 tablet in the morning and 1 tablet at night

## 2022-10-10 ENCOUNTER — TELEPHONE (OUTPATIENT)
Dept: NEUROSURGERY | Age: 37
End: 2022-10-10

## 2022-10-10 NOTE — TELEPHONE ENCOUNTER
Naty Dear Key: [id number] - PA Case ID: [id number] - Rx #: [id number] help?  Call us at [phone]  Status  Sent to [address] [place] [address] 120MG/4.8ML solution  Form  [facility] ePA Form 2017 NCPDP  Original Claim Info  48

## 2022-10-11 ENCOUNTER — HOSPITAL ENCOUNTER (OUTPATIENT)
Dept: MRI IMAGING | Age: 37
Discharge: HOME | End: 2022-10-11
Payer: COMMERCIAL

## 2022-10-11 DIAGNOSIS — R51.9 INTRACTABLE HEADACHE, UNSPECIFIED CHRONICITY PATTERN, UNSPECIFIED HEADACHE TYPE: ICD-10-CM

## 2022-10-11 DIAGNOSIS — H53.9 VISION CHANGES: ICD-10-CM

## 2022-10-11 PROCEDURE — 70544 MR ANGIOGRAPHY HEAD W/O DYE: CPT

## 2022-10-11 PROCEDURE — 70544 MR ANGIOGRAPHY HEAD W/O DYE: CPT | Performed by: RADIOLOGY

## 2022-10-12 ENCOUNTER — OFFICE VISIT (OUTPATIENT)
Dept: PRIMARY CARE CLINIC | Age: 37
End: 2022-10-12
Payer: COMMERCIAL

## 2022-10-12 VITALS
TEMPERATURE: 97.4 F | SYSTOLIC BLOOD PRESSURE: 108 MMHG | HEART RATE: 99 BPM | OXYGEN SATURATION: 98 % | DIASTOLIC BLOOD PRESSURE: 80 MMHG | WEIGHT: 148 LBS | BODY MASS INDEX: 27.94 KG/M2 | RESPIRATION RATE: 16 BRPM | HEIGHT: 61 IN

## 2022-10-12 DIAGNOSIS — R35.89 POLYURIA: Primary | ICD-10-CM

## 2022-10-12 DIAGNOSIS — N92.6 IRREGULAR MENSES: ICD-10-CM

## 2022-10-12 DIAGNOSIS — N64.52 NIPPLE DISCHARGE: ICD-10-CM

## 2022-10-12 DIAGNOSIS — R39.15 URINARY URGENCY: ICD-10-CM

## 2022-10-12 DIAGNOSIS — R53.83 FATIGUE, UNSPECIFIED TYPE: ICD-10-CM

## 2022-10-12 DIAGNOSIS — R63.1 POLYDIPSIA: ICD-10-CM

## 2022-10-12 DIAGNOSIS — R35.0 URINARY FREQUENCY: ICD-10-CM

## 2022-10-12 PROCEDURE — 99214 OFFICE O/P EST MOD 30 MIN: CPT | Performed by: FAMILY MEDICINE

## 2022-10-12 RX ORDER — RIZATRIPTAN BENZOATE 10 MG/1
10 TABLET ORAL
Qty: 9 TABLET | Refills: 0 | Status: SHIPPED | OUTPATIENT
Start: 2022-10-12 | End: 2022-10-12

## 2022-10-12 ASSESSMENT — PATIENT HEALTH QUESTIONNAIRE - PHQ9
SUM OF ALL RESPONSES TO PHQ QUESTIONS 1-9: 0
1. LITTLE INTEREST OR PLEASURE IN DOING THINGS: 0
SUM OF ALL RESPONSES TO PHQ QUESTIONS 1-9: 0
2. FEELING DOWN, DEPRESSED OR HOPELESS: 0
SUM OF ALL RESPONSES TO PHQ9 QUESTIONS 1 & 2: 0

## 2022-10-14 ASSESSMENT — ENCOUNTER SYMPTOMS
EYE DISCHARGE: 0
COUGH: 0
DIARRHEA: 0
VOMITING: 0
WHEEZING: 0
ABDOMINAL PAIN: 1
COLOR CHANGE: 0
BACK PAIN: 0
NAUSEA: 0

## 2022-10-14 NOTE — PROGRESS NOTES
SUBJECTIVE:    Patient ID: Leonard Obrien is a 40 y.o. female. HPI:   Patient is seen today for a follow-up on her headaches. She is still having them almost daily. She states that she has not really been able to tell a big difference with the Topamax but she is just now weaning up on it. She states that the Toradol, Elyxyb, Ubrelvy and Nurtec really did not touch the headaches. The Maxalt really does not help them either. She states that she is having them frequently and she states they are very bothersome for her everyday life. She has had an MRI and an MRV and is currently being followed by neurology. She states that she is constantly thirsty and has frequent urination. She states that this has been going on over a year. She states that she has not had any odor to her urine. She states that she is also had some breast tenderness. She is also noticed some nipple discharge greater than a month in the left breast.  She has not had a mammogram and ultrasound recently. She has been having some heavy periods greater than a year. She states that she has to change her pads and tampons frequently the first couple of days. She states that she just feels fatigued a lot and feels very rundown. Past Medical History:   Diagnosis Date    Anxiety     Hx of partial seizures     UTI (urinary tract infection)     PT states she gets constant UTI's \"That is pretty much undercontrol now. \"      Current Outpatient Medications on File Prior to Visit   Medication Sig Dispense Refill    topiramate (TOPAMAX) 50 MG tablet Take 1 tablet by mouth 2 times daily 60 tablet 3    baclofen (LIORESAL) 10 MG tablet Take 10 mg by mouth 3 times daily      Celecoxib (ELYXYB) 120 MG/4.8ML SOLN Take 120 mg by mouth as needed (PRN every 24 hours) 28.8 mL 3    ketorolac (TORADOL) 10 MG tablet Take 1 tablet by mouth every 6 hours as needed for Pain 20 tablet 0     No current facility-administered medications on file prior to visit. Allergies   Allergen Reactions    Penicillins Shortness Of Breath    Zoloft [Sertraline Hcl] Itching       Review of Systems   Constitutional:  Positive for activity change, appetite change and fatigue. Negative for fever. HENT:  Negative for congestion and nosebleeds. Eyes:  Negative for discharge. Respiratory:  Negative for cough and wheezing. Cardiovascular:  Negative for chest pain and leg swelling. Gastrointestinal:  Positive for abdominal pain. Negative for diarrhea, nausea and vomiting. Endocrine: Positive for polydipsia and polyuria. Genitourinary:  Negative for difficulty urinating, frequency and urgency. Musculoskeletal:  Positive for arthralgias and myalgias. Negative for back pain and gait problem. Skin:  Negative for color change and rash. Neurological:  Positive for dizziness and headaches. Hematological:  Does not bruise/bleed easily. Psychiatric/Behavioral:  Negative for sleep disturbance and suicidal ideas. OBJECTIVE:    Physical Exam  Vitals reviewed. Constitutional:       General: She is not in acute distress. Appearance: Normal appearance. She is well-developed. She is not diaphoretic. HENT:      Head: Normocephalic and atraumatic. Right Ear: External ear normal.      Left Ear: External ear normal.   Cardiovascular:      Rate and Rhythm: Normal rate and regular rhythm. Pulses: Normal pulses. Heart sounds: Normal heart sounds. No murmur heard. Pulmonary:      Effort: Pulmonary effort is normal. No respiratory distress. Breath sounds: Normal breath sounds. Musculoskeletal:      Cervical back: Normal range of motion and neck supple. Skin:     General: Skin is warm and dry. Neurological:      General: No focal deficit present. Mental Status: She is alert and oriented to person, place, and time. Mental status is at baseline.    Psychiatric:         Mood and Affect: Mood normal.         Behavior: Behavior normal.         Thought Content: Thought content normal.         Judgment: Judgment normal.      /80   Pulse 99   Temp 97.4 °F (36.3 °C) (Temporal)   Resp 16   Ht 5' 1\" (1.549 m)   Wt 148 lb (67.1 kg)   SpO2 98%   BMI 27.96 kg/m²      ASSESSMENT/PLAN:    1. Polyuria  -     CBC with Auto Differential; Future  -     Comprehensive Metabolic Panel; Future  -     Hemoglobin A1C; Future  -     TSH with Reflex to FT4; Future  -     POCT Urinalysis no Micro  -     Follicle Stimulating Hormone; Future  -     Luteinizing Hormone; Future  -     Estradiol; Future  -     Insulin, total; Future  2. Irregular menses  -     CBC with Auto Differential; Future  -     Comprehensive Metabolic Panel; Future  -     Hemoglobin A1C; Future  -     TSH with Reflex to FT4; Future  -     POCT Urinalysis no Micro  -     Follicle Stimulating Hormone; Future  -     Luteinizing Hormone; Future  -     Estradiol; Future  -     Insulin, total; Future  3. Fatigue, unspecified type  -     CBC with Auto Differential; Future  -     Comprehensive Metabolic Panel; Future  -     Hemoglobin A1C; Future  -     TSH with Reflex to FT4; Future  -     POCT Urinalysis no Micro  -     Follicle Stimulating Hormone; Future  -     Luteinizing Hormone; Future  -     Estradiol; Future  -     Insulin, total; Future  4. Polydipsia  -     CBC with Auto Differential; Future  -     Comprehensive Metabolic Panel; Future  -     Hemoglobin A1C; Future  -     TSH with Reflex to FT4; Future  -     POCT Urinalysis no Micro  -     Follicle Stimulating Hormone; Future  -     Luteinizing Hormone; Future  -     Estradiol; Future  -     Insulin, total; Future  5. Urinary frequency  -     CBC with Auto Differential; Future  -     Comprehensive Metabolic Panel; Future  -     Hemoglobin A1C; Future  -     TSH with Reflex to FT4; Future  -     POCT Urinalysis no Micro  -     Follicle Stimulating Hormone; Future  -     Luteinizing Hormone; Future  -     Estradiol;  Future  -     Insulin, total; Future  6. Urinary urgency  -     CBC with Auto Differential; Future  -     Comprehensive Metabolic Panel; Future  -     Hemoglobin A1C; Future  -     TSH with Reflex to FT4; Future  -     POCT Urinalysis no Micro  -     Follicle Stimulating Hormone; Future  -     Luteinizing Hormone; Future  -     Estradiol; Future  -     Insulin, total; Future  7. Nipple discharge  -     Prolactin; Future  -     US BREAST COMPLETE LEFT; Future  -     [facility] DIGITAL DIAGNOSTIC W OR WO CAD BILATERAL; Future       We are going to get a mammogram and ultrasound of the left breast due to the nipple discharge. We are going to get a prolactin level including other hormone labs. She is going to come back in to get these done and she is not fasting today. We are going to check a urine as well as get labs due to the urinary frequency and polydipsia. We will notify her of the results of that. I encouraged her to follow-up with neurology regarding the frequent headaches. Follow-up with us in 3 months for checkup unless we find something on the blood work we need to get her back in with us sooner.     PDMP Monitoring:    Last PDMP Araceli Shen as Reviewed [facility]):  Review User Review Instant Review Result   Kerri  3/28/2022  4:17 PM Reviewed PDMP [1]       Urine Drug Screenings (1 yr)       POCT Rapid Drug Screen  Collected: 7/8/2021 (Final result)              POCT Rapid Drug Screen  Collected: 12/17/2020 (Final result)              Urine Drug Screen  Collected: 3/8/2019  9:04 AM (Final result)              Urine Drug Screen  Collected: 10/15/2018  2:22 PM (Final result)                  Medication Contract and Consent for Opioid Use Documents Filed       Patient Documents       Type of Document Status Date Received Received By Description    Medication Contract Signed 7/16/2018  8:57 AM Jessica KNOWLES benzo & stimulant agreement    Medication Contract Received 12/17/2020  5:15 PM Jessica KNOWLES Medication Contract 12/17/20 NADER Dragon/transcription disclaimer:  Much of this encounter note is electronic transcription/translation of spoken language toprinted texts. The electronic translation of spoken language may be erroneous, or at times, nonsensical words or phrases may be inadvertently transcribed.   Although I have reviewed the note for such errors, some may stillexist.

## 2022-10-17 ENCOUNTER — HOSPITAL ENCOUNTER (OUTPATIENT)
Dept: WOMENS IMAGING | Age: 37
Discharge: HOME | End: 2022-10-17
Payer: COMMERCIAL

## 2022-10-17 VITALS — BODY MASS INDEX: 27.4 KG/M2 | WEIGHT: 145 LBS

## 2022-10-17 DIAGNOSIS — N64.52 NIPPLE DISCHARGE: ICD-10-CM

## 2022-10-17 PROCEDURE — G0279 TOMOSYNTHESIS, MAMMO: HCPCS

## 2022-10-17 PROCEDURE — 76642 ULTRASOUND BREAST LIMITED: CPT

## 2022-10-20 DIAGNOSIS — R39.15 URINARY URGENCY: ICD-10-CM

## 2022-10-20 DIAGNOSIS — N64.52 NIPPLE DISCHARGE: ICD-10-CM

## 2022-10-20 DIAGNOSIS — R35.0 URINARY FREQUENCY: ICD-10-CM

## 2022-10-20 DIAGNOSIS — N92.6 IRREGULAR MENSES: ICD-10-CM

## 2022-10-20 DIAGNOSIS — R35.89 POLYURIA: ICD-10-CM

## 2022-10-20 DIAGNOSIS — R63.1 POLYDIPSIA: ICD-10-CM

## 2022-10-20 DIAGNOSIS — R53.83 FATIGUE, UNSPECIFIED TYPE: ICD-10-CM

## 2022-10-20 LAB
ALBUMIN SERPL-MCNC: 4.4 G/DL (ref 3.5–5.2)
ALP BLD-CCNC: 56 U/L (ref 35–104)
ALT SERPL-CCNC: 9 U/L (ref 5–33)
ANION GAP SERPL CALCULATED.3IONS-SCNC: 11 MMOL/L (ref 7–19)
AST SERPL-CCNC: 14 U/L (ref 5–32)
BASOPHILS ABSOLUTE: 0.1 K/UL (ref 0–0.2)
BASOPHILS RELATIVE PERCENT: 0.8 % (ref 0–1)
BILIRUB SERPL-MCNC: 0.5 MG/DL (ref 0.2–1.2)
BUN BLDV-MCNC: 7 MG/DL (ref 6–20)
CALCIUM SERPL-MCNC: 9.1 MG/DL (ref 8.6–10)
CHLORIDE BLD-SCNC: 104 MMOL/L (ref 98–111)
CO2: 23 MMOL/L (ref 22–29)
CREAT SERPL-MCNC: 0.8 MG/DL (ref 0.5–0.9)
EOSINOPHILS ABSOLUTE: 0.2 K/UL (ref 0–0.6)
EOSINOPHILS RELATIVE PERCENT: 2.8 % (ref 0–5)
ESTRADIOL LEVEL: 241.7 PG/ML
FOLLICLE STIMULATING HORMONE: 3.9 MIU/ML
GFR SERPL CREATININE-BSD FRML MDRD: >60 ML/MIN/{1.73_M2}
GLUCOSE BLD-MCNC: 93 MG/DL (ref 74–109)
HBA1C MFR BLD: 4.9 % (ref 4–6)
HCT VFR BLD CALC: 47 % (ref 37–47)
HEMOGLOBIN: 15.2 G/DL (ref 12–16)
IMMATURE GRANULOCYTES #: 0 K/UL
LUTEINIZING HORMONE: 10.9 MIU/ML
LYMPHOCYTES ABSOLUTE: 1.8 K/UL (ref 1.1–4.5)
LYMPHOCYTES RELATIVE PERCENT: 24.7 % (ref 20–40)
MCH RBC QN AUTO: 30.1 PG (ref 27–31)
MCHC RBC AUTO-ENTMCNC: 32.3 G/DL (ref 33–37)
MCV RBC AUTO: 93.1 FL (ref 81–99)
MONOCYTES ABSOLUTE: 0.5 K/UL (ref 0–0.9)
MONOCYTES RELATIVE PERCENT: 6.8 % (ref 0–10)
NEUTROPHILS ABSOLUTE: 4.7 K/UL (ref 1.5–7.5)
NEUTROPHILS RELATIVE PERCENT: 64.6 % (ref 50–65)
PDW BLD-RTO: 14 % (ref 11.5–14.5)
PLATELET # BLD: 292 K/UL (ref 130–400)
PMV BLD AUTO: 10.5 FL (ref 9.4–12.3)
POTASSIUM SERPL-SCNC: 3.6 MMOL/L (ref 3.5–5)
PROLACTIN: 10.62 NG/ML (ref 4.79–23.3)
RBC # BLD: 5.05 M/UL (ref 4.2–5.4)
SODIUM BLD-SCNC: 138 MMOL/L (ref 136–145)
TOTAL PROTEIN: 6.8 G/DL (ref 6.6–8.7)
TSH REFLEX FT4: 1.29 UIU/ML (ref 0.35–5.5)
WBC # BLD: 7.2 K/UL (ref 4.8–10.8)

## 2022-10-21 LAB — INSULIN: 12.3 MU/L (ref 2.6–24.9)

## 2022-10-27 ENCOUNTER — TELEPHONE (OUTPATIENT)
Dept: NEUROLOGY | Age: 37
End: 2022-10-27

## 2022-10-27 NOTE — TELEPHONE ENCOUNTER
Pt called and states she was started on Topamax on Oct. 3. She states about 2 weeks before starting the medicine she was struggling with a loss of appetite. Pt states since taking this medication the loss of appetite has gotten worse. Pt states she can barely eat and has lost around 5-6 lbs each week since then. She has been taking 2 tablets daily as directed and said it is not helping her headaches. She doesn't know if it is a side effect of the Topamax or not but wants to be able to eat regularly again. Please advise.

## 2022-10-28 NOTE — TELEPHONE ENCOUNTER
Message from Adrianna Loo. Please advise patient to stop taking the Topamax as it can cause decreased appetite. Prior list of medicines that have not worked include toradol, baclofen, nurtec, ubrelvy, compazine, diclofenac. Propranolol is contraindicated. Other options include an anti-depressant called Nortriptyline that would be daily- main SE of this is fatigue when you first start it but it would be started low and slowly increased. Another option is an injectable CGRP such as Emgality that is once monthly. Please advise what she would like to do and I will send it in. MRV of brain was WNL. Has she had her ophthalmology appointment yet? Called pt no answer VM not set up, home phone is not a working number and has been removed. [employer] message sent at this time.

## 2022-11-02 DIAGNOSIS — N64.52 NIPPLE DISCHARGE: Primary | ICD-10-CM

## 2022-11-04 ENCOUNTER — CLINICAL DOCUMENTATION (OUTPATIENT)
Dept: NEUROLOGY | Age: 37
End: 2022-11-04

## 2022-11-04 DIAGNOSIS — G43.009 MIGRAINE WITHOUT AURA AND WITHOUT STATUS MIGRAINOSUS, NOT INTRACTABLE: Primary | ICD-10-CM

## 2022-11-04 RX ORDER — GALCANEZUMAB 120 MG/ML
120 INJECTION, SOLUTION SUBCUTANEOUS
Qty: 1 ADJUSTABLE DOSE PRE-FILLED PEN SYRINGE | Refills: 5 | Status: SHIPPED | OUTPATIENT
Start: 2022-11-04

## 2022-11-04 NOTE — TELEPHONE ENCOUNTER
Requested Prescriptions     Pending Prescriptions Disp Refills    Galcanezumab-gnlm (EMGALITY) 120 MG/ML SOAJ 1 Adjustable Dose Pre-filled Pen Syringe 5     Sig: Inject 120 mg into the skin every 30 days       Last Office Visit: Visit date not found  Next Office Visit: Visit date not found  Last Medication Refill: New medication start.  Pt received loading does in office today, was educated on injections and side effects

## 2022-11-04 NOTE — PROGRESS NOTES
Patient stopped by clinic to get sample of Emgality per OLIVIA Iyer. Pt educated on injection use. All questions were answered side effects discussed. Pt voiced understanding and will call us with any issues. Pt voiced understanding.

## 2022-11-08 RX ORDER — RIZATRIPTAN BENZOATE 10 MG/1
10 TABLET ORAL
Qty: 9 TABLET | Refills: 0 | Status: SHIPPED | OUTPATIENT
Start: 2022-11-08 | End: 2022-11-08

## 2022-11-10 ENCOUNTER — TELEPHONE (OUTPATIENT)
Dept: NEUROLOGY | Age: 37
End: 2022-11-10

## 2022-11-10 NOTE — TELEPHONE ENCOUNTER
Eli Cassidy Sarkar: [id number] - Rx #: [id number] help? Call us at [phone]  Outcome  Additional Information Required  Member has an active PA on file which is expiring on 02/06/2023 and has 2 no. of fills remaining.   Drug  Emgality 120MG/ML auto-injectors (migraine)  Form  [facility] Form 2017 NCPDP  Original Claim Info  40

## 2022-12-07 ENCOUNTER — OFFICE VISIT (OUTPATIENT)
Dept: NEUROLOGY | Age: 37
End: 2022-12-07
Payer: COMMERCIAL

## 2022-12-07 VITALS
BODY MASS INDEX: 27.38 KG/M2 | HEIGHT: 61 IN | HEART RATE: 97 BPM | DIASTOLIC BLOOD PRESSURE: 73 MMHG | WEIGHT: 145 LBS | SYSTOLIC BLOOD PRESSURE: 120 MMHG | OXYGEN SATURATION: 99 %

## 2022-12-07 DIAGNOSIS — G43.009 MIGRAINE WITHOUT AURA AND WITHOUT STATUS MIGRAINOSUS, NOT INTRACTABLE: Primary | ICD-10-CM

## 2022-12-07 PROCEDURE — 99213 OFFICE O/P EST LOW 20 MIN: CPT | Performed by: NURSE PRACTITIONER

## 2022-12-07 RX ORDER — SUMATRIPTAN 50 MG/1
TABLET, FILM COATED ORAL
Qty: 9 TABLET | Refills: 3 | Status: SHIPPED | OUTPATIENT
Start: 2022-12-07

## 2022-12-07 NOTE — PROGRESS NOTES
[facility]    Patient: Denis Bradley   :  1985  Age:  40 y.o. MRN:  [id number]  Today:  10/3/22    Provider: OLIVIA Briggs    Chief Complaint:  Chief Complaint   Patient presents with    Follow-up     headaches         HISTORY OF PRESENT ILLNESS:   Denis Bradley is a 40y.o. year old female here for headache follow up. Notes there have been improvement. She is on Emgality , has had two injections. No side effects. She has not followed up with ophthalmology. She is not using ano abortives, relates none have worked so far. Tried Maxalt and Elyxyb with no relief. Exertion still a clear trigger. Headaches first started in February and have progressively worsened. Pain is located globally and is described as severe pressure. There is not radiation of pain. There are daily mild headaches, with severe headaches occurring about 3-4 times a month. Headaches last for days. There is associated photophobia, phonophobia, nausea. There are some visual disturbances, lights to vision, and blurred vision. Denies diplopia, gaston visual loss, jaw claudication. Has some burning to inside of mouth/tongue/throat, burning/tingling to hands at times. Denies lacrimation, conjunctival injection, mental status changes, or ptosis. Headaches are aggravated by position changes. They are triggered by positions and exertion. She has been on Toradol, Maxalt, Elyxyb, Baclofen, Amitriptyline, Nurtec, Ublrevy, Compazine, Diclofenac in the past without benefit. She does not take a daily analgesic. No history of head or neck injury or surgeries. No family history of aneurysm. Migraine disease is in the family. MRI, CTA head and neck as below. MRV brain WNL. PAST MEDICAL HISTORY:    Medical History:      Diagnosis Date    Anxiety     Hx of partial seizures     UTI (urinary tract infection)     PT states she gets constant UTI's \"That is pretty much undercontrol now. \"       Surgical History:      Procedure Laterality Date BREAST BIOPSY Left     [place]x, benign     SECTION      x2    TUBAL LIGATION         Current Medications:  Current Outpatient Medications   Medication Sig Dispense Refill    SUMAtriptan (IMITREX) 50 MG tablet Take 1 tablet at the onset of migraine. Can repeat once in 2 hours if no improvement. Do not exceed 2 tablets in 24 hours. 9 tablet 3    Galcanezumab-gnlm (EMGALITY) 120 MG/ML SOAJ Inject 120 mg into the skin every 30 days 1 Adjustable Dose Pre-filled Pen Syringe 5    rizatriptan (MAXALT) 10 MG tablet TAKE 1 TABLET BY MOUTH ONCE AS NEEDED FOR MIGRAINE MAY REPEAT IN 2 HOURS IF NEEDED (Patient not taking: Reported on 2022) 9 tablet 0    topiramate (TOPAMAX) 50 MG tablet Take 1 tablet by mouth 2 times daily (Patient not taking: Reported on 2022) 60 tablet 3    Celecoxib (ELYXYB) 120 MG/4.8ML SOLN Take 120 mg by mouth as needed (PRN every 24 hours) (Patient not taking: Reported on 2022) 28.8 mL 3    baclofen (LIORESAL) 10 MG tablet Take 10 mg by mouth 3 times daily (Patient not taking: Reported on 2022)      ketorolac (TORADOL) 10 MG tablet Take 1 tablet by mouth every 6 hours as needed for Pain (Patient not taking: Reported on 2022) 20 tablet 0     No current facility-administered medications for this visit. Allergies:  Penicillins and Zoloft [sertraline hcl]    SOCIAL HISTORY:   Social History     Socioeconomic History    Marital status: Life Partner     Spouse name: Not on file    Number of children: 3    Years of education: 12+    Highest education level: Not on file   Occupational History     Comment:    Tobacco Use    Smoking status: Some Days     Packs/day: 0.25     Types: Cigarettes    Smokeless tobacco: Never    Tobacco comments:     working on quitting as of 20   Vaping Use    Vaping Use: Former    Substances: Always   Substance and Sexual Activity    Alcohol use:  Yes     Alcohol/week: 4.0 standard drinks     Types: 2 Cans of beer, 2 Shots of liquor per week    Drug use: Not Currently     Types: Marijuana Catherine Palm)     Comment: last use was around age 33    Sexual activity: Yes     Partners: Male     Birth control/protection: Surgical   Other Topics Concern    Not on file   Social History Narrative    Was admitted to an inpatient mental health setting as a teenager. Was admitted in [place]. I was having breakup with boyfriend and stress with mother. Was in the crisis clinic several times in the same unit. 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 \"        Has been on a few different antidepressants. \"I don't respond well to SSRIs. I have been on a few antianxiety medications. \"         I don't tend to be too pelletier unless there is a lot going on. My sleep in a little touch and go. I have a lot of to do list. 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           Social Determinants of Health     Financial Resource Strain: Medium Risk    Difficulty of Paying Living Expenses: Somewhat hard   Food Insecurity: No Food Insecurity    Worried About Running Out of Food in the Last Year: Never true    Ran Out of Food in the Last Year: Never true   Transportation Needs: Not on file   Physical Activity: Not on file   Stress: Not on file   Social Connections: Not on file   Intimate Partner Violence: Not on file   Housing Stability: Not on file       FAMILY HISTORY:       Problem Relation Age of Onset    Heart Attack Mother     Stroke Mother     High Blood Pressure Mother     High Cholesterol Mother     Diabetes Mother     Cancer Mother         thyroid    No Known Problems Father     Alcohol Abuse Brother         recently out of rehab from what she was told    Diabetes Maternal Grandmother     Cancer Maternal Grandmother         Pancreatic     ADHD Son         with Trouettes    No Known Problems Son        REVIEW OF SYSTEMS:  Constitutional: []Fever []Sweat []Chills [] Recent Injury [x] Denies all unless marked  HEENT:[x]Headache  [x] Head Injury/Hearing Loss  [] Sore Throat  [x] Ear Ache/Dizziness  [x] Denies all unless marked  Spine:  [] Neck pain  [] Back pain  [] Sciaticia  [x] Denies all unless marked  Cardiovascular:[]Heart Disease []Chest Pain [] Palpitations  [x] Denies all unless marked  Pulmonary: []Shortness of Breath []Cough   [x] Denies all unless marke  Gastrointestinal: [x]Nausea  []Vomiting  []Abdominal Pain  []Constipation  []Diarrhea  []Dark Bloody Stools  [x] Denies all unless marked  Psychiatric/Behavioral:[] Depression [x] Anxiety [x] Denies all unless marked  Genitourinary:   [] Frequency  [] Urgency  [] Incontinence [] Pain with Urination  [x] Denies all unless marked  Extremities: []Pain  []Swelling  [x] Denies all unless marked  Musculoskeletal: [x] Muscle Pain  [x] Joint Pain  [] Arthritis [x] Muscle Cramps [] Muscle Twitches  [x] Denies all unless marked  Sleep: [x] Insomnia [] Snoring [] Restless Legs [] Sleep Apnea  [] Daytime Sleepiness  [x] Denies all unless marked  Skin:[] Rash [] Skin Discoloration [x] Denies all unless marked   Neurological: [x]Visual Disturbance/Memory Loss [x] Loss of Balance [] Slurred Speech/Weakness [] Seizures  [] Vertigo/Dizziness [x] Denies all unless marked       The MA has completed the ROS with the patient. I have reviewed it in its' entirety with the patient and agree with the documentation. PHYSICAL EXAMINATION:  Vitals: /73   Pulse 97   Ht 5' 1\" (1.549 m)   Wt 145 lb (65.8 kg)   SpO2 99%   BMI 27.40 kg/m²     NEUROLOGIC EXAMINATION:    Mental status   [x]Awake, alert, oriented   [x]Affect attention and concentration appear appropriate  [x]Recent and remote memory appears unremarkable  [x]Speech normal without dysarthria or aphasia, comprehension and repetition intact.    COMMENTS:    Cranial Nerves [x]No VF deficit to confrontation  [x]PERRLA, EOMI, no nystagmus, conjugate eye movements, no ptosis  [x]Face symmetric  [x]Facial sensation intact  [x]Tongue midline no atrophy or fasciculations present  [x]Palate midline, hearing to finger rub normal bilaterally  [x]Shoulder shrug and SCM testing normal bilaterally  COMMENTS:   Motor   [x]5/5 strength x 4 extremities  [x]Normal bulk and tone  [x]No tremor present  [x]No rigidity or bradykinesia noted  COMMENTS:   Sensory  [x]Sensation intact to light touch, vibration, and proprioception BLE  [x]Sensation intact to light touch, vibration, and proprioception BUE  COMMENTS:   Coordination [x]FTN normal bilaterally   []HTS normal bilaterally  [x]SANDY normal bilaterally. COMMENTS:   Reflexes  [x]Symmetric and non-pathological  []Toes down going bilaterally  [x]No clonus present  COMMENTS:   Gait                  [x]Normal steady gait    []Ataxic    []Spastic     []Magnetic     []Shuffling  COMMENTS:       ADDITIONAL REVIEW:  No results found for: [id number]  Lab Results   Component Value Date    WBC 7.2 10/20/2022    HGB 15.2 10/20/2022    HCT 47.0 10/20/2022    MCV 93.1 10/20/2022     10/20/2022     Lab Results   Component Value Date     10/20/2022    K 3.6 10/20/2022     10/20/2022    CO2 23 10/20/2022    BUN 7 10/20/2022    CREATININE 0.8 10/20/2022    GLUCOSE 93 10/20/2022    CALCIUM 9.1 10/20/2022    PROT 6.8 10/20/2022    LABALBU 4.4 10/20/2022    BILITOT 0.5 10/20/2022    ALKPHOS 56 10/20/2022    AST 14 10/20/2022    ALT 9 10/20/2022    LABGLOM >60 10/20/2022    GFRAA >59 09/19/2022    GLOB 2.7 03/05/2017     Lab Results   Component Value Date    TSH 1.830 09/19/2022     MRI Brain WO 9/20/2022    Narrative   NO PRIOR REPORT AVAILABLE   Exam: MRI OF THE BRAIN WITHOUTINTRAVENOUS CONTRAST   Clinical data:New daily persistent headache. Nausea. Change in vision. Technique: Multiplanar multi-sequence MRI of the brain without intravenous gadolinium. Diffusion-weighted imaging is submitted. Prior studies: CTA and CT of the brain dated 9/19/2022. Findings: No abnormal parenchymal signal is present.  No evidence of acute cortical infarction, hemorrhage, mass or mass effect is seen. The ventricular system is normal in size, shape, and contour. No hydrocephalus or abnormal extra-axial fluid    collections are present. The marrow signal within the skull base and calvarium is intact. The paranasal sinuses and mastoid air cells are clear. DWI/ADC: No evidence of restricted diffusion. Impression   1. No acute intracranial abnormality observed. Recommendation: Follow up as clinically indicated. Electronically Signed by Mike Pugh MD at 29-Sep-2022 04:47:29 AM      CTA Head Neck 9/19/2022    Narrative   NO PRIOR REPORT AVAILABLE   Exam: CTA OF THEINTRACRANIAL ARTERIES (COW)   Clinical data:Headache, right-side numbness/tingling. Technique: Axial CT angiography of the intracranial arteries within the brain was performed with administration of intravenous contrast. Coronal, sagittal, and 3D volume reconstructions of theintracranial arteries were performed. Reformatted/3D-MPR    images were performed. Radiation dose: CTDIvol = 97.18 mGy, Total DLP = 1518 mGy x cm. Prior studies: CT scan brain done on same date. Findings:   No definite abnormality seen on 3D reformatted images. Anterior cerebral arteries demonstrate enhance normally. Anterior communicating artery is opacified. Middle Cerebral arteries are unremarkable. Posterior communicating arteries are opacified. Posterior cerebral arteries are intact. Vertebral arteries are visualized. Intracranial portion of right vertebral artery is hypoplastic. Basilar artery is unremarkable. Intracranial internal carotid arteries demonstrate normal enhancement. No evidence of aneurysm (greater than 4 mm) orarteriovenous lesion. Impression   CTA of the intracranial arteries unremarkable. Recommendation:   Follow up as clinically indicated.    All CT scans at this facility utilize dose modulation, iterative reconstruction, and/or weight based Unremarkable MRV of the cerebral veins and sinuses. RECOMMENDATION:    Follow up as clinically indicated. Electronically Signed by Alexis Benitez MD at 12-Oct-2022 03:46:00 AM                Reviewed records. IMPRESSION:  Lexus Babcock is a 40 y.o. female here today for follow up of headaches. First started in February with progression in severity. She notes there has been improvement since last visit after starting Emgality. Has had two injections- no issues with side effects. She was seen in ED several months ago for headache and had labs, UA, CTA of the head and neck, and CT head completed that were all normal. MRI completed per PCP was also normal. Headaches seem to be triggered by dependent positions and exertion. She states she cannot lay flat due to pain. MRV was WNL. She has not followed up with ophthalmology as referred. She has been on Toradol, Baclofen, Amitriptyline, Nurtec, Ublrevy, Compazine, Diclofenac in the past for prevention. She has tried Elyxyb and Maxalt without relief. Will still need to rule out IIH with ophthalmology exam. Will trial Imitrex as abortive. Continue Emgality. ICD-10-CM    1. Migraine without aura and without status migrainosus, not intractable  G43.009           PLAN:  Opthalmology exam, referral placed. 2. Continue Emgality  3. Imitrex PRN- side effects discussed  4. Patient advised of the pathophysiology, etiology, and diagnosis of migraines, along with treatment options, and treatment side effects. 5. Return in about 6 months (around 6/7/2023) for Follow up, sooner with worsening symptoms.      Letty Rodriguez, OLIVIA - CNP

## 2022-12-30 DIAGNOSIS — R51.9 INTRACTABLE HEADACHE, UNSPECIFIED CHRONICITY PATTERN, UNSPECIFIED HEADACHE TYPE: ICD-10-CM

## 2022-12-30 RX ORDER — TOPIRAMATE 50 MG/1
TABLET, FILM COATED ORAL
Qty: 180 TABLET | Refills: 1 | Status: SHIPPED | OUTPATIENT
Start: 2022-12-30

## 2022-12-30 NOTE — TELEPHONE ENCOUNTER
Requested Prescriptions     Pending Prescriptions Disp Refills    topiramate (TOPAMAX) 50 MG tablet [Pharmacy Med Name: TOPIRAMATE 50 MG TABLET] 180 tablet 1     Sig: TAKE 1 TABLET BY MOUTH TWICE A DAY       Last Office Visit: 10/3/2022  Next Office Visit: Visit date not found  Last Medication Refill:10/3/2022 with 3 RF       Herlinda Rogers patient

## 2023-01-17 ENCOUNTER — OFFICE VISIT (OUTPATIENT)
Dept: PRIMARY CARE CLINIC | Age: 38
End: 2023-01-17

## 2023-01-17 VITALS
OXYGEN SATURATION: 100 % | BODY MASS INDEX: 25.3 KG/M2 | WEIGHT: 134 LBS | TEMPERATURE: 97.3 F | SYSTOLIC BLOOD PRESSURE: 120 MMHG | HEIGHT: 61 IN | DIASTOLIC BLOOD PRESSURE: 80 MMHG | HEART RATE: 87 BPM

## 2023-01-17 DIAGNOSIS — R10.12 LUQ PAIN: ICD-10-CM

## 2023-01-17 DIAGNOSIS — L98.9 SKIN LESION: ICD-10-CM

## 2023-01-17 DIAGNOSIS — K76.9 LIVER LESION: ICD-10-CM

## 2023-01-17 DIAGNOSIS — N15.1 RENAL ABSCESS, LEFT: Primary | ICD-10-CM

## 2023-01-17 DIAGNOSIS — K80.20 CALCULUS OF GALLBLADDER WITHOUT CHOLECYSTITIS WITHOUT OBSTRUCTION: ICD-10-CM

## 2023-01-17 LAB
APPEARANCE FLUID: CLEAR
BILIRUBIN, POC: NORMAL
BLOOD URINE, POC: NORMAL
CLARITY, POC: CLEAR
COLOR, POC: NORMAL
GLUCOSE URINE, POC: NORMAL
KETONES, POC: NORMAL
LEUKOCYTE EST, POC: NORMAL
NITRITE, POC: NORMAL
PH, POC: NORMAL
PROTEIN, POC: NORMAL
SPECIFIC GRAVITY, POC: NORMAL
UROBILINOGEN, POC: NORMAL

## 2023-01-17 RX ORDER — CIPROFLOXACIN 500 MG/1
500 TABLET, FILM COATED ORAL 2 TIMES DAILY
COMMUNITY
Start: 2023-01-02 | End: 2023-01-18

## 2023-01-17 ASSESSMENT — PATIENT HEALTH QUESTIONNAIRE - PHQ9
SUM OF ALL RESPONSES TO PHQ QUESTIONS 1-9: 0
1. LITTLE INTEREST OR PLEASURE IN DOING THINGS: 0
SUM OF ALL RESPONSES TO PHQ9 QUESTIONS 1 & 2: 0
SUM OF ALL RESPONSES TO PHQ QUESTIONS 1-9: 0
2. FEELING DOWN, DEPRESSED OR HOPELESS: 0
SUM OF ALL RESPONSES TO PHQ QUESTIONS 1-9: 0
SUM OF ALL RESPONSES TO PHQ QUESTIONS 1-9: 0

## 2023-01-18 LAB
ANION GAP SERPL CALCULATED.3IONS-SCNC: 10 MMOL/L (ref 7–19)
BASOPHILS ABSOLUTE: 0 K/UL (ref 0–0.2)
BASOPHILS RELATIVE PERCENT: 0.5 % (ref 0–1)
BUN BLDV-MCNC: 9 MG/DL (ref 6–20)
CALCIUM SERPL-MCNC: 8.5 MG/DL (ref 8.6–10)
CHLORIDE BLD-SCNC: 102 MMOL/L (ref 98–111)
CO2: 23 MMOL/L (ref 22–29)
CREAT SERPL-MCNC: 0.7 MG/DL (ref 0.5–0.9)
EOSINOPHILS ABSOLUTE: 0.2 K/UL (ref 0–0.6)
EOSINOPHILS RELATIVE PERCENT: 3 % (ref 0–5)
GFR SERPL CREATININE-BSD FRML MDRD: >60 ML/MIN/{1.73_M2}
GLUCOSE BLD-MCNC: 85 MG/DL (ref 74–109)
HCT VFR BLD CALC: 38.7 % (ref 37–47)
HEMOGLOBIN: 12.6 G/DL (ref 12–16)
IMMATURE GRANULOCYTES #: 0 K/UL
LYMPHOCYTES ABSOLUTE: 1.7 K/UL (ref 1.1–4.5)
LYMPHOCYTES RELATIVE PERCENT: 23.2 % (ref 20–40)
MCH RBC QN AUTO: 31 PG (ref 27–31)
MCHC RBC AUTO-ENTMCNC: 32.6 G/DL (ref 33–37)
MCV RBC AUTO: 95.1 FL (ref 81–99)
MONOCYTES ABSOLUTE: 0.4 K/UL (ref 0–0.9)
MONOCYTES RELATIVE PERCENT: 5.9 % (ref 0–10)
NEUTROPHILS ABSOLUTE: 5 K/UL (ref 1.5–7.5)
NEUTROPHILS RELATIVE PERCENT: 67.1 % (ref 50–65)
PDW BLD-RTO: 14.2 % (ref 11.5–14.5)
PLATELET # BLD: 242 K/UL (ref 130–400)
PMV BLD AUTO: 12.2 FL (ref 9.4–12.3)
POTASSIUM SERPL-SCNC: 4.3 MMOL/L (ref 3.5–5)
RBC # BLD: 4.07 M/UL (ref 4.2–5.4)
SODIUM BLD-SCNC: 135 MMOL/L (ref 136–145)
WBC # BLD: 7.4 K/UL (ref 4.8–10.8)

## 2023-01-19 ENCOUNTER — OFFICE VISIT (OUTPATIENT)
Dept: SURGERY | Age: 38
End: 2023-01-19
Payer: COMMERCIAL

## 2023-01-19 VITALS
HEIGHT: 61 IN | TEMPERATURE: 98.1 F | OXYGEN SATURATION: 98 % | BODY MASS INDEX: 25.68 KG/M2 | HEART RATE: 108 BPM | WEIGHT: 136 LBS

## 2023-01-19 DIAGNOSIS — K80.10 CALCULUS OF GALLBLADDER WITH CHRONIC CHOLECYSTITIS WITHOUT OBSTRUCTION: Primary | ICD-10-CM

## 2023-01-19 PROCEDURE — 99204 OFFICE O/P NEW MOD 45 MIN: CPT | Performed by: SURGERY

## 2023-01-19 RX ORDER — SODIUM CHLORIDE, SODIUM LACTATE, POTASSIUM CHLORIDE, CALCIUM CHLORIDE 600; 310; 30; 20 MG/100ML; MG/100ML; MG/100ML; MG/100ML
INJECTION, SOLUTION INTRAVENOUS CONTINUOUS
OUTPATIENT
Start: 2023-01-19

## 2023-01-19 RX ORDER — ACETAMINOPHEN 325 MG/1
1000 TABLET ORAL ONCE
OUTPATIENT
Start: 2023-01-19 | End: 2023-01-19

## 2023-01-19 RX ORDER — SODIUM CHLORIDE 0.9 % (FLUSH) 0.9 %
5-40 SYRINGE (ML) INJECTION EVERY 12 HOURS SCHEDULED
OUTPATIENT
Start: 2023-01-19

## 2023-01-19 RX ORDER — SODIUM CHLORIDE 9 MG/ML
INJECTION, SOLUTION INTRAVENOUS PRN
OUTPATIENT
Start: 2023-01-19

## 2023-01-19 RX ORDER — CIPROFLOXACIN 500 MG/1
500 TABLET, FILM COATED ORAL 2 TIMES DAILY
COMMUNITY

## 2023-01-19 RX ORDER — CELECOXIB 200 MG/1
200 CAPSULE ORAL ONCE
OUTPATIENT
Start: 2023-01-19 | End: 2023-01-19

## 2023-01-19 RX ORDER — SODIUM CHLORIDE 0.9 % (FLUSH) 0.9 %
5-40 SYRINGE (ML) INJECTION PRN
OUTPATIENT
Start: 2023-01-19

## 2023-01-19 ASSESSMENT — ENCOUNTER SYMPTOMS
CONSTIPATION: 0
COUGH: 0
BACK PAIN: 1
DIARRHEA: 1
CHEST TIGHTNESS: 0
NAUSEA: 1
EYE PAIN: 1
SORE THROAT: 0
EYE REDNESS: 0
COLOR CHANGE: 0
SHORTNESS OF BREATH: 0
ABDOMINAL DISTENTION: 0
ABDOMINAL PAIN: 0
VOMITING: 0

## 2023-01-19 NOTE — PROGRESS NOTES
SUBJECTIVE:  Ms. Carson Pettit is a 40 y.o. female who presents today with with reports of right sided upper abdominal pain. The pain is sharp and intermittent and does not radiate. She does not know of any specific foods that make it worse. It is somewhat improved at the moment. This all occurred a few months ago. Since that time she was hospitalized in [place] for a renal abscess and on imaging was found to have gallstones. Past Medical History:   Diagnosis Date    Anxiety     Hx of partial seizures     UTI (urinary tract infection)     PT states she gets constant UTI's \"That is pretty much undercontrol now. \"     Past Surgical History:   Procedure Laterality Date    BREAST BIOPSY Left     [place]x, benign     SECTION      x2    TUBAL LIGATION       Current Outpatient Medications   Medication Sig Dispense Refill    ciprofloxacin (CIPRO) 500 MG tablet Take 500 mg by mouth 2 times daily       No current facility-administered medications for this visit. Allergies: Penicillins and Zoloft [sertraline hcl]    Family History   Problem Relation Age of Onset    Heart Attack Mother     Stroke Mother     High Blood Pressure Mother     High Cholesterol Mother     Diabetes Mother     Cancer Mother         thyroid    No Known Problems Father     Alcohol Abuse Brother         recently out of rehab from what she was told    Diabetes Maternal Grandmother     Cancer Maternal Grandmother         Pancreatic     ADHD Son         with Trouettes    No Known Problems Son        Social History     Tobacco Use    Smoking status: Some Days     Packs/day: 0.25     Types: Cigarettes    Smokeless tobacco: Never    Tobacco comments:     working on quitting as of 20   Substance Use Topics    Alcohol use: Yes     Alcohol/week: 4.0 standard drinks     Types: 2 Cans of beer, 2 Shots of liquor per week       Review of Systems   Constitutional:  Positive for activity change, appetite change and fatigue. Negative for fever and unexpected weight change. HENT:  Positive for ear pain. Negative for hearing loss, nosebleeds and sore throat. Eyes:  Positive for pain. Negative for redness and visual disturbance. Respiratory:  Negative for cough, chest tightness and shortness of breath. Cardiovascular:  Negative for chest pain, palpitations and leg swelling. Gastrointestinal:  Positive for diarrhea and nausea. Negative for abdominal distention, abdominal pain, constipation and vomiting. Endocrine: Positive for polydipsia and polyuria. Negative for cold intolerance and heat intolerance. Genitourinary:  Positive for difficulty urinating, flank pain and frequency. Negative for urgency. Musculoskeletal:  Positive for back pain. Negative for joint swelling and neck pain. Skin:  Positive for rash. Negative for color change and wound. Allergic/Immunologic: Negative for environmental allergies and food allergies. Neurological:  Positive for numbness and headaches. Negative for seizures and light-headedness. Hematological:  Negative for adenopathy. Does not bruise/bleed easily. Psychiatric/Behavioral:  Negative for confusion, sleep disturbance and suicidal ideas. OBJECTIVE:  Pulse (!) 108   Temp 98.1 °F (36.7 °C) (Temporal)   Ht 5' 1\" (1.549 m)   Wt 136 lb (61.7 kg)   LMP 01/17/2023   SpO2 98%   BMI 25.70 kg/m²   Physical Exam  Vitals reviewed. Constitutional:       Appearance: Normal appearance. She is well-developed. HENT:      Head: Normocephalic and atraumatic. Eyes:      Pupils: Pupils are equal, round, and reactive to light. Cardiovascular:      Rate and Rhythm: Normal rate and regular rhythm. Heart sounds: Normal heart sounds. Pulmonary:      Effort: Pulmonary effort is normal.      Breath sounds: Normal breath sounds. No wheezing or rales. Abdominal:      General: Bowel sounds are normal. There is no distension. Palpations: Abdomen is soft. Tenderness:  There is abdominal tenderness in the right upper quadrant. There is no guarding or rebound. Musculoskeletal:         General: Normal range of motion. Cervical back: Normal range of motion. Lymphadenopathy:      Cervical: No cervical adenopathy. Skin:     General: Skin is warm and dry. Neurological:      Mental Status: She is alert and oriented to person, place, and time. Deep Tendon Reflexes: Reflexes are normal and symmetric. Psychiatric:         Attention and Perception: Attention normal.         Mood and Affect: Mood normal.         Behavior: Behavior normal.         Thought Content: Thought content normal.         Judgment: Judgment normal.       ASSESSMENT:  Chronic Cholecystitis with Cholelithiasis     PLAN:  Orders Placed This Encounter   Procedures    Initiate PAT Protocol     Standing Status:   Future     Standing Expiration Date:   3/20/2023     No orders of the defined types were placed in this encounter. Reviewed biliary pathophysiology with the patient who notes understanding. The risks, benefits, and alternatives were discussed with the pt. She is willing to accept the risks and proceed with a laparoscopic cholecystectomy. The surgical risks included but not limited to bleeding, infection, bile duct leak and injury, risk of needing further surgery, etc. The anesthetic risks included heart attacks, strokes, pneumonia, phlebitis, etc.  She is willing to accept all risks and proceed with surgery. No guarantees have been given. Return for Post-operative Visit.     DO Emmett 1/69/0133 2:35 PM

## 2023-01-19 NOTE — LETTER
SCHEDULING INSTRUCTIONS:     Patient: Yani Segal  : 1985    Hospital: Hospital    Admitting Physician:  Dr. Paz Castorena    Diagnosis: Chronic Cholecystitis with Cholelithiasis     Procedure: Laparoscopic Cholecystectomy    ALLOW ADDITIONAL 30 MINS FOR TURNOVER EXCEPT FOR PHYSICIAN'S BOUNCE DAY    Anesthesia: GEN    Admission:Outpatient     Date: 2023                 NOT TO BE USED OUTSIDE OF THE OFFICE

## 2023-01-19 NOTE — H&P (VIEW-ONLY)
SUBJECTIVE:  Ms. Jesenia Allred is a 40 y.o. female who presents today with with reports of right sided upper abdominal pain. The pain is sharp and intermittent and does not radiate. She does not know of any specific foods that make it worse. It is somewhat improved at the moment. This all occurred a few months ago. Since that time she was hospitalized in [place] for a renal abscess and on imaging was found to have gallstones. Past Medical History:   Diagnosis Date    Anxiety     Hx of partial seizures     UTI (urinary tract infection)     PT states she gets constant UTI's \"That is pretty much undercontrol now. \"     Past Surgical History:   Procedure Laterality Date    BREAST BIOPSY Left     [place] bx, benign     SECTION      x2    TUBAL LIGATION       Current Outpatient Medications   Medication Sig Dispense Refill    ciprofloxacin (CIPRO) 500 MG tablet Take 500 mg by mouth 2 times daily       No current facility-administered medications for this visit. Allergies: Penicillins and Zoloft [sertraline hcl]    Family History   Problem Relation Age of Onset    Heart Attack Mother     Stroke Mother     High Blood Pressure Mother     High Cholesterol Mother     Diabetes Mother     Cancer Mother         thyroid    No Known Problems Father     Alcohol Abuse Brother         recently out of rehab from what she was told    Diabetes Maternal Grandmother     Cancer Maternal Grandmother         Pancreatic     ADHD Son         with Trouettes    No Known Problems Son        Social History     Tobacco Use    Smoking status: Some Days     Packs/day: 0.25     Types: Cigarettes    Smokeless tobacco: Never    Tobacco comments:     working on quitting as of 20   Substance Use Topics    Alcohol use: Yes     Alcohol/week: 4.0 standard drinks     Types: 2 Cans of beer, 2 Shots of liquor per week       Review of Systems   Constitutional:  Positive for activity change, appetite change and fatigue. Negative for fever and unexpected weight change. HENT:  Positive for ear pain. Negative for hearing loss, nosebleeds and sore throat. Eyes:  Positive for pain. Negative for redness and visual disturbance. Respiratory:  Negative for cough, chest tightness and shortness of breath. Cardiovascular:  Negative for chest pain, palpitations and leg swelling. Gastrointestinal:  Positive for diarrhea and nausea. Negative for abdominal distention, abdominal pain, constipation and vomiting. Endocrine: Positive for polydipsia and polyuria. Negative for cold intolerance and heat intolerance. Genitourinary:  Positive for difficulty urinating, flank pain and frequency. Negative for urgency. Musculoskeletal:  Positive for back pain. Negative for joint swelling and neck pain. Skin:  Positive for rash. Negative for color change and wound. Allergic/Immunologic: Negative for environmental allergies and food allergies. Neurological:  Positive for numbness and headaches. Negative for seizures and light-headedness. Hematological:  Negative for adenopathy. Does not bruise/bleed easily. Psychiatric/Behavioral:  Negative for confusion, sleep disturbance and suicidal ideas. OBJECTIVE:  Pulse (!) 108   Temp 98.1 °F (36.7 °C) (Temporal)   Ht 5' 1\" (1.549 m)   Wt 136 lb (61.7 kg)   LMP 01/17/2023   SpO2 98%   BMI 25.70 kg/m²   Physical Exam  Vitals reviewed. Constitutional:       Appearance: Normal appearance. She is well-developed. HENT:      Head: Normocephalic and atraumatic. Eyes:      Pupils: Pupils are equal, round, and reactive to light. Cardiovascular:      Rate and Rhythm: Normal rate and regular rhythm. Heart sounds: Normal heart sounds. Pulmonary:      Effort: Pulmonary effort is normal.      Breath sounds: Normal breath sounds. No wheezing or rales. Abdominal:      General: Bowel sounds are normal. There is no distension. Palpations: Abdomen is soft. Tenderness:  There is abdominal tenderness in the right upper quadrant. There is no guarding or rebound. Musculoskeletal:         General: Normal range of motion. Cervical back: Normal range of motion. Lymphadenopathy:      Cervical: No cervical adenopathy. Skin:     General: Skin is warm and dry. Neurological:      Mental Status: She is alert and oriented to person, place, and time. Deep Tendon Reflexes: Reflexes are normal and symmetric. Psychiatric:         Attention and Perception: Attention normal.         Mood and Affect: Mood normal.         Behavior: Behavior normal.         Thought Content: Thought content normal.         Judgment: Judgment normal.       ASSESSMENT:  Chronic Cholecystitis with Cholelithiasis     PLAN:  Orders Placed This Encounter   Procedures    Initiate PAT Protocol     Standing Status:   Future     Standing Expiration Date:   3/20/2023     No orders of the defined types were placed in this encounter. Reviewed biliary pathophysiology with the patient who notes understanding. The risks, benefits, and alternatives were discussed with the pt. She is willing to accept the risks and proceed with a laparoscopic cholecystectomy. The surgical risks included but not limited to bleeding, infection, bile duct leak and injury, risk of needing further surgery, etc. The anesthetic risks included heart attacks, strokes, pneumonia, phlebitis, etc.  She is willing to accept all risks and proceed with surgery. No guarantees have been given. Return for Post-operative Visit.     DO Emmett 4/43/2393 2:35 PM

## 2023-01-20 ENCOUNTER — HOSPITAL ENCOUNTER (OUTPATIENT)
Dept: PREADMISSION TESTING | Age: 38
Discharge: HOME | End: 2023-01-24

## 2023-01-23 ENCOUNTER — TELEPHONE (OUTPATIENT)
Dept: NEUROLOGY | Age: 38
End: 2023-01-23

## 2023-01-23 NOTE — TELEPHONE ENCOUNTER
Salinajim Harish called the office with questions regarding her ophthalmology referrel. I returned Mirtha's call explained that referral had first been sent to the ophthalmology group of [place] but her where out of network for her. Advised that referral was then sent to [facility] ophthalmology and that if she has not heard anything to give them a call. Provided her with number. She voiced her understanding and had no other questions at this time.

## 2023-01-26 ENCOUNTER — HOSPITAL ENCOUNTER (OUTPATIENT)
Dept: ULTRASOUND IMAGING | Age: 38
Discharge: HOME | End: 2023-01-26
Payer: COMMERCIAL

## 2023-01-26 DIAGNOSIS — N15.1 RENAL ABSCESS, LEFT: ICD-10-CM

## 2023-01-26 DIAGNOSIS — R10.12 LUQ PAIN: ICD-10-CM

## 2023-01-26 PROCEDURE — 76705 ECHO EXAM OF ABDOMEN: CPT

## 2023-01-26 PROCEDURE — 76705 ECHO EXAM OF ABDOMEN: CPT | Performed by: RADIOLOGY

## 2023-01-26 PROCEDURE — 76770 US EXAM ABDO BACK WALL COMP: CPT

## 2023-01-27 ENCOUNTER — TELEPHONE (OUTPATIENT)
Dept: NEUROLOGY | Age: 38
End: 2023-01-27

## 2023-01-27 ENCOUNTER — HOSPITAL ENCOUNTER (OUTPATIENT)
Age: 38
Setting detail: OUTPATIENT SURGERY
Discharge: HOME | End: 2023-01-27
Attending: SURGERY | Admitting: SURGERY
Payer: COMMERCIAL

## 2023-01-27 ENCOUNTER — ANESTHESIA EVENT (OUTPATIENT)
Dept: OPERATING ROOM | Age: 38
End: 2023-01-27
Payer: COMMERCIAL

## 2023-01-27 ENCOUNTER — CLINICAL DOCUMENTATION (OUTPATIENT)
Dept: NEUROLOGY | Age: 38
End: 2023-01-27

## 2023-01-27 ENCOUNTER — ANESTHESIA (OUTPATIENT)
Dept: OPERATING ROOM | Age: 38
End: 2023-01-27
Payer: COMMERCIAL

## 2023-01-27 VITALS
DIASTOLIC BLOOD PRESSURE: 73 MMHG | TEMPERATURE: 97.9 F | WEIGHT: 138 LBS | HEART RATE: 72 BPM | OXYGEN SATURATION: 100 % | HEIGHT: 61 IN | BODY MASS INDEX: 26.06 KG/M2 | SYSTOLIC BLOOD PRESSURE: 103 MMHG | RESPIRATION RATE: 16 BRPM

## 2023-01-27 DIAGNOSIS — K80.10 CALCULUS OF GALLBLADDER WITH CHRONIC CHOLECYSTITIS WITHOUT OBSTRUCTION: ICD-10-CM

## 2023-01-27 LAB — HCG(URINE) PREGNANCY TEST: NEGATIVE

## 2023-01-27 PROCEDURE — 3600000004 HC SURGERY LEVEL 4 BASE: Performed by: SURGERY

## 2023-01-27 PROCEDURE — 47562 LAPAROSCOPIC CHOLECYSTECTOMY: CPT | Performed by: SURGERY

## 2023-01-27 PROCEDURE — 6360000002 HC RX W HCPCS: Performed by: SURGERY

## 2023-01-27 PROCEDURE — 88304 TISSUE EXAM BY PATHOLOGIST: CPT

## 2023-01-27 PROCEDURE — 2500000003 HC RX 250 WO HCPCS: Performed by: NURSE ANESTHETIST, CERTIFIED REGISTERED

## 2023-01-27 PROCEDURE — 2709999900 HC NON-CHARGEABLE SUPPLY: Performed by: SURGERY

## 2023-01-27 PROCEDURE — 6360000002 HC RX W HCPCS: Performed by: NURSE ANESTHETIST, CERTIFIED REGISTERED

## 2023-01-27 PROCEDURE — 2580000003 HC RX 258: Performed by: NURSE ANESTHETIST, CERTIFIED REGISTERED

## 2023-01-27 PROCEDURE — 2720000010 HC SURG SUPPLY STERILE: Performed by: SURGERY

## 2023-01-27 PROCEDURE — 3700000001 HC ADD 15 MINUTES (ANESTHESIA): Performed by: SURGERY

## 2023-01-27 PROCEDURE — 2580000003 HC RX 258: Performed by: SURGERY

## 2023-01-27 PROCEDURE — 3700000000 HC ANESTHESIA ATTENDED CARE: Performed by: SURGERY

## 2023-01-27 PROCEDURE — 7100000010 HC PHASE II RECOVERY - FIRST 15 MIN: Performed by: SURGERY

## 2023-01-27 PROCEDURE — 0FT44ZZ RESECTION OF GALLBLADDER, PERCUTANEOUS ENDOSCOPIC APPROACH: ICD-10-PCS | Performed by: SURGERY

## 2023-01-27 PROCEDURE — 84703 CHORIONIC GONADOTROPIN ASSAY: CPT

## 2023-01-27 PROCEDURE — 3600000014 HC SURGERY LEVEL 4 ADDTL 15MIN: Performed by: SURGERY

## 2023-01-27 PROCEDURE — 7100000000 HC PACU RECOVERY - FIRST 15 MIN: Performed by: SURGERY

## 2023-01-27 PROCEDURE — 7100000011 HC PHASE II RECOVERY - ADDTL 15 MIN: Performed by: SURGERY

## 2023-01-27 PROCEDURE — 7100000001 HC PACU RECOVERY - ADDTL 15 MIN: Performed by: SURGERY

## 2023-01-27 PROCEDURE — 6360000002 HC RX W HCPCS: Performed by: ANESTHESIOLOGY

## 2023-01-27 PROCEDURE — 2500000003 HC RX 250 WO HCPCS: Performed by: SURGERY

## 2023-01-27 PROCEDURE — 6370000000 HC RX 637 (ALT 250 FOR IP): Performed by: SURGERY

## 2023-01-27 PROCEDURE — 6370000000 HC RX 637 (ALT 250 FOR IP): Performed by: ANESTHESIOLOGY

## 2023-01-27 RX ORDER — KETOROLAC TROMETHAMINE 30 MG/ML
INJECTION, SOLUTION INTRAMUSCULAR; INTRAVENOUS PRN
Status: DISCONTINUED | OUTPATIENT
Start: 2023-01-27 | End: 2023-01-27 | Stop reason: SDUPTHER

## 2023-01-27 RX ORDER — DEXAMETHASONE SODIUM PHOSPHATE 10 MG/ML
INJECTION, SOLUTION INTRAMUSCULAR; INTRAVENOUS PRN
Status: DISCONTINUED | OUTPATIENT
Start: 2023-01-27 | End: 2023-01-27 | Stop reason: SDUPTHER

## 2023-01-27 RX ORDER — FENTANYL CITRATE 50 UG/ML
INJECTION, SOLUTION INTRAMUSCULAR; INTRAVENOUS PRN
Status: DISCONTINUED | OUTPATIENT
Start: 2023-01-27 | End: 2023-01-27 | Stop reason: SDUPTHER

## 2023-01-27 RX ORDER — DEXMEDETOMIDINE HYDROCHLORIDE 100 UG/ML
INJECTION, SOLUTION INTRAVENOUS PRN
Status: DISCONTINUED | OUTPATIENT
Start: 2023-01-27 | End: 2023-01-27 | Stop reason: SDUPTHER

## 2023-01-27 RX ORDER — SODIUM CHLORIDE 0.9 % (FLUSH) 0.9 %
5-40 SYRINGE (ML) INJECTION EVERY 12 HOURS SCHEDULED
Status: DISCONTINUED | OUTPATIENT
Start: 2023-01-27 | End: 2023-01-27 | Stop reason: HOSPADM

## 2023-01-27 RX ORDER — SODIUM CHLORIDE 0.9 % (FLUSH) 0.9 %
5-40 SYRINGE (ML) INJECTION PRN
Status: DISCONTINUED | OUTPATIENT
Start: 2023-01-27 | End: 2023-01-27 | Stop reason: HOSPADM

## 2023-01-27 RX ORDER — SODIUM CHLORIDE 9 MG/ML
INJECTION, SOLUTION INTRAVENOUS PRN
Status: DISCONTINUED | OUTPATIENT
Start: 2023-01-27 | End: 2023-01-27 | Stop reason: HOSPADM

## 2023-01-27 RX ORDER — CELECOXIB 200 MG/1
200 CAPSULE ORAL ONCE
Status: COMPLETED | OUTPATIENT
Start: 2023-01-27 | End: 2023-01-27

## 2023-01-27 RX ORDER — OXYCODONE HYDROCHLORIDE AND ACETAMINOPHEN 5; 325 MG/1; MG/1
1 TABLET ORAL
Status: COMPLETED | OUTPATIENT
Start: 2023-01-27 | End: 2023-01-27

## 2023-01-27 RX ORDER — METOCLOPRAMIDE HYDROCHLORIDE 5 MG/ML
10 INJECTION INTRAMUSCULAR; INTRAVENOUS
Status: COMPLETED | OUTPATIENT
Start: 2023-01-27 | End: 2023-01-27

## 2023-01-27 RX ORDER — DEXAMETHASONE SODIUM PHOSPHATE 10 MG/ML
8 INJECTION, SOLUTION INTRAMUSCULAR; INTRAVENOUS ONCE
Status: COMPLETED | OUTPATIENT
Start: 2023-01-27 | End: 2023-01-27

## 2023-01-27 RX ORDER — SODIUM CHLORIDE, SODIUM LACTATE, POTASSIUM CHLORIDE, CALCIUM CHLORIDE 600; 310; 30; 20 MG/100ML; MG/100ML; MG/100ML; MG/100ML
INJECTION, SOLUTION INTRAVENOUS CONTINUOUS PRN
Status: DISCONTINUED | OUTPATIENT
Start: 2023-01-27 | End: 2023-01-27 | Stop reason: SDUPTHER

## 2023-01-27 RX ORDER — LIDOCAINE HYDROCHLORIDE 10 MG/ML
1 INJECTION, SOLUTION EPIDURAL; INFILTRATION; INTRACAUDAL; PERINEURAL
Status: DISCONTINUED | OUTPATIENT
Start: 2023-01-27 | End: 2023-01-27 | Stop reason: HOSPADM

## 2023-01-27 RX ORDER — FAMOTIDINE 20 MG/1
20 TABLET, FILM COATED ORAL ONCE
Status: COMPLETED | OUTPATIENT
Start: 2023-01-27 | End: 2023-01-27

## 2023-01-27 RX ORDER — LIDOCAINE HYDROCHLORIDE 10 MG/ML
INJECTION, SOLUTION INFILTRATION; PERINEURAL PRN
Status: DISCONTINUED | OUTPATIENT
Start: 2023-01-27 | End: 2023-01-27 | Stop reason: SDUPTHER

## 2023-01-27 RX ORDER — BUPIVACAINE HYDROCHLORIDE AND EPINEPHRINE 2.5; 5 MG/ML; UG/ML
INJECTION, SOLUTION INFILTRATION; PERINEURAL PRN
Status: DISCONTINUED | OUTPATIENT
Start: 2023-01-27 | End: 2023-01-27 | Stop reason: ALTCHOICE

## 2023-01-27 RX ORDER — SCOLOPAMINE TRANSDERMAL SYSTEM 1 MG/1
1 PATCH, EXTENDED RELEASE TRANSDERMAL
Status: DISCONTINUED | OUTPATIENT
Start: 2023-01-27 | End: 2023-01-27 | Stop reason: HOSPADM

## 2023-01-27 RX ORDER — OXYCODONE HYDROCHLORIDE AND ACETAMINOPHEN 5; 325 MG/1; MG/1
1 TABLET ORAL EVERY 6 HOURS PRN
Qty: 12 TABLET | Refills: 0 | Status: ON HOLD | OUTPATIENT
Start: 2023-01-27 | End: 2023-01-31

## 2023-01-27 RX ORDER — ONDANSETRON 2 MG/ML
INJECTION INTRAMUSCULAR; INTRAVENOUS PRN
Status: DISCONTINUED | OUTPATIENT
Start: 2023-01-27 | End: 2023-01-27 | Stop reason: SDUPTHER

## 2023-01-27 RX ORDER — SODIUM CHLORIDE, SODIUM LACTATE, POTASSIUM CHLORIDE, CALCIUM CHLORIDE 600; 310; 30; 20 MG/100ML; MG/100ML; MG/100ML; MG/100ML
INJECTION, SOLUTION INTRAVENOUS CONTINUOUS
Status: DISCONTINUED | OUTPATIENT
Start: 2023-01-27 | End: 2023-01-27 | Stop reason: HOSPADM

## 2023-01-27 RX ORDER — ACETAMINOPHEN 500 MG
1000 TABLET ORAL ONCE
Status: COMPLETED | OUTPATIENT
Start: 2023-01-27 | End: 2023-01-27

## 2023-01-27 RX ORDER — MORPHINE SULFATE 2 MG/ML
2 INJECTION, SOLUTION INTRAMUSCULAR; INTRAVENOUS EVERY 5 MIN PRN
Status: DISCONTINUED | OUTPATIENT
Start: 2023-01-27 | End: 2023-01-27 | Stop reason: HOSPADM

## 2023-01-27 RX ORDER — LABETALOL HYDROCHLORIDE 5 MG/ML
10 INJECTION, SOLUTION INTRAVENOUS
Status: DISCONTINUED | OUTPATIENT
Start: 2023-01-27 | End: 2023-01-27 | Stop reason: HOSPADM

## 2023-01-27 RX ORDER — IPRATROPIUM BROMIDE AND ALBUTEROL SULFATE 2.5; .5 MG/3ML; MG/3ML
1 SOLUTION RESPIRATORY (INHALATION)
Status: DISCONTINUED | OUTPATIENT
Start: 2023-01-27 | End: 2023-01-27 | Stop reason: HOSPADM

## 2023-01-27 RX ORDER — MIDAZOLAM HYDROCHLORIDE 1 MG/ML
INJECTION INTRAMUSCULAR; INTRAVENOUS PRN
Status: DISCONTINUED | OUTPATIENT
Start: 2023-01-27 | End: 2023-01-27 | Stop reason: SDUPTHER

## 2023-01-27 RX ORDER — MIDAZOLAM HYDROCHLORIDE 2 MG/2ML
2 INJECTION, SOLUTION INTRAMUSCULAR; INTRAVENOUS
Status: DISCONTINUED | OUTPATIENT
Start: 2023-01-27 | End: 2023-01-27 | Stop reason: HOSPADM

## 2023-01-27 RX ORDER — LORAZEPAM 2 MG/ML
0.5 INJECTION INTRAMUSCULAR
Status: DISCONTINUED | OUTPATIENT
Start: 2023-01-27 | End: 2023-01-27 | Stop reason: HOSPADM

## 2023-01-27 RX ORDER — DROPERIDOL 2.5 MG/ML
0.62 INJECTION, SOLUTION INTRAMUSCULAR; INTRAVENOUS
Status: DISCONTINUED | OUTPATIENT
Start: 2023-01-27 | End: 2023-01-27 | Stop reason: HOSPADM

## 2023-01-27 RX ORDER — MORPHINE SULFATE 4 MG/ML
4 INJECTION, SOLUTION INTRAMUSCULAR; INTRAVENOUS EVERY 5 MIN PRN
Status: DISCONTINUED | OUTPATIENT
Start: 2023-01-27 | End: 2023-01-27 | Stop reason: HOSPADM

## 2023-01-27 RX ORDER — ROCURONIUM BROMIDE 10 MG/ML
INJECTION, SOLUTION INTRAVENOUS PRN
Status: DISCONTINUED | OUTPATIENT
Start: 2023-01-27 | End: 2023-01-27 | Stop reason: SDUPTHER

## 2023-01-27 RX ORDER — PROPOFOL 10 MG/ML
INJECTION, EMULSION INTRAVENOUS PRN
Status: DISCONTINUED | OUTPATIENT
Start: 2023-01-27 | End: 2023-01-27 | Stop reason: SDUPTHER

## 2023-01-27 RX ORDER — MEPERIDINE HYDROCHLORIDE 25 MG/ML
12.5 INJECTION INTRAMUSCULAR; INTRAVENOUS; SUBCUTANEOUS EVERY 5 MIN PRN
Status: DISCONTINUED | OUTPATIENT
Start: 2023-01-27 | End: 2023-01-27 | Stop reason: HOSPADM

## 2023-01-27 RX ORDER — DIPHENHYDRAMINE HYDROCHLORIDE 50 MG/ML
12.5 INJECTION INTRAMUSCULAR; INTRAVENOUS
Status: DISCONTINUED | OUTPATIENT
Start: 2023-01-27 | End: 2023-01-27 | Stop reason: HOSPADM

## 2023-01-27 RX ADMIN — CEFAZOLIN 2000 MG: 2 INJECTION, POWDER, FOR SOLUTION INTRAMUSCULAR; INTRAVENOUS at 08:09

## 2023-01-27 RX ADMIN — FENTANYL CITRATE 50 MCG: 50 INJECTION, SOLUTION INTRAMUSCULAR; INTRAVENOUS at 08:33

## 2023-01-27 RX ADMIN — SODIUM CHLORIDE, SODIUM LACTATE, POTASSIUM CHLORIDE, AND CALCIUM CHLORIDE: 600; 310; 30; 20 INJECTION, SOLUTION INTRAVENOUS at 08:23

## 2023-01-27 RX ADMIN — FAMOTIDINE 20 MG: 20 TABLET, FILM COATED ORAL at 07:02

## 2023-01-27 RX ADMIN — ONDANSETRON 4 MG: 2 INJECTION INTRAMUSCULAR; INTRAVENOUS at 08:39

## 2023-01-27 RX ADMIN — SUGAMMADEX 300 MG: 100 INJECTION, SOLUTION INTRAVENOUS at 08:43

## 2023-01-27 RX ADMIN — PROPOFOL 150 MG: 10 INJECTION, EMULSION INTRAVENOUS at 07:59

## 2023-01-27 RX ADMIN — LIDOCAINE HYDROCHLORIDE 50 MG: 10 INJECTION, SOLUTION INFILTRATION; PERINEURAL at 07:58

## 2023-01-27 RX ADMIN — DEXMEDETOMIDINE HYDROCHLORIDE 20 MCG: 100 INJECTION, SOLUTION, CONCENTRATE INTRAVENOUS at 08:09

## 2023-01-27 RX ADMIN — DEXAMETHASONE SODIUM PHOSPHATE 8 MG: 10 INJECTION INTRAMUSCULAR; INTRAVENOUS at 07:02

## 2023-01-27 RX ADMIN — MIDAZOLAM 2 MG: 1 INJECTION INTRAMUSCULAR; INTRAVENOUS at 07:54

## 2023-01-27 RX ADMIN — KETOROLAC TROMETHAMINE 30 MG: 30 INJECTION, SOLUTION INTRAMUSCULAR; INTRAVENOUS at 08:43

## 2023-01-27 RX ADMIN — SODIUM CHLORIDE, SODIUM LACTATE, POTASSIUM CHLORIDE, AND CALCIUM CHLORIDE: 600; 310; 30; 20 INJECTION, SOLUTION INTRAVENOUS at 07:54

## 2023-01-27 RX ADMIN — FENTANYL CITRATE 100 MCG: 50 INJECTION, SOLUTION INTRAMUSCULAR; INTRAVENOUS at 07:58

## 2023-01-27 RX ADMIN — ROCURONIUM BROMIDE 50 MG: 10 INJECTION, SOLUTION INTRAVENOUS at 08:00

## 2023-01-27 RX ADMIN — OXYCODONE HYDROCHLORIDE AND ACETAMINOPHEN 1 TABLET: 5; 325 TABLET ORAL at 09:58

## 2023-01-27 RX ADMIN — PHENYLEPHRINE HYDROCHLORIDE 100 MCG: 10 INJECTION INTRAVENOUS at 08:21

## 2023-01-27 RX ADMIN — METOCLOPRAMIDE 10 MG: 5 INJECTION, SOLUTION INTRAMUSCULAR; INTRAVENOUS at 09:05

## 2023-01-27 RX ADMIN — DEXAMETHASONE SODIUM PHOSPHATE 10 MG: 10 INJECTION, SOLUTION INTRAMUSCULAR; INTRAVENOUS at 08:09

## 2023-01-27 RX ADMIN — CELECOXIB 200 MG: 200 CAPSULE ORAL at 07:02

## 2023-01-27 RX ADMIN — SODIUM CHLORIDE, POTASSIUM CHLORIDE, SODIUM LACTATE AND CALCIUM CHLORIDE: 600; 310; 30; 20 INJECTION, SOLUTION INTRAVENOUS at 06:59

## 2023-01-27 RX ADMIN — ACETAMINOPHEN 1000 MG: 500 TABLET, FILM COATED ORAL at 07:02

## 2023-01-27 RX ADMIN — FENTANYL CITRATE 50 MCG: 50 INJECTION, SOLUTION INTRAMUSCULAR; INTRAVENOUS at 08:12

## 2023-01-27 ASSESSMENT — PAIN DESCRIPTION - LOCATION
LOCATION: ABDOMEN

## 2023-01-27 ASSESSMENT — ENCOUNTER SYMPTOMS
VOMITING: 0
COUGH: 0
DIARRHEA: 0
BACK PAIN: 1
COLOR CHANGE: 0
WHEEZING: 0
ABDOMINAL PAIN: 1
NAUSEA: 0
SHORTNESS OF BREATH: 0
EYE DISCHARGE: 0

## 2023-01-27 ASSESSMENT — PAIN SCALES - GENERAL
PAINLEVEL_OUTOF10: 7

## 2023-01-27 ASSESSMENT — PAIN DESCRIPTION - PAIN TYPE
TYPE: SURGICAL PAIN
TYPE: SURGICAL PAIN

## 2023-01-27 ASSESSMENT — PAIN DESCRIPTION - DESCRIPTORS
DESCRIPTORS: ACHING
DESCRIPTORS: ACHING

## 2023-01-27 ASSESSMENT — LIFESTYLE VARIABLES: SMOKING_STATUS: 1

## 2023-01-27 NOTE — ANESTHESIA PRE PROCEDURE
Department of Anesthesiology  Preprocedure Note       Name:  Coy Alfaro   Age:  40 y.o.  :  1985                                          MRN:  [id number]         Date:  2023      Surgeon: Veronika Lazcano):  Mandeep Blanchard DO    Procedure: Procedure(s):  LAPAROSCOPIC CHOLECYSTECTOMY    Medications prior to admission:   Prior to Admission medications    Medication Sig Start Date End Date Taking? Authorizing Provider   ciprofloxacin (CIPRO) 500 MG tablet Take 500 mg by mouth 2 times daily    Historical Provider, MD       Current medications:    Current Facility-Administered Medications   Medication Dose Route Frequency Provider Last Rate Last Admin    acetaminophen (TYLENOL) tablet 1,000 mg  0,076 mg Oral Once Anastacia Lemus, DO        celecoxib (CELEBREX) capsule 200 mg  680 mg Oral Once Anastacia Lemus, DO        dexamethasone (PF) (DECADRON) injection 8 mg  8 mg IntraVENous Once Anastacia Lemus, DO        lactated ringers IV soln infusion   IntraVENous Continuous Anastacia Lemus, DO        sodium chloride flush 0.9 % injection 5-40 mL  5-40 mL IntraVENous 2 times per day Anastacia Lemus, DO        sodium chloride flush 0.9 % injection 5-40 mL  5-40 mL IntraVENous PRN Anastacia Lemus, DO        0.9 % sodium chloride infusion   IntraVENous PRN Anastacia Lemus, DO        ceFAZolin (ANCEF) 2,000 mg in sterile water 20 mL IV syringe  2,000 mg IntraVENous On Call to [address], DO           Allergies: Allergies   Allergen Reactions    Penicillins Shortness Of Breath    Zoloft [Sertraline Hcl] Itching       Problem List:    Patient Active Problem List   Diagnosis Code    Idiopathic hypotension I95.0    Phobia, 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 \" Past Surgical History:        Procedure Laterality Date    BREAST BIOPSY [place]     [place], benign     SECTION      x2    TUBAL LIGATION         Social History:    Social History     Tobacco Use    Smoking status: Some Days     Packs/day: 0.25     Types: Cigarettes    Smokeless tobacco: Never    Tobacco comments:     working on quitting as of 20   Substance Use Topics    Alcohol use: Not Currently                                Ready to quit: Not Answered  Counseling given: Not Answered  Tobacco comments: working on quitting as of 20      Vital Signs (Current): There were no vitals filed for this visit.                                            BP Readings from Last 3 Encounters:   23 120/80   22 120/73   10/12/22 108/80       NPO Status:                                                                                 BMI:   Wt Readings from Last 3 Encounters:   23 136 lb (61.7 kg)   23 134 lb (60.8 kg)   22 145 lb (65.8 kg)     There is no height or weight on file to calculate BMI.    CBC:   Lab Results   Component Value Date/Time    WBC 7.4 2023 03:24 PM    RBC 4.07 2023 03:24 PM    HGB 12.6 2023 03:24 PM    HCT 38.7 2023 03:24 PM    MCV 95.1 2023 03:24 PM    RDW 14.2 2023 03:24 PM     2023 03:24 PM       CMP:   Lab Results   Component Value Date/Time     2023 03:24 PM    K 4.3 2023 03:24 PM     2023 03:24 PM    CO2 23 2023 03:24 PM    BUN 9 2023 03:24 PM    CREATININE 0.7 2023 03:24 PM    GFRAA >59 2022 08:05 PM    LABGLOM >60 2023 03:24 PM    GLUCOSE 85 2023 03:24 PM    PROT 6.8 10/20/2022 08:06 AM    CALCIUM 8.5 2023 03:24 PM    BILITOT 0.5 10/20/2022 08:06 AM    ALKPHOS 56 10/20/2022 08:06 AM    AST 14 10/20/2022 08:06 AM    ALT 9 10/20/2022 08:06 AM       POC Tests: No results for input(s): RENE ADAME, NATA, POCCL, POCBUN, POCHEMO, POCHCT in the last 72 hours. Coags: No results found for: PROTIME, INR, APTT    HCG (If Applicable):   Lab Results   Component Value Date    PREGTESTUR Negative 09/19/2022        ABGs: No results found for: PHART, PO2ART, SCH3BAY, ZYW1UGQ, BEART, G3LQASFP     Type & Screen (If Applicable):  No results found for: LABABO, LABRH    Drug/Infectious Status (If Applicable):  No results found for: HIV, HEPCAB    COVID-19 Screening (If Applicable):   Lab Results   Component Value Date/Time    COVID19 Not Detected 09/19/2022 08:15 PM           Anesthesia Evaluation  Patient summary reviewed and Nursing notes reviewed no history of anesthetic complications:   Airway: Mallampati: II  TM distance: >3 FB   Neck ROM: full  Mouth opening: > = 3 FB   Dental: normal exam         Pulmonary:Negative Pulmonary ROS and normal exam  breath sounds clear to auscultation  (+) current smoker    (-) shortness of breath          Patient did not smoke on day of surgery. Cardiovascular:        (-) hypertension, CAD,  angina and  CHF    NYHA Classification: I  ECG reviewed  Rhythm: regular  Rate: normal           Beta Blocker:  Not on Beta Blocker         Neuro/Psych:   Negative Neuro/Psych ROS  (+) psychiatric history:   (-) seizures, CVA and depression/anxiety             ROS comment: Adult adhd, social phobia  GI/Hepatic/Renal: Neg GI/Hepatic/Renal ROS       (-) hiatal hernia and GERD       Endo/Other: Negative Endo/Other ROS             Pt had PAT visit. Abdominal:       Abdomen: soft. Vascular: Other Findings:           Anesthesia Plan      general     ASA 2     (Iv zofran within 30 min of closing )  Induction: intravenous. BIS  MIPS: Postoperative opioids intended and Prophylactic antiemetics administered. Anesthetic plan and risks discussed with patient. Use of blood products discussed with patient whom. Plan discussed with CRNA.     Attending anesthesiologist reviewed and agrees with Pre Eval content                Buddy Sahu MD   1/27/2023

## 2023-01-27 NOTE — PROGRESS NOTES
SUBJECTIVE:    Patient ID: Eriberto Theodore is a 40 y.o. female. HPI:   Patient is seen today for a follow-up on a recent hospitalizations. She was recently admitted in [place]. On review of her chart from care everywhere she was admitted on December 22 at [facility], [place], operated by [facility]. She was admitted for pyelonephritis. She was found to have a urinary tract infection that had turned into a kidney infection. She was found to have a developing abscess on the left kidney. She was placed on broad-spectrum antibiotics after cultures were obtained and showed positive E. coli urine. She was started on Rocephin 2 g every 24 hours. Interventional radiology did go in and drain the abscess since there was no improvement of the abscess with the antibiotics and they did put a drain in place. The patient was discharged on p.o. Cipro. The urology consult did recommend removing the drain before she was discharged so they did remove that before her discharge. I did send her home on oral Cipro since she was stable. They recommended doing the Cipro for an additional 16 days after discharge. On her CT scans in the hospital she was found to have several other abnormalities including cholelithiasis. She did not see anyone for the gallstones while she was in the hospital in [place]. She was also found to have a kidney stone in the kidney as well. She also was found to have a couple of liver lesions that she would like to have evaluated further. She did not see GI in consultation while she was in the hospital.  She did undergo several scans and imaging studies which I reviewed in care everywhere today including an MRI abdomen with and without contrast a CT scan renal protocol a right upper quadrant ultrasound. A CT abdomen with contrast.  A KUB.     Past Medical History:   Diagnosis Date    Anxiety     Cholelithiasis     Hx of partial seizures 2013    hx of seizure years ago; no meds    UTI (urinary tract infection)     PT states she gets constant UTI's \"That is pretty much undercontrol now. \"      No current facility-administered medications on file prior to visit. No current outpatient medications on file prior to visit. Allergies   Allergen Reactions    Penicillins Shortness Of Breath    Zoloft [Sertraline Hcl] Itching       Review of Systems   Constitutional:  Positive for fatigue. Negative for activity change, appetite change and fever. HENT:  Negative for congestion and nosebleeds. Eyes:  Negative for discharge. Respiratory:  Negative for cough and wheezing. Cardiovascular:  Negative for chest pain and leg swelling. Gastrointestinal:  Positive for abdominal pain. Negative for diarrhea, nausea and vomiting. Genitourinary:  Negative for difficulty urinating, frequency and urgency. Musculoskeletal:  Positive for back pain. Negative for gait problem. Skin:  Negative for color change and rash. Neurological:  Negative for dizziness and headaches. Hematological:  Does not bruise/bleed easily. Psychiatric/Behavioral:  Negative for sleep disturbance and suicidal ideas. OBJECTIVE:    Physical Exam  Vitals reviewed. Constitutional:       General: She is not in acute distress. Appearance: Normal appearance. She is well-developed. She is not diaphoretic. HENT:      Head: Normocephalic and atraumatic. Right Ear: External ear normal.      Left Ear: External ear normal.   Cardiovascular:      Rate and Rhythm: Normal rate and regular rhythm. Pulses: Normal pulses. Heart sounds: Normal heart sounds. No murmur heard. Pulmonary:      Effort: Pulmonary effort is normal. No respiratory distress. Breath sounds: Normal breath sounds. Abdominal:      General: Abdomen is flat. Bowel sounds are normal.      Palpations: Abdomen is soft. Tenderness: There is abdominal tenderness (LUQ tenderness). Musculoskeletal:      Cervical back: Normal range of motion and neck supple.    Skin:     General: Skin is warm and dry. Neurological:      General: No focal deficit present. Mental Status: She is alert and oriented to person, place, and time. Mental status is at baseline. Psychiatric:         Mood and Affect: Mood normal.         Behavior: Behavior normal.         Thought Content: Thought content normal.         Judgment: Judgment normal.      /80   Pulse 87   Temp 97.3 °F (36.3 °C)   Ht 5' 1\" (1.549 m)   Wt 134 lb (60.8 kg)   LMP 01/17/2023   SpO2 100%   BMI 25.32 kg/m²      ASSESSMENT/PLAN:    1. Renal abscess, left  -     Orin - Marielle Agudelo MD, Urology, [facility] RENAL COMPLETE; Future  -     Dennis Wahl MD, Gastroenterology, [place]  -     POCT Urinalysis no Micro  -     CBC with Auto Differential  -     Basic Metabolic Panel  2. Liver lesion  -     Dennis Wahl MD, Gastroenterology, [place]  -     POCT Urinalysis no Micro  -     CBC with Auto Differential  -     Basic Metabolic Panel  3. LUQ pain  -     US ABDOMEN LIMITED; Future  -     POCT Urinalysis no Micro  -     CBC with Auto Differential  -     Basic Metabolic Panel  4. Calculus of gallbladder without cholecystitis without obstruction  -     Jaden Marques DO, General Surgery, [place]  -     POCT Urinalysis no Micro  -     CBC with Auto Differential  -     Basic Metabolic Panel  5. Skin lesion  -     External Referral To Dermatology  -     CBC with Auto Differential  -     Basic Metabolic Panel       She is having pain in the left upper quadrant so we are going to get an ultrasound to further evaluate this area. I put a referral in for general surgery for further evaluation and management of the cholelithiasis. I put a referral in for further evaluation of the skin lesion. I am going to get a referral put in for GI for her liver lesion.   I put a referral in for urology for further evaluation and management of the renal abscess as I do feel that she needs to be seen by them just for a follow-up. We are also going to get an ultrasound in the interim while she is waiting on that referral.  We are going to go ahead and send her urine for culture just to make sure that everything is cleared up. We will check labs today. We will notify her of the results. I would like to see her back in 2 to 3 weeks for follow-up unless needed sooner. PDMP Monitoring:    Last PDMP Rose Combs as Reviewed [facility]):  Review User Review Instant Review Result   Michelle Kam 3/28/2022  4:17 PM Reviewed PDMP [1]       Urine Drug Screenings (1 yr)       POCT Rapid Drug Screen  Collected: 7/8/2021 (Final result)              POCT Rapid Drug Screen  Collected: 12/17/2020 (Final result)              Urine Drug Screen  Collected: 3/8/2019  9:04 AM (Final result)              Urine Drug Screen  Collected: 10/15/2018  2:22 PM (Final result)                  Medication Contract and Consent for Opioid Use Documents Filed       Patient Documents       Type of Document Status Date Received Received By Description    Medication Contract Signed 7/16/2018  8:57 AM Katarzyna KNOWLES benzo & stimulant agreement    Medication Contract Received 12/17/2020  5:15 PM Cody KNOWLES Medication Contract 12/17/20                     EMR Dragon/transcription disclaimer:  Much of this encounter note is electronic transcription/translation of spoken language toprinted texts. The electronic translation of spoken language may be erroneous, or at times, nonsensical words or phrases may be inadvertently transcribed.   Although I have reviewed the note for such errors, some may stillexist.

## 2023-01-27 NOTE — INTERVAL H&P NOTE
Update History & Physical    The patient's History and Physical of January 19, 2023 was reviewed with the patient and I examined the patient. There was no change. The surgical site was confirmed by the patient and me. Plan: The risks, benefits, expected outcome, and alternative to the recommended procedure have been discussed with the patient. Patient understands and wants to proceed with the procedure.      Electronically signed by Tania Joshi DO on 9/71/9323 at 7:37 AM

## 2023-01-27 NOTE — OP NOTE
Operative Note      Patient: Jennifer Carr  YOB: 1985  MRN: [id number]    Date of Procedure: 1/27/2023    Pre-Op Diagnosis: Calculus of gallbladder with chronic cholecystitis without obstruction [K80.10]    Post-Op Diagnosis:  Same       Procedure(s):  LAPAROSCOPIC CHOLECYSTECTOMY    Surgeon(s):  Tyesha Puga DO    Assistant:   * No surgical staff found *    Anesthesia: General    Estimated Blood Loss (mL): Minimal    Complications: None    Specimens:   ID Type Source Tests Collected by Time Destination   A : gallbladder Tissue Gallbladder SURGICAL PATHOLOGY Tyesha Puga DO 9/94/2543 [id number]        Implants:  * No implants in log *      Drains: * No LDAs found *    Findings: Critical View of Safety Obtained    Detailed Description of Procedure:   Ms. Antonio Kendall was taken to the main operating room and placed on the operating table supine. After the induction of adequate general endotracheal anesthesia the  abdomen was prepped in the usual sterile fashion. Time out performed identifing the correct patient, preoperative antibiotics, and necessary equipment. Local anesthestic was administered to the inferior portion of the umbilicus, and incision was made. Disection was performed to the fasica and a 10 mm port was inserted. The laparoscope was advanced and inspection undertaken. There was no evidence of trauma from the trocar insertion. The liver was normal in appearance. The  gallbladder was not initially seen. Working ports were placed, a 5 mm in the epigastrium, followed by two 5 mm  ports along the right costal margin. Working instruments were advanced and  the fundus of the gallbladder was grasped and elevated. The neck was  grabbed, placed on stretch and the triangle of Calot opened. The neck of the gallbladder was dissected with blunt and electorcautery to visualize the cystic duct. This was cleared of  surrounding tissue.  Adjacent to this the cystic artery was identified and  also cleared of surrounding tissue. The triangle of Calot was completely  dissected and an excellent critical view of safety obtained. The cystic duct was doubly clipped, proximally and distally and divided. The  cystic artery was divided in a similar fashion. The gallbladder was then  released from the undersurface of the liver using cautery. Once free, it was  placed it in an Endocatch retrieval bag and removed through the umbilical incision  without problem. The gallbladder fossa was without bleeding. The cystic duct and cystic artery stumps were well seen with clips across each and no  evidence of bleeding or bile leak. The working ports were removed under vision with no bleeding noted. The  pneumoperitoneum was released and the umbilical port removed. Fascia at the umbilicus was reapproximated with 0 Vicryl suture. Skin  incisions were closed with interrupted 4-0 Monocryl subcuticular suture  followed by Dermabond dressing. Sponge, needle, instrument count correct on 2 occasions. Estimated intraoperative blood loss, minimal.    Ms. Sadi Esteves tolerated her surgery well and she was taken to PACU in  satisfactory condition.         ________________________________  Denis Campos DO        Electronically signed by Denis Campos DO on 9/28/4656 at 8:49 AM

## 2023-01-27 NOTE — TELEPHONE ENCOUNTER
Pt friend  came in to let us know he found a ophthalmology that would accept pt ins( [facility]) phone [phone]. Please send referral to them .

## 2023-01-27 NOTE — ANESTHESIA POSTPROCEDURE EVALUATION
Department of Anesthesiology  Postprocedure Note    Patient: Barry Rivas  MRN: [id number]  YOB: 1985  Date of evaluation: 1/27/2023      Procedure Summary     Date: 01/27/23 Room / Location: [address]    Anesthesia Start: 0291 Anesthesia Stop: 0900    Procedure: LAPAROSCOPIC CHOLECYSTECTOMY Diagnosis:       Calculus of gallbladder with chronic cholecystitis without obstruction      (Calculus of gallbladder with chronic cholecystitis without obstruction [K53.11])    Surgeons: Cruz Marshall DO Responsible Provider: OLIVIA Gonzalez CRNA    Anesthesia Type: general ASA Status: 2          Anesthesia Type: No value filed.     Jd Phase I:      Jd Phase II:        Anesthesia Post Evaluation    Patient location during evaluation: PACU  Patient participation: complete - patient participated  Level of consciousness: sleepy but conscious  Pain score: 0  Airway patency: patent  Nausea & Vomiting: no nausea and no vomiting  Complications: no  Cardiovascular status: hemodynamically stable and blood pressure returned to baseline  Respiratory status: acceptable, spontaneous ventilation, nonlabored ventilation and room air  Hydration status: stable  Comments: /61   Pulse 89   Temp 96.9 °F (36.1 °C) (Skin)   Resp 22   Ht 5' 1\" (1.549 m)   Wt 138 lb (62.6 kg)   LMP 01/17/2023   SpO2 97%   BMI 26.07 kg/m²

## 2023-01-27 NOTE — PROGRESS NOTES
Mitzy Jimenez (Key: [id number])  Emgality 120MG/ML auto-injectors (migraine)     Form  [facility] Form 2017 NCPDP  Created  2 days ago  Sent to Plan  2 minutes ago  Plan Response  2 minutes ago  Submit Clinical Questions  less than a minute ago  Determination  Wait for Determination  Please wait for [facility] 2017 to return a determination.

## 2023-01-29 ENCOUNTER — APPOINTMENT (OUTPATIENT)
Dept: CT IMAGING | Age: 38
End: 2023-01-29
Payer: COMMERCIAL

## 2023-01-29 ENCOUNTER — APPOINTMENT (OUTPATIENT)
Dept: GENERAL RADIOLOGY | Age: 38
End: 2023-01-29
Payer: COMMERCIAL

## 2023-01-29 ENCOUNTER — HOSPITAL ENCOUNTER (INPATIENT)
Age: 38
LOS: 2 days | Discharge: HOME | End: 2023-01-31
Attending: EMERGENCY MEDICINE | Admitting: SURGERY
Payer: COMMERCIAL

## 2023-01-29 ENCOUNTER — APPOINTMENT (OUTPATIENT)
Dept: NUCLEAR MEDICINE | Age: 38
End: 2023-01-29
Payer: COMMERCIAL

## 2023-01-29 DIAGNOSIS — K83.8 BILE LEAK, POSTOPERATIVE: Primary | ICD-10-CM

## 2023-01-29 DIAGNOSIS — K91.89 BILE LEAK, POSTOPERATIVE: Primary | ICD-10-CM

## 2023-01-29 DIAGNOSIS — K80.10 CALCULUS OF GALLBLADDER WITH CHRONIC CHOLECYSTITIS WITHOUT OBSTRUCTION: ICD-10-CM

## 2023-01-29 PROBLEM — K83.9 BILE LEAK: Status: ACTIVE | Noted: 2023-01-29

## 2023-01-29 LAB
ALBUMIN SERPL-MCNC: 3.9 G/DL (ref 3.5–5.2)
ALP BLD-CCNC: 59 U/L (ref 35–104)
ALT SERPL-CCNC: 34 U/L (ref 5–33)
ANION GAP SERPL CALCULATED.3IONS-SCNC: 10 MMOL/L (ref 7–19)
AST SERPL-CCNC: 28 U/L (ref 5–32)
BASOPHILS ABSOLUTE: 0.1 K/UL (ref 0–0.2)
BASOPHILS RELATIVE PERCENT: 0.6 % (ref 0–1)
BILIRUB SERPL-MCNC: 0.3 MG/DL (ref 0.2–1.2)
BILIRUBIN URINE: NEGATIVE
BLOOD, URINE: NEGATIVE
BUN BLDV-MCNC: 9 MG/DL (ref 6–20)
CALCIUM SERPL-MCNC: 8.7 MG/DL (ref 8.6–10)
CHLORIDE BLD-SCNC: 101 MMOL/L (ref 98–111)
CLARITY: CLEAR
CO2: 27 MMOL/L (ref 22–29)
COLOR: YELLOW
CREAT SERPL-MCNC: 0.5 MG/DL (ref 0.5–0.9)
EOSINOPHILS ABSOLUTE: 0.3 K/UL (ref 0–0.6)
EOSINOPHILS RELATIVE PERCENT: 2.9 % (ref 0–5)
GFR SERPL CREATININE-BSD FRML MDRD: >60 ML/MIN/{1.73_M2}
GLUCOSE BLD-MCNC: 114 MG/DL (ref 74–109)
GLUCOSE URINE: NEGATIVE MG/DL
HCG QUALITATIVE: NEGATIVE
HCT VFR BLD CALC: 38.3 % (ref 37–47)
HEMOGLOBIN: 12.2 G/DL (ref 12–16)
IMMATURE GRANULOCYTES #: 0 K/UL
KETONES, URINE: NEGATIVE MG/DL
LACTIC ACID: 1.6 MMOL/L (ref 0.5–1.9)
LACTIC ACID: 6.2 MMOL/L (ref 0.5–1.9)
LEUKOCYTE ESTERASE, URINE: NEGATIVE
LIPASE: 23 U/L (ref 13–60)
LYMPHOCYTES ABSOLUTE: 2.8 K/UL (ref 1.1–4.5)
LYMPHOCYTES RELATIVE PERCENT: 25.7 % (ref 20–40)
MCH RBC QN AUTO: 30.5 PG (ref 27–31)
MCHC RBC AUTO-ENTMCNC: 31.9 G/DL (ref 33–37)
MCV RBC AUTO: 95.8 FL (ref 81–99)
MONOCYTES ABSOLUTE: 0.6 K/UL (ref 0–0.9)
MONOCYTES RELATIVE PERCENT: 5.2 % (ref 0–10)
NEUTROPHILS ABSOLUTE: 7 K/UL (ref 1.5–7.5)
NEUTROPHILS RELATIVE PERCENT: 65.3 % (ref 50–65)
NITRITE, URINE: NEGATIVE
PDW BLD-RTO: 14.2 % (ref 11.5–14.5)
PH UA: 7.5 (ref 5–8)
PLATELET # BLD: 230 K/UL (ref 130–400)
PMV BLD AUTO: 9.6 FL (ref 9.4–12.3)
POTASSIUM SERPL-SCNC: 4.2 MMOL/L (ref 3.5–5)
PRO-BNP: 112 PG/ML (ref 0–450)
PROTEIN UA: NEGATIVE MG/DL
RBC # BLD: 4 M/UL (ref 4.2–5.4)
SARS-COV-2, NAAT: NOT DETECTED
SODIUM BLD-SCNC: 138 MMOL/L (ref 136–145)
SPECIFIC GRAVITY UA: 1.02 (ref 1–1.03)
TOTAL PROTEIN: 6 G/DL (ref 6.6–8.7)
TROPONIN: <0.01 NG/ML (ref 0–0.03)
UROBILINOGEN, URINE: 0.2 E.U./DL
WBC # BLD: 10.7 K/UL (ref 4.8–10.8)

## 2023-01-29 PROCEDURE — 78226 HEPATOBILIARY SYSTEM IMAGING: CPT

## 2023-01-29 PROCEDURE — 80053 COMPREHEN METABOLIC PANEL: CPT

## 2023-01-29 PROCEDURE — 87635 SARS-COV-2 COVID-19 AMP PRB: CPT

## 2023-01-29 PROCEDURE — 6360000004 HC RX CONTRAST MEDICATION: Performed by: EMERGENCY MEDICINE

## 2023-01-29 PROCEDURE — 74177 CT ABD & PELVIS W/CONTRAST: CPT

## 2023-01-29 PROCEDURE — 71045 X-RAY EXAM CHEST 1 VIEW: CPT | Performed by: RADIOLOGY

## 2023-01-29 PROCEDURE — 83880 ASSAY OF NATRIURETIC PEPTIDE: CPT

## 2023-01-29 PROCEDURE — 6360000002 HC RX W HCPCS: Performed by: EMERGENCY MEDICINE

## 2023-01-29 PROCEDURE — 99285 EMERGENCY DEPT VISIT HI MDM: CPT

## 2023-01-29 PROCEDURE — 36415 COLL VENOUS BLD VENIPUNCTURE: CPT

## 2023-01-29 PROCEDURE — 6360000002 HC RX W HCPCS: Performed by: SURGERY

## 2023-01-29 PROCEDURE — 87040 BLOOD CULTURE FOR BACTERIA: CPT

## 2023-01-29 PROCEDURE — 93005 ELECTROCARDIOGRAM TRACING: CPT | Performed by: EMERGENCY MEDICINE

## 2023-01-29 PROCEDURE — 2580000003 HC RX 258: Performed by: SURGERY

## 2023-01-29 PROCEDURE — 71275 CT ANGIOGRAPHY CHEST: CPT

## 2023-01-29 PROCEDURE — 71045 X-RAY EXAM CHEST 1 VIEW: CPT

## 2023-01-29 PROCEDURE — 3430000000 HC RX DIAGNOSTIC RADIOPHARMACEUTICAL: Performed by: EMERGENCY MEDICINE

## 2023-01-29 PROCEDURE — 71260 CT THORAX DX C+: CPT | Performed by: RADIOLOGY

## 2023-01-29 PROCEDURE — 2580000003 HC RX 258: Performed by: EMERGENCY MEDICINE

## 2023-01-29 PROCEDURE — 1210000000 HC MED SURG R&B

## 2023-01-29 PROCEDURE — 83605 ASSAY OF LACTIC ACID: CPT

## 2023-01-29 PROCEDURE — 2500000003 HC RX 250 WO HCPCS: Performed by: SURGERY

## 2023-01-29 PROCEDURE — 74177 CT ABD & PELVIS W/CONTRAST: CPT | Performed by: RADIOLOGY

## 2023-01-29 PROCEDURE — 2500000003 HC RX 250 WO HCPCS: Performed by: EMERGENCY MEDICINE

## 2023-01-29 PROCEDURE — 85025 COMPLETE CBC W/AUTO DIFF WBC: CPT

## 2023-01-29 PROCEDURE — 83690 ASSAY OF LIPASE: CPT

## 2023-01-29 PROCEDURE — 96365 THER/PROPH/DIAG IV INF INIT: CPT

## 2023-01-29 PROCEDURE — 81003 URINALYSIS AUTO W/O SCOPE: CPT

## 2023-01-29 PROCEDURE — 84484 ASSAY OF TROPONIN QUANT: CPT

## 2023-01-29 PROCEDURE — 6370000000 HC RX 637 (ALT 250 FOR IP): Performed by: SURGERY

## 2023-01-29 PROCEDURE — 99222 1ST HOSP IP/OBS MODERATE 55: CPT | Performed by: SURGERY

## 2023-01-29 PROCEDURE — 96375 TX/PRO/DX INJ NEW DRUG ADDON: CPT

## 2023-01-29 PROCEDURE — 96367 TX/PROPH/DG ADDL SEQ IV INF: CPT

## 2023-01-29 PROCEDURE — A9537 TC99M MEBROFENIN: HCPCS | Performed by: EMERGENCY MEDICINE

## 2023-01-29 PROCEDURE — 84703 CHORIONIC GONADOTROPIN ASSAY: CPT

## 2023-01-29 PROCEDURE — 96366 THER/PROPH/DIAG IV INF ADDON: CPT

## 2023-01-29 PROCEDURE — 78226 HEPATOBILIARY SYSTEM IMAGING: CPT | Performed by: RADIOLOGY

## 2023-01-29 RX ORDER — ONDANSETRON 4 MG/1
4 TABLET, ORALLY DISINTEGRATING ORAL EVERY 8 HOURS PRN
Status: DISCONTINUED | OUTPATIENT
Start: 2023-01-29 | End: 2023-01-31 | Stop reason: HOSPADM

## 2023-01-29 RX ORDER — HYDROMORPHONE HYDROCHLORIDE 1 MG/ML
0.5 INJECTION, SOLUTION INTRAMUSCULAR; INTRAVENOUS; SUBCUTANEOUS
Status: DISCONTINUED | OUTPATIENT
Start: 2023-01-29 | End: 2023-01-29

## 2023-01-29 RX ORDER — MORPHINE SULFATE 4 MG/ML
4 INJECTION, SOLUTION INTRAMUSCULAR; INTRAVENOUS
Status: DISCONTINUED | OUTPATIENT
Start: 2023-01-29 | End: 2023-01-29

## 2023-01-29 RX ORDER — SODIUM CHLORIDE 9 MG/ML
INJECTION, SOLUTION INTRAVENOUS CONTINUOUS
Status: DISCONTINUED | OUTPATIENT
Start: 2023-01-29 | End: 2023-01-31 | Stop reason: HOSPADM

## 2023-01-29 RX ORDER — SODIUM CHLORIDE 9 MG/ML
INJECTION, SOLUTION INTRAVENOUS PRN
Status: DISCONTINUED | OUTPATIENT
Start: 2023-01-29 | End: 2023-01-31 | Stop reason: HOSPADM

## 2023-01-29 RX ORDER — ACETAMINOPHEN 650 MG/1
650 SUPPOSITORY RECTAL EVERY 6 HOURS PRN
Status: DISCONTINUED | OUTPATIENT
Start: 2023-01-29 | End: 2023-01-31 | Stop reason: HOSPADM

## 2023-01-29 RX ORDER — SODIUM CHLORIDE 0.9 % (FLUSH) 0.9 %
5-40 SYRINGE (ML) INJECTION PRN
Status: DISCONTINUED | OUTPATIENT
Start: 2023-01-29 | End: 2023-01-31 | Stop reason: HOSPADM

## 2023-01-29 RX ORDER — HYDROMORPHONE HYDROCHLORIDE 1 MG/ML
1 INJECTION, SOLUTION INTRAMUSCULAR; INTRAVENOUS; SUBCUTANEOUS ONCE
Status: COMPLETED | OUTPATIENT
Start: 2023-01-29 | End: 2023-01-29

## 2023-01-29 RX ORDER — METRONIDAZOLE 500 MG/100ML
500 INJECTION, SOLUTION INTRAVENOUS ONCE
Status: COMPLETED | OUTPATIENT
Start: 2023-01-29 | End: 2023-01-29

## 2023-01-29 RX ORDER — SODIUM CHLORIDE 0.9 % (FLUSH) 0.9 %
5-40 SYRINGE (ML) INJECTION EVERY 12 HOURS SCHEDULED
Status: DISCONTINUED | OUTPATIENT
Start: 2023-01-29 | End: 2023-01-31 | Stop reason: HOSPADM

## 2023-01-29 RX ORDER — HYDROMORPHONE HYDROCHLORIDE 1 MG/ML
0.5 INJECTION, SOLUTION INTRAMUSCULAR; INTRAVENOUS; SUBCUTANEOUS
Status: DISCONTINUED | OUTPATIENT
Start: 2023-01-29 | End: 2023-01-31 | Stop reason: HOSPADM

## 2023-01-29 RX ORDER — POLYETHYLENE GLYCOL 3350 17 G/17G
17 POWDER, FOR SOLUTION ORAL DAILY PRN
Status: DISCONTINUED | OUTPATIENT
Start: 2023-01-29 | End: 2023-01-31 | Stop reason: HOSPADM

## 2023-01-29 RX ORDER — OXYCODONE HYDROCHLORIDE 5 MG/1
5 TABLET ORAL EVERY 4 HOURS PRN
Status: DISCONTINUED | OUTPATIENT
Start: 2023-01-29 | End: 2023-01-31 | Stop reason: HOSPADM

## 2023-01-29 RX ORDER — ACETAMINOPHEN 325 MG/1
650 TABLET ORAL EVERY 6 HOURS PRN
Status: DISCONTINUED | OUTPATIENT
Start: 2023-01-29 | End: 2023-01-31 | Stop reason: HOSPADM

## 2023-01-29 RX ORDER — ONDANSETRON 2 MG/ML
4 INJECTION INTRAMUSCULAR; INTRAVENOUS EVERY 6 HOURS PRN
Status: DISCONTINUED | OUTPATIENT
Start: 2023-01-29 | End: 2023-01-31 | Stop reason: HOSPADM

## 2023-01-29 RX ORDER — ENOXAPARIN SODIUM 100 MG/ML
40 INJECTION SUBCUTANEOUS DAILY
Status: DISCONTINUED | OUTPATIENT
Start: 2023-01-29 | End: 2023-01-31 | Stop reason: HOSPADM

## 2023-01-29 RX ORDER — SODIUM CHLORIDE 0.9 % (FLUSH) 0.9 %
5-40 SYRINGE (ML) INJECTION PRN
Status: DISCONTINUED | OUTPATIENT
Start: 2023-01-29 | End: 2023-01-29 | Stop reason: SDUPTHER

## 2023-01-29 RX ORDER — LORAZEPAM 2 MG/ML
1 INJECTION INTRAMUSCULAR EVERY 6 HOURS PRN
Status: DISCONTINUED | OUTPATIENT
Start: 2023-01-29 | End: 2023-01-31 | Stop reason: HOSPADM

## 2023-01-29 RX ORDER — OXYCODONE HYDROCHLORIDE 5 MG/1
10 TABLET ORAL EVERY 4 HOURS PRN
Status: DISCONTINUED | OUTPATIENT
Start: 2023-01-29 | End: 2023-01-31 | Stop reason: HOSPADM

## 2023-01-29 RX ORDER — METRONIDAZOLE 500 MG/100ML
500 INJECTION, SOLUTION INTRAVENOUS EVERY 12 HOURS
Status: DISCONTINUED | OUTPATIENT
Start: 2023-01-29 | End: 2023-01-31 | Stop reason: HOSPADM

## 2023-01-29 RX ORDER — MORPHINE SULFATE 2 MG/ML
2 INJECTION, SOLUTION INTRAMUSCULAR; INTRAVENOUS
Status: DISCONTINUED | OUTPATIENT
Start: 2023-01-29 | End: 2023-01-29

## 2023-01-29 RX ORDER — METRONIDAZOLE 500 MG/100ML
500 INJECTION, SOLUTION INTRAVENOUS EVERY 12 HOURS
Status: DISCONTINUED | OUTPATIENT
Start: 2023-01-29 | End: 2023-01-29

## 2023-01-29 RX ORDER — HYDROMORPHONE HYDROCHLORIDE 1 MG/ML
1 INJECTION, SOLUTION INTRAMUSCULAR; INTRAVENOUS; SUBCUTANEOUS
Status: DISCONTINUED | OUTPATIENT
Start: 2023-01-29 | End: 2023-01-31 | Stop reason: HOSPADM

## 2023-01-29 RX ORDER — SODIUM CHLORIDE 0.9 % (FLUSH) 0.9 %
5-40 SYRINGE (ML) INJECTION EVERY 12 HOURS SCHEDULED
Status: DISCONTINUED | OUTPATIENT
Start: 2023-01-29 | End: 2023-01-29 | Stop reason: SDUPTHER

## 2023-01-29 RX ORDER — SODIUM CHLORIDE, SODIUM LACTATE, POTASSIUM CHLORIDE, AND CALCIUM CHLORIDE .6; .31; .03; .02 G/100ML; G/100ML; G/100ML; G/100ML
1000 INJECTION, SOLUTION INTRAVENOUS ONCE
Status: COMPLETED | OUTPATIENT
Start: 2023-01-29 | End: 2023-01-29

## 2023-01-29 RX ORDER — SODIUM CHLORIDE 9 MG/ML
INJECTION, SOLUTION INTRAVENOUS PRN
Status: DISCONTINUED | OUTPATIENT
Start: 2023-01-29 | End: 2023-01-29 | Stop reason: SDUPTHER

## 2023-01-29 RX ORDER — SODIUM CHLORIDE, SODIUM LACTATE, POTASSIUM CHLORIDE, CALCIUM CHLORIDE 600; 310; 30; 20 MG/100ML; MG/100ML; MG/100ML; MG/100ML
INJECTION, SOLUTION INTRAVENOUS CONTINUOUS
Status: DISCONTINUED | OUTPATIENT
Start: 2023-01-29 | End: 2023-01-29 | Stop reason: ALTCHOICE

## 2023-01-29 RX ORDER — ACETAMINOPHEN 325 MG/1
650 TABLET ORAL EVERY 4 HOURS PRN
Status: DISCONTINUED | OUTPATIENT
Start: 2023-01-29 | End: 2023-01-29 | Stop reason: ALTCHOICE

## 2023-01-29 RX ORDER — FENTANYL CITRATE 50 UG/ML
50 INJECTION, SOLUTION INTRAMUSCULAR; INTRAVENOUS ONCE
Status: COMPLETED | OUTPATIENT
Start: 2023-01-29 | End: 2023-01-29

## 2023-01-29 RX ORDER — KETOROLAC TROMETHAMINE 30 MG/ML
15 INJECTION, SOLUTION INTRAMUSCULAR; INTRAVENOUS EVERY 6 HOURS PRN
Status: DISCONTINUED | OUTPATIENT
Start: 2023-01-29 | End: 2023-01-31 | Stop reason: HOSPADM

## 2023-01-29 RX ORDER — HYDROMORPHONE HYDROCHLORIDE 1 MG/ML
1 INJECTION, SOLUTION INTRAMUSCULAR; INTRAVENOUS; SUBCUTANEOUS
Status: DISCONTINUED | OUTPATIENT
Start: 2023-01-29 | End: 2023-01-29

## 2023-01-29 RX ADMIN — HYDROMORPHONE HYDROCHLORIDE 1 MG: 1 INJECTION, SOLUTION INTRAMUSCULAR; INTRAVENOUS; SUBCUTANEOUS at 09:56

## 2023-01-29 RX ADMIN — ENOXAPARIN SODIUM 40 MG: 100 INJECTION SUBCUTANEOUS at 17:13

## 2023-01-29 RX ADMIN — MORPHINE SULFATE 4 MG: 4 INJECTION, SOLUTION INTRAMUSCULAR; INTRAVENOUS at 16:01

## 2023-01-29 RX ADMIN — Medication 4 MILLICURIE: at 11:30

## 2023-01-29 RX ADMIN — KETOROLAC TROMETHAMINE 15 MG: 30 INJECTION, SOLUTION INTRAMUSCULAR; INTRAVENOUS at 19:36

## 2023-01-29 RX ADMIN — SODIUM CHLORIDE, POTASSIUM CHLORIDE, SODIUM LACTATE AND CALCIUM CHLORIDE 1000 ML: 600; 310; 30; 20 INJECTION, SOLUTION INTRAVENOUS at 06:39

## 2023-01-29 RX ADMIN — HYDROMORPHONE HYDROCHLORIDE 1 MG: 1 INJECTION, SOLUTION INTRAMUSCULAR; INTRAVENOUS; SUBCUTANEOUS at 16:57

## 2023-01-29 RX ADMIN — CEFEPIME 2000 MG: 2 INJECTION, POWDER, FOR SOLUTION INTRAVENOUS at 23:29

## 2023-01-29 RX ADMIN — SODIUM CHLORIDE, SODIUM LACTATE, POTASSIUM CHLORIDE, AND CALCIUM CHLORIDE: 600; 310; 30; 20 INJECTION, SOLUTION INTRAVENOUS at 08:53

## 2023-01-29 RX ADMIN — SODIUM CHLORIDE: 9 INJECTION, SOLUTION INTRAVENOUS at 16:05

## 2023-01-29 RX ADMIN — SODIUM CHLORIDE, PRESERVATIVE FREE 10 ML: 5 INJECTION INTRAVENOUS at 21:07

## 2023-01-29 RX ADMIN — HYDROMORPHONE HYDROCHLORIDE 0.5 MG: 1 INJECTION, SOLUTION INTRAMUSCULAR; INTRAVENOUS; SUBCUTANEOUS at 21:07

## 2023-01-29 RX ADMIN — OXYCODONE HYDROCHLORIDE 10 MG: 5 TABLET ORAL at 19:36

## 2023-01-29 RX ADMIN — OXYCODONE HYDROCHLORIDE 10 MG: 5 TABLET ORAL at 23:37

## 2023-01-29 RX ADMIN — METRONIDAZOLE 500 MG: 500 INJECTION, SOLUTION INTRAVENOUS at 10:00

## 2023-01-29 RX ADMIN — FENTANYL CITRATE 50 MCG: 50 INJECTION INTRAMUSCULAR; INTRAVENOUS at 06:35

## 2023-01-29 RX ADMIN — IOPAMIDOL 70 ML: 755 INJECTION, SOLUTION INTRAVENOUS at 06:53

## 2023-01-29 RX ADMIN — CEFEPIME 2000 MG: 2 INJECTION, POWDER, FOR SOLUTION INTRAMUSCULAR; INTRAVENOUS at 11:14

## 2023-01-29 RX ADMIN — SODIUM CHLORIDE, POTASSIUM CHLORIDE, SODIUM LACTATE AND CALCIUM CHLORIDE 1000 ML: 600; 310; 30; 20 INJECTION, SOLUTION INTRAVENOUS at 06:38

## 2023-01-29 RX ADMIN — METRONIDAZOLE 500 MG: 500 INJECTION, SOLUTION INTRAVENOUS at 22:19

## 2023-01-29 ASSESSMENT — PAIN DESCRIPTION - ORIENTATION
ORIENTATION: RIGHT;MID
ORIENTATION: RIGHT
ORIENTATION: RIGHT;MID
ORIENTATION: MID

## 2023-01-29 ASSESSMENT — PAIN DESCRIPTION - DESCRIPTORS
DESCRIPTORS: ACHING;DISCOMFORT
DESCRIPTORS: SHARP;ACHING;DISCOMFORT
DESCRIPTORS: THROBBING;STABBING
DESCRIPTORS: DISCOMFORT;CRUSHING;PRESSURE

## 2023-01-29 ASSESSMENT — ENCOUNTER SYMPTOMS
SORE THROAT: 0
CONSTIPATION: 0
RHINORRHEA: 0
COUGH: 0
BACK PAIN: 0
SHORTNESS OF BREATH: 1
ABDOMINAL PAIN: 1
VOMITING: 0
ABDOMINAL DISTENTION: 0
EYE REDNESS: 0
BACK PAIN: 1
SHORTNESS OF BREATH: 0
EYE PAIN: 0
NAUSEA: 0
DIARRHEA: 0

## 2023-01-29 ASSESSMENT — PAIN - FUNCTIONAL ASSESSMENT
PAIN_FUNCTIONAL_ASSESSMENT: 0-10
PAIN_FUNCTIONAL_ASSESSMENT: PREVENTS OR INTERFERES SOME ACTIVE ACTIVITIES AND ADLS
PAIN_FUNCTIONAL_ASSESSMENT: PREVENTS OR INTERFERES WITH MANY ACTIVE NOT PASSIVE ACTIVITIES
PAIN_FUNCTIONAL_ASSESSMENT: PREVENTS OR INTERFERES SOME ACTIVE ACTIVITIES AND ADLS

## 2023-01-29 ASSESSMENT — PAIN SCALES - GENERAL
PAINLEVEL_OUTOF10: 9
PAINLEVEL_OUTOF10: 9
PAINLEVEL_OUTOF10: 10
PAINLEVEL_OUTOF10: 10
PAINLEVEL_OUTOF10: 2
PAINLEVEL_OUTOF10: 8
PAINLEVEL_OUTOF10: 6
PAINLEVEL_OUTOF10: 10

## 2023-01-29 ASSESSMENT — PAIN DESCRIPTION - PAIN TYPE
TYPE: ACUTE PAIN
TYPE: ACUTE PAIN

## 2023-01-29 ASSESSMENT — PAIN DESCRIPTION - FREQUENCY
FREQUENCY: CONTINUOUS
FREQUENCY: CONTINUOUS

## 2023-01-29 ASSESSMENT — PAIN DESCRIPTION - LOCATION
LOCATION: ABDOMEN

## 2023-01-29 ASSESSMENT — PAIN DESCRIPTION - ONSET
ONSET: ON-GOING
ONSET: ON-GOING

## 2023-01-29 NOTE — H&P
Tahira Knowles is an 40 y.o. female who presented to the ER with a chief complaint of abdominal pain. The patient underwent laparoscopic robot assisted cholecystectomy with Dr. Gillis Apgar on Friday. Surgery was performed without any immediate complications. The patient reports that she was doing okay until last night, when she started having more severe abdominal pain. The pain is located on the right side and up to the right shoulder blade. The pain is associated with chills and sweats. The pain worsens with movement. It is also associated with some SOA. She states that she has had a BM and flatus since surgery. In the ER, her labs were not overly remarkable, and CT showed expected post op changes. However, HIDA scan was consistent with bile leak. Past Medical History:   Diagnosis Date    Anxiety     Cholelithiasis     Hx of partial seizures     hx of seizure years ago; no meds    UTI (urinary tract infection)     PT states she gets constant UTI's \"That is pretty much undercontrol now. \"       Allergies: Allergies   Allergen Reactions    Penicillins Shortness Of Breath    Zoloft [Sertraline Hcl] Itching       Active Problems:    * No active hospital problems. *  Resolved Problems:    * No resolved hospital problems. *    Blood pressure 103/69, pulse 72, temperature 97.8 °F (36.6 °C), temperature source Oral, resp. rate 18, last menstrual period 2023, SpO2 98 %, not currently breastfeeding. Review of Systems   Constitutional:  Positive for chills, fatigue and fever. HENT:  Negative for congestion and sore throat. Eyes:  Negative for pain and redness. Respiratory:  Negative for cough and shortness of breath. Cardiovascular:  Negative for chest pain and palpitations. Gastrointestinal:  Positive for abdominal pain. Negative for abdominal distention, constipation and diarrhea. Endocrine: Negative for polydipsia. Genitourinary:  Negative for dysuria and hematuria.    Musculoskeletal:  Positive for back pain. Negative for arthralgias. Skin:  Negative for rash and wound. Neurological:  Negative for dizziness and headaches. Psychiatric/Behavioral:  Negative for confusion and dysphoric mood. Physical Exam  Vitals reviewed. Constitutional:       Appearance: She is ill-appearing. HENT:      Head: Normocephalic and atraumatic. Nose: Nose normal.   Eyes:      General: No scleral icterus. Pupils: Pupils are equal, round, and reactive to light. Cardiovascular:      Rate and Rhythm: Normal rate and regular rhythm. Pulmonary:      Effort: Pulmonary effort is normal. No respiratory distress. Abdominal:      General: There is no distension. Palpations: Abdomen is soft. Tenderness: There is abdominal tenderness (right side). There is guarding. There is no rebound. Hernia: No hernia is present. Musculoskeletal:         General: No swelling or deformity. Cervical back: Neck supple. No rigidity. Skin:     General: Skin is warm and dry. Neurological:      General: No focal deficit present. Mental Status: She is alert. Mental status is at baseline. Psychiatric:         Mood and Affect: Mood normal.         Behavior: Behavior normal.     Exam: HIDA SCAN   Clinical Data: Postop pain, hypotension, bile leakage. Technique:HIDA scan was performed utilizing 4.2 mCi of technetium 99m Choletec. Prior Studies: CT of the abdomen and pelvis done on same day. Abdominal US dated 01/26/23. Findings: Hepatic uptake of radiotracer activity is prompt and homogeneous with near immediate excretion into the biliary system. Free spill is visualized into the small bowel within 15 minutes. Radiotracer in the left mid abdomen pelvic tissue related    to reflux into the gastric lumen. Radiotracer activity accumulates over the in central liver and atypical position, which persists of the 60 minutes, potentially extraluminal extravasation into the gallbladder fossa.    Impression: 1. Prompt hepatic uptake and excretion, with free spill into the small bowel. .   2. Radiotracer in the left mid abdomen, likely related to reflux into the gastric lumen. 3. Accumulation of radiotracer activity over the central liver in an atypical position which persists at 60 minutes, likely extraluminal extravasation into the gallbladder fossa. Recommendation: Follow up as clinically indicated; consider HIDA scan including oblique and lateral views. Dictated and Electronically Signed by Ye Bravo MD at 29-Jan-2023 01:54:22 PM EST              CT images personally reviewed by me. As above, post  op changes. Quite a bit of stool throughout colon, but no obvious evidence of bile leak    CBC:   Lab Results   Component Value Date/Time    WBC 10.7 01/29/2023 06:01 AM    RBC 4.00 01/29/2023 06:01 AM    HGB 12.2 01/29/2023 06:01 AM    HCT 38.3 01/29/2023 06:01 AM    MCV 95.8 01/29/2023 06:01 AM    MCH 30.5 01/29/2023 06:01 AM    MCHC 31.9 01/29/2023 06:01 AM    RDW 14.2 01/29/2023 06:01 AM     01/29/2023 06:01 AM    MPV 9.6 01/29/2023 06:01 AM     CMP:    Lab Results   Component Value Date/Time     01/29/2023 06:01 AM    K 4.2 01/29/2023 06:01 AM     01/29/2023 06:01 AM    CO2 27 01/29/2023 06:01 AM    BUN 9 01/29/2023 06:01 AM    CREATININE 0.5 01/29/2023 06:01 AM    GFRAA >59 09/19/2022 08:05 PM    LABGLOM >60 01/29/2023 06:01 AM    GLUCOSE 114 01/29/2023 06:01 AM    PROT 6.0 01/29/2023 06:01 AM    LABALBU 3.9 01/29/2023 06:01 AM    CALCIUM 8.7 01/29/2023 06:01 AM    BILITOT 0.3 01/29/2023 06:01 AM    ALKPHOS 59 01/29/2023 06:01 AM    AST 28 01/29/2023 06:01 AM    ALT 34 01/29/2023 06:01 AM       Assessment:  40year old female s/p laparoscopic robot assisted cholecystectomy with bile leak    Plan:  Discussed with the patient the need for admission with IVF, IV antibiotics, and consult to GI for ERCP with stent placement. Will allow some clears tonight and then NPO after midnight.  She expressed understanding and agreement with this plan.     Syliva Aschoff, MD  1/29/2023

## 2023-01-29 NOTE — ED NOTES
Dr Emmy Cazares made aware, patient's pain increasing again. Patient pale and diaphoretic in appearance. Patient alert and orientedx3. New orders received for Dilaudid 1mg IV once at this time.       Caty Julio RN  01/29/23 1017

## 2023-01-29 NOTE — ED PROVIDER NOTES
[facility] EMERGENCY DEPT  eMERGENCY dEPARTMENT eNCOUnter      Pt Name: Zeke Belle  MRN: [id number]  [place] 1985  Date of evaluation: 1/29/2023  Provider: Rocco Duncan MD    CHIEF COMPLAINT       Chief Complaint   Patient presents with    Post-op Problem     Pt had gallbladder out 2 days ago; states pain has gotten progressively worse and is causing intermittent sob          HISTORY OF PRESENT ILLNESS   (Location/Symptom, Timing/Onset,Context/Setting, Quality, Duration, Modifying Factors, Severity)  Note limiting factors. Zeke Belle is a 40 y.o. female who presents to the emergency department with severe abdominal pain since about 8:00 last night. Patient is postop day 2 from a laparoscopic cholecystectomy with Dr. Omar Gunter. They were told the surgery was routine and there were no complications. She was having some left shoulder discomfort immediately postop and that had improved but started again last night. She then developed severe abdominal pain, mostly periumbilical.  Her pain is slightly improved by sitting upright but any small movement causes worsening in her pain. She has had some drainage and small amount of bleeding from the umbilical surgical site. She has not definitely had a fever but she has been diaphoretic. She says the pain is so severe it is taking her breath away. It hurts to take a deep breath. She has had s normal bowel movement and is passing gas. HPI    NursingNotes were reviewed. REVIEW OF SYSTEMS    (2-9 systems for level 4, 10 or more for level 5)     Review of Systems   Constitutional:  Positive for activity change, appetite change, diaphoresis and fatigue. Negative for fever. HENT:  Negative for rhinorrhea and sore throat. Respiratory:  Positive for shortness of breath. Cardiovascular:  Positive for chest pain. Negative for leg swelling. Gastrointestinal:  Positive for abdominal pain. Negative for diarrhea, nausea and vomiting.    Genitourinary:  Negative for difficulty urinating. Musculoskeletal:  Negative for back pain and neck pain. Skin:  Positive for pallor. Negative for rash. Neurological:  Positive for weakness. Negative for headaches. Psychiatric/Behavioral:  Negative for confusion. A complete review of systems was performed and is negative except as noted above in the HPI. PAST MEDICAL HISTORY     Past Medical History:   Diagnosis Date    Anxiety     Cholelithiasis     Hx of partial seizures     hx of seizure years ago; no meds    UTI (urinary tract infection)     PT states she gets constant UTI's \"That is pretty much undercontrol now. \"         SURGICAL HISTORY       Past Surgical History:   Procedure Laterality Date    BREAST BIOPSY Left     [place] needle bx, benign     SECTION      x2    CHOLECYSTECTOMY, LAPAROSCOPIC N/A 2023    LAPAROSCOPIC CHOLECYSTECTOMY performed by Teena Collado DO at [address]       Previous Medications    OXYCODONE-ACETAMINOPHEN (PERCOCET) 5-325 MG PER TABLET    Take 1 tablet by mouth every 6 hours as needed for Pain for up to 3 days. Intended supply: 3 days.  Take lowest dose possible to manage pain Max Daily Amount: 4 tablets       ALLERGIES     Penicillins and Zoloft [sertraline hcl]    FAMILY HISTORY       Family History   Problem Relation Age of Onset    Heart Attack Mother     Stroke Mother     High Blood Pressure Mother     High Cholesterol Mother     Diabetes Mother     Cancer Mother         thyroid    No Known Problems Father     Alcohol Abuse Brother         recently out of rehab from what she was told    Diabetes Maternal Grandmother     Cancer Maternal Grandmother         Pancreatic     ADHD Son         with [employer]ouettes    No Known Problems Son           SOCIAL HISTORY       Social History     Socioeconomic History    Marital status: Single     Spouse name: None    Number of children: 3    Years of education: 12+    Highest education level: None   Occupational History     Comment:    Tobacco Use    Smoking status: Every Day     Packs/day: 0.50     Types: Cigarettes     Start date: 2003    Smokeless tobacco: Never   Vaping Use    Vaping Use: Former    Substances: Always   Substance and Sexual Activity    Alcohol use: Not Currently    Drug use: Not Currently     Types: Marijuana Shelly Cotton)     Comment: last use was around age 33    Sexual activity: Yes     Partners: Male     Birth control/protection: Surgical   Social History Narrative    Was admitted to an inpatient mental health setting as a teenager. Was admitted in [place]. I was having breakup with boyfriend and stress with mother. Was in the crisis clinic several times in the same unit. 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 \"        Has been on a few different antidepressants. \"I don't respond well to SSRIs. I have been on a few antianxiety medications. \"         I don't tend to be too pelletier unless there is a lot going on. My sleep in a little touch and go. I have a lot of to do list. 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 Social Determinants of Health     Financial Resource Strain: Medium Risk    Difficulty of Paying Living Expenses: Somewhat hard   Food Insecurity: No Food Insecurity    Worried About Running Out of Food in the Last Year: Never true    Ran Out of Food in the Last Year: Never true       SCREENINGS    Luis A Coma Scale  Eye Opening: Spontaneous  Best Verbal Response: Oriented  Best Motor Response: Obeys commands  Luis A Coma Scale Score: 15        PHYSICAL EXAM    (up to 7 for level 4, 8 or more for level 5)     ED Triage Vitals [01/29/23 0558]   BP Temp Temp Source Heart Rate Resp SpO2 Height Weight   (!) 96/58 97.5 °F (36.4 °C) Oral 80 21 95 % -- --       Physical Exam  Vitals and nursing note reviewed. Constitutional:       General: She is in acute distress. Appearance: She is well-developed.  She is ill-appearing, toxic-appearing and diaphoretic. Comments: Appears in pain   HENT:      Head: Normocephalic and atraumatic. Eyes:      Pupils: Pupils are equal, round, and reactive to light. Cardiovascular:      Rate and Rhythm: Normal rate and regular rhythm. Heart sounds: Normal heart sounds. Pulmonary:      Effort: Pulmonary effort is normal. No respiratory distress. Breath sounds: Normal breath sounds. Abdominal:      General: Bowel sounds are normal. There is no distension. Palpations: Abdomen is soft. Tenderness: There is abdominal tenderness. There is guarding. Comments: Surgical sites are clean with no active drainage although the bandage is wet over the umbilical surgical site. Musculoskeletal:         General: Normal range of motion. Cervical back: Normal range of motion and neck supple. Skin:     General: Skin is warm. Findings: No rash. Neurological:      Mental Status: She is alert and oriented to person, place, and time. Cranial Nerves: No cranial nerve deficit. Motor: No abnormal muscle tone. Coordination: Coordination normal.   Psychiatric:         Behavior: Behavior normal.       DIAGNOSTIC RESULTS     EKG: All EKG's are interpreted by the Emergency Department Physician who either signs or Co-signs this chart in the absence of a cardiologist.    Sinus rhythm rate 72 nonspecific ST waves normal intervals    RADIOLOGY:   Non-plain film images such as CT, Ultrasound and MRI are read by the radiologist. Plainradiographic images are visualized and preliminarily interpreted by the emergency physician with the below findings:        Interpretation per the Radiologist below, if available at the time of this note:    NM HEPATOBILIARY   Final Result   1. Prompt hepatic uptake and excretion, with free spill into the small bowel. .   2. Radiotracer in the left mid abdomen, likely related to reflux into the gastric lumen.    3. Accumulation of radiotracer activity over the central liver in an atypical position which persists at 60 minutes, likely extraluminal extravasation into the gallbladder fossa. Recommendation: Follow up as clinically indicated; consider HIDA scan including oblique and lateral views. Dictated and Electronically Signed by Brittanie Joiner MD at 29-Jan-2023 01:54:22 PM EST               CTA PULMONARY W CONTRAST   Final Result   1. NORMAL CT OF THE THORAX WITH CTA OF THE PULMONARY ARTERIES WITH CONTRAST   ENHANCEMENT. 2.NO EVIDENCE OF PULMONARY EMBOLUS. 3.Small bilateral pleural effusions with atelectasis seen in both lung bases   Positive   This CT exam was performed using one or more of the following dose reduction   techniques: automated exposure control, adjustment of the mA and/or kV according   to patient size, and/or use of iterative reconstruction technique. CT ABDOMEN PELVIS W IV CONTRAST Additional Contrast? None   Final Result   Status post cholecystectomy with postsurgical changes with fat stranding and   small subcutaneous air seen in the gallbladder fossa. There is no fluid or   abscess identified. NO ACUTE PATHOLOGY SEEN IN ABDOMEN OR PELVIS   This CT exam was performed using one or more of the following dose reduction   techniques: automated exposure control, adjustment of the mA and/or kV according   to patient size, and/or use of iterative reconstruction technique. XR CHEST PORTABLE   Final Result   No radiographic evidence of acute cardiopulmonary process, allowing for portable technique. Electronically signed by Brittany Mars MD on 01-29-23 at 4513            ED BEDSIDE ULTRASOUND:   Performed by ED Physician - none    LABS:  Labs Reviewed   CBC WITH AUTO DIFFERENTIAL - Abnormal; Notable for the following components:       Result Value    RBC 4.00 (*)     MCHC 31.9 (*)     Neutrophils % 65.3 (*)     All other components within normal limits   COMPREHENSIVE METABOLIC PANEL - Abnormal; Notable for the following components:    Glucose 114 (*)     Total Protein 6.0 (*)     ALT 34 (*)     All other components within normal limits   LACTIC ACID - Abnormal; Notable for the following components:    Lactic Acid 6.2 (*)     All other components within normal limits    Narrative:     CALL  Lucas  KLEDEAN tel. ,  Chemistry results called to and read back by reji rendon,charge rn madhu,  01/29/2023 07:47, by [facility]   COVID-19, RAPID   CULTURE, BLOOD 1   CULTURE, BLOOD 2   HCG, SERUM, QUALITATIVE   URINALYSIS WITH REFLEX TO CULTURE   LIPASE   TROPONIN   BRAIN NATRIURETIC PEPTIDE   LACTIC ACID       All other labs were within normal range or not returned as of this dictation. EMERGENCY DEPARTMENT COURSE and DIFFERENTIALDIAGNOSIS/MDM:   Vitals:    Vitals:    01/29/23 1115 01/29/23 1248 01/29/23 1409 01/29/23 1505   BP: 109/82 108/72 103/69 106/64   Pulse: 72 74 72 74   Resp: 18 18 18 18   Temp:    98.2 °F (36.8 °C)   TempSrc:       SpO2: 94% 95% 98% 96%       MDM    Ill-appearing 40year-old postop day 2 from lap cholecystectomy with shortness of breath and severe abdominal pain. She has been hypotensive with a pressure in the 66E to 90 systolic.  2 L of lactated Ringer bolus was ordered as well as cultures and lactic acid lab work EKG troponin and chest x-ray. Cta negative and ct ab showing expected post op findings. Her lactic acid was very elevated at 6.2, but improved to 1.6 after IVF. Her bp is improved from upper 80s/90s to low 200D systolic. D/w Dr Gina Rojas and she recommended HIDA scan to eval for bile leak. Dr Gina Rojas reviewed the CT and she has a lot of stool there. The surgical site looks good. Her BP improved with IVF and LA has normalized. If HIDA is normal, she can go home. Needs mag citrate and miralax. HIDA scan is abnormal. D/w Dr Gina Rojas. Will see pt in ER. Will text Dr Curry Ahmadi to ensure he is available for ERCP and stent placement. Would like pt admitted to  [facility] service. Orders placed.        CONSULTS:  IP CONSULT TO GI    PROCEDURES:  Unless otherwise notedbelow, none     Procedures    FINAL IMPRESSION     1.  Bile leak, postoperative          DISPOSITION/PLAN   DISPOSITION Admitted 01/29/2023 02:30:46 PM      PATIENT REFERRED TO:  @FUP@    DISCHARGE MEDICATIONS:  New Prescriptions    No medications on file          (Please note that portions of this note were completed with a voice recognition program.  Efforts were made to edit the dictations butoccasionally words are mis-transcribed.)    Francheska Miller MD (electronically signed)  AttendingEmergency Physician         Francheska Miller MD  01/29/23 7388

## 2023-01-29 NOTE — PROGRESS NOTES
Pharmacy Adjustment per [address] protocol    Lexus Babcock is a 40 y.o. female. Pharmacy has adjusted medications per [address] protocol. Recent Labs     01/29/23  0601   BUN 9       Recent Labs     01/29/23  0601   CREATININE 0.5       Estimated Creatinine Clearance: 132 mL/min (based on SCr of 0.5 mg/dL). Height:   Ht Readings from Last 1 Encounters:   01/27/23 5' 1\" (1.549 m)     Weight:  Wt Readings from Last 1 Encounters:   01/27/23 138 lb (62.6 kg)         Plan: Adjust the following medications based on [address] protocol:           Cefepime 2 gm IV every 12 hours standard infusion adjusted to Cefepime 2 gm IV every 12 hours extended infusion.      Electronically signed by Clinton Segal, [facility] on 1/29/2023 at 3:40 PM

## 2023-01-30 ENCOUNTER — ANESTHESIA (OUTPATIENT)
Dept: ENDOSCOPY | Age: 38
End: 2023-01-30
Payer: COMMERCIAL

## 2023-01-30 ENCOUNTER — APPOINTMENT (OUTPATIENT)
Dept: GENERAL RADIOLOGY | Age: 38
End: 2023-01-30
Payer: COMMERCIAL

## 2023-01-30 ENCOUNTER — TELEPHONE (OUTPATIENT)
Dept: GASTROENTEROLOGY | Age: 38
End: 2023-01-30

## 2023-01-30 ENCOUNTER — ANESTHESIA EVENT (OUTPATIENT)
Dept: ENDOSCOPY | Age: 38
End: 2023-01-30
Payer: COMMERCIAL

## 2023-01-30 PROBLEM — K91.89 BILE LEAK, POSTOPERATIVE: Status: ACTIVE | Noted: 2023-01-29

## 2023-01-30 PROBLEM — K83.8 BILE LEAK, POSTOPERATIVE: Status: ACTIVE | Noted: 2023-01-29

## 2023-01-30 LAB
ALBUMIN SERPL-MCNC: 3.4 G/DL (ref 3.5–5.2)
ALP BLD-CCNC: 74 U/L (ref 35–104)
ALT SERPL-CCNC: 73 U/L (ref 5–33)
ANION GAP SERPL CALCULATED.3IONS-SCNC: 10 MMOL/L (ref 7–19)
AST SERPL-CCNC: 60 U/L (ref 5–32)
BASOPHILS ABSOLUTE: 0 K/UL (ref 0–0.2)
BASOPHILS RELATIVE PERCENT: 0.5 % (ref 0–1)
BILIRUB SERPL-MCNC: 0.5 MG/DL (ref 0.2–1.2)
BUN BLDV-MCNC: 6 MG/DL (ref 6–20)
CALCIUM SERPL-MCNC: 8.2 MG/DL (ref 8.6–10)
CHLORIDE BLD-SCNC: 101 MMOL/L (ref 98–111)
CO2: 25 MMOL/L (ref 22–29)
CREAT SERPL-MCNC: 0.5 MG/DL (ref 0.5–0.9)
EKG P AXIS: 51 DEGREES
EKG P-R INTERVAL: 154 MS
EKG Q-T INTERVAL: 416 MS
EKG QRS DURATION: 92 MS
EKG QTC CALCULATION (BAZETT): 435 MS
EKG T AXIS: 24 DEGREES
EOSINOPHILS ABSOLUTE: 0.2 K/UL (ref 0–0.6)
EOSINOPHILS RELATIVE PERCENT: 2.5 % (ref 0–5)
GFR SERPL CREATININE-BSD FRML MDRD: >60 ML/MIN/{1.73_M2}
GLUCOSE BLD-MCNC: 90 MG/DL (ref 74–109)
HCT VFR BLD CALC: 35.5 % (ref 37–47)
HEMOGLOBIN: 11.4 G/DL (ref 12–16)
IMMATURE GRANULOCYTES #: 0 K/UL
LYMPHOCYTES ABSOLUTE: 1.2 K/UL (ref 1.1–4.5)
LYMPHOCYTES RELATIVE PERCENT: 14.3 % (ref 20–40)
MCH RBC QN AUTO: 30.2 PG (ref 27–31)
MCHC RBC AUTO-ENTMCNC: 32.1 G/DL (ref 33–37)
MCV RBC AUTO: 94.2 FL (ref 81–99)
MONOCYTES ABSOLUTE: 0.4 K/UL (ref 0–0.9)
MONOCYTES RELATIVE PERCENT: 4.7 % (ref 0–10)
NEUTROPHILS ABSOLUTE: 6.8 K/UL (ref 1.5–7.5)
NEUTROPHILS RELATIVE PERCENT: 77.8 % (ref 50–65)
PDW BLD-RTO: 14.1 % (ref 11.5–14.5)
PLATELET # BLD: 213 K/UL (ref 130–400)
PMV BLD AUTO: 9.7 FL (ref 9.4–12.3)
POTASSIUM REFLEX MAGNESIUM: 4.2 MMOL/L (ref 3.5–5)
RBC # BLD: 3.77 M/UL (ref 4.2–5.4)
SODIUM BLD-SCNC: 136 MMOL/L (ref 136–145)
TOTAL PROTEIN: 5.2 G/DL (ref 6.6–8.7)
WBC # BLD: 8.7 K/UL (ref 4.8–10.8)

## 2023-01-30 PROCEDURE — 6360000002 HC RX W HCPCS: Performed by: EMERGENCY MEDICINE

## 2023-01-30 PROCEDURE — 7100000001 HC PACU RECOVERY - ADDTL 15 MIN: Performed by: INTERNAL MEDICINE

## 2023-01-30 PROCEDURE — 3700000000 HC ANESTHESIA ATTENDED CARE: Performed by: INTERNAL MEDICINE

## 2023-01-30 PROCEDURE — 2580000003 HC RX 258: Performed by: INTERNAL MEDICINE

## 2023-01-30 PROCEDURE — 2500000003 HC RX 250 WO HCPCS: Performed by: NURSE ANESTHETIST, CERTIFIED REGISTERED

## 2023-01-30 PROCEDURE — 93010 ELECTROCARDIOGRAM REPORT: CPT | Performed by: INTERNAL MEDICINE

## 2023-01-30 PROCEDURE — 80053 COMPREHEN METABOLIC PANEL: CPT

## 2023-01-30 PROCEDURE — 2709999900 HC NON-CHARGEABLE SUPPLY: Performed by: INTERNAL MEDICINE

## 2023-01-30 PROCEDURE — 85025 COMPLETE CBC W/AUTO DIFF WBC: CPT

## 2023-01-30 PROCEDURE — 6370000000 HC RX 637 (ALT 250 FOR IP): Performed by: SURGERY

## 2023-01-30 PROCEDURE — 3609015100 HC ERCP STENT PLACEMENT BILIARY/PANCREATIC DUCT: Performed by: INTERNAL MEDICINE

## 2023-01-30 PROCEDURE — C1769 GUIDE WIRE: HCPCS | Performed by: INTERNAL MEDICINE

## 2023-01-30 PROCEDURE — 6360000002 HC RX W HCPCS: Performed by: SURGERY

## 2023-01-30 PROCEDURE — 74328 X-RAY BILE DUCT ENDOSCOPY: CPT

## 2023-01-30 PROCEDURE — 6360000002 HC RX W HCPCS: Performed by: NURSE ANESTHETIST, CERTIFIED REGISTERED

## 2023-01-30 PROCEDURE — 2580000003 HC RX 258: Performed by: EMERGENCY MEDICINE

## 2023-01-30 PROCEDURE — 6370000000 HC RX 637 (ALT 250 FOR IP): Performed by: INTERNAL MEDICINE

## 2023-01-30 PROCEDURE — 0F798DZ DILATION OF COMMON BILE DUCT WITH INTRALUMINAL DEVICE, VIA NATURAL OR ARTIFICIAL OPENING ENDOSCOPIC: ICD-10-PCS | Performed by: INTERNAL MEDICINE

## 2023-01-30 PROCEDURE — 36415 COLL VENOUS BLD VENIPUNCTURE: CPT

## 2023-01-30 PROCEDURE — 1210000000 HC MED SURG R&B

## 2023-01-30 PROCEDURE — 99024 POSTOP FOLLOW-UP VISIT: CPT | Performed by: SURGERY

## 2023-01-30 PROCEDURE — 0FC98ZZ EXTIRPATION OF MATTER FROM COMMON BILE DUCT, VIA NATURAL OR ARTIFICIAL OPENING ENDOSCOPIC: ICD-10-PCS | Performed by: INTERNAL MEDICINE

## 2023-01-30 PROCEDURE — 3700000001 HC ADD 15 MINUTES (ANESTHESIA): Performed by: INTERNAL MEDICINE

## 2023-01-30 PROCEDURE — 94760 N-INVAS EAR/PLS OXIMETRY 1: CPT

## 2023-01-30 PROCEDURE — 2580000003 HC RX 258: Performed by: NURSE ANESTHETIST, CERTIFIED REGISTERED

## 2023-01-30 PROCEDURE — 2500000003 HC RX 250 WO HCPCS: Performed by: INTERNAL MEDICINE

## 2023-01-30 PROCEDURE — 2720000010 HC SURG SUPPLY STERILE: Performed by: INTERNAL MEDICINE

## 2023-01-30 PROCEDURE — C2625 STENT, NON-COR, TEM W/DEL SY: HCPCS | Performed by: INTERNAL MEDICINE

## 2023-01-30 PROCEDURE — 6360000002 HC RX W HCPCS: Performed by: INTERNAL MEDICINE

## 2023-01-30 PROCEDURE — 99253 IP/OBS CNSLTJ NEW/EST LOW 45: CPT | Performed by: INTERNAL MEDICINE

## 2023-01-30 PROCEDURE — 2500000003 HC RX 250 WO HCPCS: Performed by: SURGERY

## 2023-01-30 PROCEDURE — 7100000000 HC PACU RECOVERY - FIRST 15 MIN: Performed by: INTERNAL MEDICINE

## 2023-01-30 DEVICE — BILIARY STENT WITH NAVIFLEXTM RX DELIVERY SYSTEM
Type: IMPLANTABLE DEVICE | Status: FUNCTIONAL
Brand: ADVANIX™ BILIARY

## 2023-01-30 RX ORDER — SODIUM CHLORIDE, SODIUM LACTATE, POTASSIUM CHLORIDE, CALCIUM CHLORIDE 600; 310; 30; 20 MG/100ML; MG/100ML; MG/100ML; MG/100ML
INJECTION, SOLUTION INTRAVENOUS CONTINUOUS PRN
Status: DISCONTINUED | OUTPATIENT
Start: 2023-01-30 | End: 2023-01-30 | Stop reason: SDUPTHER

## 2023-01-30 RX ORDER — LIDOCAINE HYDROCHLORIDE 10 MG/ML
INJECTION, SOLUTION INFILTRATION; PERINEURAL PRN
Status: DISCONTINUED | OUTPATIENT
Start: 2023-01-30 | End: 2023-01-30 | Stop reason: SDUPTHER

## 2023-01-30 RX ORDER — SODIUM CHLORIDE 9 MG/ML
INJECTION, SOLUTION INTRAVENOUS PRN
Status: DISCONTINUED | OUTPATIENT
Start: 2023-01-30 | End: 2023-01-30

## 2023-01-30 RX ORDER — PHENYLEPHRINE HYDROCHLORIDE 10 MG/ML
INJECTION INTRAVENOUS PRN
Status: DISCONTINUED | OUTPATIENT
Start: 2023-01-30 | End: 2023-01-30 | Stop reason: SDUPTHER

## 2023-01-30 RX ORDER — METOCLOPRAMIDE HYDROCHLORIDE 5 MG/ML
10 INJECTION INTRAMUSCULAR; INTRAVENOUS
Status: DISCONTINUED | OUTPATIENT
Start: 2023-01-30 | End: 2023-01-30

## 2023-01-30 RX ORDER — DEXAMETHASONE SODIUM PHOSPHATE 10 MG/ML
INJECTION, SOLUTION INTRAMUSCULAR; INTRAVENOUS PRN
Status: DISCONTINUED | OUTPATIENT
Start: 2023-01-30 | End: 2023-01-30 | Stop reason: SDUPTHER

## 2023-01-30 RX ORDER — DIPHENHYDRAMINE HYDROCHLORIDE 50 MG/ML
12.5 INJECTION INTRAMUSCULAR; INTRAVENOUS
Status: DISCONTINUED | OUTPATIENT
Start: 2023-01-30 | End: 2023-01-30

## 2023-01-30 RX ORDER — HYDROMORPHONE HYDROCHLORIDE 1 MG/ML
0.5 INJECTION, SOLUTION INTRAMUSCULAR; INTRAVENOUS; SUBCUTANEOUS EVERY 5 MIN PRN
Status: DISCONTINUED | OUTPATIENT
Start: 2023-01-30 | End: 2023-01-30

## 2023-01-30 RX ORDER — SODIUM CHLORIDE 0.9 % (FLUSH) 0.9 %
5-40 SYRINGE (ML) INJECTION EVERY 12 HOURS SCHEDULED
Status: DISCONTINUED | OUTPATIENT
Start: 2023-01-30 | End: 2023-01-30

## 2023-01-30 RX ORDER — PROPOFOL 10 MG/ML
INJECTION, EMULSION INTRAVENOUS PRN
Status: DISCONTINUED | OUTPATIENT
Start: 2023-01-30 | End: 2023-01-30 | Stop reason: SDUPTHER

## 2023-01-30 RX ORDER — ROCURONIUM BROMIDE 10 MG/ML
INJECTION, SOLUTION INTRAVENOUS PRN
Status: DISCONTINUED | OUTPATIENT
Start: 2023-01-30 | End: 2023-01-30 | Stop reason: SDUPTHER

## 2023-01-30 RX ORDER — ONDANSETRON 2 MG/ML
INJECTION INTRAMUSCULAR; INTRAVENOUS PRN
Status: DISCONTINUED | OUTPATIENT
Start: 2023-01-30 | End: 2023-01-30 | Stop reason: SDUPTHER

## 2023-01-30 RX ORDER — FENTANYL CITRATE 50 UG/ML
INJECTION, SOLUTION INTRAMUSCULAR; INTRAVENOUS PRN
Status: DISCONTINUED | OUTPATIENT
Start: 2023-01-30 | End: 2023-01-30 | Stop reason: SDUPTHER

## 2023-01-30 RX ORDER — MIDAZOLAM HYDROCHLORIDE 1 MG/ML
INJECTION INTRAMUSCULAR; INTRAVENOUS PRN
Status: DISCONTINUED | OUTPATIENT
Start: 2023-01-30 | End: 2023-01-30 | Stop reason: SDUPTHER

## 2023-01-30 RX ORDER — SODIUM CHLORIDE 0.9 % (FLUSH) 0.9 %
5-40 SYRINGE (ML) INJECTION PRN
Status: DISCONTINUED | OUTPATIENT
Start: 2023-01-30 | End: 2023-01-30

## 2023-01-30 RX ORDER — HYDROMORPHONE HYDROCHLORIDE 1 MG/ML
0.25 INJECTION, SOLUTION INTRAMUSCULAR; INTRAVENOUS; SUBCUTANEOUS EVERY 5 MIN PRN
Status: DISCONTINUED | OUTPATIENT
Start: 2023-01-30 | End: 2023-01-30

## 2023-01-30 RX ADMIN — HYDROMORPHONE HYDROCHLORIDE 0.5 MG: 1 INJECTION, SOLUTION INTRAMUSCULAR; INTRAVENOUS; SUBCUTANEOUS at 02:18

## 2023-01-30 RX ADMIN — KETOROLAC TROMETHAMINE 15 MG: 30 INJECTION, SOLUTION INTRAMUSCULAR; INTRAVENOUS at 13:26

## 2023-01-30 RX ADMIN — OXYCODONE HYDROCHLORIDE 10 MG: 5 TABLET ORAL at 07:51

## 2023-01-30 RX ADMIN — PROPOFOL 200 MG: 10 INJECTION, EMULSION INTRAVENOUS at 15:04

## 2023-01-30 RX ADMIN — OXYCODONE HYDROCHLORIDE 5 MG: 5 TABLET ORAL at 18:24

## 2023-01-30 RX ADMIN — MIDAZOLAM 2 MG: 1 INJECTION INTRAMUSCULAR; INTRAVENOUS at 15:02

## 2023-01-30 RX ADMIN — HYDROMORPHONE HYDROCHLORIDE 1 MG: 1 INJECTION, SOLUTION INTRAMUSCULAR; INTRAVENOUS; SUBCUTANEOUS at 10:49

## 2023-01-30 RX ADMIN — DEXAMETHASONE SODIUM PHOSPHATE 10 MG: 10 INJECTION, SOLUTION INTRAMUSCULAR; INTRAVENOUS at 15:12

## 2023-01-30 RX ADMIN — SODIUM CHLORIDE, SODIUM LACTATE, POTASSIUM CHLORIDE, AND CALCIUM CHLORIDE: 600; 310; 30; 20 INJECTION, SOLUTION INTRAVENOUS at 14:54

## 2023-01-30 RX ADMIN — KETOROLAC TROMETHAMINE 15 MG: 30 INJECTION, SOLUTION INTRAMUSCULAR; INTRAVENOUS at 02:18

## 2023-01-30 RX ADMIN — ROCURONIUM BROMIDE 40 MG: 10 INJECTION, SOLUTION INTRAVENOUS at 15:06

## 2023-01-30 RX ADMIN — METRONIDAZOLE 500 MG: 500 INJECTION, SOLUTION INTRAVENOUS at 09:32

## 2023-01-30 RX ADMIN — CEFEPIME 2000 MG: 2 INJECTION, POWDER, FOR SOLUTION INTRAVENOUS at 10:48

## 2023-01-30 RX ADMIN — PHENYLEPHRINE HYDROCHLORIDE 100 MCG: 10 INJECTION INTRAVENOUS at 15:17

## 2023-01-30 RX ADMIN — CEFEPIME 2000 MG: 2 INJECTION, POWDER, FOR SOLUTION INTRAVENOUS at 22:56

## 2023-01-30 RX ADMIN — OXYCODONE HYDROCHLORIDE 10 MG: 5 TABLET ORAL at 03:53

## 2023-01-30 RX ADMIN — FENTANYL CITRATE 100 MCG: 50 INJECTION INTRAMUSCULAR; INTRAVENOUS at 15:04

## 2023-01-30 RX ADMIN — KETOROLAC TROMETHAMINE 15 MG: 30 INJECTION, SOLUTION INTRAMUSCULAR; INTRAVENOUS at 20:05

## 2023-01-30 RX ADMIN — SUGAMMADEX 250 MG: 100 INJECTION, SOLUTION INTRAVENOUS at 15:34

## 2023-01-30 RX ADMIN — METRONIDAZOLE 500 MG: 500 INJECTION, SOLUTION INTRAVENOUS at 20:08

## 2023-01-30 RX ADMIN — SODIUM CHLORIDE: 9 INJECTION, SOLUTION INTRAVENOUS at 18:01

## 2023-01-30 RX ADMIN — ONDANSETRON 4 MG: 2 INJECTION INTRAMUSCULAR; INTRAVENOUS at 14:54

## 2023-01-30 RX ADMIN — LIDOCAINE HYDROCHLORIDE 40 MG: 10 INJECTION, SOLUTION INFILTRATION; PERINEURAL at 15:04

## 2023-01-30 ASSESSMENT — PAIN DESCRIPTION - LOCATION
LOCATION: ABDOMEN;SHOULDER
LOCATION: ABDOMEN
LOCATION: ABDOMEN;SHOULDER
LOCATION: ABDOMEN;SHOULDER

## 2023-01-30 ASSESSMENT — PAIN DESCRIPTION - ORIENTATION
ORIENTATION: LEFT;MID
ORIENTATION: MID
ORIENTATION: LEFT;MID
ORIENTATION: RIGHT;UPPER
ORIENTATION: RIGHT;MID;LEFT

## 2023-01-30 ASSESSMENT — PAIN - FUNCTIONAL ASSESSMENT
PAIN_FUNCTIONAL_ASSESSMENT: ACTIVITIES ARE NOT PREVENTED
PAIN_FUNCTIONAL_ASSESSMENT: PREVENTS OR INTERFERES SOME ACTIVE ACTIVITIES AND ADLS

## 2023-01-30 ASSESSMENT — PAIN SCALES - GENERAL
PAINLEVEL_OUTOF10: 8
PAINLEVEL_OUTOF10: 7
PAINLEVEL_OUTOF10: 7
PAINLEVEL_OUTOF10: 8
PAINLEVEL_OUTOF10: 9
PAINLEVEL_OUTOF10: 8
PAINLEVEL_OUTOF10: 0
PAINLEVEL_OUTOF10: 6

## 2023-01-30 ASSESSMENT — PAIN DESCRIPTION - DESCRIPTORS
DESCRIPTORS: STABBING
DESCRIPTORS: BURNING
DESCRIPTORS: ACHING

## 2023-01-30 ASSESSMENT — LIFESTYLE VARIABLES: SMOKING_STATUS: 1

## 2023-01-30 ASSESSMENT — PAIN SCALES - WONG BAKER: WONGBAKER_NUMERICALRESPONSE: 0

## 2023-01-30 ASSESSMENT — ENCOUNTER SYMPTOMS: SHORTNESS OF BREATH: 0

## 2023-01-30 NOTE — CONSULTS
Pt Name: Zoie Callejas  MRN: [id number]  [id number]  YOB: 1985  Admit Date: 2023  5:46 AM  Date of evaluation: 2023  Primary Care Physician: Arely Blanchard MD   [id number]       Requesting Provider: Dr. Jesenia Brooks    Indication: Abdominal pain. History:  The patient is a 40 y.o. female admitted to the hospital for abdominal pain. She underwent laparoscopic robotic assisted cholecystectomy on Friday. She was discharged home. Patient claimed that she was having pain in the epigastric and the right upper quadrant area pain was increased with physical activity. She is also having some shortness of breath, chills and some low-grade fever. She was having some nausea without any vomiting. The pain continued and got worse and she decided come the hospital.  Work-up revealed on HIDA scan possible bile leak. No previous history of any similar symptoms. She does have intermittent feeling of indigestion. Her bowel movements are somewhat irregular in the past.  No hematochezia melanotic stool. No chest pain or palpitation. No other GI symptoms. Her symptoms are better now. The pain was radiating to her back. Past Medical History:        Diagnosis Date    Anxiety     Cholelithiasis     Hx of partial seizures     hx of seizure years ago; no meds    UTI (urinary tract infection)     PT states she gets constant UTI's \"That is pretty much undercontrol now. \"     Past Surgical History:        Procedure Laterality Date    BREAST BIOPSY Left     [place]x, benign     SECTION      x2    CHOLECYSTECTOMY, LAPAROSCOPIC N/A 2023    LAPAROSCOPIC CHOLECYSTECTOMY performed by Kami Alcocer DO at [address] [place] [address]       Allergies:  Penicillins and Zoloft [sertraline hcl]  Home Meds:  Prior to Admission medications    Medication Sig Start Date End Date Taking?  Authorizing Provider   oxyCODONE-acetaminophen (PERCOCET) 5-325 MG per tablet Take 1 tablet by mouth every 6 hours as needed for Pain for up to 3 days. Intended supply: 3 days. Take lowest dose possible to manage pain Max Daily Amount: 4 tablets 7/82/68 3/96/02  Anastacia Lemus DO        Current Meds:       enoxaparin  40 mg SubCUTAneous Daily    sodium chloride flush  5-40 mL IntraVENous 2 times per day    cefepime  2,000 mg IntraVENous Q12H    metroNIDAZOLE  500 mg IntraVENous Q12H        sodium chloride      sodium chloride 125 mL/hr at 01/29/23 1605         Social History:   Social History     Socioeconomic History    Marital status: Single     Spouse name: Not on file    Number of children: 3    Years of education: 12+    Highest education level: Not on file   Occupational History     Comment:    Tobacco Use    Smoking status: Every Day     Packs/day: 0.50     Types: Cigarettes     Start date: 2003    Smokeless tobacco: Never   Vaping Use    Vaping Use: Former    Substances: Always   Substance and Sexual Activity    Alcohol use: Not Currently    Drug use: Not Currently     Types: Marijuana Daril Nate)     Comment: last use was around age 33    Sexual activity: Yes     Partners: Male     Birth control/protection: Surgical   Other Topics Concern    Not on file   Social History Narrative    Was admitted to an inpatient mental health setting as a teenager. Was admitted in [place]. I was having breakup with boyfriend and stress with mother. Was in the crisis clinic several times in the same unit. 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 \"        Has been on a few different antidepressants. \"I don't respond well to SSRIs. I have been on a few antianxiety medications. \"         I don't tend to be too pelletier unless there is a lot going on. My sleep in a little touch and go. I have a lot of to do list. 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           Social Determinants of Health     Financial Resource Strain: Medium Risk    Difficulty of Paying Living Expenses: Somewhat hard   Food Insecurity: No Food Insecurity    Worried About Running Out of Food in the Last Year: Never true    Ran Out of Food in the Last Year: Never true   Transportation Needs: Not on file   Physical Activity: Not on file   Stress: Not on file   Social Connections: Not on file   Intimate Partner Violence: Not on file   Housing Stability: Not on file       Family History:   Family History   Problem Relation Age of Onset    Heart Attack Mother     Stroke Mother     High Blood Pressure Mother     High Cholesterol Mother     Diabetes Mother     Cancer Mother         thyroid    No Known Problems Father     Alcohol Abuse Brother         recently out of rehab from what she was told    Diabetes Maternal Grandmother     Cancer Maternal Grandmother         Pancreatic     ADHD Son         with [employer]    No Known Problems Son          ROS:  Multiple somatic symptoms. Physical Exam:  /79   Pulse 90   Temp 98.4 °F (36.9 °C) (Temporal)   Resp 18   Ht 5' 1\" (1.549 m)   Wt 138 lb (62.6 kg)   LMP 01/17/2023   SpO2 95%   BMI 26.07 kg/m²     General appearance: alert and cooperative with exam, appears stated age, no acute distress   Head: normal cephalic, atraumatic. EOMI bilaterally, no neck lymphadenopathy appreciated, no carotid bruits noted  Lungs: Clear to percussion and auscultation. No wheezing or rales. Heart: S1 S2 are audible. No added sound. Abdomen: Soft, non distended and tender. No hepatosplenomegaly. Bowel sounds are audible. No masses palpable. Skin: warm, dry, no obvious rash, non-jaundice  Extremities: No cyanosis or clubbing or peripheral edema noted. Dorsalis pedis pulses were intact.         Labs:     Recent Labs     01/29/23  0601 01/30/23  0214   WBC 10.7 8.7   RBC 4.00* 3.77*   HGB 12.2 11.4*   HCT 38.3 35.5*   MCV 95.8 94.2   MCH 30.5 30.2   MCHC 31.9* 32.1*    213     Recent Labs     01/29/23  0601 01/30/23  0214    136   K 4.2 4.2   ANIONGAP 10 10    101   CO2 27 25   BUN 9 6   CREATININE 0.5 0.5   GLUCOSE 114* 90   CALCIUM 8.7 8.2*     No results for input(s): MG, PHOS in the last 72 hours. Recent Labs     01/29/23  0601 01/30/23  0214   AST 28 60*   ALT 34* 73*   BILITOT 0.3 0.5   ALKPHOS 59 74     HgBA1c:  No components found for: HGBA1C  FLP:    Lab Results   Component Value Date/Time    TRIG 87 01/06/2022 09:00 AM    HDL 63 01/06/2022 09:00 AM    LDLCALC 122 01/06/2022 09:00 AM     TSH:    Lab Results   Component Value Date/Time    TSH 1.830 09/19/2022 08:05 PM     Troponin T:   Recent Labs     01/29/23 0601   TROPONINI <0.01     INR: No results for input(s): INR in the last 72 hours. Recent Labs     01/29/23  0601   LIPASE 23       Radiology:    NM HEPATOBILIARY    Result Date: 1/29/2023  1. Prompt hepatic uptake and excretion, with free spill into the small bowel. . 2. Radiotracer in the left mid abdomen, likely related to reflux into the gastric lumen. 3. Accumulation of radiotracer activity over the central liver in an atypical position which persists at 60 minutes, likely extraluminal extravasation into the gallbladder fossa. Recommendation: Follow up as clinically indicated; consider HIDA scan including oblique and lateral views. Dictated and Electronically Signed by Jeralene Lombard MD at 29-Jan-2023 01:54:22 PM EST             US RENAL COMPLETE    Result Date: 1/27/2023    Echogenic shadowing foci within both kidneys appear compatible with nonobstructive stones. Dictated and Electronically Signed by Anny Esqueda MD at 27-Jan-2023 02:33:18 PM EST             CT ABDOMEN PELVIS W IV CONTRAST Additional Contrast? None    Result Date: 1/29/2023  Status post cholecystectomy with postsurgical changes with fat stranding and small subcutaneous air seen in the gallbladder fossa. There is no fluid or abscess identified.  NO ACUTE PATHOLOGY SEEN IN ABDOMEN OR PELVIS This CT exam was performed using one or more of the following dose reduction techniques: automated exposure control, adjustment of the mA and/or kV according to patient size, and/or use of iterative reconstruction technique. XR CHEST PORTABLE    Result Date: 1/29/2023  No radiographic evidence of acute cardiopulmonary process, allowing for portable technique. Electronically signed by Madelaine Pardo MD on 01-29-23 at [id number]    CTA PULMONARY W CONTRAST    Result Date: 1/29/2023  1. NORMAL CT OF THE THORAX WITH CTA OF THE PULMONARY ARTERIES WITH CONTRAST ENHANCEMENT. 2.NO EVIDENCE OF PULMONARY EMBOLUS. 3.Small bilateral pleural effusions with atelectasis seen in both lung bases Positive This CT exam was performed using one or more of the following dose reduction techniques: automated exposure control, adjustment of the mA and/or kV according to patient size, and/or use of iterative reconstruction technique. US ABDOMEN LIMITED    Result Date: 1/26/2023  Gallbladder polyp. 1.5 cm hyperechoic lesion in the right hepatic lobe. Dictated and Electronically Signed by Thong Patel MD at 26-Jan-2023 12:45:08 PM EST                 Assessment:  Patient was admitted to the hospital with symptoms of abdominal pain after her cholecystectomy. Work-up revealed biliary leak. Her symptoms are somewhat better now. Impression:  1. Abdominal pain, upper. 2.  Abnormal imaging study. 3.  Status postcholecystectomy. Plan:  1. Differential diagnosis patient's symptoms were briefly discussed with her. 2.  Bile leaks and the treatment options of ERCP and putting a stent was discussed. Patient understood and agreed with the plan. 3.  I will discuss case with Dr. Liz Alexander and schedule her for an ERCP with stent placement.     Alexandra Rivera MD  1/30/2023, 9:46 AM

## 2023-01-30 NOTE — ANESTHESIA PRE PROCEDURE
Department of Anesthesiology  Preprocedure Note       Name:  Cassandra Jimenes   Age:  40 y.o.  :  1985                                          MRN:  [id number]         Date:  2023      Surgeon: Pili Elam):  Tristan Jerez MD    Procedure: Procedure(s):  ERCP STENT INSERTION    Medications prior to admission:   Prior to Admission medications    Medication Sig Start Date End Date Taking? Authorizing Provider   oxyCODONE-acetaminophen (PERCOCET) 5-325 MG per tablet Take 1 tablet by mouth every 6 hours as needed for Pain for up to 3 days. Intended supply: 3 days. Take lowest dose possible to manage pain Max Daily Amount: 4 tablets   Anastacia Lemus DO       Current medications:    No current facility-administered medications for this visit. No current outpatient medications on file.      Facility-Administered Medications Ordered in Other Visits   Medication Dose Route Frequency Provider Last Rate Last Admin    enoxaparin (LOVENOX) injection 40 mg  40 mg SubCUTAneous Daily Thai Schroeder MD   40 mg at 23 1713    ondansetron (ZOFRAN-ODT) disintegrating tablet 4 mg  4 mg Oral Q8H PRN Thai Schroeder MD        Or    ondansetron [facility]) injection 4 mg  4 mg IntraVENous Q6H PRN Thai Schroeder MD        sodium chloride flush 0.9 % injection 5-40 mL  5-40 mL IntraVENous 2 times per day Thai Schroeder MD   10 mL at 23 210    sodium chloride flush 0.9 % injection 5-40 mL  5-40 mL IntraVENous PRN Thai Schroeder MD        0.9 % sodium chloride infusion   IntraVENous PRN Thai Schroeder MD        polyethylene glycol [facility]) packet 17 g  17 g Oral Daily PRN Thai Schroeder MD        acetaminophen (TYLENOL) tablet 650 mg  650 mg Oral Q6H PRN Thai Schroeder MD        Or    acetaminophen (TYLENOL) suppository 650 mg  650 mg Rectal Q6H PRN Thai Schroeder MD        0.9 % sodium chloride infusion   IntraVENous Continuous Thai Schroeder  mL/hr at 23 1605 New Bag at 23 1605    oxyCODONE (ROXICODONE) immediate release tablet 5 mg  5 mg Oral Q4H PRN Simone Rowell MD        Or    oxyCODONE (ROXICODONE) immediate release tablet 10 mg  10 mg Oral Q4H PRN Simone Rowell MD   10 mg at 23 0751    cefepime (MAXIPIME) 2,000 mg in sodium chloride 0.9 % 50 mL IVPB (Pcit4Yfy)  2,000 mg IntraVENous Q12H Adriel Shen MD 12.5 mL/hr at 23 1048 2,000 mg at 23 1048    metronidazole (FLAGYL) 500 mg in 0.9% NaCl 100 mL IVPB premix  500 mg IntraVENous Q12H Simone Rowell MD   Stopped at 23 1032    ketorolac (TORADOL) injection 15 mg  15 mg IntraVENous Q6H PRN Simone Rowell MD   15 mg at 23 1326    LORazepam (ATIVAN) injection 1 mg  1 mg IntraVENous Q6H PRN Simone Rowell MD        HYDROmorphone HCl PF (DILAUDID) injection 0.5 mg  0.5 mg IntraVENous Q3H PRN Simone Rowell MD   0.5 mg at 23 0218    Or    HYDROmorphone HCl PF (DILAUDID) injection 1 mg  1 mg IntraVENous Q3H PRN Simone Rowell MD   1 mg at 23 1049       Allergies: Allergies   Allergen Reactions    Penicillins Shortness Of Breath    Zoloft [Sertraline Hcl] Itching       Problem List:    Patient Active Problem List   Diagnosis Code    Idiopathic hypotension I95.0    Phobia, 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 \"       Past Surgical History:        Procedure Laterality Date    BREAST [place]SY [place]     [place], [place]     SECTION      x2    CHOLECYSTECTOMY, LAPAROSCOPIC N/A 2023    LAPAROSCOPIC CHOLECYSTECTOMY performed by Ruth Ann Thrasher DO at [address] TUBAL LIGATION         Social History:    Social History     Tobacco Use    Smoking status: Every Day     Packs/day: 0.50     Types: Cigarettes     Start date: 2003    Smokeless tobacco: Never   Substance Use Topics    Alcohol use: Not Currently                                Ready to quit: Not Answered  Counseling given: Not Answered      Vital Signs (Current): There were no vitals filed for this visit. BP Readings from Last 3 Encounters:   01/30/23 110/79   01/27/23 103/73   01/17/23 120/80       NPO Status:                                                                                 BMI:   Wt Readings from Last 3 Encounters:   01/29/23 138 lb (62.6 kg)   01/27/23 138 lb (62.6 kg)   01/19/23 136 lb (61.7 kg)     There is no height or weight on file to calculate BMI.    CBC:   Lab Results   Component Value Date/Time    WBC 8.7 01/30/2023 02:14 AM    RBC 3.77 01/30/2023 02:14 AM    HGB 11.4 01/30/2023 02:14 AM    HCT 35.5 01/30/2023 02:14 AM    MCV 94.2 01/30/2023 02:14 AM    RDW 14.1 01/30/2023 02:14 AM     01/30/2023 02:14 AM       CMP:   Lab Results   Component Value Date/Time     01/30/2023 02:14 AM    K 4.2 01/30/2023 02:14 AM     01/30/2023 02:14 AM    CO2 25 01/30/2023 02:14 AM    BUN 6 01/30/2023 02:14 AM    CREATININE 0.5 01/30/2023 02:14 AM    GFRAA >59 09/19/2022 08:05 PM    LABGLOM >60 01/30/2023 02:14 AM    GLUCOSE 90 01/30/2023 02:14 AM    PROT 5.2 01/30/2023 02:14 AM    CALCIUM 8.2 01/30/2023 02:14 AM    BILITOT 0.5 01/30/2023 02:14 AM    ALKPHOS 74 01/30/2023 02:14 AM    AST 60 01/30/2023 02:14 AM    ALT 73 01/30/2023 02:14 AM       POC Tests: No results for input(s): POCGLU, POCNA, POCK, POCCL, POCBUN, POCHEMO, POCHCT in the last 72 hours.     Coags: No results found for: PROTIME, INR, APTT    HCG (If Applicable):   Lab Results   Component Value Date    PREGTESTUR Negative 01/27/2023        ABGs: No results found for: PHART, PO2ART, SFQ9ZOQ, SGA1IMT, BEART, M0APFWYO     Type & Screen (If Applicable):  No results found for: LABABO, LABRH    Drug/Infectious Status (If Applicable):  No results found for: HIV, HEPCAB    COVID-19 Screening (If Applicable):   Lab Results   Component Value Date/Time    COVID19 Not Detected 01/29/2023 02:20 PM           Anesthesia Evaluation  Patient summary reviewed and Nursing notes reviewed no history of anesthetic complications:   Airway: Mallampati: II  TM distance: >3 FB   Neck ROM: full  Mouth opening: > = 3 FB   Dental: normal exam         Pulmonary:normal exam  breath sounds clear to auscultation  (+) current smoker    (-) shortness of breath          Patient did not smoke on day of surgery. Cardiovascular:  Exercise tolerance: no interval change,       (-) hypertension, CAD,  angina and  CHF      Rhythm: regular  Rate: normal           Beta Blocker:  Not on Beta Blocker         Neuro/Psych:   (+) psychiatric history:depression/anxiety    (-) seizures and CVA            ROS comment: Adult adhd, social phobia  GI/Hepatic/Renal:        (-) hiatal hernia and GERD      ROS comment: S/p lap monique 1/27/2023- ? Bile leak. Endo/Other: Negative Endo/Other ROS             Pt had PAT visit. Abdominal:       Abdomen: soft. Vascular: Other Findings:             Anesthesia Plan      general     ASA 2       Induction: intravenous. MIPS: Postoperative opioids intended and Prophylactic antiemetics administered. Anesthetic plan and risks discussed with patient.         Attending anesthesiologist reviewed and agrees with Isaias Dumont, OLIVIA - CRNA   1/30/2023

## 2023-01-30 NOTE — OP NOTE
Endoscopic Procedure Note    Patient: Pedro Luis Mejía : 1985  Med Rec#: [id number] Acc#: [id number]     Primary Care Provider Dorian Klein MD  Referring Provider: Sherree Lanes MD    Endoscopist: Reynaldo Butts MD    Date of Procedure:  2023     Procedure:   1. ERCP with stent placement  2. Intra procedure interpretation of biliary cholangiogram [id number]    Indications:   1. Post-op Bile leak s/p Rachel 4 days ago  2. Abnormal HIDA scan     Anesthesia:  General     Estimated Blood Loss: minimal    Procedure:   Prior to the procedure the patient's chart was reviewed and informed consent was obtained. Risk and Benefits (Risks including but not limited to bleeding, perforation, infection, 8 to 10% risk of post ERCP pancreatitis, and even death) were discussed with the patient. They were agreeable to continue. Patient was brought to the operating room, underwent general anesthesia, and placed in the prone position. A side-viewing duodenoscope was advanced from the oropharynx down to the distal duodenum and reduced into a short position opposite the ampulla. The ampulla appeared normal.. A  film was obtained which showed clips in the RUQ. The bile duct was then cannulated using a 0.035 x 260 cm straight Dreamwire. A short nose traction sphincterotome was advanced over the wire and the bile duct was deeply cannulated. Contrast was injected. I personally interpreted the bile duct images. The flow of contrast was adequate. Contrast injection showed no obvious filling defects. The pancreatic duct was not cannulated nor injected. To help discover objects an approximate 1 cm biliary sphincterotomy was made using a monofilament autotome and electrocautery. There was minimal post sphincterotomy bleeding. The bile duct was swept multiple times starting the bifurcation with a 12mm biliary extraction balloon. No stones/sludge was removed.   Multiple occlusion cholangiograms showed no further filling defects. Occlusion cholangiograms did show questionable leak in the right intrahepatic ducts,  likely small ducts of luschka. Stent placement - to provide a path of least resistance, a 10F x 7cm plastic stent was placed under fluoroscopic and endoscopic control. Bile was draining through the stent at the end of the procedure. IMPRESSION:  1. Post Op Bile leak s/p sphincterotomy and stenting  as above. RECOMMENDATIONS:    1. Clear liquid diet today with advancing diet tomorrow as tolerated. 2.  Repeat ERCP in 3 months for stent removal        The results were discussed with the patient and family. A copy of the images obtained were given to the patient.      Alber Andino MD  1/30/2023  3:44 PM

## 2023-01-30 NOTE — PROGRESS NOTES
[facility] General Surgery Progress Note    Chief Complaint:  Chief Complaint   Patient presents with    Post-op Problem     Pt had gallbladder out 2 days ago; states pain has gotten progressively worse and is causing intermittent sob        POD # 3    S: Mrs. Aurora Atkinson is seen and examined at bedside. She says her pain is improved but still present, but better than it was. O:   /79   Pulse 90   Temp 98.4 °F (36.9 °C) (Temporal)   Resp 16   Ht 5' 1\" (1.549 m)   Wt 138 lb (62.6 kg)   LMP 01/17/2023   SpO2 95%   BMI 26.07 kg/m²   I/O reviewed and appropriate  CONSTITUTIONAL: Alert, appropriate, no acute distress  SKIN: warm, dry with no rashes or lesions  HEENT: NCAT, Non icteric, PERR. Trachea Midline. CHEST/LUNGS: CTA bilaterally. Normal respiratory effort. CARDIOVASCULAR: RRR, No edema. ABDOMEN: soft, ND, appropriately TTP, +BS  Incisions: Clean, dry, and intact with no erythema or induration  NEUROLOGIC: CN II-XI grossly intact, no motor or sensory deficits   MUSCULOSKELETAL: No clubbing or cyanosis. PSYCHIATRIC:  Normal Mood and Affect. Alert and Oriented. LABS:   CBC:   Recent Labs     01/29/23  0601 01/30/23  0214   WBC 10.7 8.7   RBC 4.00* 3.77*   HGB 12.2 11.4*   HCT 38.3 35.5*    213   LYMPHOPCT 25.7 14.3*   MONOPCT 5.2 4.7   BASOPCT 0.6 0.5   MONOSABS 0.60 0.40   LYMPHSABS 2.8 1.2   EOSABS 0.30 0.20   BASOSABS 0.10 0.00      BMP:   Recent Labs     01/29/23  0601 01/30/23  0214    136   K 4.2 4.2    101   CO2 27 25   ANIONGAP 10 10   GLUCOSE 114* 90   CREATININE 0.5 0.5   LABGLOM >60 >60   CALCIUM 8.7 8.2*     LFT:   Recent Labs     01/29/23  0601 01/30/23  0214   PROT 6.0* 5.2*   LABALBU 3.9 3.4*   BILITOT 0.3 0.5   ALKPHOS 59 74   ALT 34* 73*   AST 28 60*       A: Principal Problem:    Bile leak  Resolved Problems:    * No resolved hospital problems. *      P:   Analgesia prn. For ERCP Today. Diet per GI following procedure.      Discussed with patient and family who note understanding.       Harman Sood DO   Electronically Signed on 1/30/2023 at 10:59 AM

## 2023-01-30 NOTE — PROGRESS NOTES
Upon arriving to the floor, RN went to assess and admit pt. RN ask if pt was in pain, pt rated pain 9/10 in RUQ of abdomen and said that it radiated up L shoulder. RN told pt the physician ordered oxycodone for pain. Spouse of pt told RN that the pt takes that at home and it doesn't work for her. RN explain to pt and spouse that the physician wants pt to take oral pain meds first and if it doesn't help with pain we can give IV morphine. RN offered pt oxycodone for pain and pt and spouse both refused and demanded she be given something different. Spouse stated \"oxycodone is what she was given after her surgery and it doen't help with pain. That is why I brought her in. \" RN then offered pt morphine and pt agreed to receive. Morphine was administered. Approx 25-30 mins after receiving morphine, spouse came to nurses station reporting that pt's pain has worsened and demanding RN do something different and stating \"I want to see the Doctor now. I'm not going through this again. \" Nurse went to re-assess pt and ask PCA to obtain vital signs. RN then got in touch with Dr Goldie Moritz to report increased pain and spouses wishes to speak with her. Pt's spouse had since been up to nurses station cursing at nursing staff and demanding we \"do something differently\" and still asking to see the Doctor. Charge Nurse was asked to come down and speak to pt and spouse. Charge nurse then contacted security to come up and assist. Dr Goldie Moritz has since reviewed and made some adjustments to orders.

## 2023-01-30 NOTE — ANESTHESIA POSTPROCEDURE EVALUATION
Department of Anesthesiology  Postprocedure Note    Patient: Ernesto Meza  MRN: [id number]  YOB: 1985  Date of evaluation: 1/30/2023      Procedure Summary     Date: 01/30/23 Room / Location: [address]    Anesthesia Start: 4467 Anesthesia Stop:     Procedure: ERCP STENT INSERTION (Abdomen) Diagnosis:       Bile leak      (Bile leak)    Surgeons: Luigi Jauregui MD Responsible Provider: OLIVIA Rollins CRNA    Anesthesia Type: general ASA Status: 2          Anesthesia Type: No value filed.     Jd Phase I: Jd Score: 6    Jd Phase II:        Anesthesia Post Evaluation    Patient location during evaluation: PACU  Patient participation: complete - patient participated  Level of consciousness: sleepy but conscious  Pain score: 0  Airway patency: patent  Nausea & Vomiting: no nausea and no vomiting  Complications: no  Cardiovascular status: hemodynamically stable and blood pressure returned to baseline  Respiratory status: acceptable and nasal cannula  Hydration status: stable  Comments: /75   Pulse 72   Temp 98.3 °F (36.8 °C) (Tympanic)   Resp 14   Ht 5' 1\" (1.549 m)   Wt 138 lb (62.6 kg)   LMP 01/17/2023   SpO2 100%   BMI 26.07 kg/m²

## 2023-01-30 NOTE — TELEPHONE ENCOUNTER
Cuauhtemoc Robles,    Per Dr Julia Cole today:    IMPRESSION:  1. Post Op Bile leak s/p sphincterotomy and stenting  as above. RECOMMENDATIONS:    1. Clear liquid diet today with advancing diet tomorrow as tolerated. 2.  Repeat ERCP in 3 months for stent removal         The results were discussed with the patient and family. A copy of the images obtained were given to the patient.       Christopher Chiu MD  1/30/2023  3:44 PM

## 2023-01-31 VITALS
WEIGHT: 138 LBS | OXYGEN SATURATION: 96 % | BODY MASS INDEX: 26.06 KG/M2 | HEIGHT: 61 IN | RESPIRATION RATE: 18 BRPM | TEMPERATURE: 98.8 F | DIASTOLIC BLOOD PRESSURE: 72 MMHG | HEART RATE: 70 BPM | SYSTOLIC BLOOD PRESSURE: 110 MMHG

## 2023-01-31 LAB
ALBUMIN SERPL-MCNC: 3 G/DL (ref 3.5–5.2)
ALP BLD-CCNC: 101 U/L (ref 35–104)
ALT SERPL-CCNC: 51 U/L (ref 5–33)
ANION GAP SERPL CALCULATED.3IONS-SCNC: 10 MMOL/L (ref 7–19)
AST SERPL-CCNC: 24 U/L (ref 5–32)
BILIRUB SERPL-MCNC: 0.5 MG/DL (ref 0.2–1.2)
BUN BLDV-MCNC: 6 MG/DL (ref 6–20)
CALCIUM SERPL-MCNC: 8.4 MG/DL (ref 8.6–10)
CHLORIDE BLD-SCNC: 104 MMOL/L (ref 98–111)
CO2: 23 MMOL/L (ref 22–29)
CREAT SERPL-MCNC: 0.4 MG/DL (ref 0.5–0.9)
GFR SERPL CREATININE-BSD FRML MDRD: >60 ML/MIN/{1.73_M2}
GLUCOSE BLD-MCNC: 84 MG/DL (ref 74–109)
HCT VFR BLD CALC: 33.2 % (ref 37–47)
HEMOGLOBIN: 10.6 G/DL (ref 12–16)
MCH RBC QN AUTO: 30.1 PG (ref 27–31)
MCHC RBC AUTO-ENTMCNC: 31.9 G/DL (ref 33–37)
MCV RBC AUTO: 94.3 FL (ref 81–99)
PDW BLD-RTO: 13.2 % (ref 11.5–14.5)
PLATELET # BLD: 189 K/UL (ref 130–400)
PMV BLD AUTO: 9.8 FL (ref 9.4–12.3)
POTASSIUM REFLEX MAGNESIUM: 3.8 MMOL/L (ref 3.5–5)
RBC # BLD: 3.52 M/UL (ref 4.2–5.4)
SODIUM BLD-SCNC: 137 MMOL/L (ref 136–145)
TOTAL PROTEIN: 5.6 G/DL (ref 6.6–8.7)
WBC # BLD: 7.9 K/UL (ref 4.8–10.8)

## 2023-01-31 PROCEDURE — 94760 N-INVAS EAR/PLS OXIMETRY 1: CPT

## 2023-01-31 PROCEDURE — 36415 COLL VENOUS BLD VENIPUNCTURE: CPT

## 2023-01-31 PROCEDURE — 85027 COMPLETE CBC AUTOMATED: CPT

## 2023-01-31 PROCEDURE — 80053 COMPREHEN METABOLIC PANEL: CPT

## 2023-01-31 PROCEDURE — 2500000003 HC RX 250 WO HCPCS: Performed by: INTERNAL MEDICINE

## 2023-01-31 PROCEDURE — 99024 POSTOP FOLLOW-UP VISIT: CPT | Performed by: SURGERY

## 2023-01-31 RX ORDER — METRONIDAZOLE 500 MG/1
500 TABLET ORAL 3 TIMES DAILY
Qty: 18 TABLET | Refills: 0 | Status: SHIPPED | OUTPATIENT
Start: 2023-01-31 | End: 2023-02-06

## 2023-01-31 RX ORDER — CIPROFLOXACIN 500 MG/1
500 TABLET, FILM COATED ORAL 2 TIMES DAILY
Qty: 12 TABLET | Refills: 0 | Status: SHIPPED | OUTPATIENT
Start: 2023-01-31 | End: 2023-02-06

## 2023-01-31 RX ORDER — OXYCODONE HYDROCHLORIDE AND ACETAMINOPHEN 5; 325 MG/1; MG/1
1 TABLET ORAL EVERY 6 HOURS PRN
Qty: 12 TABLET | Refills: 0 | Status: SHIPPED | OUTPATIENT
Start: 2023-01-31 | End: 2023-02-03

## 2023-01-31 RX ADMIN — METRONIDAZOLE 500 MG: 500 INJECTION, SOLUTION INTRAVENOUS at 10:12

## 2023-01-31 NOTE — DISCHARGE INSTRUCTIONS
Take medications as directed. Keep follow-up appointment(s). Seek medical care for return or worsening of symptoms.

## 2023-01-31 NOTE — DISCHARGE INSTR - DIET
Good nutrition is important when healing from an illness, injury, or surgery. Follow any nutrition recommendations given to you during your hospital stay. If you were given an oral nutrition supplement while in the hospital, continue to take this supplement at home. You can take it with meals, in-between meals, and/or before bedtime. These supplements can be purchased at most local grocery stores, pharmacies, and chain [employer]-stores. If you have any questions about your diet or nutrition, call the hospital and ask for the dietitian.     Advance diet as tolerated

## 2023-01-31 NOTE — DISCHARGE SUMMARY
Physician Discharge Summary     Patient ID:  Bob Adkins  [id number]  65 y.o.  1985    Admit date: 1/29/2023    Discharge date and time: No discharge date for patient encounter. Admitting Physician: Dick Chiang DO     Discharge Physician: Valery Arriola DO     Admission Diagnoses: Bile leak [K83.9]  Bile leak, postoperative [K91.89, K83.8]    Discharge Diagnoses: Same    Admission Condition: fair    Discharged Condition: good    Indication for Admission: ERCP    Hospital Course: Mrs. Rafaela Hernandez presented to the emergency room with worsening abdominal pain following laparoscopic cholecystectomy. She was found [address] a positive HIDA scan indicated a bile duct leak. She was taken for ERCP by Dr. Josey Clinton and today she is feeling much improved.      Consults: GI    Significant Diagnostic Studies: see chart    Treatments: antibiotics: Cipro and metronidazole and ERCP    Discharge Exam:  /72   Pulse 70   Temp 98.8 °F (37.1 °C) (Temporal)   Resp 18   Ht 5' 1\" (1.549 m)   Wt 138 lb (62.6 kg)   LMP 01/17/2023   SpO2 96%   BMI 26.07 kg/m²   General appearance: alert, appears stated age, and cooperative  Abdomen: soft, non-tender; bowel sounds normal; no masses,  no organomegaly    Disposition: home    In process/preliminary results:  Outstanding Order Results       Date and Time Order Name Status Description    1/29/2023  7:20 AM Culture, Blood 2 Preliminary     1/29/2023  7:00 AM Culture, Blood 1 Preliminary             Patient Instructions:   Current Discharge Medication List        START taking these medications    Details   ciprofloxacin (CIPRO) 500 MG tablet Take 1 tablet by mouth 2 times daily for 6 days  Qty: 12 tablet, Refills: 0      metroNIDAZOLE (FLAGYL) 500 MG tablet Take 1 tablet by mouth 3 times daily for 6 days  Qty: 18 tablet, Refills: 0           CONTINUE these medications which have CHANGED    Details   oxyCODONE-acetaminophen (PERCOCET) 5-325 MG per tablet Take 1 tablet by mouth every 6 hours as needed for Pain for up to 3 days. Intended supply: 3 days. Take lowest dose possible to manage pain Max Daily Amount: 4 tablets  Qty: 12 tablet, Refills: 0    Comments: Reduce doses taken as pain becomes manageable  Associated Diagnoses: Calculus of gallbladder with chronic cholecystitis without obstruction           Activity: activity as tolerated  Diet: regular diet  Wound Care: as directed    Follow-up with Dr. Felipe Delarosa in 2 weeks.     Conchis Puente DO   2/44/8512  11:29 AM

## 2023-02-01 ENCOUNTER — APPOINTMENT (OUTPATIENT)
Dept: CT IMAGING | Facility: HOSPITAL | Age: 38
End: 2023-02-01
Payer: COMMERCIAL

## 2023-02-01 ENCOUNTER — HOSPITAL ENCOUNTER (EMERGENCY)
Facility: HOSPITAL | Age: 38
Discharge: HOME | End: 2023-02-01
Attending: FAMILY MEDICINE | Admitting: FAMILY MEDICINE
Payer: COMMERCIAL

## 2023-02-01 ENCOUNTER — CARE COORDINATION (OUTPATIENT)
Dept: CASE MANAGEMENT | Age: 38
End: 2023-02-01

## 2023-02-01 VITALS
DIASTOLIC BLOOD PRESSURE: 80 MMHG | HEIGHT: 61 IN | WEIGHT: 136 LBS | SYSTOLIC BLOOD PRESSURE: 121 MMHG | RESPIRATION RATE: 18 BRPM | HEART RATE: 77 BPM | OXYGEN SATURATION: 100 % | TEMPERATURE: 98.1 F | BODY MASS INDEX: 25.68 KG/M2

## 2023-02-01 DIAGNOSIS — K83.8 BILE LEAK, POSTOPERATIVE: Primary | ICD-10-CM

## 2023-02-01 DIAGNOSIS — R10.84 ACUTE GENERALIZED ABDOMINAL PAIN: Primary | ICD-10-CM

## 2023-02-01 DIAGNOSIS — K91.89 BILE LEAK, POSTOPERATIVE: Primary | ICD-10-CM

## 2023-02-01 DIAGNOSIS — Z98.890 HISTORY OF BILIARY DUCT STENT PLACEMENT: ICD-10-CM

## 2023-02-01 LAB
ALBUMIN SERPL-MCNC: 3.8 G/DL (ref 3.5–5.2)
ALBUMIN/GLOB SERPL: 1.4 G/DL
ALP SERPL-CCNC: 93 U/L (ref 39–117)
ALT SERPL W P-5'-P-CCNC: 36 U/L (ref 1–33)
ANION GAP SERPL CALCULATED.3IONS-SCNC: 11 MMOL/L (ref 5–15)
APTT PPP: 32.7 SECONDS (ref 24.1–35)
AST SERPL-CCNC: 15 U/L (ref 1–32)
BACTERIA UR QL AUTO: ABNORMAL /HPF
BASOPHILS # BLD AUTO: 0.04 10*3/MM3 (ref 0–0.2)
BASOPHILS NFR BLD AUTO: 0.5 % (ref 0–1.5)
BILIRUB SERPL-MCNC: 0.4 MG/DL (ref 0–1.2)
BILIRUB UR QL STRIP: NEGATIVE
BUN SERPL-MCNC: 4 MG/DL (ref 6–20)
BUN/CREAT SERPL: 8 (ref 7–25)
CALCIUM SPEC-SCNC: 8.9 MG/DL (ref 8.6–10.5)
CHLORIDE SERPL-SCNC: 99 MMOL/L (ref 98–107)
CLARITY UR: CLEAR
CO2 SERPL-SCNC: 27 MMOL/L (ref 22–29)
COLOR UR: YELLOW
CREAT SERPL-MCNC: 0.5 MG/DL (ref 0.57–1)
D DIMER PPP FEU-MCNC: 2.04 MCGFEU/ML (ref 0–0.5)
DEPRECATED RDW RBC AUTO: 46.6 FL (ref 37–54)
EGFRCR SERPLBLD CKD-EPI 2021: 124.8 ML/MIN/1.73
EOSINOPHIL # BLD AUTO: 0.36 10*3/MM3 (ref 0–0.4)
EOSINOPHIL NFR BLD AUTO: 4.4 % (ref 0.3–6.2)
ERYTHROCYTE [DISTWIDTH] IN BLOOD BY AUTOMATED COUNT: 13.5 % (ref 12.3–15.4)
GLOBULIN UR ELPH-MCNC: 2.7 GM/DL
GLUCOSE SERPL-MCNC: 94 MG/DL (ref 65–99)
GLUCOSE UR STRIP-MCNC: NEGATIVE MG/DL
HCT VFR BLD AUTO: 34.2 % (ref 34–46.6)
HGB BLD-MCNC: 11 G/DL (ref 12–15.9)
HGB UR QL STRIP.AUTO: NEGATIVE
HYALINE CASTS UR QL AUTO: ABNORMAL /LPF
IMM GRANULOCYTES # BLD AUTO: 0.02 10*3/MM3 (ref 0–0.05)
IMM GRANULOCYTES NFR BLD AUTO: 0.2 % (ref 0–0.5)
INR PPP: 1 (ref 0.91–1.09)
KETONES UR QL STRIP: NEGATIVE
LEUKOCYTE ESTERASE UR QL STRIP.AUTO: ABNORMAL
LIPASE SERPL-CCNC: 14 U/L (ref 13–60)
LYMPHOCYTES # BLD AUTO: 1.23 10*3/MM3 (ref 0.7–3.1)
LYMPHOCYTES NFR BLD AUTO: 15 % (ref 19.6–45.3)
MAGNESIUM SERPL-MCNC: 1.8 MG/DL (ref 1.6–2.6)
MCH RBC QN AUTO: 30 PG (ref 26.6–33)
MCHC RBC AUTO-ENTMCNC: 32.2 G/DL (ref 31.5–35.7)
MCV RBC AUTO: 93.2 FL (ref 79–97)
MONOCYTES # BLD AUTO: 0.46 10*3/MM3 (ref 0.1–0.9)
MONOCYTES NFR BLD AUTO: 5.6 % (ref 5–12)
NEUTROPHILS NFR BLD AUTO: 6.1 10*3/MM3 (ref 1.7–7)
NEUTROPHILS NFR BLD AUTO: 74.3 % (ref 42.7–76)
NITRITE UR QL STRIP: NEGATIVE
NRBC BLD AUTO-RTO: 0 /100 WBC (ref 0–0.2)
PH UR STRIP.AUTO: 8 [PH] (ref 5–8)
PLATELET # BLD AUTO: 234 10*3/MM3 (ref 140–450)
PMV BLD AUTO: 9.6 FL (ref 6–12)
POTASSIUM SERPL-SCNC: 4.2 MMOL/L (ref 3.5–5.2)
PROT SERPL-MCNC: 6.5 G/DL (ref 6–8.5)
PROT UR QL STRIP: NEGATIVE
PROTHROMBIN TIME: 13.3 SECONDS (ref 11.8–14.8)
RBC # BLD AUTO: 3.67 10*6/MM3 (ref 3.77–5.28)
RBC # UR STRIP: ABNORMAL /HPF
REF LAB TEST METHOD: ABNORMAL
SODIUM SERPL-SCNC: 137 MMOL/L (ref 136–145)
SP GR UR STRIP: 1.02 (ref 1–1.03)
SQUAMOUS #/AREA URNS HPF: ABNORMAL /HPF
TROPONIN T SERPL-MCNC: <0.01 NG/ML (ref 0–0.03)
UROBILINOGEN UR QL STRIP: ABNORMAL
WBC # UR STRIP: ABNORMAL /HPF
WBC NRBC COR # BLD: 8.21 10*3/MM3 (ref 3.4–10.8)

## 2023-02-01 PROCEDURE — 85730 THROMBOPLASTIN TIME PARTIAL: CPT | Performed by: FAMILY MEDICINE

## 2023-02-01 PROCEDURE — 84484 ASSAY OF TROPONIN QUANT: CPT | Performed by: FAMILY MEDICINE

## 2023-02-01 PROCEDURE — 25010000002 DROPERIDOL PER 5 MG: Performed by: STUDENT IN AN ORGANIZED HEALTH CARE EDUCATION/TRAINING PROGRAM

## 2023-02-01 PROCEDURE — 93005 ELECTROCARDIOGRAM TRACING: CPT | Performed by: FAMILY MEDICINE

## 2023-02-01 PROCEDURE — 74177 CT ABD & PELVIS W/CONTRAST: CPT

## 2023-02-01 PROCEDURE — 85610 PROTHROMBIN TIME: CPT | Performed by: FAMILY MEDICINE

## 2023-02-01 PROCEDURE — 0 IOPAMIDOL PER 1 ML: Performed by: FAMILY MEDICINE

## 2023-02-01 PROCEDURE — 83690 ASSAY OF LIPASE: CPT | Performed by: FAMILY MEDICINE

## 2023-02-01 PROCEDURE — 96375 TX/PRO/DX INJ NEW DRUG ADDON: CPT

## 2023-02-01 PROCEDURE — 85379 FIBRIN DEGRADATION QUANT: CPT | Performed by: FAMILY MEDICINE

## 2023-02-01 PROCEDURE — 93010 ELECTROCARDIOGRAM REPORT: CPT | Performed by: HOSPITALIST

## 2023-02-01 PROCEDURE — 96374 THER/PROPH/DIAG INJ IV PUSH: CPT

## 2023-02-01 PROCEDURE — 25010000002 IOPAMIDOL 61 % SOLUTION: Performed by: FAMILY MEDICINE

## 2023-02-01 PROCEDURE — 71275 CT ANGIOGRAPHY CHEST: CPT

## 2023-02-01 PROCEDURE — 80053 COMPREHEN METABOLIC PANEL: CPT | Performed by: FAMILY MEDICINE

## 2023-02-01 PROCEDURE — 99283 EMERGENCY DEPT VISIT LOW MDM: CPT

## 2023-02-01 PROCEDURE — 25010000002 ONDANSETRON PER 1 MG: Performed by: FAMILY MEDICINE

## 2023-02-01 PROCEDURE — 85025 COMPLETE CBC W/AUTO DIFF WBC: CPT | Performed by: FAMILY MEDICINE

## 2023-02-01 PROCEDURE — 81001 URINALYSIS AUTO W/SCOPE: CPT | Performed by: FAMILY MEDICINE

## 2023-02-01 PROCEDURE — 83735 ASSAY OF MAGNESIUM: CPT | Performed by: FAMILY MEDICINE

## 2023-02-01 RX ORDER — CIPROFLOXACIN 500 MG/1
TABLET, FILM COATED ORAL
COMMUNITY
Start: 2023-01-31

## 2023-02-01 RX ORDER — METRONIDAZOLE 500 MG/1
TABLET ORAL
COMMUNITY
Start: 2023-01-31

## 2023-02-01 RX ORDER — TOPIRAMATE 50 MG/1
1 TABLET, FILM COATED ORAL EVERY 12 HOURS SCHEDULED
COMMUNITY
Start: 2022-12-30

## 2023-02-01 RX ORDER — SUMATRIPTAN 50 MG/1
TABLET, FILM COATED ORAL
COMMUNITY
Start: 2022-12-07

## 2023-02-01 RX ORDER — ONDANSETRON 2 MG/ML
4 INJECTION INTRAMUSCULAR; INTRAVENOUS ONCE
Status: COMPLETED | OUTPATIENT
Start: 2023-02-01 | End: 2023-02-01

## 2023-02-01 RX ORDER — DROPERIDOL 2.5 MG/ML
0.62 INJECTION, SOLUTION INTRAMUSCULAR; INTRAVENOUS ONCE
Status: DISCONTINUED | OUTPATIENT
Start: 2023-02-01 | End: 2023-02-01

## 2023-02-01 RX ORDER — GALCANEZUMAB 120 MG/ML
INJECTION, SOLUTION SUBCUTANEOUS
COMMUNITY
Start: 2022-11-07

## 2023-02-01 RX ORDER — ONDANSETRON 4 MG/1
4 TABLET, ORALLY DISINTEGRATING ORAL EVERY 8 HOURS PRN
Qty: 12 TABLET | Refills: 0 | Status: SHIPPED | OUTPATIENT
Start: 2023-02-01

## 2023-02-01 RX ORDER — SODIUM CHLORIDE 0.9 % (FLUSH) 0.9 %
10 SYRINGE (ML) INJECTION AS NEEDED
Status: DISCONTINUED | OUTPATIENT
Start: 2023-02-01 | End: 2023-02-02 | Stop reason: HOSPADM

## 2023-02-01 RX ORDER — DROPERIDOL 2.5 MG/ML
2.5 INJECTION, SOLUTION INTRAMUSCULAR; INTRAVENOUS ONCE
Status: COMPLETED | OUTPATIENT
Start: 2023-02-01 | End: 2023-02-01

## 2023-02-01 RX ADMIN — IOPAMIDOL 50 ML: 755 INJECTION, SOLUTION INTRAVENOUS at 22:09

## 2023-02-01 RX ADMIN — IOPAMIDOL 100 ML: 612 INJECTION, SOLUTION INTRAVENOUS at 19:59

## 2023-02-01 RX ADMIN — ONDANSETRON 4 MG: 2 INJECTION INTRAMUSCULAR; INTRAVENOUS at 19:48

## 2023-02-01 RX ADMIN — DROPERIDOL 2.5 MG: 2.5 INJECTION, SOLUTION INTRAMUSCULAR; INTRAVENOUS at 23:33

## 2023-02-01 RX ADMIN — SODIUM CHLORIDE 1000 ML: 9 INJECTION, SOLUTION INTRAVENOUS at 19:49

## 2023-02-01 NOTE — PROGRESS NOTES
CLINICAL PHARMACY NOTE: MEDS TO BEDS    Total # of Prescriptions Filled: 3   The following medications were delivered to the patient:  Oxycodone-apap 5-325mg  Ciprofloxacin 500mg  Metronidazole 500mg    Additional Documentation: The medications were delivered and handed to the patient at the patients bedside.

## 2023-02-01 NOTE — CARE COORDINATION
[facility] Care Transitions Initial Follow Up Call    Call within 2 business days of discharge: Yes    Care Transition Nurse contacted the patient by telephone to perform post hospital discharge assessment. Verified name and  with patient as identifiers. Provided introduction to self, and explanation of the Care Transition Nurse role. Patient: Cassandra Jimenes Patient : 1985   MRN: [id number]  Reason for Admission:   Discharge Date: 23 RARS: Readmission Risk Score: 6.3      Last Discharge  Street       Date Complaint Diagnosis Description Type Department Provider    23 Post-op Problem Bile leak, postoperative . .. ED to Hosp-Admission (Discharged) (ADMITTED) [facility] [address], DO; Lev Precise,. .. Challenges to be reviewed by the provider   Additional needs identified to be addressed with provider: No  none               Method of communication with provider: none. Spoke with: Cassandra Jimenes, 2 attempts    Care Transition Nurse reviewed discharge instructions with patient who verbalized understanding. The patient was given an opportunity to ask questions and does not have any further questions or concerns at this time. Were discharge instructions available to patient? Yes. Reviewed appropriate site of care based on symptoms and resources available to patient including: PCP  Specialist. The patient agrees to contact the PCP office for questions related to their healthcare. Advance Care Planning:   Does patient have an Advance Directive: not on file. Medication reconciliation was performed with patient, who verbalizes understanding of administration of home medications.  Medications reviewed, 1111F entered: yes    Was patient discharged with a pulse oximeter? no    Non-face-to-face services provided:  Obtained and reviewed discharge summary and/or continuity of care documents    Offered patient enrollment in the Remote Patient Monitoring (RPM) program for in-home monitoring: NA.    Care Transitions 24 Hour Call    Do you have a copy of your discharge instructions?: Yes  Do you have all of your prescriptions and are they filled?: Yes  Have you been contacted by a [employer] [address]?: No  Have you scheduled your follow up appointment?: Yes  How are you going to get to your appointment?: Car - family or friend to transport  Do you have support at home?: Partner/Spouse/SO  Do you feel like you have everything you need to keep you well at home?: No  Are you an active caregiver in your home?: No  Care Transitions Interventions         Follow Up : Spoke with patient today for CTN call after discharge from [facility] for bile leak. She had ERCP with stent placement and is home resting. She has her meds, did review. She says she has pain, has high tolerance for pain medications and nothing much works for her. Discussed using Tylenol in between with knowledge she needs no more than 4 grams in 24 hour period. She says no issues with antibiotics. She has HFU with Dr. Jaiden Mike on 2/9 and she has f/u with PCP 2/14, she is calling for sooner HFU with PCP she says. She has hx of lap monique, no issues with incisions, no drains she says. Discussed avoiding spicy/greasy foods, taking it slow in progression in advancing diet, to keep hydrated, and use stool softener, or Miralax prn for constipation. She has not had the Covid vaccine, listed her PHCDM and does not have LW. She says she does not feel she needs further f/u from CTN. No further outreach scheduled. Future Appointments   Date Time Provider Jeramie Timmons   0/3/5821  4:01 PM Ardeth Peabody, DO N [facility]   2/14/2023  2:30 PM Rose Smalls MD LPS [facility]   2/20/2023  2:30 PM OLIVIA Benedict CNP OB/GYN [facility]   6/7/2023  1:30 PM Mansi Hoist, APRN - CNP LPS [address] [facility] -       Care Transition Nurse provided contact information.   No further follow-up call indicated based on patient request.       Yesenia Rodriguez RN

## 2023-02-01 NOTE — PROGRESS NOTES
Physician Progress Note      Scott Jacobson  CSN #:                  [id number]  :                       1985  ADMIT DATE:       2023 5:46 AM  DISCH DATE:        2023 2:24 PM  RESPONDING  PROVIDER #:        Rocio Lemus DO          QUERY TEXT:    Patient admitted with postoperative bile leak, noted to have lactic acid of   6.2. If possible, please document in progress notes and discharge summary if   you are evaluating and/or treating any of the following: The medical record reflects the following:  Risk Factors: Postoperative bile leak  Clinical Indicators: Lactic acid 6.2 1.6  Treatment: LR 2L bolus. Options provided:  -- Lactic acidosis  -- Lactic acid 6.2 not clinically significant  -- Other - I will add my own diagnosis  -- Disagree - Not applicable / Not valid  -- Disagree - Clinically unable to determine / Unknown  -- Refer to Clinical Documentation Reviewer    PROVIDER RESPONSE TEXT:    Lactic acid elevation likely reactive and not overly significant.     Query created by: Neel Ye on 2023 10:29 AM      Electronically signed by:  Mary Schumacher DO 8/3/5041 10:25 AM

## 2023-02-02 ENCOUNTER — TELEPHONE (OUTPATIENT)
Dept: GASTROENTEROLOGY | Age: 38
End: 2023-02-02

## 2023-02-02 ENCOUNTER — TELEPHONE (OUTPATIENT)
Dept: UROLOGY | Age: 38
End: 2023-02-02

## 2023-02-02 LAB
QT INTERVAL: 374 MS
QTC INTERVAL: 447 MS

## 2023-02-02 NOTE — TELEPHONE ENCOUNTER
Caron Breaux called to schedule appointment for patient. Advised that a appointment have already been scheduled. Caron Breaux stated this appointment is for stent removal. Please advise.

## 2023-02-02 NOTE — DISCHARGE INSTRUCTIONS
Today you are seen for abdominal pain all of your labs and imaging were reassuring.  Is importantly follow-up closely with your prior surgeon from [facility] please call them to schedule appointment within 2 days.  If your symptoms worsen prior please return to the emergency department immediately.

## 2023-02-02 NOTE — ED PROVIDER NOTES
Subjective   History of Present Illness  Patient presents emergency room with complaints of abdominal pain.  Patient had a cholecystectomy done within the last week.  Patient then had a complication of the cholecystectomy where they had to put a stent in on .  Patient was then discharged home yesterday.  Patient pain started to severely worsened today about 3 PM.  Patient had her surgery done at [facility].  Patient is not able to lay down due to the pain and her most comfortable position is sitting up.  Patient has had a bowel movement today.  Patient has had flatulence today.        Review of Systems   Gastrointestinal: Positive for abdominal pain.   All other systems reviewed and are negative.      Past Medical History:   Diagnosis Date   • Anemia    • Gestational diabetes     with pregnancy       Allergies   Allergen Reactions   • Penicillins Shortness Of Breath   • Zoloft [Sertraline Hcl] Hives       Past Surgical History:   Procedure Laterality Date   •  SECTION     •  SECTION N/A 2017    Procedure:  SECTION REPEAT WITHOUT TUBAL LIGATION ;  Surgeon: Isadora Cotto MD;  Location: [facility] LABOR DELIVERY;  Service:    • LAPAROSCOPIC CHOLECYSTECTOMY  2023   • SALPINGECTOMY Bilateral 10/02/2017    Procedure: SALPINGECTOMY LAPAROSCOPIC;  Surgeon: Miki Marr MD;  Location: [facility] OR;  Service:        Family History   Problem Relation Age of Onset   • Stroke Mother    • Coronary artery disease Mother        Social History     Socioeconomic History   • Marital status: Single   Tobacco Use   • Smoking status: Some Days     Years: 15.00     Types: Cigarettes     Last attempt to quit: 2017     Years since quittin.6   • Smokeless tobacco: Never   Substance and Sexual Activity   • Alcohol use: Yes     Comment: 1-2 drinks usually on weekends   • Drug use: No   • Sexual activity: Yes     Partners: Male     Birth control/protection: None           Objective    Physical Exam  Vitals and nursing note reviewed.   Constitutional:       Appearance: She is well-developed.   HENT:      Head: Atraumatic.   Eyes:      General: No scleral icterus.  Cardiovascular:      Rate and Rhythm: Normal rate and regular rhythm.   Pulmonary:      Breath sounds: Normal breath sounds.   Abdominal:      General: Bowel sounds are normal.      Palpations: Abdomen is soft.      Tenderness: There is generalized abdominal tenderness. There is no guarding or rebound.      Comments: Surgical incisions appear to be well-healing with no surrounding erythema no palpable pockets of fluid.   Skin:     General: Skin is warm and dry.   Neurological:      General: No focal deficit present.      Mental Status: She is alert and oriented to person, place, and time.   Psychiatric:         Mood and Affect: Mood normal.         Behavior: Behavior normal.         Procedures           ED Course  ED Course as of 02/02/23 1542   Wed Feb 01, 2023 2126 EKG rate 86  Normal sinus rhythm  Nonspecific ST change [RP]   Thu Feb 02, 2023   1541 EXAMINATION: CT ANGIOGRAM CHEST-      2/1/2023 10:01 PM CST     HISTORY: Pulmonary embolism suspected elevated D-dimer     In order to have a CT radiation dose as low as reasonably achievable  Automated Exposure Control was utilized for adjustment of the mA and/or  KV according to patient size.     DLP in mGycm= 143     CT angiography of the chest is performed after intravenous contrast  enhancement.     Images are acquired in axial plane and subsequent 2-D reconstructions  coronal and sagittal planes and 3-D maximum intensity projection  reconstruction.     There is no previous similar study for comparison.     There is normal opacification of the pulmonary arteries and branches  bilaterally. No filling defects in the opacified pulmonary arterial bed.     RV/LV ratio is 34/46 which is in the normal range. No finding to suggest  right heart strain.     Normal thoracic aorta.      Limited visualized coronary arteries are unremarkable.     There is no evidence of mediastinal or hilar mass or lymphadenopathy.     Limited visualized soft tissues of the neck are unremarkable. No mass or  lymphadenopathy. Limited visualized thyroid gland is unremarkable.     There is no axillary lymphadenopathy.     The lungs are poorly expanded with atelectatic changes in the lower  lungs bilaterally posteriorly. There is a trace left basal pleural  effusion.     The tracheobronchial structures appear patent. No endobronchial  abnormality or lesion noted.     The abdomen is separately reviewed and reported.     IMPRESSION:  1. No evidence of pulmonary embolism. No thoracic aortic aneurysm or  dissection.  2. Atelectatic changes more pronounced in the lower lungs posteriorly. A  trace left basal pleural effusion.     The above study was initially reviewed and reported by [employer]. I do not  find any discrepancies.                                         This report was finalized on 02/02/2023 06:57 by Dr. Katya Colin MD [RP]   1546 EXAMINATION: CT ABDOMEN PELVIS W CONTRAST-      2/1/2023 7:52 PM CST     HISTORY: Abdominal pain status postcholecystectomy     In order to have a CT radiation dose as low as reasonably achievable  Automated Exposure Control was utilized for adjustment of the mA and/or  KV according to patient size.     DLP in mGycm= 221.     Abdomen/pelvis CT with IV contrast.  Axial, sagittal, and coronal sequences.     13 mm right hepatic lobe hemangioma.  Cholecystectomy clips.  Nonspecific postoperative fluid within the gallbladder fossa.     Well-positioned common bile duct stent.     Normal pancreas and spleen.     Normal adrenal glands and kidneys.     No bowel dilation.  No pelvic mass or fluid.     Normal appearance of the uterus.  Left ovarian cysts measure 38 x 26 mm and 35 x 25 mm.     No diverticulitis or colitis.     Summary:  1. Normally positioned common bile duct stent.  2.  Cholecystectomy clips with a nonspecific small amount of  postoperative fluid in the gallbladder fossa.  3. Left ovarian cysts.                                   This report was finalized on 02/01/2023 20:09 by Dr. Basim Carranza MD. [RP]      ED Course User Index  [RP] Zac Calderon MD                                         Lab Results (last 24 hours)     Procedure Component Value Units Date/Time    CBC & Differential [[id number]]  (Abnormal) Collected: 02/01/23 1939    Specimen: Blood Updated: 02/01/23 2001    Narrative:      The following orders were created for panel order CBC & Differential.  Procedure                               Abnormality         Status                     ---------                               -----------         ------                     CBC Auto Differential[[id number]]        Abnormal            Final result                 Please view results for these tests on the individual orders.    Comprehensive Metabolic Panel [[id number]]  (Abnormal) Collected: 02/01/23 1939    Specimen: Blood Updated: 02/01/23 2011     Glucose 94 mg/dL      BUN 4 mg/dL      Creatinine 0.50 mg/dL      Sodium 137 mmol/L      Potassium 4.2 mmol/L      Chloride 99 mmol/L      CO2 27.0 mmol/L      Calcium 8.9 mg/dL      Total Protein 6.5 g/dL      Albumin 3.8 g/dL      ALT (SGPT) 36 U/L      AST (SGOT) 15 U/L      Alkaline Phosphatase 93 U/L      Total Bilirubin 0.4 mg/dL      Globulin 2.7 gm/dL      A/G Ratio 1.4 g/dL      BUN/Creatinine Ratio 8.0     Anion Gap 11.0 mmol/L      eGFR 124.8 mL/min/1.73     Narrative:      GFR Normal >60  Chronic Kidney Disease <60  Kidney Failure <15      Protime-INR [[id number]]  (Normal) Collected: 02/01/23 1939    Specimen: Blood Updated: 02/01/23 2006     Protime 13.3 Seconds      INR 1.00    aPTT [[id number]]  (Normal) Collected: 02/01/23 1939    Specimen: Blood Updated: 02/01/23 2006     PTT 32.7 seconds     Lipase [[id number]]  (Normal) Collected: 02/01/23 1939    Specimen: Blood  Updated: 02/01/23 2006     Lipase 14 U/L     Magnesium [[id number]]  (Normal) Collected: 02/01/23 1939    Specimen: Blood Updated: 02/01/23 2005     Magnesium 1.8 mg/dL     CBC Auto Differential [[id number]]  (Abnormal) Collected: 02/01/23 1939    Specimen: Blood Updated: 02/01/23 2001     WBC 8.21 10*3/mm3      RBC 3.67 10*6/mm3      Hemoglobin 11.0 g/dL      Hematocrit 34.2 %      MCV 93.2 fL      MCH 30.0 pg      MCHC 32.2 g/dL      RDW 13.5 %      RDW-SD 46.6 fl      MPV 9.6 fL      Platelets 234 10*3/mm3      Neutrophil % 74.3 %      Lymphocyte % 15.0 %      Monocyte % 5.6 %      Eosinophil % 4.4 %      Basophil % 0.5 %      Immature Grans % 0.2 %      Neutrophils, Absolute 6.10 10*3/mm3      Lymphocytes, Absolute 1.23 10*3/mm3      Monocytes, Absolute 0.46 10*3/mm3      Eosinophils, Absolute 0.36 10*3/mm3      Basophils, Absolute 0.04 10*3/mm3      Immature Grans, Absolute 0.02 10*3/mm3      nRBC 0.0 /100 WBC     Troponin [[id number]]  (Normal) Collected: 02/01/23 1939    Specimen: Blood Updated: 02/01/23 2009     Troponin T <0.010 ng/mL     Narrative:      Troponin T Reference Range:  <= 0.03 ng/mL-   Negative for AMI  >0.03 ng/mL-     Abnormal for myocardial necrosis.  Clinicians would have to utilize clinical acumen, EKG, Troponin and serial changes to determine if it is an Acute Myocardial Infarction or myocardial injury due to an underlying chronic condition.       Results may be falsely decreased if patient taking Biotin.      D-dimer, Quantitative [[id number]]  (Abnormal) Collected: 02/01/23 1939    Specimen: Blood Updated: 02/01/23 2006     D-Dimer, Quantitative 2.04 MCGFEU/mL     Narrative:      According to the assay 's published package insert, a normal (<0.50 MCGFEU/mL) D-dimer result in conjunction with a non-high clinical probability assessment, excludes deep vein thrombosis (DVT) and pulmonary embolism (PE) with high sensitivity.    D-dimer values increase with age and this can make VTE  "exclusion of an older population difficult. To address this, the American College of Phys[employer], based on best available evidence and recent guidelines, recommends that clinicians use age-adjusted D-dimer thresholds in patients greater than 50 years of age with: a) a low probability of PE who do not meet all Pulmonary Embolism Rule Out Criteria, or b) in those with intermediate probability of PE.   The formula for an age-adjusted D-dimer cut-off is \"age/100\".  For example, a 60 year old patient would have an age-adjusted cut-off of 0.60 MCGFEU/mL and an 80 year old 0.80 MCGFEU/mL.    Urinalysis With Microscopic If Indicated (No Culture) - Urine, Clean Catch [[id number]]  (Abnormal) Collected: 02/01/23 2110    Specimen: Urine, Clean Catch Updated: 02/01/23 2131     Color, UA Yellow     Appearance, UA Clear     pH, UA 8.0     Specific Gravity, UA 1.024     Glucose, UA Negative     Ketones, UA Negative     Bilirubin, UA Negative     Blood, UA Negative     Protein, UA Negative     Leuk Esterase, UA Small (1+)     Nitrite, UA Negative     Urobilinogen, UA 0.2 E.U./dL    Urinalysis, Microscopic Only - Urine, Clean Catch [[id number]]  (Abnormal) Collected: 02/01/23 2110    Specimen: Urine, Clean Catch Updated: 02/01/23 2131     RBC, UA 0-2 /HPF      WBC, UA 3-5 /HPF      Bacteria, UA None Seen /HPF      Squamous Epithelial Cells, UA 3-6 /HPF      Hyaline Casts, UA 0-2 /LPF      Methodology Automated Microscopy          Medical Decision Making  This is a patient presented emergency room with postop pain.  Patient had cholecystectomy and stent placement.  Patient did have CT scan abdomen pelvis done here emergency room which was negative for any acute findings.  Patient had a CT angiogram of the chest done which was negative for acute findings.  Patient was discharged home in a stable condition.  Patient was given Zofran for her nausea and vomiting.  Patient was advised to follow-up with her surgeon on outpatient basis and " return to the emergency room with new or worsening symptoms.  Patient verbalized understanding was given the plan as discussed    Acute generalized abdominal pain: acute illness or injury  History of biliary duct stent placement: acute illness or injury  Amount and/or Complexity of Data Reviewed  Labs: ordered. Decision-making details documented in ED Course.  Radiology: ordered. Decision-making details documented in ED Course.  ECG/medicine tests: ordered. Decision-making details documented in ED Course.      Risk  Prescription drug management.          Final diagnoses:   Acute generalized abdominal pain   History of biliary duct stent placement       ED Disposition  ED Disposition     ED Disposition   Discharge    Condition   Stable    Comment   --             Your Surgeon at [facility]    Schedule an appointment as soon as possible for a visit in 2 days      [facility]  [address] [place] [id number] [phone]    If symptoms worsen         Medication List      New Prescriptions    ondansetron ODT 4 MG disintegrating tablet  Commonly known as: ZOFRAN-ODT  Place 1 tablet on the tongue Every 8 (Eight) Hours As Needed for Nausea or Vomiting.           Where to Get Your Medications      These medications were sent to [facility] [address] [place] [address] [address] [employer] - [phone] [facility] [phone]   [address] [facility] [facility] [address]    Phone: [phone]   · ondansetron ODT 4 MG disintegrating tablet          Zac Calderon MD  02/02/23 5699

## 2023-02-03 DIAGNOSIS — N20.0 KIDNEY STONE: Primary | ICD-10-CM

## 2023-02-03 LAB
BLOOD CULTURE, ROUTINE: NORMAL
CULTURE, BLOOD 2: NORMAL

## 2023-02-03 NOTE — TELEPHONE ENCOUNTER
Patient is scheduled for repeat ERCP for STENT REMOVAL with Dr. Shawna Varghese at [facility] on [email or web address]. Patient is aware of appointment date and all instructions.

## 2023-02-07 ENCOUNTER — OFFICE VISIT (OUTPATIENT)
Dept: PRIMARY CARE CLINIC | Age: 38
End: 2023-02-07
Payer: COMMERCIAL

## 2023-02-07 VITALS
WEIGHT: 132 LBS | SYSTOLIC BLOOD PRESSURE: 118 MMHG | OXYGEN SATURATION: 99 % | TEMPERATURE: 97.7 F | HEART RATE: 110 BPM | HEIGHT: 61 IN | DIASTOLIC BLOOD PRESSURE: 70 MMHG | BODY MASS INDEX: 24.92 KG/M2

## 2023-02-07 DIAGNOSIS — K83.8 BILE LEAK, POSTOPERATIVE: Primary | ICD-10-CM

## 2023-02-07 DIAGNOSIS — R35.89 POLYURIA: ICD-10-CM

## 2023-02-07 DIAGNOSIS — K80.10 CALCULUS OF GALLBLADDER WITH CHRONIC CHOLECYSTITIS WITHOUT OBSTRUCTION: ICD-10-CM

## 2023-02-07 DIAGNOSIS — K91.89 BILE LEAK, POSTOPERATIVE: Primary | ICD-10-CM

## 2023-02-07 DIAGNOSIS — Z09 HOSPITAL DISCHARGE FOLLOW-UP: ICD-10-CM

## 2023-02-07 PROCEDURE — 99214 OFFICE O/P EST MOD 30 MIN: CPT | Performed by: FAMILY MEDICINE

## 2023-02-07 PROCEDURE — 1111F DSCHRG MED/CURRENT MED MERGE: CPT | Performed by: FAMILY MEDICINE

## 2023-02-07 PROCEDURE — 81002 URINALYSIS NONAUTO W/O SCOPE: CPT | Performed by: FAMILY MEDICINE

## 2023-02-07 SDOH — ECONOMIC STABILITY: FOOD INSECURITY: WITHIN THE PAST 12 MONTHS, THE FOOD YOU BOUGHT JUST DIDN'T LAST AND YOU DIDN'T HAVE MONEY TO GET MORE.: NEVER TRUE

## 2023-02-07 SDOH — ECONOMIC STABILITY: INCOME INSECURITY: HOW HARD IS IT FOR YOU TO PAY FOR THE VERY BASICS LIKE FOOD, HOUSING, MEDICAL CARE, AND HEATING?: SOMEWHAT HARD

## 2023-02-07 SDOH — ECONOMIC STABILITY: FOOD INSECURITY: WITHIN THE PAST 12 MONTHS, YOU WORRIED THAT YOUR FOOD WOULD RUN OUT BEFORE YOU GOT MONEY TO BUY MORE.: NEVER TRUE

## 2023-02-07 SDOH — ECONOMIC STABILITY: HOUSING INSECURITY
IN THE LAST 12 MONTHS, WAS THERE A TIME WHEN YOU DID NOT HAVE A STEADY PLACE TO SLEEP OR SLEPT IN A SHELTER (INCLUDING NOW)?: NO

## 2023-02-09 ENCOUNTER — OFFICE VISIT (OUTPATIENT)
Dept: SURGERY | Age: 38
End: 2023-02-09

## 2023-02-09 VITALS
HEART RATE: 100 BPM | HEIGHT: 61 IN | TEMPERATURE: 98 F | OXYGEN SATURATION: 100 % | WEIGHT: 133.2 LBS | BODY MASS INDEX: 25.15 KG/M2

## 2023-02-09 DIAGNOSIS — R10.30 LOWER ABDOMINAL PAIN: Primary | ICD-10-CM

## 2023-02-09 DIAGNOSIS — K83.9 BILE LEAK: ICD-10-CM

## 2023-02-09 DIAGNOSIS — Z09 POSTOPERATIVE EXAMINATION: ICD-10-CM

## 2023-02-09 PROCEDURE — 99024 POSTOP FOLLOW-UP VISIT: CPT | Performed by: SURGERY

## 2023-02-09 NOTE — PROGRESS NOTES
Post-Discharge Transitional Care  Follow Up      Hawa Blancas   YOB: 1985    Date of Office Visit:  2/7/2023  Date of Hospital Admission: 1/29/23  Date of Hospital Discharge: 1/31/23  Risk of hospital readmission (high >=14%. Medium >=10%) :Readmission Risk Score: 6.3      Care management risk score Rising risk (score 2-5) and Complex Care (Scores >=6): No Risk Score On File     Non face to face  following discharge, date last encounter closed (first attempt may have been earlier): 02/01/2023    Call initiated 2 business days of discharge: Yes    ASSESSMENT/PLAN:   Bile leak, postoperative  Polyuria  -     POCT Urinalysis no Micro  Hospital discharge follow-up  -     PA DISCHARGE MEDS RECONCILED W/ CURRENT OUTPATIENT MED LIST  Calculus of gallbladder with chronic cholecystitis without obstruction      Medical Decision Making: moderate complexity  Return in about 4 weeks (around 3/7/2023). Subjective:   HPI:  Follow up of Hospital problems/diagnosis(es): Patient is seen today for hospital follow-up after she was recently seen after a gallbladder surgery and then had a bile leak. She was seen at [place] and had the surgery done there on January 27 and then had a postoperative bile leak and was readmitted on January 29. She had a stent put in by gastroenterology. She states that she is still having some discomfort but it has improved some. She states that she is not having any vomiting. She states that her appetite is improving slowly. She states that she has not run fever. She does have an appointment scheduled to have the stent taken out but it is not till April. She does have a follow-up appointment scheduled with Dr. Douglas Gill for a postop tomorrow. She states that she has not had any rash. She states that she has had bowel movements and is passing gas. Inpatient course: Discharge summary reviewed- see chart. Interval history/Current status: see note above.     Patient Active Problem List   Diagnosis    Idiopathic hypotension    Phobia, social    Adult ADHD    Calculus of gallbladder with chronic cholecystitis without obstruction    Bile leak, postoperative       Medications listed as ordered at the time of discharge from hospital     Medication List      as of February 7, 2023 11:59 PM     You have not been prescribed any medications. Medications marked \"taking\" at this time  No outpatient medications have been marked as taking for the 2/7/23 encounter (Office Visit) with Araceli Marsh MD.        Medications patient taking as of now reconciled against medications ordered at time of hospital discharge: Yes    A comprehensive review of systems was negative except for what was noted in the HPI. Objective:    /70   Pulse (!) 110   Temp 97.7 °F (36.5 °C)   Ht 5' 1\" (1.549 m)   Wt 132 lb (59.9 kg)   LMP 01/17/2023   SpO2 99%   BMI 24.94 kg/m²   General Appearance: alert and oriented to person, place and time, well-developed and well-nourished, in no acute distress  Skin: warm and dry, no rash or erythema  Head: normocephalic and atraumatic  Neck: neck supple and non tender without mass, no thyromegaly or thyroid nodules, no cervical lymphadenopathy   Pulmonary/Chest: clear to auscultation bilaterally- no wheezes, rales or rhonchi, normal air movement, no respiratory distress  Cardiovascular: normal rate, normal S1 and S2, no gallops, intact distal pulses, and no carotid bruits  Abdomen: soft, non-tender, non-distended, normal bowel sounds, no masses or organomegaly  Extremities: no cyanosis and no clubbing  Musculoskeletal: normal range of motion, no joint swelling, deformity or tenderness  Neurologic: gait and coordination normal and speech normal      An electronic signature was used to authenticate this note.   --Araceli Marsh MD

## 2023-02-09 NOTE — PROGRESS NOTES
Postop Progress Note    Gayathri Ernandez presents to the office for postop follow up 2 weeks s/p laparoscopic cholecystectomy with postoperative bile leak. ERCP was performed and she tolerated well. She is tolerating her diet and having bowel movements. She complains of significant left sided abdominal pain. She is using a heating pad without improvement. Objective    Vitals:    02/09/23 1433   Pulse: 100   Temp: 98 °F (36.7 °C)   SpO2: 100%     General: alert, cooperative and no distress  Incision: healing well. Markings on the left abdomen from heating pad. No burns identified. Assessment  Doing well postoperatively. Plan  Orders Placed This Encounter   Procedures    CT ABDOMEN PELVIS W IV CONTRAST Additional Contrast? Oral     Standing Status:   Future     Standing Expiration Date:   2/9/2024     Order Specific Question:   Additional Contrast?     Answer:   Oral     Order Specific Question:   STAT Creatinine as needed:     Answer:   Yes       1. Continue any current medications  2. Will check CT given abdominal pain in the presence of history of bile leak. R/o abscess/biloma. Will call with results. 3. Pt is to increase activities as tolerated  4. Usual diet  5.  Follow up: as needed    Electronically signed by Sabino Vazquez DO on 4/0/1945 at 2:38 PM

## 2023-02-13 ENCOUNTER — TELEPHONE (OUTPATIENT)
Dept: GASTROENTEROLOGY | Age: 38
End: 2023-02-13

## 2023-02-13 NOTE — TELEPHONE ENCOUNTER
IMPRESSION:  1. Post Op Bile leak s/p sphincterotomy and stenting  as above. RECOMMENDATIONS:    1. Clear liquid diet today with advancing diet tomorrow as tolerated. 2.  Repeat ERCP in 3 months for stent removal         The results were discussed with the patient and family. A copy of the images obtained were given to the patient. Brenden Colon MD  1/30/2023  3:44 PM      ERCP/Stent placement per  1-30-23. Repeat ERCP scheduled 4-25-23. NP appointment scheduled  here with CT aprn on 2-16-23. Patient's Boyfriend ( on [employer]) called today from [phone] said Lisandro Rivera is having increased RUQ area pain and currently at 4-5/10 on pain scale, low grade temp  of 100.3 and her BP is slightly low, said she has an OV tomorrow with  but they are going to CA that and see a new PCP MARIA LUZ Mccollum in [place] tomorrow instead. I told the caller if her pain level goes higher than 6/10 and her fever is persistent or gets higher and they feel they have an emergent situation to report to ER and the caller said that is what they may be doing anyway but wanting to make  aware and see if he has further recommendations at present.  Femi licea

## 2023-02-14 ENCOUNTER — APPOINTMENT (OUTPATIENT)
Dept: GENERAL RADIOLOGY | Age: 38
DRG: 862 | End: 2023-02-14
Payer: COMMERCIAL

## 2023-02-14 ENCOUNTER — APPOINTMENT (OUTPATIENT)
Dept: CT IMAGING | Age: 38
DRG: 862 | End: 2023-02-14
Payer: COMMERCIAL

## 2023-02-14 ENCOUNTER — HOSPITAL ENCOUNTER (INPATIENT)
Age: 38
LOS: 3 days | Discharge: ANOTHER HOSPITAL | DRG: 862 | End: 2023-02-18
Attending: PEDIATRICS | Admitting: SURGERY
Payer: COMMERCIAL

## 2023-02-14 DIAGNOSIS — A41.9 SEPSIS, DUE TO UNSPECIFIED ORGANISM, UNSPECIFIED WHETHER ACUTE ORGAN DYSFUNCTION PRESENT (HCC): Primary | ICD-10-CM

## 2023-02-14 DIAGNOSIS — T81.43XA INTRA-ABDOMINAL ABSCESS POST-PROCEDURE: ICD-10-CM

## 2023-02-14 LAB
ALBUMIN SERPL-MCNC: 3.7 G/DL (ref 3.5–5.2)
ALP BLD-CCNC: 91 U/L (ref 35–104)
ALT SERPL-CCNC: 10 U/L (ref 5–33)
ANION GAP SERPL CALCULATED.3IONS-SCNC: 14 MMOL/L (ref 7–19)
AST SERPL-CCNC: 12 U/L (ref 5–32)
BACTERIA: NEGATIVE /HPF
BASOPHILS ABSOLUTE: 0.1 K/UL (ref 0–0.2)
BASOPHILS RELATIVE PERCENT: 0.4 % (ref 0–1)
BILIRUB SERPL-MCNC: 0.5 MG/DL (ref 0.2–1.2)
BILIRUBIN URINE: NEGATIVE
BLOOD, URINE: ABNORMAL
BUN BLDV-MCNC: 6 MG/DL (ref 6–20)
CALCIUM SERPL-MCNC: 8.7 MG/DL (ref 8.6–10)
CHLORIDE BLD-SCNC: 101 MMOL/L (ref 98–111)
CLARITY: CLEAR
CO2: 23 MMOL/L (ref 22–29)
COLOR: YELLOW
CREAT SERPL-MCNC: 0.5 MG/DL (ref 0.5–0.9)
CRYSTALS, UA: ABNORMAL /HPF
EKG P AXIS: 57 DEGREES
EKG P-R INTERVAL: 162 MS
EKG Q-T INTERVAL: 334 MS
EKG QRS DURATION: 82 MS
EKG QTC CALCULATION (BAZETT): 411 MS
EKG T AXIS: 37 DEGREES
EOSINOPHILS ABSOLUTE: 0.4 K/UL (ref 0–0.6)
EOSINOPHILS RELATIVE PERCENT: 2.8 % (ref 0–5)
EPITHELIAL CELLS, UA: 5 /HPF (ref 0–5)
GFR SERPL CREATININE-BSD FRML MDRD: >60 ML/MIN/{1.73_M2}
GLUCOSE BLD-MCNC: 101 MG/DL (ref 74–109)
GLUCOSE URINE: NEGATIVE MG/DL
HCT VFR BLD CALC: 36.6 % (ref 37–47)
HEMOGLOBIN: 12.2 G/DL (ref 12–16)
HYALINE CASTS: 3 /HPF (ref 0–8)
IMMATURE GRANULOCYTES #: 0.1 K/UL
KETONES, URINE: ABNORMAL MG/DL
LACTIC ACID: 0.6 MMOL/L (ref 0.5–1.9)
LEUKOCYTE ESTERASE, URINE: NEGATIVE
LIPASE: 18 U/L (ref 13–60)
LYMPHOCYTES ABSOLUTE: 1.4 K/UL (ref 1.1–4.5)
LYMPHOCYTES RELATIVE PERCENT: 8.9 % (ref 20–40)
MAGNESIUM: 2 MG/DL (ref 1.6–2.6)
MCH RBC QN AUTO: 30.3 PG (ref 27–31)
MCHC RBC AUTO-ENTMCNC: 33.3 G/DL (ref 33–37)
MCV RBC AUTO: 90.8 FL (ref 81–99)
MONOCYTES ABSOLUTE: 1 K/UL (ref 0–0.9)
MONOCYTES RELATIVE PERCENT: 6.3 % (ref 0–10)
NEUTROPHILS ABSOLUTE: 12.5 K/UL (ref 1.5–7.5)
NEUTROPHILS RELATIVE PERCENT: 81.1 % (ref 50–65)
NITRITE, URINE: NEGATIVE
PDW BLD-RTO: 13.3 % (ref 11.5–14.5)
PH UA: 8 (ref 5–8)
PLATELET # BLD: 279 K/UL (ref 130–400)
PMV BLD AUTO: 9.9 FL (ref 9.4–12.3)
POTASSIUM SERPL-SCNC: 4.3 MMOL/L (ref 3.5–5)
PROCALCITONIN: 0.15 NG/ML (ref 0–0.09)
PROTEIN UA: NEGATIVE MG/DL
RBC # BLD: 4.03 M/UL (ref 4.2–5.4)
RBC UA: 3 /HPF (ref 0–4)
SARS-COV-2, NAAT: NOT DETECTED
SODIUM BLD-SCNC: 138 MMOL/L (ref 136–145)
SPECIFIC GRAVITY UA: >=1.045 (ref 1–1.03)
TOTAL PROTEIN: 6.8 G/DL (ref 6.6–8.7)
TROPONIN: <0.01 NG/ML (ref 0–0.03)
UROBILINOGEN, URINE: 0.2 E.U./DL
WBC # BLD: 15.5 K/UL (ref 4.8–10.8)
WBC UA: 2 /HPF (ref 0–5)

## 2023-02-14 PROCEDURE — 83735 ASSAY OF MAGNESIUM: CPT

## 2023-02-14 PROCEDURE — 96365 THER/PROPH/DIAG IV INF INIT: CPT

## 2023-02-14 PROCEDURE — 84484 ASSAY OF TROPONIN QUANT: CPT

## 2023-02-14 PROCEDURE — 93010 ELECTROCARDIOGRAM REPORT: CPT | Performed by: INTERNAL MEDICINE

## 2023-02-14 PROCEDURE — 81001 URINALYSIS AUTO W/SCOPE: CPT

## 2023-02-14 PROCEDURE — 74177 CT ABD & PELVIS W/CONTRAST: CPT

## 2023-02-14 PROCEDURE — 36415 COLL VENOUS BLD VENIPUNCTURE: CPT

## 2023-02-14 PROCEDURE — 84145 PROCALCITONIN (PCT): CPT

## 2023-02-14 PROCEDURE — 6360000002 HC RX W HCPCS: Performed by: PEDIATRICS

## 2023-02-14 PROCEDURE — 6360000002 HC RX W HCPCS: Performed by: SURGERY

## 2023-02-14 PROCEDURE — 2580000003 HC RX 258: Performed by: PEDIATRICS

## 2023-02-14 PROCEDURE — 96367 TX/PROPH/DG ADDL SEQ IV INF: CPT

## 2023-02-14 PROCEDURE — 71045 X-RAY EXAM CHEST 1 VIEW: CPT

## 2023-02-14 PROCEDURE — 99285 EMERGENCY DEPT VISIT HI MDM: CPT

## 2023-02-14 PROCEDURE — 87635 SARS-COV-2 COVID-19 AMP PRB: CPT

## 2023-02-14 PROCEDURE — 96361 HYDRATE IV INFUSION ADD-ON: CPT

## 2023-02-14 PROCEDURE — 85025 COMPLETE CBC W/AUTO DIFF WBC: CPT

## 2023-02-14 PROCEDURE — 83605 ASSAY OF LACTIC ACID: CPT

## 2023-02-14 PROCEDURE — 83690 ASSAY OF LIPASE: CPT

## 2023-02-14 PROCEDURE — 80053 COMPREHEN METABOLIC PANEL: CPT

## 2023-02-14 PROCEDURE — 87040 BLOOD CULTURE FOR BACTERIA: CPT

## 2023-02-14 PROCEDURE — 93005 ELECTROCARDIOGRAM TRACING: CPT | Performed by: PEDIATRICS

## 2023-02-14 PROCEDURE — 96375 TX/PRO/DX INJ NEW DRUG ADDON: CPT

## 2023-02-14 PROCEDURE — 2500000003 HC RX 250 WO HCPCS: Performed by: PEDIATRICS

## 2023-02-14 PROCEDURE — G0378 HOSPITAL OBSERVATION PER HR: HCPCS

## 2023-02-14 RX ORDER — DEXTROSE AND SODIUM CHLORIDE 5; .45 G/100ML; G/100ML
INJECTION, SOLUTION INTRAVENOUS CONTINUOUS
Status: DISCONTINUED | OUTPATIENT
Start: 2023-02-14 | End: 2023-02-15

## 2023-02-14 RX ORDER — ENOXAPARIN SODIUM 100 MG/ML
40 INJECTION SUBCUTANEOUS DAILY
Status: DISCONTINUED | OUTPATIENT
Start: 2023-02-15 | End: 2023-02-19 | Stop reason: HOSPADM

## 2023-02-14 RX ORDER — ACETAMINOPHEN 325 MG/1
650 TABLET ORAL EVERY 4 HOURS PRN
Status: DISCONTINUED | OUTPATIENT
Start: 2023-02-14 | End: 2023-02-15

## 2023-02-14 RX ORDER — HYDROMORPHONE HYDROCHLORIDE 1 MG/ML
0.5 INJECTION, SOLUTION INTRAMUSCULAR; INTRAVENOUS; SUBCUTANEOUS ONCE
Status: COMPLETED | OUTPATIENT
Start: 2023-02-14 | End: 2023-02-14

## 2023-02-14 RX ORDER — ONDANSETRON 2 MG/ML
4 INJECTION INTRAMUSCULAR; INTRAVENOUS EVERY 6 HOURS PRN
Status: DISCONTINUED | OUTPATIENT
Start: 2023-02-14 | End: 2023-02-19 | Stop reason: HOSPADM

## 2023-02-14 RX ORDER — METRONIDAZOLE 500 MG/100ML
500 INJECTION, SOLUTION INTRAVENOUS EVERY 8 HOURS
Status: DISCONTINUED | OUTPATIENT
Start: 2023-02-15 | End: 2023-02-19 | Stop reason: HOSPADM

## 2023-02-14 RX ORDER — SODIUM CHLORIDE, SODIUM LACTATE, POTASSIUM CHLORIDE, AND CALCIUM CHLORIDE .6; .31; .03; .02 G/100ML; G/100ML; G/100ML; G/100ML
30 INJECTION, SOLUTION INTRAVENOUS ONCE
Status: COMPLETED | OUTPATIENT
Start: 2023-02-14 | End: 2023-02-14

## 2023-02-14 RX ORDER — ONDANSETRON 4 MG/1
4 TABLET, ORALLY DISINTEGRATING ORAL EVERY 8 HOURS PRN
Status: DISCONTINUED | OUTPATIENT
Start: 2023-02-14 | End: 2023-02-19 | Stop reason: HOSPADM

## 2023-02-14 RX ORDER — OXYCODONE HYDROCHLORIDE 5 MG/1
10 TABLET ORAL EVERY 4 HOURS PRN
Status: DISCONTINUED | OUTPATIENT
Start: 2023-02-14 | End: 2023-02-19 | Stop reason: HOSPADM

## 2023-02-14 RX ORDER — SODIUM CHLORIDE 9 MG/ML
INJECTION, SOLUTION INTRAVENOUS PRN
Status: DISCONTINUED | OUTPATIENT
Start: 2023-02-14 | End: 2023-02-19 | Stop reason: HOSPADM

## 2023-02-14 RX ORDER — CIPROFLOXACIN 2 MG/ML
400 INJECTION, SOLUTION INTRAVENOUS EVERY 12 HOURS
Status: DISCONTINUED | OUTPATIENT
Start: 2023-02-15 | End: 2023-02-19 | Stop reason: HOSPADM

## 2023-02-14 RX ORDER — LORAZEPAM 2 MG/ML
0.5 INJECTION INTRAMUSCULAR ONCE
Status: COMPLETED | OUTPATIENT
Start: 2023-02-14 | End: 2023-02-14

## 2023-02-14 RX ORDER — SODIUM CHLORIDE 0.9 % (FLUSH) 0.9 %
5-40 SYRINGE (ML) INJECTION PRN
Status: DISCONTINUED | OUTPATIENT
Start: 2023-02-14 | End: 2023-02-19 | Stop reason: HOSPADM

## 2023-02-14 RX ORDER — KETOROLAC TROMETHAMINE 30 MG/ML
30 INJECTION, SOLUTION INTRAMUSCULAR; INTRAVENOUS EVERY 6 HOURS PRN
Status: DISCONTINUED | OUTPATIENT
Start: 2023-02-14 | End: 2023-02-15

## 2023-02-14 RX ORDER — METRONIDAZOLE 500 MG/100ML
500 INJECTION, SOLUTION INTRAVENOUS ONCE
Status: COMPLETED | OUTPATIENT
Start: 2023-02-14 | End: 2023-02-14

## 2023-02-14 RX ORDER — SODIUM CHLORIDE, SODIUM LACTATE, POTASSIUM CHLORIDE, CALCIUM CHLORIDE 600; 310; 30; 20 MG/100ML; MG/100ML; MG/100ML; MG/100ML
INJECTION, SOLUTION INTRAVENOUS CONTINUOUS
Status: DISCONTINUED | OUTPATIENT
Start: 2023-02-14 | End: 2023-02-19 | Stop reason: HOSPADM

## 2023-02-14 RX ORDER — SODIUM CHLORIDE 0.9 % (FLUSH) 0.9 %
5-40 SYRINGE (ML) INJECTION EVERY 12 HOURS SCHEDULED
Status: DISCONTINUED | OUTPATIENT
Start: 2023-02-15 | End: 2023-02-19 | Stop reason: HOSPADM

## 2023-02-14 RX ORDER — OXYCODONE HYDROCHLORIDE 5 MG/1
5 TABLET ORAL EVERY 4 HOURS PRN
Status: DISCONTINUED | OUTPATIENT
Start: 2023-02-14 | End: 2023-02-19 | Stop reason: HOSPADM

## 2023-02-14 RX ADMIN — DEXTROSE AND SODIUM CHLORIDE: 5; 450 INJECTION, SOLUTION INTRAVENOUS at 23:06

## 2023-02-14 RX ADMIN — HYDROMORPHONE HYDROCHLORIDE 0.5 MG: 1 INJECTION, SOLUTION INTRAMUSCULAR; INTRAVENOUS; SUBCUTANEOUS at 23:02

## 2023-02-14 RX ADMIN — HYDROMORPHONE HYDROCHLORIDE 0.5 MG: 1 INJECTION, SOLUTION INTRAMUSCULAR; INTRAVENOUS; SUBCUTANEOUS at 15:05

## 2023-02-14 RX ADMIN — KETOROLAC TROMETHAMINE 30 MG: 30 INJECTION, SOLUTION INTRAMUSCULAR at 23:57

## 2023-02-14 RX ADMIN — METRONIDAZOLE 500 MG: 500 INJECTION, SOLUTION INTRAVENOUS at 14:01

## 2023-02-14 RX ADMIN — CEFEPIME 2000 MG: 2 INJECTION, POWDER, FOR SOLUTION INTRAVENOUS at 13:36

## 2023-02-14 RX ADMIN — LORAZEPAM 0.5 MG: 2 INJECTION INTRAMUSCULAR at 23:02

## 2023-02-14 RX ADMIN — SODIUM CHLORIDE, POTASSIUM CHLORIDE, SODIUM LACTATE AND CALCIUM CHLORIDE 1434 ML: 600; 310; 30; 20 INJECTION, SOLUTION INTRAVENOUS at 13:28

## 2023-02-14 ASSESSMENT — PAIN DESCRIPTION - LOCATION
LOCATION: ABDOMEN

## 2023-02-14 ASSESSMENT — PAIN SCALES - GENERAL
PAINLEVEL_OUTOF10: 7
PAINLEVEL_OUTOF10: 7
PAINLEVEL_OUTOF10: 8

## 2023-02-14 ASSESSMENT — LIFESTYLE VARIABLES
HOW OFTEN DO YOU HAVE A DRINK CONTAINING ALCOHOL: NEVER
HOW MANY STANDARD DRINKS CONTAINING ALCOHOL DO YOU HAVE ON A TYPICAL DAY: PATIENT DOES NOT DRINK

## 2023-02-14 ASSESSMENT — PAIN - FUNCTIONAL ASSESSMENT: PAIN_FUNCTIONAL_ASSESSMENT: 0-10

## 2023-02-14 ASSESSMENT — PAIN DESCRIPTION - ONSET: ONSET: ON-GOING

## 2023-02-14 ASSESSMENT — PAIN DESCRIPTION - PAIN TYPE: TYPE: ACUTE PAIN

## 2023-02-14 ASSESSMENT — PAIN DESCRIPTION - FREQUENCY: FREQUENCY: INTERMITTENT

## 2023-02-14 ASSESSMENT — PAIN DESCRIPTION - ORIENTATION: ORIENTATION: RIGHT

## 2023-02-14 ASSESSMENT — PAIN DESCRIPTION - DESCRIPTORS: DESCRIPTORS: ACHING;DISCOMFORT

## 2023-02-15 ENCOUNTER — APPOINTMENT (OUTPATIENT)
Dept: CT IMAGING | Age: 38
DRG: 862 | End: 2023-02-15
Payer: COMMERCIAL

## 2023-02-15 PROBLEM — K29.70 GASTRITIS: Status: ACTIVE | Noted: 2023-02-15

## 2023-02-15 PROBLEM — T81.43XA INTRA-ABDOMINAL ABSCESS POST-PROCEDURE: Status: ACTIVE | Noted: 2023-02-15

## 2023-02-15 PROBLEM — K59.00 CONSTIPATION: Status: ACTIVE | Noted: 2023-02-15

## 2023-02-15 PROBLEM — K80.10 CALCULUS OF GALLBLADDER WITH CHRONIC CHOLECYSTITIS WITHOUT OBSTRUCTION: Status: RESOLVED | Noted: 2023-01-27 | Resolved: 2023-02-15

## 2023-02-15 LAB
ANION GAP SERPL CALCULATED.3IONS-SCNC: 9 MMOL/L (ref 7–19)
BASOPHILS ABSOLUTE: 0.1 K/UL (ref 0–0.2)
BASOPHILS RELATIVE PERCENT: 0.5 % (ref 0–1)
BUN BLDV-MCNC: 5 MG/DL (ref 6–20)
CALCIUM SERPL-MCNC: 8.2 MG/DL (ref 8.6–10)
CHLORIDE BLD-SCNC: 102 MMOL/L (ref 98–111)
CO2: 26 MMOL/L (ref 22–29)
CREAT SERPL-MCNC: 0.6 MG/DL (ref 0.5–0.9)
EOSINOPHILS ABSOLUTE: 0.5 K/UL (ref 0–0.6)
EOSINOPHILS RELATIVE PERCENT: 4.2 % (ref 0–5)
GFR SERPL CREATININE-BSD FRML MDRD: >60 ML/MIN/{1.73_M2}
GLUCOSE BLD-MCNC: 84 MG/DL (ref 74–109)
HCT VFR BLD CALC: 32 % (ref 37–47)
HEMOGLOBIN: 10.5 G/DL (ref 12–16)
IMMATURE GRANULOCYTES #: 0.1 K/UL
INR BLD: 1.22 (ref 0.88–1.18)
LYMPHOCYTES ABSOLUTE: 1.4 K/UL (ref 1.1–4.5)
LYMPHOCYTES RELATIVE PERCENT: 11.7 % (ref 20–40)
MCH RBC QN AUTO: 30.5 PG (ref 27–31)
MCHC RBC AUTO-ENTMCNC: 32.8 G/DL (ref 33–37)
MCV RBC AUTO: 93 FL (ref 81–99)
MONOCYTES ABSOLUTE: 0.8 K/UL (ref 0–0.9)
MONOCYTES RELATIVE PERCENT: 6.6 % (ref 0–10)
NEUTROPHILS ABSOLUTE: 9.1 K/UL (ref 1.5–7.5)
NEUTROPHILS RELATIVE PERCENT: 76.5 % (ref 50–65)
PDW BLD-RTO: 13.3 % (ref 11.5–14.5)
PLATELET # BLD: 249 K/UL (ref 130–400)
PMV BLD AUTO: 10.3 FL (ref 9.4–12.3)
POTASSIUM REFLEX MAGNESIUM: 4.1 MMOL/L (ref 3.5–5)
PROTHROMBIN TIME: 15.5 SEC (ref 12–14.6)
RBC # BLD: 3.44 M/UL (ref 4.2–5.4)
SODIUM BLD-SCNC: 137 MMOL/L (ref 136–145)
WBC # BLD: 11.9 K/UL (ref 4.8–10.8)

## 2023-02-15 PROCEDURE — C9113 INJ PANTOPRAZOLE SODIUM, VIA: HCPCS | Performed by: SURGERY

## 2023-02-15 PROCEDURE — 85610 PROTHROMBIN TIME: CPT

## 2023-02-15 PROCEDURE — 6360000002 HC RX W HCPCS: Performed by: SURGERY

## 2023-02-15 PROCEDURE — 36415 COLL VENOUS BLD VENIPUNCTURE: CPT

## 2023-02-15 PROCEDURE — 96367 TX/PROPH/DG ADDL SEQ IV INF: CPT

## 2023-02-15 PROCEDURE — 2580000003 HC RX 258: Performed by: SURGERY

## 2023-02-15 PROCEDURE — 96366 THER/PROPH/DIAG IV INF ADDON: CPT

## 2023-02-15 PROCEDURE — 2500000003 HC RX 250 WO HCPCS: Performed by: SURGERY

## 2023-02-15 PROCEDURE — 6370000000 HC RX 637 (ALT 250 FOR IP): Performed by: SURGERY

## 2023-02-15 PROCEDURE — 1210000000 HC MED SURG R&B

## 2023-02-15 PROCEDURE — 96361 HYDRATE IV INFUSION ADD-ON: CPT

## 2023-02-15 PROCEDURE — 94150 VITAL CAPACITY TEST: CPT

## 2023-02-15 PROCEDURE — 96372 THER/PROPH/DIAG INJ SC/IM: CPT

## 2023-02-15 PROCEDURE — G0378 HOSPITAL OBSERVATION PER HR: HCPCS

## 2023-02-15 PROCEDURE — 96375 TX/PRO/DX INJ NEW DRUG ADDON: CPT

## 2023-02-15 PROCEDURE — 99024 POSTOP FOLLOW-UP VISIT: CPT | Performed by: SURGERY

## 2023-02-15 PROCEDURE — 85025 COMPLETE CBC W/AUTO DIFF WBC: CPT

## 2023-02-15 PROCEDURE — 80048 BASIC METABOLIC PNL TOTAL CA: CPT

## 2023-02-15 RX ORDER — HYDROMORPHONE HYDROCHLORIDE 1 MG/ML
1 INJECTION, SOLUTION INTRAMUSCULAR; INTRAVENOUS; SUBCUTANEOUS
Status: DISCONTINUED | OUTPATIENT
Start: 2023-02-15 | End: 2023-02-19 | Stop reason: HOSPADM

## 2023-02-15 RX ORDER — DOCUSATE SODIUM 100 MG/1
200 CAPSULE, LIQUID FILLED ORAL DAILY
Status: DISCONTINUED | OUTPATIENT
Start: 2023-02-15 | End: 2023-02-19 | Stop reason: HOSPADM

## 2023-02-15 RX ORDER — BISACODYL 10 MG
10 SUPPOSITORY, RECTAL RECTAL ONCE
Status: DISCONTINUED | OUTPATIENT
Start: 2023-02-15 | End: 2023-02-19 | Stop reason: HOSPADM

## 2023-02-15 RX ORDER — POLYETHYLENE GLYCOL 3350 17 G/17G
17 POWDER, FOR SOLUTION ORAL DAILY
Status: DISCONTINUED | OUTPATIENT
Start: 2023-02-15 | End: 2023-02-19 | Stop reason: HOSPADM

## 2023-02-15 RX ADMIN — HYDROMORPHONE HYDROCHLORIDE 1 MG: 1 INJECTION, SOLUTION INTRAMUSCULAR; INTRAVENOUS; SUBCUTANEOUS at 16:01

## 2023-02-15 RX ADMIN — SODIUM CHLORIDE 8 MG/HR: 9 INJECTION, SOLUTION INTRAVENOUS at 11:45

## 2023-02-15 RX ADMIN — OXYCODONE HYDROCHLORIDE 10 MG: 5 TABLET ORAL at 17:23

## 2023-02-15 RX ADMIN — OXYCODONE HYDROCHLORIDE 10 MG: 5 TABLET ORAL at 09:10

## 2023-02-15 RX ADMIN — SODIUM CHLORIDE, POTASSIUM CHLORIDE, SODIUM LACTATE AND CALCIUM CHLORIDE: 600; 310; 30; 20 INJECTION, SOLUTION INTRAVENOUS at 22:59

## 2023-02-15 RX ADMIN — METRONIDAZOLE 500 MG: 500 INJECTION, SOLUTION INTRAVENOUS at 09:05

## 2023-02-15 RX ADMIN — SODIUM CHLORIDE, PRESERVATIVE FREE 10 ML: 5 INJECTION INTRAVENOUS at 22:53

## 2023-02-15 RX ADMIN — SODIUM CHLORIDE, PRESERVATIVE FREE 10 ML: 5 INJECTION INTRAVENOUS at 09:05

## 2023-02-15 RX ADMIN — CIPROFLOXACIN 400 MG: 2 INJECTION, SOLUTION INTRAVENOUS at 12:30

## 2023-02-15 RX ADMIN — CIPROFLOXACIN 400 MG: 2 INJECTION, SOLUTION INTRAVENOUS at 00:01

## 2023-02-15 RX ADMIN — ACETAMINOPHEN 650 MG: 325 TABLET ORAL at 09:04

## 2023-02-15 RX ADMIN — DOCUSATE SODIUM 200 MG: 100 CAPSULE, LIQUID FILLED ORAL at 10:10

## 2023-02-15 RX ADMIN — POLYETHYLENE GLYCOL 3350 17 G: 17 POWDER, FOR SOLUTION ORAL at 10:10

## 2023-02-15 RX ADMIN — SODIUM CHLORIDE 8 MG/HR: 9 INJECTION, SOLUTION INTRAVENOUS at 22:52

## 2023-02-15 RX ADMIN — OXYCODONE HYDROCHLORIDE 10 MG: 5 TABLET ORAL at 22:47

## 2023-02-15 RX ADMIN — ENOXAPARIN SODIUM 40 MG: 100 INJECTION SUBCUTANEOUS at 09:04

## 2023-02-15 RX ADMIN — SODIUM CHLORIDE, POTASSIUM CHLORIDE, SODIUM LACTATE AND CALCIUM CHLORIDE: 600; 310; 30; 20 INJECTION, SOLUTION INTRAVENOUS at 10:00

## 2023-02-15 RX ADMIN — METRONIDAZOLE 500 MG: 500 INJECTION, SOLUTION INTRAVENOUS at 17:24

## 2023-02-15 RX ADMIN — METRONIDAZOLE 500 MG: 500 INJECTION, SOLUTION INTRAVENOUS at 01:22

## 2023-02-15 ASSESSMENT — PAIN DESCRIPTION - ONSET: ONSET: ON-GOING

## 2023-02-15 ASSESSMENT — ENCOUNTER SYMPTOMS
BLOOD IN STOOL: 0
DIARRHEA: 0
RECTAL PAIN: 0
EYE DISCHARGE: 0
NAUSEA: 0
EYE PAIN: 0
CONSTIPATION: 0
SHORTNESS OF BREATH: 0
COLOR CHANGE: 0
EYE REDNESS: 0
SORE THROAT: 0
CHEST TIGHTNESS: 0
VOMITING: 0
ABDOMINAL DISTENTION: 0
ABDOMINAL PAIN: 1
COUGH: 0
BACK PAIN: 0
RHINORRHEA: 0
NAUSEA: 1

## 2023-02-15 ASSESSMENT — PAIN DESCRIPTION - ORIENTATION
ORIENTATION: RIGHT
ORIENTATION: MID
ORIENTATION: RIGHT

## 2023-02-15 ASSESSMENT — PAIN DESCRIPTION - DESCRIPTORS
DESCRIPTORS: ACHING;DISCOMFORT
DESCRIPTORS: ACHING;DISCOMFORT
DESCRIPTORS: DISCOMFORT
DESCRIPTORS: ACHING
DESCRIPTORS: DISCOMFORT

## 2023-02-15 ASSESSMENT — PAIN DESCRIPTION - FREQUENCY
FREQUENCY: CONTINUOUS

## 2023-02-15 ASSESSMENT — PAIN - FUNCTIONAL ASSESSMENT
PAIN_FUNCTIONAL_ASSESSMENT: ACTIVITIES ARE NOT PREVENTED
PAIN_FUNCTIONAL_ASSESSMENT: ACTIVITIES ARE NOT PREVENTED
PAIN_FUNCTIONAL_ASSESSMENT: PREVENTS OR INTERFERES SOME ACTIVE ACTIVITIES AND ADLS

## 2023-02-15 ASSESSMENT — PAIN DESCRIPTION - PAIN TYPE
TYPE: ACUTE PAIN

## 2023-02-15 ASSESSMENT — PAIN DESCRIPTION - LOCATION
LOCATION: ABDOMEN

## 2023-02-15 ASSESSMENT — PAIN SCALES - GENERAL
PAINLEVEL_OUTOF10: 4
PAINLEVEL_OUTOF10: 8
PAINLEVEL_OUTOF10: 7
PAINLEVEL_OUTOF10: 8
PAINLEVEL_OUTOF10: 4
PAINLEVEL_OUTOF10: 4
PAINLEVEL_OUTOF10: 10
PAINLEVEL_OUTOF10: 8

## 2023-02-15 NOTE — PROGRESS NOTES
4 Eyes Skin Assessment    Benjaman Line is being assessed upon: Admission    I agree that Guillermina Slater RN, along with Randy Page RN (either 2 RN's or 1 LPN and 1 RN) have performed a thorough Head to Toe Skin Assessment on the patient. ALL assessment sites listed below have been assessed. Areas assessed by both nurses:     [x]   Head, Face, and Ears   [x]   Shoulders, Back, and Chest  [x]   Arms, Elbows, and Hands   [x]   Coccyx, Sacrum, and Ischium  [x]   Legs, Feet, and Heels    Does the Patient have Skin Breakdown?  No    Mando Prevention initiated: No  Wound Care Orders initiated: No    WOC nurse consulted for Pressure Injury (Stage 3,4, Unstageable, DTI, NWPT, and Complex wounds) and New or Established Ostomies: No        Primary Nurse eSignature: Adonis Tenorio RN on 2/15/2023 at 12:16 AM      Co-Signer eSignature: Electronically signed by Randy Page RN on 2/15/23 at 12:21 AM CST

## 2023-02-15 NOTE — PROGRESS NOTES
Irving Peña arrived to room # . Presented with: Post-op problem  Mental Status: Patient is oriented, alert, coherent, logical, thought processes intact, and able to concentrate and follow conversation. Vitals:    02/14/23 2330   BP: (!) 92/59   Pulse: (!) 109   Resp: 20   Temp: 99.1 °F (37.3 °C)   SpO2: 96%     Patient safety contract and falls prevention contract reviewed with patient Yes. Oriented Patient and Family to room. Call light within reach. Yes.   Needs, issues or concerns expressed at this time: no.      Electronically signed by Silvano Cardenas RN on 2/15/2023 at 12:15 AM

## 2023-02-15 NOTE — DISCHARGE SUMMARY
Physician Discharge Summary     Patient ID:  Gilmer Clement  [id number]  52 y.o.  1985    Admit date: 2/14/2023    Discharge date and time: No discharge date for patient encounter. Admitting Physician: Jason Fortune DO     Discharge Physician: Carlita Rodriguez DO     Admission Diagnoses: Intra-abdominal abscess post-procedure [T81.43XA]  Bile leak, postoperative [K91.89, K83.8]  Sepsis, due to unspecified organism, unspecified whether acute organ dysfunction present [facility]) [A41.9]    Discharge Diagnoses: Same    Admission Condition: fair    Discharged Condition: fair    Indication for Admission: abscess, antibiotics, drainage. Hospital Course: Ms. Marilu Brito was admitted through the emergency room for abdominal pain and findings of what seems to be a biloma or biliary abscess related to her bile leak that occurred two weeks ago following laparoscopic cholecystectomy which was treated by ERCP. Radiology is not able to place a drain in this area, and given her fever and abdominal pain, feel at least aspiration would be beneficial. Treated with IV antibiotics. Planning for transfer for interventional radiology evaluation and treatment. Consults: none    Significant Diagnostic Studies: see chart    Treatments: IV hydration, antibiotics: Cipro and metronidazole, and analgesia: Dilaudid    Discharge Exam:  /69   Pulse 95   Temp 98.4 °F (36.9 °C)   Resp 18   Ht 5' 1\" (1.549 m)   Wt 130 lb (59 kg)   LMP 01/17/2023   SpO2 96%   BMI 24.56 kg/m²   Head: Normocephalic, without obvious abnormality, atraumatic   AAO x 3, Diaphoretic  Abdomen: soft, non-tender; bowel sounds normal; no masses,  no organomegaly and TTP in RUQ. Disposition: [facility] to be determined.      In process/preliminary results:  Outstanding Order Results       Date and Time Order Name Status Description    2/14/2023  1:30 PM Culture, Blood 2 Preliminary     2/14/2023  1:20 PM Blood Culture 1 Preliminary             Patient Instructions: There are no discharge medications for this patient. Activity: activity as tolerated  Diet:  NPO at this time. Wound Care: none needed    Follow-up with Dr. George Chang in 1 week.     SignedTrinity Ochoa DO   2/47/2785  4:49 PM

## 2023-02-15 NOTE — H&P
[address] [facility] Surgery History & Physical    Chief Complaint:  Chief Complaint   Patient presents with    Post-op Problem     Abdominal pain         SUBJECTIVE:  Ms. Mg Hand is a 40 y.o. female who presents today with new worsening pain in the right upper abdomen. She had been evaluated this week in the office and outpatient CT scan was ordered due to left sided pain. She states that in the last few days the right upper abdomen has started to hurt more and more. It became severe last night so she came in to be seen. The left sided pain is improving. Denies nausea/vomiting. Denies fever, chills. Past Medical History:   Diagnosis Date    Anxiety     Cholelithiasis     Hx of partial seizures     hx of seizure years ago; no meds    UTI (urinary tract infection)     PT states she gets constant UTI's \"That is pretty much undercontrol now. \"     Past Surgical History:   Procedure Laterality Date    BREAST BIOPSY Left     [place]x, benign     SECTION      x2    CHOLECYSTECTOMY, LAPAROSCOPIC N/A 2023    LAPAROSCOPIC CHOLECYSTECTOMY performed by Cruz Marshall DO at [facility] OR    ERCP N/A 2023    Dr DELL Samayoa-w/1 cm biliary sphincterotomy, placement of a 10F x 7cm plastic stent-Op bile leak, 3 month repeat    TUBAL LIGATION       Current Facility-Administered Medications   Medication Dose Route Frequency Provider Last Rate Last Admin    pantoprazole (PROTONIX) 80 mg in sodium chloride 0.9 % 100 mL infusion  8 mg/hr IntraVENous Continuous Anastacia Lemus DO 10 mL/hr at 02/15/23 1145 8 mg/hr at 02/15/23 1145    bisacodyl (DULCOLAX) suppository 10 mg  10 mg Rectal Once Anastacia Lemus DO        docusate sodium (COLACE) capsule 200 mg  517 mg Oral Daily Anastacia Lemus DO   895 mg at 02/15/23 1010    polyethylene glycol (GLYCOLAX) packet 17 g  17 g Oral Daily Anastacia Lemus DO   17 g at 02/15/23 1010    iopamidol (ISOVUE-370) 76 % injection 90 mL  90 mL IntraVENous ONCE PRN Vitor J Nate Garcia, DO        lactated ringers IV soln infusion   IntraVENous Continuous Bobbetta Massy,  mL/hr at 02/15/23 1000 New Bag at 02/15/23 1000    sodium chloride flush 0.9 % injection 5-40 mL  5-40 mL IntraVENous 2 times per day Bobbetta Massy, DO   10 mL at 02/15/23 0905    sodium chloride flush 0.9 % injection 5-40 mL  5-40 mL IntraVENous PRN Bobbetta Massy, DO        0.9 % sodium chloride infusion   IntraVENous PRN Bobbetta Massy, DO        ondansetron (ZOFRAN-ODT) disintegrating tablet 4 mg  4 mg Oral Q8H PRN Bobbetta Massy, DO        Or    [facility] injection 4 mg  4 mg IntraVENous Q6H PRN Bobbetta Massy, DO        enoxaparin (LOVENOX) injection 40 mg  40 mg SubCUTAneous Daily Bobbetta Massy, DO   40 mg at 02/15/23 9300    ketorolac (TORADOL) injection 30 mg  30 mg IntraVENous Q6H PRN Bobbetta Massy, DO   30 mg at 02/14/23 2357    oxyCODONE (ROXICODONE) immediate release tablet 5 mg  5 mg Oral Q4H PRN Bobbetta Massy, DO        Or    oxyCODONE (ROXICODONE) immediate release tablet 10 mg  10 mg Oral Q4H PRN Bobbetta Massy, DO   10 mg at 02/15/23 0910    acetaminophen (TYLENOL) tablet 650 mg  650 mg Oral Q4H PRN Bobbetta Massy, DO   650 mg at 02/15/23 7546    ciprofloxacin (CIPRO) IVPB 400 mg  400 mg IntraVENous Q12H Bobbetta Massy,  mL/hr at 02/15/23 1230 400 mg at 02/15/23 1230    metronidazole (FLAGYL) 500 mg in 0.9% NaCl 100 mL IVPB premix  500 mg IntraVENous Q8H Bobbetta Massy, DO   Stopped at 02/15/23 1005     Allergies: Penicillins and Zoloft [sertraline hcl]    Family History   Problem Relation Age of Onset    Heart Attack Mother     Stroke Mother     High Blood Pressure Mother     High Cholesterol Mother     Diabetes Mother     Cancer Mother         thyroid    No Known Problems Father     Alcohol Abuse Brother         recently out of rehab from what she was told    Diabetes Maternal Grandmother     Cancer Maternal Grandmother         Pancreatic ADHD Son         with Trouettes    No Known Problems Son        Social History     Tobacco Use    Smoking status: Every Day     Packs/day: 0.50     Types: Cigarettes     Start date: 2003    Smokeless tobacco: Never   Substance Use Topics    Alcohol use: Not Currently       Review of Systems   Constitutional:  Positive for activity change, appetite change and fatigue. Negative for fever and unexpected weight change. HENT:  Negative for hearing loss, nosebleeds and sore throat. Eyes:  Negative for pain, redness and visual disturbance. Respiratory:  Negative for cough, chest tightness and shortness of breath. Cardiovascular:  Negative for chest pain, palpitations and leg swelling. Gastrointestinal:  Positive for abdominal pain. Negative for abdominal distention, constipation, diarrhea, nausea and vomiting. Endocrine: Negative for cold intolerance, heat intolerance and polydipsia. Genitourinary:  Negative for difficulty urinating, frequency and urgency. Musculoskeletal:  Negative for back pain, joint swelling and neck pain. Skin:  Negative for color change, rash and wound. Allergic/Immunologic: Negative for environmental allergies and food allergies. Neurological:  Negative for seizures, light-headedness and headaches. Hematological:  Negative for adenopathy. Does not bruise/bleed easily. Psychiatric/Behavioral:  Negative for confusion, sleep disturbance and suicidal ideas. OBJECTIVE:  /76   Pulse (!) 109   Temp 100.4 °F (38 °C) (Temporal)   Resp 18   Ht 5' 1\" (1.549 m)   Wt 130 lb (59 kg)   LMP 01/17/2023   SpO2 97%   BMI 24.56 kg/m²   CONSTITUTIONAL: Alert, appropriate, no acute distress. diaphoretic  SKIN: warm, dry with no rashes or lesions  HEENT: NCAT, Non icteric, PERR. Trachea Midline. CHEST/LUNGS: CTA bilaterally. Normal respiratory effort. CARDIOVASCULAR: RRR, No edema.   ABDOMEN: soft, ND, TTP in RUQ, +BS Incisions c/d/I.  NEUROLOGIC: CN II-XI grossly intact, no motor or sensory deficits . MUSCULOSKELETAL: No clubbing or cyanosis. PSYCHIATRIC:  Normal Mood and Affect. Alert and Oriented. LABS:  CBC:   Recent Labs     02/14/23  1320 02/15/23  0229   WBC 15.5* 11.9*   HGB 12.2 10.5*    249     BMP:    Recent Labs     02/14/23  1320 02/15/23  0229    137   K 4.3 4.1    102   CO2 23 26   BUN 6 5*   CREATININE 0.5 0.6   GLUCOSE 101 84     2/15/2023 CT abd/pelvis       Impression   1. Since prior examination a common bile duct stent has been placed. Within the gallbladder fossa a 2.85 cm hypoattenuated fluid collection is suspected. Not seen on prior exam. This may represent bile. 2. Haziness in the right upper abdomen mesentery can be edema/inflammatory process. 3. No intrahepatic biliary ductal dilatation. 4. Gastritis with mild worsening. Recommendation: Follow up as clinically indicated. All CT scans at this facility utilize dose modulation, iterative reconstruction, and/or weight based dosing when appropriate to reduce radiation dose to as low as reasonably achievable. Dictated and Electronically Signed by Anahi Verdugo MD, SA CERTIFIED at [id number] 07:07:30 PM EST                    ASSESSMENT:  Principal Problem:    Bile leak, postoperative  Active Problems:    Constipation    Gastritis  Resolved Problems:    * No resolved hospital problems. *      PLAN:  Will initiate antibiotics for fluid collection in the gallbladder fossa from previously treated bile leak. Will discuss with radiology possible drain placement, given patient is now febrile with pretty significant tenderness in the area. Starting PPI for gastritis. Will treat with drip for 24 hours then change to oral BID. D/C any NSAIDS. Constipation. Initiate bowel regimen. NPO for now until determine if drain to be placed.      Jan Rosen DO   Electronically Signed on 2/15/2023 at 12:52 PM

## 2023-02-15 NOTE — PLAN OF CARE
Problem: Pain  Goal: Verbalizes/displays adequate comfort level or baseline comfort level  2/15/2023 1340 by Sheri Underwood RN  Outcome: Progressing  2/15/2023 0039 by Yajaira Joe RN  Outcome: Progressing

## 2023-02-15 NOTE — PROGRESS NOTES
CT tech Xochitl Nova called asking that I review a patient that has a order for a ct guided abscess drainage with cath placement. Patient labs are complete, but Lovenox 40mg  was given this am at 904 am and that is a 12 hour hold per protocol. Will call and alert Xochitl Nova and see if patient can be added tomorrow after hold is placed for the Lovenox.

## 2023-02-15 NOTE — ED PROVIDER NOTES
[facility]  eMERGENCY dEPARTMENT eNCOUnter      Pt Name: Adan Vazquez  MRN: [id number]  [place] 1985  Date of evaluation: 2/14/2023  Provider: Nicolas Bangura MD    [address] [place] [address]       Chief Complaint   Patient presents with    Post-op Problem     Abdominal pain           HISTORY OF PRESENT ILLNESS   (Location/Symptom, Timing/Onset,Context/Setting, Quality, Duration, Modifying Factors, Severity)  Note limiting factors. Adan Vazquez is a 40 y.o. female who presents to the emergency department with abdominal pain and fever. Patient had laparoscopic cholecystectomy performed on 1/27/2023 by Dr. Umesh Alvarez. Patient was found to have gallstones while hospitalized in [place] for renal abscess. Patient return to [place], [place] for cholecystectomy. Patient developed a bile leak on 1/29/2023 and was hospitalized with an ERCP performed by Dr. Myesha Boyce. Patient had stent placed in common bile duct. Patient was then seen at [facility] on 2/1/2023 for abdominal pain secondary to biliary stent. Patient presents today with more diffuse pain but with maximal pain of right upper quadrant. Patient developed a fever with a Tmax to 102 degrees yesterday. Patient states that pain increases with movement. Patient denies radiation. Associated symptoms include nausea. Patient denies vomiting, diarrhea, burning with urination, or black or bloody stools. Patient last ate at approximately 4:30 AM.    HPI    NursingNotes were reviewed. REVIEW OF SYSTEMS    (2-9 systems for level 4, 10 or more for level 5)     Review of Systems   Constitutional:  Negative for chills and fever. HENT:  Negative for congestion and rhinorrhea. Eyes:  Negative for discharge. Respiratory:  Negative for cough and shortness of breath. Cardiovascular:  Negative for chest pain and palpitations. Gastrointestinal:  Positive for abdominal pain and nausea.  Negative for blood in stool, constipation, diarrhea, rectal pain and vomiting. Genitourinary:  Negative for difficulty urinating and dysuria. Musculoskeletal:  Negative for back pain and neck pain. Skin:  Negative for color change and pallor. Neurological:  Negative for syncope and light-headedness. Psychiatric/Behavioral:  Negative for agitation and decreased concentration. All other systems reviewed and are negative. PAST MEDICALHISTORY     Past Medical History:   Diagnosis Date    Anxiety     Cholelithiasis     Hx of partial seizures     hx of seizure years ago; no meds    UTI (urinary tract infection)     PT states she gets constant UTI's \"That is pretty much undercontrol now. \"         SURGICAL HISTORY       Past Surgical History:   Procedure Laterality Date    BREAST BIOPSY Left     [place]x, benign     SECTION      x2    CHOLECYSTECTOMY, LAPAROSCOPIC N/A 2023    LAPAROSCOPIC CHOLECYSTECTOMY performed by Caleb Rosas DO at [facility] OR    ERCP N/A 2023    Dr DELL Samayoa-w/1 cm biliary sphincterotomy, placement of a 10F x 7cm plastic stent-Op bile leak, 3 month repeat    TUBAL LIGATION           CURRENT MEDICATIONS   There are no discharge medications for this patient.       ALLERGIES     Penicillins and Zoloft [sertraline hcl]    FAMILY HISTORY       Family History   Problem Relation Age of Onset    Heart Attack Mother     Stroke Mother     High Blood Pressure Mother     High Cholesterol Mother     Diabetes Mother     Cancer Mother         thyroid    No Known Problems Father     Alcohol Abuse Brother         recently out of rehab from what she was told    Diabetes Maternal Grandmother     Cancer Maternal Grandmother         Pancreatic     ADHD Son         with Trouettes    No Known Problems Son           SOCIAL HISTORY       Social History     Socioeconomic History    Marital status: Single     Spouse name: None    Number of children: 3    Years of education: 12+    Highest education level: None   Occupational History Comment:    Tobacco Use    Smoking status: Every Day     Packs/day: 0.50     Types: Cigarettes     Start date: 2003    Smokeless tobacco: Never   Vaping Use    Vaping Use: Former    Substances: Always   Substance and Sexual Activity    Alcohol use: Not Currently    Drug use: Not Currently     Types: Marijuana [facility]zma)     Comment: last use was around age 33    Sexual activity: Yes     Partners: Male     Birth control/protection: Surgical   Social History Narrative    Was admitted to an inpatient mental health setting as a teenager. Was admitted in [place]. I was having breakup with boyfriend and stress with mother. Was in the crisis clinic several times in the same unit. 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 \"        Has been on a few different antidepressants. \"I don't respond well to SSRIs. I have been on a few antianxiety medications. \"         I don't tend to be too pelletier unless there is a lot going on. My sleep in a little touch and go. I have a lot of to do list. 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 Social Determinants of Health     Financial Resource Strain: Medium Risk    Difficulty of Paying Living Expenses: Somewhat hard   Food Insecurity: No Food Insecurity    Worried About Running Out of Food in the Last Year: Never true    Ran Out of Food in the Last Year: Never true   Transportation Needs: Unknown    Lack of Transportation (Non-Medical): No   Housing Stability: Unknown    Unstable Housing in the Last Year: No       SCREENINGS    Page Coma Scale  Eye Opening: Spontaneous  Best Verbal Response: Oriented  Best Motor Response: Obeys commands  Luis A Coma Scale Score: 15        PHYSICAL EXAM    (up to 7 for level 4, 8 or more for level 5)     ED Triage Vitals [02/14/23 1240]   BP Temp Temp Source Heart Rate Resp SpO2 Height Weight   110/77 100.3 °F (37.9 °C) Oral (!) 130 16 99 % 5' 1\" (1.549 m) 130 lb (59 kg)       Physical Exam  Vitals and nursing note reviewed. Constitutional:       General: She is in acute distress. Appearance: She is ill-appearing, toxic-appearing and diaphoretic. Comments: Patient is diaphoretic and pale. Heart rate is 130. Patient appears unsteady as she sits up in RME bed. Blood pressure 110/73 but pulses palpably diminished. HENT:      Head: Normocephalic and atraumatic. Right Ear: External ear normal.      Left Ear: External ear normal.      Nose: Nose normal. No congestion or rhinorrhea. Mouth/Throat:      Pharynx: Oropharynx is clear. No oropharyngeal exudate or posterior oropharyngeal erythema. Comments: Tacky mucous membranes  Eyes:      General: No scleral icterus. Conjunctiva/sclera: Conjunctivae normal.      Pupils: Pupils are equal, round, and reactive to light. Cardiovascular:      Rate and Rhythm: Regular rhythm. Tachycardia present. Pulses: Normal pulses. Heart sounds: Normal heart sounds. Pulmonary:      Effort: Pulmonary effort is normal. No respiratory distress. Breath sounds: Normal breath sounds. No stridor. No wheezing, rhonchi or rales. Abdominal:      General: Bowel sounds are normal. There is distension. Palpations: Abdomen is soft. Tenderness: There is abdominal tenderness (Maximal tenderness in the right upper quadrant but abdomen is diffusely tender with mild distention. ). There is guarding. There is no rebound. Musculoskeletal:         General: No tenderness or deformity. Cervical back: Neck supple. No rigidity or tenderness. Right lower leg: No edema. Left lower leg: No edema. Skin:     General: Skin is warm. Capillary Refill: Capillary refill takes less than 2 seconds. Coloration: Skin is not jaundiced. Neurological:      General: No focal deficit present. Mental Status: She is alert and oriented to person, place, and time. Mental status is at baseline. Cranial Nerves: No cranial nerve deficit.       Sensory: No sensory deficit. Motor: Weakness (Generalized) present. Coordination: Coordination normal.   Psychiatric:         Mood and Affect: Mood normal.         Behavior: Behavior normal.       DIAGNOSTIC RESULTS     EKG: All EKG's areinterpreted by the Emergency Department Physician who either signs or Co-signs this chart in the absence of a cardiologist.    EKG dated 2/14/2023 at 1350 4 PM: Sinus tachycardia, rate 105.  QRS 83 QTc 435. Normal EKG. RADIOLOGY:  Non-plain film images such as CT, Ultrasound and MRI are read by the radiologist. Plain radiographic images are visualized and preliminarily interpreted bythe emergency physician with the below findings:          CT ABDOMEN PELVIS W IV CONTRAST Additional Contrast? None   Final Result   1. Since prior examination a common bile duct stent has been placed. Within the gallbladder fossa a 2.85 cm hypoattenuated fluid collection is suspected. Not seen on prior exam. This may represent bile. 2. Haziness in the right upper abdomen mesentery can be edema/inflammatory process. 3. No intrahepatic biliary ductal dilatation. 4. Gastritis with mild worsening. Recommendation: Follow up as clinically indicated. All CT scans at this facility utilize dose modulation, iterative reconstruction, and/or weight based dosing when appropriate to reduce radiation dose to as low as reasonably achievable. Dictated and Electronically Signed by Anastacia Martinez MD, SA CERTIFIED at [id number] 07:07:30 PM EST               XR CHEST PORTABLE   Final Result   Impression:   No acute radiographic abnormality is identified.               LABS:  Labs Reviewed   PROCALCITONIN - Abnormal; Notable for the following components:       Result Value    Procalcitonin 0.15 (*)     All other components within normal limits   CBC WITH AUTO DIFFERENTIAL - Abnormal; Notable for the following components:    WBC 15.5 (*)     RBC 4.03 (*)     Hematocrit 36.6 (*)     Neutrophils % 81.1 (*) Lymphocytes % 8.9 (*)     Neutrophils Absolute 12.5 (*)     Monocytes Absolute 1.00 (*)     All other components within normal limits   URINALYSIS WITH REFLEX TO CULTURE - Abnormal; Notable for the following components:    Ketones, Urine TRACE (*)     Blood, Urine TRACE (*)     All other components within normal limits   MICROSCOPIC URINALYSIS - Abnormal; Notable for the following components:    Bacteria, UA NEGATIVE (*)     Crystals, UA NEG (*)     All other components within normal limits   COVID-19, RAPID   CULTURE, BLOOD 2   CULTURE, BLOOD 1   MAGNESIUM   COMPREHENSIVE METABOLIC PANEL   LACTIC ACID   LIPASE   TROPONIN   BASIC METABOLIC PANEL W/ REFLEX TO MG FOR LOW K   PROTIME-INR   CBC WITH AUTO DIFFERENTIAL       All other labs were within normal range or not returned as of this dictation. EMERGENCY DEPARTMENT COURSE and DIFFERENTIAL DIAGNOSIS/MDM:   Vitals:    Vitals:    02/14/23 2031 02/14/23 2214 02/14/23 2330 02/15/23 0124   BP: 99/68 96/68 (!) 92/59    Pulse: (!) 112 (!) 111 (!) 109    Resp: 20 15 20    Temp:   99.1 °F (37.3 °C) 97.9 °F (36.6 °C)   TempSrc:   Oral Oral   SpO2: 94% 94% 96%    Weight:       Height:           MDM     Amount and/or Complexity of Data Reviewed  Clinical lab tests: reviewed  Tests in the radiology section of CPT®: reviewed  Tests in the medicine section of CPT®: reviewed    79-year-old female with history of recent cholecystectomy and bile leak presents with abdominal pain and fever. Lab, EKG, and radiology results reviewed. Patient has a 2.85 cm hypoattenuated area in the gallbladder fossa suspected to be an abscess. On physical exam, patient appears to be septic. IV's fluids started as well as antibiotics upon seeing patient. Discussed with Dr. Tyler Mejía, general surgery, who will admit patient for further evaluation and treatment. Patient's blood pressure went from 743/39 down to systolics in the 87G but due to IV fluids and antibiotics held steady.   Heart rate improved from 130 down to low 100s with fluid administration. I have explained to patient and  significance of her presentation with diaphoresis, tachycardia, and low blood pressure. Patient and fiancé verbalize understanding and agreement with plan of care. Due to the immediate potential for life-threatening deterioration due to sepsis , I spent 60 minutes providing critical care. This time is excluding time spent performing procedures. CONSULTS:  None    PROCEDURES:  Unless otherwise noted below, none     Procedures    FINAL IMPRESSION      1. Sepsis, due to unspecified organism, unspecified whether acute organ dysfunction present ([facility] [address]    2.  Intra-abdominal abscess post-procedure          DISPOSITION/PLAN   DISPOSITION Admitted 02/14/2023 08:53:06 PM             (Please note that portions of this note were completed with a voice recognition program.  Efforts were made to edit thedictations but occasionally words are mis-transcribed.)    Zahida Salmeron MD (electronically signed)  Attending Emergency Physician          Zahida Salmeron MD  02/15/23 7152 Fabián Bonds MD  02/15/23 [id number]

## 2023-02-15 NOTE — PROGRESS NOTES
[id number] [facility] Surgery Progress Note    Chief Complaint:   Chief Complaint   Patient presents with    Post-op Problem     Abdominal pain         SUBJECTIVE:  Ms. Domingo Askew is a 40 y.o. female is seen and examined at bedside. She has severe right upper abdominal pain. She is due for pain medication. She denies fever, chills, chest pain, SOB, n/v/d/c.      ASSESSMENT:  Principal Problem:    Bile leak, postoperative  Active Problems:    Constipation    Gastritis    Intra-abdominal abscess post-procedure  Resolved Problems:    * No resolved hospital problems. *      PLAN:  Radiology unable to place drain. Will require transfer for drain placement. Will attempt transfer to [facility] for procedure, with return following procedure. If unable, will require transfer to tertiary care center. Discussed with the patient who notes understanding. Ordered IV dilaudid for pain control. Encourage IS usage.      David Schaffer DO   Electronically Signed on 2/15/2023 at 3:57 PM

## 2023-02-16 LAB
ALBUMIN SERPL-MCNC: 3.2 G/DL (ref 3.5–5.2)
ALP BLD-CCNC: 106 U/L (ref 35–104)
ALT SERPL-CCNC: 18 U/L (ref 5–33)
ANION GAP SERPL CALCULATED.3IONS-SCNC: 9 MMOL/L (ref 7–19)
AST SERPL-CCNC: 19 U/L (ref 5–32)
BASOPHILS ABSOLUTE: 0 K/UL (ref 0–0.2)
BASOPHILS RELATIVE PERCENT: 0.4 % (ref 0–1)
BILIRUB SERPL-MCNC: 0.5 MG/DL (ref 0.2–1.2)
BUN BLDV-MCNC: 4 MG/DL (ref 6–20)
CALCIUM SERPL-MCNC: 8.1 MG/DL (ref 8.6–10)
CHLORIDE BLD-SCNC: 100 MMOL/L (ref 98–111)
CO2: 27 MMOL/L (ref 22–29)
CREAT SERPL-MCNC: 0.5 MG/DL (ref 0.5–0.9)
EOSINOPHILS ABSOLUTE: 0.4 K/UL (ref 0–0.6)
EOSINOPHILS RELATIVE PERCENT: 3.8 % (ref 0–5)
GFR SERPL CREATININE-BSD FRML MDRD: >60 ML/MIN/{1.73_M2}
GLUCOSE BLD-MCNC: 96 MG/DL (ref 74–109)
HCT VFR BLD CALC: 32 % (ref 37–47)
HEMOGLOBIN: 10.3 G/DL (ref 12–16)
IMMATURE GRANULOCYTES #: 0 K/UL
LYMPHOCYTES ABSOLUTE: 1.3 K/UL (ref 1.1–4.5)
LYMPHOCYTES RELATIVE PERCENT: 13.4 % (ref 20–40)
MCH RBC QN AUTO: 30.6 PG (ref 27–31)
MCHC RBC AUTO-ENTMCNC: 32.2 G/DL (ref 33–37)
MCV RBC AUTO: 95 FL (ref 81–99)
MONOCYTES ABSOLUTE: 0.7 K/UL (ref 0–0.9)
MONOCYTES RELATIVE PERCENT: 7.1 % (ref 0–10)
NEUTROPHILS ABSOLUTE: 7.2 K/UL (ref 1.5–7.5)
NEUTROPHILS RELATIVE PERCENT: 75.1 % (ref 50–65)
PDW BLD-RTO: 13 % (ref 11.5–14.5)
PLATELET # BLD: 245 K/UL (ref 130–400)
PMV BLD AUTO: 10 FL (ref 9.4–12.3)
POTASSIUM REFLEX MAGNESIUM: 3.8 MMOL/L (ref 3.5–5)
RBC # BLD: 3.37 M/UL (ref 4.2–5.4)
SODIUM BLD-SCNC: 136 MMOL/L (ref 136–145)
TOTAL PROTEIN: 5.9 G/DL (ref 6.6–8.7)
WBC # BLD: 9.6 K/UL (ref 4.8–10.8)

## 2023-02-16 PROCEDURE — 6360000002 HC RX W HCPCS: Performed by: SURGERY

## 2023-02-16 PROCEDURE — 6370000000 HC RX 637 (ALT 250 FOR IP): Performed by: SURGERY

## 2023-02-16 PROCEDURE — 99024 POSTOP FOLLOW-UP VISIT: CPT | Performed by: SURGERY

## 2023-02-16 PROCEDURE — 2580000003 HC RX 258: Performed by: SURGERY

## 2023-02-16 PROCEDURE — 85025 COMPLETE CBC W/AUTO DIFF WBC: CPT

## 2023-02-16 PROCEDURE — 2500000003 HC RX 250 WO HCPCS: Performed by: SURGERY

## 2023-02-16 PROCEDURE — 80053 COMPREHEN METABOLIC PANEL: CPT

## 2023-02-16 PROCEDURE — 36415 COLL VENOUS BLD VENIPUNCTURE: CPT

## 2023-02-16 PROCEDURE — 1210000000 HC MED SURG R&B

## 2023-02-16 PROCEDURE — C9113 INJ PANTOPRAZOLE SODIUM, VIA: HCPCS | Performed by: SURGERY

## 2023-02-16 RX ADMIN — HYDROMORPHONE HYDROCHLORIDE 1 MG: 1 INJECTION, SOLUTION INTRAMUSCULAR; INTRAVENOUS; SUBCUTANEOUS at 09:39

## 2023-02-16 RX ADMIN — OXYCODONE HYDROCHLORIDE 10 MG: 5 TABLET ORAL at 07:12

## 2023-02-16 RX ADMIN — POLYETHYLENE GLYCOL 3350 17 G: 17 POWDER, FOR SOLUTION ORAL at 09:38

## 2023-02-16 RX ADMIN — CIPROFLOXACIN 400 MG: 2 INJECTION, SOLUTION INTRAVENOUS at 12:19

## 2023-02-16 RX ADMIN — ONDANSETRON 4 MG: 2 INJECTION INTRAMUSCULAR; INTRAVENOUS at 09:52

## 2023-02-16 RX ADMIN — SODIUM CHLORIDE, POTASSIUM CHLORIDE, SODIUM LACTATE AND CALCIUM CHLORIDE: 600; 310; 30; 20 INJECTION, SOLUTION INTRAVENOUS at 18:19

## 2023-02-16 RX ADMIN — HYDROMORPHONE HYDROCHLORIDE 1 MG: 1 INJECTION, SOLUTION INTRAMUSCULAR; INTRAVENOUS; SUBCUTANEOUS at 00:28

## 2023-02-16 RX ADMIN — SODIUM CHLORIDE 8 MG/HR: 9 INJECTION, SOLUTION INTRAVENOUS at 18:35

## 2023-02-16 RX ADMIN — SODIUM CHLORIDE, PRESERVATIVE FREE 5 ML: 5 INJECTION INTRAVENOUS at 10:00

## 2023-02-16 RX ADMIN — SODIUM CHLORIDE 8 MG/HR: 9 INJECTION, SOLUTION INTRAVENOUS at 18:17

## 2023-02-16 RX ADMIN — METRONIDAZOLE 500 MG: 500 INJECTION, SOLUTION INTRAVENOUS at 18:22

## 2023-02-16 RX ADMIN — OXYCODONE HYDROCHLORIDE 10 MG: 5 TABLET ORAL at 03:33

## 2023-02-16 RX ADMIN — SODIUM CHLORIDE 8 MG/HR: 9 INJECTION, SOLUTION INTRAVENOUS at 07:14

## 2023-02-16 RX ADMIN — CIPROFLOXACIN 400 MG: 2 INJECTION, SOLUTION INTRAVENOUS at 00:33

## 2023-02-16 RX ADMIN — METRONIDAZOLE 500 MG: 500 INJECTION, SOLUTION INTRAVENOUS at 02:41

## 2023-02-16 RX ADMIN — HYDROMORPHONE HYDROCHLORIDE 1 MG: 1 INJECTION, SOLUTION INTRAMUSCULAR; INTRAVENOUS; SUBCUTANEOUS at 16:03

## 2023-02-16 RX ADMIN — SODIUM CHLORIDE, PRESERVATIVE FREE 10 ML: 5 INJECTION INTRAVENOUS at 21:51

## 2023-02-16 RX ADMIN — DOCUSATE SODIUM 200 MG: 100 CAPSULE, LIQUID FILLED ORAL at 09:46

## 2023-02-16 RX ADMIN — METRONIDAZOLE 500 MG: 500 INJECTION, SOLUTION INTRAVENOUS at 09:40

## 2023-02-16 RX ADMIN — OXYCODONE HYDROCHLORIDE 10 MG: 5 TABLET ORAL at 12:20

## 2023-02-16 ASSESSMENT — PAIN - FUNCTIONAL ASSESSMENT
PAIN_FUNCTIONAL_ASSESSMENT: PREVENTS OR INTERFERES SOME ACTIVE ACTIVITIES AND ADLS
PAIN_FUNCTIONAL_ASSESSMENT: PREVENTS OR INTERFERES SOME ACTIVE ACTIVITIES AND ADLS
PAIN_FUNCTIONAL_ASSESSMENT: PREVENTS OR INTERFERES WITH MANY ACTIVE NOT PASSIVE ACTIVITIES

## 2023-02-16 ASSESSMENT — PAIN DESCRIPTION - ORIENTATION
ORIENTATION: MID
ORIENTATION: LEFT
ORIENTATION: MID
ORIENTATION: RIGHT
ORIENTATION: RIGHT
ORIENTATION: MID

## 2023-02-16 ASSESSMENT — ENCOUNTER SYMPTOMS
COUGH: 0
NAUSEA: 0
ABDOMINAL PAIN: 1
COLOR CHANGE: 0
EYE PAIN: 0
SORE THROAT: 0
CONSTIPATION: 0
ABDOMINAL DISTENTION: 0
SHORTNESS OF BREATH: 0
DIARRHEA: 0
EYE REDNESS: 0
BACK PAIN: 0
CHEST TIGHTNESS: 0
VOMITING: 0

## 2023-02-16 ASSESSMENT — PAIN DESCRIPTION - FREQUENCY
FREQUENCY: CONTINUOUS

## 2023-02-16 ASSESSMENT — PAIN SCALES - GENERAL
PAINLEVEL_OUTOF10: 8
PAINLEVEL_OUTOF10: 7
PAINLEVEL_OUTOF10: 8
PAINLEVEL_OUTOF10: 9
PAINLEVEL_OUTOF10: 8
PAINLEVEL_OUTOF10: 7

## 2023-02-16 ASSESSMENT — PAIN DESCRIPTION - DESCRIPTORS
DESCRIPTORS: ACHING

## 2023-02-16 ASSESSMENT — PAIN DESCRIPTION - ONSET
ONSET: ON-GOING

## 2023-02-16 ASSESSMENT — PAIN DESCRIPTION - PAIN TYPE
TYPE: ACUTE PAIN

## 2023-02-16 ASSESSMENT — PAIN DESCRIPTION - LOCATION
LOCATION: ABDOMEN

## 2023-02-17 PROCEDURE — 2580000003 HC RX 258: Performed by: SURGERY

## 2023-02-17 PROCEDURE — 99024 POSTOP FOLLOW-UP VISIT: CPT | Performed by: SURGERY

## 2023-02-17 PROCEDURE — 6370000000 HC RX 637 (ALT 250 FOR IP): Performed by: SURGERY

## 2023-02-17 PROCEDURE — 6360000002 HC RX W HCPCS: Performed by: SURGERY

## 2023-02-17 PROCEDURE — 94760 N-INVAS EAR/PLS OXIMETRY 1: CPT

## 2023-02-17 PROCEDURE — 1210000000 HC MED SURG R&B

## 2023-02-17 PROCEDURE — 2500000003 HC RX 250 WO HCPCS: Performed by: SURGERY

## 2023-02-17 PROCEDURE — C9113 INJ PANTOPRAZOLE SODIUM, VIA: HCPCS | Performed by: SURGERY

## 2023-02-17 RX ORDER — PANTOPRAZOLE SODIUM 40 MG/1
40 TABLET, DELAYED RELEASE ORAL
Status: DISCONTINUED | OUTPATIENT
Start: 2023-02-17 | End: 2023-02-19 | Stop reason: HOSPADM

## 2023-02-17 RX ADMIN — PANTOPRAZOLE SODIUM 40 MG: 40 TABLET, DELAYED RELEASE ORAL at 16:05

## 2023-02-17 RX ADMIN — DOCUSATE SODIUM 200 MG: 100 CAPSULE, LIQUID FILLED ORAL at 09:39

## 2023-02-17 RX ADMIN — CIPROFLOXACIN 400 MG: 2 INJECTION, SOLUTION INTRAVENOUS at 00:38

## 2023-02-17 RX ADMIN — OXYCODONE HYDROCHLORIDE 10 MG: 5 TABLET ORAL at 09:38

## 2023-02-17 RX ADMIN — SODIUM CHLORIDE 8 MG/HR: 9 INJECTION, SOLUTION INTRAVENOUS at 04:24

## 2023-02-17 RX ADMIN — METRONIDAZOLE 500 MG: 500 INJECTION, SOLUTION INTRAVENOUS at 16:07

## 2023-02-17 RX ADMIN — SODIUM CHLORIDE, PRESERVATIVE FREE 10 ML: 5 INJECTION INTRAVENOUS at 21:00

## 2023-02-17 RX ADMIN — CIPROFLOXACIN 400 MG: 2 INJECTION, SOLUTION INTRAVENOUS at 11:47

## 2023-02-17 RX ADMIN — METRONIDAZOLE 500 MG: 500 INJECTION, SOLUTION INTRAVENOUS at 09:47

## 2023-02-17 RX ADMIN — METRONIDAZOLE 500 MG: 500 INJECTION, SOLUTION INTRAVENOUS at 01:53

## 2023-02-17 RX ADMIN — OXYCODONE HYDROCHLORIDE 10 MG: 5 TABLET ORAL at 17:46

## 2023-02-17 RX ADMIN — POLYETHYLENE GLYCOL 3350 17 G: 17 POWDER, FOR SOLUTION ORAL at 09:39

## 2023-02-17 RX ADMIN — ONDANSETRON 4 MG: 2 INJECTION INTRAMUSCULAR; INTRAVENOUS at 17:15

## 2023-02-17 ASSESSMENT — ENCOUNTER SYMPTOMS
CHEST TIGHTNESS: 0
VOMITING: 0
NAUSEA: 0
COLOR CHANGE: 0
SORE THROAT: 0
CONSTIPATION: 0
EYE REDNESS: 0
BACK PAIN: 0
ABDOMINAL DISTENTION: 0
COUGH: 0
EYE PAIN: 0
ABDOMINAL PAIN: 1
SHORTNESS OF BREATH: 0
DIARRHEA: 0

## 2023-02-17 ASSESSMENT — PAIN SCALES - GENERAL: PAINLEVEL_OUTOF10: 7

## 2023-02-17 NOTE — PROGRESS NOTES
Comprehensive Nutrition Assessment    Type and Reason for Visit:  Initial, NPO/Clear Liquid    Nutrition Recommendations/Plan:   Consider short-term TPN     Malnutrition Assessment:  Malnutrition Status: At risk for malnutrition (Comment) (02/17/23 5772)    Context:  Chronic Illness     Findings of the 6 clinical characteristics of malnutrition:  Energy Intake:  Mild decrease in energy intake (Comment)  Weight Loss:  Greater than 20% over 1 year     Body Fat Loss:  No significant body fat loss     Muscle Mass Loss:  No significant muscle mass loss    Fluid Accumulation:  No significant fluid accumulation     Strength:  Not Performed    Nutrition Assessment:    Pt is nutritionally compromised AEB significant wt loss of 23.52% over the past  year. Pt is Clear Liquid/NPO status Day 3. Aware that pt is awaiting transfer and ready for D/C. Recommend to consider short-term PN if pt remains at this facility and is unable to progress to an oral diet by 2/19. Will continue to monitor clinical course. Nutrition Related Findings:      Wound Type: Surgical Incision       Current Nutrition Intake & Therapies:    Average Meal Intake: NPO  Diet NPO    Anthropometric Measures:  Height: 5' 1\" (154.9 cm)  Ideal Body Weight (IBW): 105 lbs (48 kg)    Current Body Weight: 130 lb (59 kg)  Current BMI (kg/m2): 24.6  BMI Categories: Normal Weight (BMI 18.5-24. 9)    Estimated Daily Nutrient Needs:  Energy Requirements Based On: Kcal/kg  Weight Used for Energy Requirements: Current  Energy (kcal/day): 6359-4535 kcals/day  Weight Used for Protein Requirements: Current  Protein (g/day):  g/protein/day  Method Used for Fluid Requirements: 1 ml/kcal  Fluid (ml/day): 7680-0180 mL/day    Nutrition Diagnosis:   Inadequate oral intake related to acute injury/trauma as evidenced by NPO or clear liquid status due to medical condition    Nutrition Interventions:   Food and/or Nutrient Delivery: Continue NPO, IV Fluid Delivery  Coordination of Nutrition Care: Continue to monitor while inpatient    Goals:  Goals: Initiate PO diet    Nutrition Monitoring and Evaluation:   Food/Nutrient Intake Outcomes: Diet Advancement/Tolerance  Physical Signs/Symptoms Outcomes: Biochemical Data, Nutrition Focused Physical Findings, Weight    Tavares La MS, RD, LD  Contact: [phone]

## 2023-02-17 NOTE — PROGRESS NOTES
[facility] Surgery History & Physical    Chief Complaint:  Chief Complaint   Patient presents with    Post-op Problem     Abdominal pain         SUBJECTIVE:  Ms. Jeovany Pitts is a 40 y.o. female is seen and examined. Continues to have pain in the right upper abdomen when she moves around. Awaiting transfer for drain placement vs aspiration of abscess. Review of Systems   Constitutional:  Positive for activity change, appetite change and fatigue. Negative for fever and unexpected weight change. HENT:  Negative for hearing loss, nosebleeds and sore throat. Eyes:  Negative for pain, redness and visual disturbance. Respiratory:  Negative for cough, chest tightness and shortness of breath. Cardiovascular:  Negative for chest pain, palpitations and leg swelling. Gastrointestinal:  Positive for abdominal pain. Negative for abdominal distention, constipation, diarrhea, nausea and vomiting. Endocrine: Negative for cold intolerance, heat intolerance and polydipsia. Genitourinary:  Negative for difficulty urinating, frequency and urgency. Musculoskeletal:  Negative for back pain, joint swelling and neck pain. Skin:  Negative for color change, rash and wound. Allergic/Immunologic: Negative for environmental allergies and food allergies. Neurological:  Negative for seizures, light-headedness and headaches. Hematological:  Negative for adenopathy. Does not bruise/bleed easily. Psychiatric/Behavioral:  Negative for confusion, sleep disturbance and suicidal ideas. OBJECTIVE:  /67   Pulse 90   Temp 97.2 °F (36.2 °C) (Temporal)   Resp 18   Ht 5' 1\" (1.549 m)   Wt 130 lb (59 kg)   SpO2 97%   BMI 24.56 kg/m²   CONSTITUTIONAL: Alert, appropriate, no acute distress. diaphoretic  SKIN: warm, dry with no rashes or lesions  HEENT: NCAT, Non icteric, PERR. Trachea Midline. CHEST/LUNGS: CTA bilaterally. Normal respiratory effort. CARDIOVASCULAR: RRR, No edema.   ABDOMEN: soft, ND, TTP in RUQ, +BS Incisions c/d/I.  NEUROLOGIC: CN II-XI grossly intact, no motor or sensory deficits . MUSCULOSKELETAL: No clubbing or cyanosis. PSYCHIATRIC:  Normal Mood and Affect. Alert and Oriented. LABS:  CBC:   Recent Labs     02/14/23  1320 02/15/23  0229 02/16/23  1039   WBC 15.5* 11.9* 9.6   HGB 12.2 10.5* 10.3*    249 245       BMP:    Recent Labs     02/14/23  1320 02/15/23  0229 02/16/23  1039    137 136   K 4.3 4.1 3.8    102 100   CO2 23 26 27   BUN 6 5* 4*   CREATININE 0.5 0.6 0.5   GLUCOSE 101 84 96       2/15/2023 CT abd/pelvis       Impression   1. Since prior examination a common bile duct stent has been placed. Within the gallbladder fossa a 2.85 cm hypoattenuated fluid collection is suspected. Not seen on prior exam. This may represent bile. 2. Haziness in the right upper abdomen mesentery can be edema/inflammatory process. 3. No intrahepatic biliary ductal dilatation. 4. Gastritis with mild worsening. Recommendation: Follow up as clinically indicated. All CT scans at this facility utilize dose modulation, iterative reconstruction, and/or weight based dosing when appropriate to reduce radiation dose to as low as reasonably achievable. Dictated and Electronically Signed by Abel Hunter MD, [facility] [[email or web address]] CERTIFIED at [id number] 07:07:30 PM EST                    ASSESSMENT:  Principal Problem:    Bile leak, postoperative  Active Problems:    Constipation    Gastritis    Intra-abdominal abscess post-procedure  Resolved Problems:    * No resolved hospital problems. *      PLAN:  Continue antibiotics, npo. Analgesia prn. Accepted at [address] in [facility]. Awaiting bed availability. Dr. Domenic Mcneill is covering over the weekend.     Delia Rodas DO   Electronically Signed on 2/17/2023 at 10:54 AM

## 2023-02-17 NOTE — PLAN OF CARE
Nutrition Problem #1: Inadequate oral intake  Intervention: Food and/or Nutrient Delivery: Continue NPO, IV Fluid Delivery      Problem: Discharge Planning  Goal: Discharge to home or other facility with appropriate resources  2/17/2023 0354 by Paige Duvall RN  Outcome: Not Progressing  Flowsheets (Taken 2/16/2023 1930)  Discharge to home or other facility with appropriate resources: Identify barriers to discharge with patient and caregiver     Problem: Nutrition Deficit:  Goal: Optimize nutritional status  Recent Flowsheet Documentation  Taken 2/17/2023 0831 by Maura Alonso, MS, RD, LD  Nutrient intake appropriate for improving, restoring, or maintaining nutritional needs: Assess nutritional status and recommend course of action  2/17/2023 0354 by Paige Duvall RN  Outcome: Not Progressing     Problem: Chronic Conditions and Co-morbidities  Goal: Patient's chronic conditions and co-morbidity symptoms are monitored and maintained or improved  2/17/2023 0506 by Paige Duvall RN  Outcome: Not Progressing  Flowsheets (Taken 2/16/2023 1930)  Care Plan - Patient's Chronic Conditions and Co-Morbidity Symptoms are Monitored and Maintained or Improved: Monitor and assess patient's chronic conditions and comorbid symptoms for stability, deterioration, or improvement

## 2023-02-18 VITALS
SYSTOLIC BLOOD PRESSURE: 95 MMHG | HEIGHT: 61 IN | HEART RATE: 91 BPM | RESPIRATION RATE: 17 BRPM | BODY MASS INDEX: 24.55 KG/M2 | TEMPERATURE: 97.8 F | WEIGHT: 130 LBS | DIASTOLIC BLOOD PRESSURE: 76 MMHG | OXYGEN SATURATION: 99 %

## 2023-02-18 PROCEDURE — 94760 N-INVAS EAR/PLS OXIMETRY 1: CPT

## 2023-02-18 PROCEDURE — 2580000003 HC RX 258: Performed by: SURGERY

## 2023-02-18 PROCEDURE — 6370000000 HC RX 637 (ALT 250 FOR IP): Performed by: SURGERY

## 2023-02-18 PROCEDURE — 6360000002 HC RX W HCPCS: Performed by: SURGERY

## 2023-02-18 PROCEDURE — 2500000003 HC RX 250 WO HCPCS: Performed by: SURGERY

## 2023-02-18 PROCEDURE — 99024 POSTOP FOLLOW-UP VISIT: CPT | Performed by: SURGERY

## 2023-02-18 RX ADMIN — ONDANSETRON 4 MG: 2 INJECTION INTRAMUSCULAR; INTRAVENOUS at 02:25

## 2023-02-18 RX ADMIN — METRONIDAZOLE 500 MG: 500 INJECTION, SOLUTION INTRAVENOUS at 17:49

## 2023-02-18 RX ADMIN — METRONIDAZOLE 500 MG: 500 INJECTION, SOLUTION INTRAVENOUS at 09:32

## 2023-02-18 RX ADMIN — METRONIDAZOLE 500 MG: 500 INJECTION, SOLUTION INTRAVENOUS at 02:22

## 2023-02-18 RX ADMIN — OXYCODONE HYDROCHLORIDE 10 MG: 5 TABLET ORAL at 19:34

## 2023-02-18 RX ADMIN — CIPROFLOXACIN 400 MG: 2 INJECTION, SOLUTION INTRAVENOUS at 00:45

## 2023-02-18 RX ADMIN — POLYETHYLENE GLYCOL 3350 17 G: 17 POWDER, FOR SOLUTION ORAL at 09:24

## 2023-02-18 RX ADMIN — PANTOPRAZOLE SODIUM 40 MG: 40 TABLET, DELAYED RELEASE ORAL at 05:18

## 2023-02-18 RX ADMIN — ONDANSETRON 4 MG: 2 INJECTION INTRAMUSCULAR; INTRAVENOUS at 09:29

## 2023-02-18 RX ADMIN — OXYCODONE HYDROCHLORIDE 10 MG: 5 TABLET ORAL at 05:16

## 2023-02-18 RX ADMIN — SODIUM CHLORIDE, POTASSIUM CHLORIDE, SODIUM LACTATE AND CALCIUM CHLORIDE: 600; 310; 30; 20 INJECTION, SOLUTION INTRAVENOUS at 02:21

## 2023-02-18 RX ADMIN — ONDANSETRON 4 MG: 2 INJECTION INTRAMUSCULAR; INTRAVENOUS at 17:48

## 2023-02-18 RX ADMIN — CIPROFLOXACIN 400 MG: 2 INJECTION, SOLUTION INTRAVENOUS at 12:06

## 2023-02-18 RX ADMIN — PANTOPRAZOLE SODIUM 40 MG: 40 TABLET, DELAYED RELEASE ORAL at 16:33

## 2023-02-18 RX ADMIN — DOCUSATE SODIUM 200 MG: 100 CAPSULE, LIQUID FILLED ORAL at 09:24

## 2023-02-18 ASSESSMENT — PAIN - FUNCTIONAL ASSESSMENT: PAIN_FUNCTIONAL_ASSESSMENT: PREVENTS OR INTERFERES SOME ACTIVE ACTIVITIES AND ADLS

## 2023-02-18 ASSESSMENT — PAIN DESCRIPTION - ONSET: ONSET: ON-GOING

## 2023-02-18 ASSESSMENT — PAIN DESCRIPTION - FREQUENCY: FREQUENCY: INTERMITTENT

## 2023-02-18 ASSESSMENT — PAIN DESCRIPTION - DESCRIPTORS
DESCRIPTORS: ACHING
DESCRIPTORS: ACHING;DISCOMFORT

## 2023-02-18 ASSESSMENT — PAIN SCALES - GENERAL
PAINLEVEL_OUTOF10: 0
PAINLEVEL_OUTOF10: 8
PAINLEVEL_OUTOF10: 7

## 2023-02-18 ASSESSMENT — PAIN DESCRIPTION - LOCATION
LOCATION: ABDOMEN
LOCATION: ABDOMEN

## 2023-02-18 ASSESSMENT — PAIN DESCRIPTION - ORIENTATION
ORIENTATION: MID
ORIENTATION: RIGHT;UPPER

## 2023-02-18 ASSESSMENT — PAIN DESCRIPTION - PAIN TYPE: TYPE: ACUTE PAIN

## 2023-02-18 NOTE — PLAN OF CARE
Problem: Discharge Planning  Goal: Discharge to home or other facility with appropriate resources  Outcome: Progressing  Flowsheets (Taken 2/18/2023 0924)  Discharge to home or other facility with appropriate resources:   Identify barriers to discharge with patient and caregiver   Arrange for needed discharge resources and transportation as appropriate   Arrange for interpreters to assist at discharge as needed   Refer to discharge planning if patient needs post-hospital services based on physician order or complex needs related to functional status, cognitive ability or social support system     Problem: Pain  Goal: Verbalizes/displays adequate comfort level or baseline comfort level  Outcome: Progressing     Problem: Safety - Adult  Goal: Free from fall injury  Outcome: Progressing     Problem: Nutrition Deficit:  Goal: Optimize nutritional status  Outcome: Progressing     Problem: Chronic Conditions and Co-morbidities  Goal: Patient's chronic conditions and co-morbidity symptoms are monitored and maintained or improved  Outcome: Progressing  Flowsheets (Taken 2/18/2023 0924)  Care Plan - Patient's Chronic Conditions and Co-Morbidity Symptoms are Monitored and Maintained or Improved:   Monitor and assess patient's chronic conditions and comorbid symptoms for stability, deterioration, or improvement   Collaborate with multidisciplinary team to address chronic and comorbid conditions and prevent exacerbation or deterioration   Update acute care plan with appropriate goals if chronic or comorbid symptoms are exacerbated and prevent overall improvement and discharge     Problem: Gastrointestinal - Adult  Goal: Minimal or absence of nausea and vomiting  Outcome: Progressing  Flowsheets (Taken 2/18/2023 0924)  Minimal or absence of nausea and vomiting:   Administer IV fluids as ordered to ensure adequate hydration   Provide nonpharmacologic comfort measures as appropriate

## 2023-02-18 NOTE — DISCHARGE SUMMARY
Physician Discharge Summary     Patient ID:  Mitzy Jimenez  [id number]  52 y.o.  1985    Admit date: 2/14/2023    Discharge date and time: No discharge date for patient encounter. Admitting Physician: Ana Laura Guerrero DO     Discharge Physician: Yehuda Azul MD    Admission Diagnoses: Intra-abdominal abscess post-procedure [T81.43XA]  Bile leak, postoperative [K91.89, K83.8]  Sepsis, due to unspecified organism, unspecified whether acute organ dysfunction present [facility]) [A41.9]    Discharge Diagnoses: s/p bile leak with gallbladder fossa fluid collection    Admission Condition: fair    Discharged Condition: stable    Indication for Admission: abdominal pain, elevated WBC, fever    Hospital Course: The patient is a 40year old female who underwent laparoscopic robot assisted cholecystectomy with Dr. Kimber Hernandez 1/27. She presented 2 days later with abdominal pain and HIDA showed a bile leak. She underwent ERCP with stent placement and sphincterotomy. She did well and was able to go home. She then called in to the office with worsening abdominal pain. Ended up going to the ER 2/14 with fever and RUQ abdominal pain and CT showed a fluid collection in the gallbladder fossa. She was treated with IV antibiotics and fluids. She was accepted for transfer to [facility] for IR, but they have not had a bed. Have now tried and spoken with accepting physician and IR at [facility] in [place], who have accepted her for transfer for drainage. Will make sure the patient is sent with a CD of her CT.     Consults: none    Significant Diagnostic Studies: labs and radiology    Treatments: IV hydration, antibiotics, and analgesia    Discharge Exam:  See progress note- AAO, NAD, expected moderate RUQ abdominal pain  /71   Pulse 78   Temp 99 °F (37.2 °C) (Oral)   Resp 16   Ht 5' 1\" (1.549 m)   Wt 130 lb (59 kg)   SpO2 95%   BMI 24.56 kg/m²       Disposition: transfer to [facility]    In process/preliminary results:  Outstanding Order Results       Date and Time Order Name Status Description    2/14/2023  1:30 PM Culture, Blood 2 Preliminary     2/14/2023  1:20 PM Blood Culture 1 Preliminary             Patient Instructions: There are no discharge medications for this patient.     Activity: activity as tolerated  Diet: clear liquids  Wound Care: keep wound clean and dry    Follow-up with Dr. Alicia Pena 1 weeks after discharge if patient is not transferred back to [facility] after procedure    Signed:  Michael Clark MD    2/18/2023  1:22 PM

## 2023-02-18 NOTE — DISCHARGE INSTR - DIET
Good nutrition is important when healing from an illness, injury, or surgery. Follow any nutrition recommendations given to you during your hospital stay. If you were given an oral nutrition supplement while in the hospital, continue to take this supplement at home. You can take it with meals, in-between meals, and/or before bedtime. These supplements can be purchased at most local grocery stores, pharmacies, and chain [employer]-stores. If you have any questions about your diet or nutrition, call the hospital and ask for the dietitian.     Clear liquids for today- will be adjusted by accepting facility once timeline/schedule is made for drainage

## 2023-02-18 NOTE — PROGRESS NOTES
Subjective:  No acute events or changes. Reports that her abdominal pain is about the same as yesterday, a little better than at admission. Objective:  /71   Pulse 78   Temp 99 °F (37.2 °C) (Oral)   Resp 16   Ht 5' 1\" (1.549 m)   Wt 130 lb (59 kg)   SpO2 95%   BMI 24.56 kg/m²   General: NAD, AAO  Chest: regular, non-labored  Abdomen: Soft, ND, TTP RUQ    CBC:   Lab Results   Component Value Date/Time    WBC 9.6 02/16/2023 10:39 AM    RBC 3.37 02/16/2023 10:39 AM    HGB 10.3 02/16/2023 10:39 AM    HCT 32.0 02/16/2023 10:39 AM    MCV 95.0 02/16/2023 10:39 AM    MCH 30.6 02/16/2023 10:39 AM    MCHC 32.2 02/16/2023 10:39 AM    RDW 13.0 02/16/2023 10:39 AM     02/16/2023 10:39 AM    MPV 10.0 02/16/2023 10:39 AM     CMP:    Lab Results   Component Value Date/Time     02/16/2023 10:39 AM    K 3.8 02/16/2023 10:39 AM     02/16/2023 10:39 AM    CO2 27 02/16/2023 10:39 AM    BUN 4 02/16/2023 10:39 AM    CREATININE 0.5 02/16/2023 10:39 AM    GFRAA >59 09/19/2022 08:05 PM    LABGLOM >60 02/16/2023 10:39 AM    GLUCOSE 96 02/16/2023 10:39 AM    PROT 5.9 02/16/2023 10:39 AM    LABALBU 3.2 02/16/2023 10:39 AM    CALCIUM 8.1 02/16/2023 10:39 AM    BILITOT 0.5 02/16/2023 10:39 AM    ALKPHOS 106 02/16/2023 10:39 AM    AST 19 02/16/2023 10:39 AM    ALT 18 02/16/2023 10:39 AM     Assessment and plan:  39 yo female with RUQ fluid collection s/p bile leak after lap monique, s/p ERCP with sphincterotomy with stent placement  Patient is stable. WBC WNL. Continue with IV antibiotics. Patient has been accepted to [facility] but no bed available. Patient's family member did ask if we had tried anywhere else. Will call the transfer center and ask about trying other locations on list just to make sure nothing is more readily available. Will allow some sips of clears.  Discussed with patient to hold if it increases her abdominal pain

## 2023-02-18 NOTE — PROGRESS NOTES
Pt aware of transfer to tertiary care, [facility] in [place], [place]. Transfer center unable to contact [facility] for transfer, this nurse called and set up transfer at this time. Report called to Chrissie Rucker RN.

## 2023-02-19 LAB
BLOOD CULTURE, ROUTINE: NORMAL
CULTURE, BLOOD 2: NORMAL

## 2023-02-24 ENCOUNTER — TELEPHONE (OUTPATIENT)
Dept: SURGERY | Age: 38
End: 2023-02-24

## 2023-02-24 NOTE — TELEPHONE ENCOUNTER
Pt Spouse, called to schedule HFU, I could not accommodate schedule per protocol,best time to call is anytime. Thank You!

## 2023-02-27 ENCOUNTER — TELEPHONE (OUTPATIENT)
Dept: SURGERY | Age: 38
End: 2023-02-27

## 2023-02-27 DIAGNOSIS — K91.89 BILE LEAK, POSTOPERATIVE: Primary | ICD-10-CM

## 2023-02-27 DIAGNOSIS — K83.8 BILE LEAK, POSTOPERATIVE: Primary | ICD-10-CM

## 2023-02-27 RX ORDER — ONDANSETRON 4 MG/1
4 TABLET, FILM COATED ORAL EVERY 8 HOURS PRN
Qty: 30 TABLET | Refills: 0 | Status: SHIPPED | OUTPATIENT
Start: 2023-02-27

## 2023-02-27 NOTE — TELEPHONE ENCOUNTER
2/27/2023 Patient's fiance, Teresa Jones, called and stated that patient's medication causing nausea and she needs something to stop the nausea.     Callback # [phone]  endy

## 2023-02-27 NOTE — TELEPHONE ENCOUNTER
Forwarding to Dr Vazquez Members since he's on call & here in clinic-(Dr [facility] patient)

## 2023-02-27 NOTE — TELEPHONE ENCOUNTER
I s/w Kelley Soto & informed him, he said yes he just got notification of this from [facility] & thanks.

## 2023-02-28 ENCOUNTER — TELEPHONE (OUTPATIENT)
Dept: SURGERY | Age: 38
End: 2023-02-28

## 2023-02-28 NOTE — TELEPHONE ENCOUNTER
2/28/2023 María Kennedy, patient's fiance, called and stated that the drainage is inconsistent and not coming out. Needs to speak with someone about drainage tube and drainage.     Callback # [phone]  endy

## 2023-03-01 ENCOUNTER — TELEPHONE (OUTPATIENT)
Dept: SURGERY | Age: 38
End: 2023-03-01

## 2023-03-01 DIAGNOSIS — K83.9 BILE LEAK: Primary | ICD-10-CM

## 2023-03-01 NOTE — TELEPHONE ENCOUNTER
Called patient back after not hearing from him regarding the vm I left earlier today telling him that Dr. Donald Spears had ordered a stat CT. I talked to him and he said that when everything happened before a Henreitta Diones scan had been ordered that discovered the bile leak. I told patient that Dr. Tim King had placed a order for a CT scan of abdomen and and that she felt like that was the best test for what we need to know about the fluid situation. He said he will talk to his fiance and discuss and call us back tomorrow when they decide what they want to do.

## 2023-03-01 NOTE — PROGRESS NOTES
Please instruct patient that it is normal for her drain output to decrease. I will order a CT scan to evaluate to ensure there is nothing else going on, as they mention is a concern. I have ordered stat, so please schedule that their convenience. I should see her in the office next week and we may be able to remove the drain at that time.      Thanks,  DO Emmett   9/5/53  9:47 AM

## 2023-03-08 ENCOUNTER — HOSPITAL ENCOUNTER (OUTPATIENT)
Dept: GENERAL RADIOLOGY | Age: 38
Discharge: HOME | End: 2023-03-08
Payer: COMMERCIAL

## 2023-03-08 ENCOUNTER — OFFICE VISIT (OUTPATIENT)
Dept: UROLOGY | Age: 38
End: 2023-03-08
Payer: COMMERCIAL

## 2023-03-08 VITALS — WEIGHT: 136.8 LBS | HEIGHT: 61 IN | BODY MASS INDEX: 25.83 KG/M2 | TEMPERATURE: 99.4 F

## 2023-03-08 DIAGNOSIS — N15.1 RENAL ABSCESS, LEFT: ICD-10-CM

## 2023-03-08 DIAGNOSIS — R33.9 INCOMPLETE EMPTYING OF BLADDER: ICD-10-CM

## 2023-03-08 DIAGNOSIS — N20.0 KIDNEY STONE: ICD-10-CM

## 2023-03-08 DIAGNOSIS — Z87.448 HISTORY OF PYELONEPHRITIS: ICD-10-CM

## 2023-03-08 DIAGNOSIS — R39.16 STRAINING ON URINATION: ICD-10-CM

## 2023-03-08 DIAGNOSIS — N20.0 RIGHT RENAL STONE: Primary | ICD-10-CM

## 2023-03-08 LAB
APPEARANCE FLUID: CLEAR
BILIRUBIN, POC: NORMAL
BLOOD URINE, POC: NORMAL
CLARITY, POC: CLEAR
COLOR, POC: YELLOW
GLUCOSE URINE, POC: NORMAL
KETONES, POC: NORMAL
LEUKOCYTE EST, POC: NORMAL
NITRITE, POC: NORMAL
PH, POC: 6
PROTEIN, POC: NORMAL
SPECIFIC GRAVITY, POC: 1.01
UROBILINOGEN, POC: 0

## 2023-03-08 PROCEDURE — 74018 RADEX ABDOMEN 1 VIEW: CPT

## 2023-03-08 PROCEDURE — 51798 US URINE CAPACITY MEASURE: CPT | Performed by: NURSE PRACTITIONER

## 2023-03-08 PROCEDURE — 99204 OFFICE O/P NEW MOD 45 MIN: CPT | Performed by: NURSE PRACTITIONER

## 2023-03-08 PROCEDURE — 81002 URINALYSIS NONAUTO W/O SCOPE: CPT | Performed by: NURSE PRACTITIONER

## 2023-03-08 RX ORDER — TAMSULOSIN HYDROCHLORIDE 0.4 MG/1
0.4 CAPSULE ORAL DAILY
Qty: 90 CAPSULE | Refills: 1 | Status: SHIPPED | OUTPATIENT
Start: 2023-03-08

## 2023-03-08 RX ORDER — METRONIDAZOLE 500 MG/1
TABLET ORAL
COMMUNITY
Start: 2023-02-22

## 2023-03-08 ASSESSMENT — ENCOUNTER SYMPTOMS
VOMITING: 0
ABDOMINAL DISTENTION: 0
BACK PAIN: 0
ABDOMINAL PAIN: 1
NAUSEA: 0

## 2023-03-08 NOTE — PROGRESS NOTES
Coy Alfaro is a 40 y.o. female who presents today   Chief Complaint   Patient presents with    New Patient     I am here today for kidney stones      Patient is a 81-year-old female who presents to clinic today for follow-up pyelonephritis. She is a new referral from her PCP. It seems she was admitted at [facility] in [place]  due to left flank pain fever up to 104. Urine cultures positive for E. coli, CT at that time revealed a left abscess which was drained with interventional radiology. She was sent home on oral antibiotics and this drain was removed prior to her DC. She reports a history of recurrent UTIs and pyelonephritis. She reports she has no symptoms of her UTIs until she develops pyelonephritis. Denies any UTIs as a child. Denies any incontinence. Reports frequency throughout the day every 2 hours, nocturia 2-3 times per night. Reports she has had to strain to urinate over the last 2 years has not been evaluated for this. Today she denies any symptoms besides straining. Does report she has been on several antibiotics recently for complications regarding her cholecystectomy. Recent CT obtained on 2023 reveals no stones, renal abscess, bladder appears nondistended. Has not recently seen OB/GYN. Past Medical History:   Diagnosis Date    Anxiety     Cholelithiasis     Hx of partial seizures     hx of seizure years ago; no meds    UTI (urinary tract infection)     PT states she gets constant UTI's \"That is pretty much undercontrol now. \"       Past Surgical History:   Procedure Laterality Date    BREAST BIOPSY Left     [place]x, benign     SECTION      x2    CHOLECYSTECTOMY, LAPAROSCOPIC N/A 2023    LAPAROSCOPIC CHOLECYSTECTOMY performed by Mandeep Blanchard DO at [facility] OR    ERCP N/A 2023    Dr DELL Samayoa-w/1 cm biliary sphincterotomy, placement of a 10F x 7cm plastic stent-Op bile leak, 3 month repeat    TUBAL LIGATION         Current Outpatient Medications   Medication Sig Dispense Refill    metroNIDAZOLE (FLAGYL) 500 MG tablet TAKE 1 TABLET BY MOUTH EVERY 8 HOURS      tamsulosin (FLOMAX) 0.4 MG capsule Take 1 capsule by mouth daily 90 capsule 1    ondansetron (ZOFRAN) 4 MG tablet Take 1 tablet by mouth every 8 hours as needed for Nausea or Vomiting 30 tablet 0     No current facility-administered medications for this visit. Allergies   Allergen Reactions    Penicillins Shortness Of Breath    Zoloft [Sertraline Hcl] Itching       Social History     Socioeconomic History    Marital status: Single     Spouse name: None    Number of children: 3    Years of education: 12+    Highest education level: None   Occupational History     Comment:    Tobacco Use    Smoking status: Every Day     Packs/day: 0.50     Types: Cigarettes     Start date: 2003    Smokeless tobacco: Never   Vaping Use    Vaping Use: Former    Substances: Always   Substance and Sexual Activity    Alcohol use: Not Currently    Drug use: Not Currently     Types: Marijuana Wanda Christie)     Comment: last use was around age 33    Sexual activity: Yes     Partners: Male     Birth control/protection: Surgical   Social History Narrative    Was admitted to an inpatient mental health setting as a teenager. Was admitted in [place]. I was having breakup with boyfriend and stress with mother. Was in the crisis clinic several times in the same unit. 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 \"        Has been on a few different antidepressants. \"I don't respond well to SSRIs. I have been on a few antianxiety medications. \"         I don't tend to be too pelletier unless there is a lot going on. My sleep in a little touch and go. I have a lot of to do list. 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           Social Determinants of Health     Financial Resource Strain: Medium Risk    Difficulty of Paying Living Expenses: Somewhat hard   Food Insecurity: No Food Insecurity    Worried About Running Out of Food in the Last Year: Never true    Ran Out of Food in the Last Year: Never true   Transportation Needs: Unknown    Lack of Transportation (Non-Medical): No   Housing Stability: Unknown    Unstable Housing in the Last Year: No       Family History   Problem Relation Age of Onset    Heart Attack Mother     Stroke Mother     High Blood Pressure Mother     High Cholesterol Mother     Diabetes Mother     Cancer Mother         thyroid    No Known Problems Father     Alcohol Abuse Brother         recently out of rehab from what she was told    Diabetes Maternal Grandmother     Cancer Maternal Grandmother         Pancreatic     ADHD Son         with [employer]    No Known Problems Son        REVIEW OF SYSTEMS:  Review of Systems   Constitutional:  Negative for chills and fever. Gastrointestinal:  Positive for abdominal pain. Negative for abdominal distention, nausea and vomiting. Genitourinary:  Negative for difficulty urinating, dysuria, flank pain, frequency, hematuria and urgency. Musculoskeletal:  Negative for back pain and gait problem. Psychiatric/Behavioral:  Negative for agitation and confusion. PHYSICAL EXAM:  Temp 99.4 °F (37.4 °C) (Temporal)   Ht 5' 1\" (1.549 m)   Wt 136 lb 12.8 oz (62.1 kg)   BMI 25.85 kg/m²   Physical Exam  Vitals and nursing note reviewed. Constitutional:       General: She is not in acute distress. Appearance: Normal appearance. She is not ill-appearing. Pulmonary:      Effort: Pulmonary effort is normal. No respiratory distress. Abdominal:      General: There is no distension. Tenderness: There is abdominal tenderness. There is no right CVA tenderness or left CVA tenderness. Comments: Right upper quadrant biliary drain   Neurological:      Mental Status: She is alert and oriented to person, place, and time. Mental status is at baseline.    Psychiatric:         Mood and Affect: Mood normal.         Behavior: Behavior normal. DATA:  CBC with Differential:    Lab Results   Component Value Date/Time    WBC 9.6 02/16/2023 10:39 AM    RBC 3.37 02/16/2023 10:39 AM    HGB 10.3 02/16/2023 10:39 AM    HCT 32.0 02/16/2023 10:39 AM     02/16/2023 10:39 AM    MCV 95.0 02/16/2023 10:39 AM    MCH 30.6 02/16/2023 10:39 AM    MCHC 32.2 02/16/2023 10:39 AM    RDW 13.0 02/16/2023 10:39 AM    LYMPHOPCT 13.4 02/16/2023 10:39 AM    MONOPCT 7.1 02/16/2023 10:39 AM    BASOPCT 0.4 02/16/2023 10:39 AM    MONOSABS 0.70 02/16/2023 10:39 AM    LYMPHSABS 1.3 02/16/2023 10:39 AM    EOSABS 0.40 02/16/2023 10:39 AM    BASOSABS 0.00 02/16/2023 10:39 AM     CMP:    Lab Results   Component Value Date/Time     02/16/2023 10:39 AM    K 3.8 02/16/2023 10:39 AM     02/16/2023 10:39 AM    CO2 27 02/16/2023 10:39 AM    BUN 4 02/16/2023 10:39 AM    CREATININE 0.5 02/16/2023 10:39 AM    GFRAA >59 09/19/2022 08:05 PM    LABGLOM >60 02/16/2023 10:39 AM    GLUCOSE 96 02/16/2023 10:39 AM    PROT 5.9 02/16/2023 10:39 AM    LABALBU 3.2 02/16/2023 10:39 AM    CALCIUM 8.1 02/16/2023 10:39 AM    BILITOT 0.5 02/16/2023 10:39 AM    ALKPHOS 106 02/16/2023 10:39 AM    AST 19 02/16/2023 10:39 AM    ALT 18 02/16/2023 10:39 AM     Results for orders placed or performed in visit on 03/08/23   POCT Urinalysis no Micro   Result Value Ref Range    Color, UA YELLOW     Clarity, UA CLEAR     Glucose, UA POC NEG     Bilirubin, UA NEG     Ketones, UA NEG     Spec Grav, UA 1.010     Blood, UA POC NEG     pH, UA 6     Protein, UA POC NEG     Urobilinogen, UA 0     Leukocytes, UA NEG     Nitrite, UA NEG     Appearance, Fluid Clear Clear, Slightly Cloudy     IMAGING:  I reviewed most recent CT on 2/14/2023. CT does not reveal any renal abscess in either kidney no perinephric stranding no hydronephrosis no obstructing stone. She does have a small nonobstructing stone seen in the right kidney. Ureters appear normal.  Bladder does appear nondistended. I also reviewed her KUB.   No stone seen on KUB today    1. Right renal stone  Right renal stone not visible on KUB. Can continue to monitor with serial imaging for possible ESWL.    - POCT Urinalysis no Micro    2. Incomplete emptying of bladder  Bladder scan today revealed 48 mL PVR. She reports she has to strain with every urination. She is not holding a significant amount of urine. Discussed with her we can start her medication to see if this helps her empty her bladder. She would like to try to do this to see if she can empty completely. Discussed use, benefit, and side effects of prescribed medications. All questions answered. Patient voiced understanding and agreed with treatment plan. - tamsulosin (FLOMAX) 0.4 MG capsule; Take 1 capsule by mouth daily  Dispense: 90 capsule; Refill: 1    3. Straining on urination  See above. We will have her follow-up in 2 months to see if she is emptying her bladder well  - tamsulosin (FLOMAX) 0.4 MG capsule; Take 1 capsule by mouth daily  Dispense: 90 capsule; Refill: 1    4. History of pyelonephritis  5. Renal abscess, left  Usually asymptomatic with her UTIs. Discussed Ellura and Suzanne. History of pyelonephritis. CT revealed no renal abscess. She was previously treated in December 2022 for a left renal abscess. UA today is negative. Orders Placed This Encounter   Procedures    POCT Urinalysis no Micro        Return in about 2 months (around 5/8/2023). All information inputted into the note by the MA to include chief complaint, past medical history, past surgical history, medications, allergies, social and family history and review of systems has been reviewed and updated as needed by me. EMR Dragon/transcription disclaimer: Much of this documentt is electronic  transcription/translation of spoken language to printed text. The  electronic translation of spoken language may be erroneous, or at times,  nonsensical words or phrases may be inadvertently transcribed.  Although I  have reviewed the document for such errors, some may still exist.

## 2023-03-09 ENCOUNTER — TELEPHONE (OUTPATIENT)
Dept: SURGERY | Age: 38
End: 2023-03-09

## 2023-03-09 ENCOUNTER — OFFICE VISIT (OUTPATIENT)
Dept: SURGERY | Age: 38
End: 2023-03-09

## 2023-03-09 ENCOUNTER — HOSPITAL ENCOUNTER (OUTPATIENT)
Dept: CT IMAGING | Age: 38
Discharge: HOME | End: 2023-03-09
Payer: COMMERCIAL

## 2023-03-09 VITALS
OXYGEN SATURATION: 100 % | BODY MASS INDEX: 25.68 KG/M2 | TEMPERATURE: 98.2 F | WEIGHT: 136 LBS | HEART RATE: 100 BPM | HEIGHT: 61 IN

## 2023-03-09 DIAGNOSIS — Z09 POSTOPERATIVE EXAMINATION: Primary | ICD-10-CM

## 2023-03-09 DIAGNOSIS — K83.9 BILE LEAK: ICD-10-CM

## 2023-03-09 PROCEDURE — 6360000004 HC RX CONTRAST MEDICATION: Performed by: SURGERY

## 2023-03-09 PROCEDURE — 74177 CT ABD & PELVIS W/CONTRAST: CPT

## 2023-03-09 RX ADMIN — IOPAMIDOL 75 ML: 755 INJECTION, SOLUTION INTRAVENOUS at 11:47

## 2023-03-09 NOTE — TELEPHONE ENCOUNTER
Called [facility] and reviewed CT scan results from today. Discussed case with Dr. Meliza Gottlieb. Will plan for repeat ERCP tomorrow with increase in size of stent. Continue biliary drain for now. She notes understanding. She will call if she has any change of symptoms.      DO Emmett   37/90/51  1:42 PM

## 2023-03-09 NOTE — PROGRESS NOTES
Postop Progress Note    Subjective    Gagandeep Menchaca presents to the office for postop follow up now 5 weeks s/p laparoscopic cholecystectomy. She developed a postoperative bile leak, and was treated with ERCP. She then presented with suspected abscess and fluid collection in the gallbladder fossa, and was transferred for transhepatic drain placement. She presents today about 2 weeks from this. She has bilious drain output. She states it has always been this color, and puts out about the same amount every day. She notes she is tolerating her diet and not in pain. No fevers or chills. Objective    Vitals:    03/09/23 0858   Pulse: 100   Temp: 98.2 °F (36.8 °C)   SpO2: 100%     General: alert, cooperative and no distress  Incision: healing well    3/9/23 CT abd/pelvis   IMPRESSION:  1. There is a pigtail catheter within the gallbladder fossa without a fluid collection. 2.There are small hepatic hypodensities. Follow up imaging is suggested      Assessment  S/p laparoscopic cholecystectomy with continued biliary leak. Plan  Reviewed anatomy and pathophysiology of biliary leak with Lisandro Rivera and her mother. Unsure of why she continues to have biliary output from her drain, and it can certainly not be removed today. Discussed case with Dr. Osei Munoz. Will plan for repeat ERCP next week for an increase in the stent size. Hopefully this will allow this to resolve. Will see her two weeks following ERCP to evaluate drain. Discussed with Lisandro Rivera that if after this there is still biliary leakage, she will likely need referral to [facility] surgery and she notes understanding.      Electronically signed by Peter Moncada DO on 9/9/2344 at 9:09 AM

## 2023-03-15 ENCOUNTER — APPOINTMENT (OUTPATIENT)
Dept: GENERAL RADIOLOGY | Age: 38
End: 2023-03-15
Attending: INTERNAL MEDICINE
Payer: COMMERCIAL

## 2023-03-15 ENCOUNTER — HOSPITAL ENCOUNTER (OUTPATIENT)
Age: 38
Setting detail: OUTPATIENT SURGERY
Discharge: HOME | End: 2023-03-15
Attending: INTERNAL MEDICINE | Admitting: INTERNAL MEDICINE
Payer: COMMERCIAL

## 2023-03-15 ENCOUNTER — ANESTHESIA EVENT (OUTPATIENT)
Dept: ENDOSCOPY | Age: 38
End: 2023-03-15
Payer: COMMERCIAL

## 2023-03-15 ENCOUNTER — ANESTHESIA (OUTPATIENT)
Dept: ENDOSCOPY | Age: 38
End: 2023-03-15
Payer: COMMERCIAL

## 2023-03-15 VITALS
OXYGEN SATURATION: 96 % | TEMPERATURE: 97.1 F | SYSTOLIC BLOOD PRESSURE: 106 MMHG | RESPIRATION RATE: 16 BRPM | DIASTOLIC BLOOD PRESSURE: 85 MMHG | HEART RATE: 91 BPM | HEIGHT: 61 IN | WEIGHT: 138 LBS | BODY MASS INDEX: 26.06 KG/M2

## 2023-03-15 DIAGNOSIS — K83.9 BILE LEAK: Primary | ICD-10-CM

## 2023-03-15 LAB — HCG UR QL: NEGATIVE

## 2023-03-15 PROCEDURE — C1769 GUIDE WIRE: HCPCS | Performed by: INTERNAL MEDICINE

## 2023-03-15 PROCEDURE — 2720000010 HC SURG SUPPLY STERILE: Performed by: INTERNAL MEDICINE

## 2023-03-15 PROCEDURE — 6360000002 HC RX W HCPCS: Performed by: NURSE ANESTHETIST, CERTIFIED REGISTERED

## 2023-03-15 PROCEDURE — C1874 STENT, COATED/COV W/DEL SYS: HCPCS | Performed by: INTERNAL MEDICINE

## 2023-03-15 PROCEDURE — 7100000011 HC PHASE II RECOVERY - ADDTL 15 MIN: Performed by: INTERNAL MEDICINE

## 2023-03-15 PROCEDURE — 7100000010 HC PHASE II RECOVERY - FIRST 15 MIN: Performed by: INTERNAL MEDICINE

## 2023-03-15 PROCEDURE — 84703 CHORIONIC GONADOTROPIN ASSAY: CPT

## 2023-03-15 PROCEDURE — 3609018800 HC ERCP DX COLLECTION SPECIMEN BRUSHING/WASHING: Performed by: INTERNAL MEDICINE

## 2023-03-15 PROCEDURE — 74328 X-RAY BILE DUCT ENDOSCOPY: CPT | Performed by: INTERNAL MEDICINE

## 2023-03-15 PROCEDURE — 43276 ERCP STENT EXCHANGE W/DILATE: CPT | Performed by: INTERNAL MEDICINE

## 2023-03-15 PROCEDURE — 2580000003 HC RX 258: Performed by: INTERNAL MEDICINE

## 2023-03-15 PROCEDURE — 3700000001 HC ADD 15 MINUTES (ANESTHESIA): Performed by: INTERNAL MEDICINE

## 2023-03-15 PROCEDURE — 2709999900 HC NON-CHARGEABLE SUPPLY: Performed by: INTERNAL MEDICINE

## 2023-03-15 PROCEDURE — 3700000000 HC ANESTHESIA ATTENDED CARE: Performed by: INTERNAL MEDICINE

## 2023-03-15 PROCEDURE — 74328 X-RAY BILE DUCT ENDOSCOPY: CPT

## 2023-03-15 PROCEDURE — 6370000000 HC RX 637 (ALT 250 FOR IP): Performed by: INTERNAL MEDICINE

## 2023-03-15 PROCEDURE — 2500000003 HC RX 250 WO HCPCS: Performed by: NURSE ANESTHETIST, CERTIFIED REGISTERED

## 2023-03-15 PROCEDURE — 6360000002 HC RX W HCPCS

## 2023-03-15 DEVICE — STENT SYSTEM RMV
Type: IMPLANTABLE DEVICE | Status: FUNCTIONAL
Brand: WALLFLEX BILIARY

## 2023-03-15 RX ORDER — LIDOCAINE HYDROCHLORIDE 10 MG/ML
INJECTION, SOLUTION INFILTRATION; PERINEURAL PRN
Status: DISCONTINUED | OUTPATIENT
Start: 2023-03-15 | End: 2023-03-15 | Stop reason: SDUPTHER

## 2023-03-15 RX ORDER — ONDANSETRON 2 MG/ML
INJECTION INTRAMUSCULAR; INTRAVENOUS PRN
Status: DISCONTINUED | OUTPATIENT
Start: 2023-03-15 | End: 2023-03-15 | Stop reason: SDUPTHER

## 2023-03-15 RX ORDER — HYDROMORPHONE HYDROCHLORIDE 1 MG/ML
0.5 INJECTION, SOLUTION INTRAMUSCULAR; INTRAVENOUS; SUBCUTANEOUS
Status: COMPLETED | OUTPATIENT
Start: 2023-03-15 | End: 2023-03-15

## 2023-03-15 RX ORDER — PROPOFOL 10 MG/ML
INJECTION, EMULSION INTRAVENOUS CONTINUOUS PRN
Status: DISCONTINUED | OUTPATIENT
Start: 2023-03-15 | End: 2023-03-15 | Stop reason: SDUPTHER

## 2023-03-15 RX ORDER — ONDANSETRON 2 MG/ML
INJECTION INTRAMUSCULAR; INTRAVENOUS
Status: COMPLETED
Start: 2023-03-15 | End: 2023-03-15

## 2023-03-15 RX ORDER — MIDAZOLAM HYDROCHLORIDE 1 MG/ML
INJECTION INTRAMUSCULAR; INTRAVENOUS PRN
Status: DISCONTINUED | OUTPATIENT
Start: 2023-03-15 | End: 2023-03-15 | Stop reason: SDUPTHER

## 2023-03-15 RX ORDER — HYDROMORPHONE HYDROCHLORIDE 1 MG/ML
0.5 INJECTION, SOLUTION INTRAMUSCULAR; INTRAVENOUS; SUBCUTANEOUS ONCE
Status: COMPLETED | OUTPATIENT
Start: 2023-03-15 | End: 2023-03-15

## 2023-03-15 RX ORDER — FENTANYL CITRATE 50 UG/ML
INJECTION, SOLUTION INTRAMUSCULAR; INTRAVENOUS PRN
Status: DISCONTINUED | OUTPATIENT
Start: 2023-03-15 | End: 2023-03-15 | Stop reason: SDUPTHER

## 2023-03-15 RX ORDER — LANOLIN ALCOHOL/MO/W.PET/CERES
3 CREAM (GRAM) TOPICAL NIGHTLY PRN
COMMUNITY

## 2023-03-15 RX ORDER — SODIUM CHLORIDE, SODIUM LACTATE, POTASSIUM CHLORIDE, CALCIUM CHLORIDE 600; 310; 30; 20 MG/100ML; MG/100ML; MG/100ML; MG/100ML
INJECTION, SOLUTION INTRAVENOUS CONTINUOUS
Status: DISCONTINUED | OUTPATIENT
Start: 2023-03-15 | End: 2023-03-15 | Stop reason: HOSPADM

## 2023-03-15 RX ORDER — ONDANSETRON 2 MG/ML
4 INJECTION INTRAMUSCULAR; INTRAVENOUS ONCE
Status: COMPLETED | OUTPATIENT
Start: 2023-03-15 | End: 2023-03-15

## 2023-03-15 RX ADMIN — ONDANSETRON 4 MG: 2 INJECTION INTRAMUSCULAR; INTRAVENOUS at 16:17

## 2023-03-15 RX ADMIN — LIDOCAINE HYDROCHLORIDE 40 MG: 10 INJECTION, SOLUTION INFILTRATION; PERINEURAL at 14:52

## 2023-03-15 RX ADMIN — FENTANYL CITRATE 50 MCG: 50 INJECTION INTRAMUSCULAR; INTRAVENOUS at 14:52

## 2023-03-15 RX ADMIN — HYDROMORPHONE HYDROCHLORIDE 0.5 MG: 1 INJECTION, SOLUTION INTRAMUSCULAR; INTRAVENOUS; SUBCUTANEOUS at 16:20

## 2023-03-15 RX ADMIN — HYDROMORPHONE HYDROCHLORIDE 0.5 MG: 1 INJECTION, SOLUTION INTRAMUSCULAR; INTRAVENOUS; SUBCUTANEOUS at 16:37

## 2023-03-15 RX ADMIN — SODIUM CHLORIDE, POTASSIUM CHLORIDE, SODIUM LACTATE AND CALCIUM CHLORIDE: 600; 310; 30; 20 INJECTION, SOLUTION INTRAVENOUS at 13:43

## 2023-03-15 RX ADMIN — MIDAZOLAM 2 MG: 1 INJECTION INTRAMUSCULAR; INTRAVENOUS at 14:46

## 2023-03-15 RX ADMIN — ONDANSETRON 4 MG: 2 INJECTION INTRAMUSCULAR; INTRAVENOUS at 14:48

## 2023-03-15 RX ADMIN — PROPOFOL 120 MCG/KG/MIN: 10 INJECTION, EMULSION INTRAVENOUS at 14:52

## 2023-03-15 RX ADMIN — FENTANYL CITRATE 50 MCG: 50 INJECTION INTRAMUSCULAR; INTRAVENOUS at 14:46

## 2023-03-15 ASSESSMENT — PAIN DESCRIPTION - LOCATION
LOCATION: ABDOMEN

## 2023-03-15 ASSESSMENT — PAIN DESCRIPTION - DESCRIPTORS
DESCRIPTORS: SHARP;STABBING
DESCRIPTORS: STABBING;SHARP

## 2023-03-15 ASSESSMENT — LIFESTYLE VARIABLES: SMOKING_STATUS: 1

## 2023-03-15 ASSESSMENT — PAIN SCALES - GENERAL
PAINLEVEL_OUTOF10: 6
PAINLEVEL_OUTOF10: 4
PAINLEVEL_OUTOF10: 7
PAINLEVEL_OUTOF10: 6

## 2023-03-15 ASSESSMENT — PAIN - FUNCTIONAL ASSESSMENT
PAIN_FUNCTIONAL_ASSESSMENT: 0-10
PAIN_FUNCTIONAL_ASSESSMENT: PREVENTS OR INTERFERES SOME ACTIVE ACTIVITIES AND ADLS
PAIN_FUNCTIONAL_ASSESSMENT: PREVENTS OR INTERFERES SOME ACTIVE ACTIVITIES AND ADLS

## 2023-03-15 ASSESSMENT — ENCOUNTER SYMPTOMS: SHORTNESS OF BREATH: 0

## 2023-03-15 ASSESSMENT — PAIN DESCRIPTION - ORIENTATION
ORIENTATION: UPPER
ORIENTATION: UPPER

## 2023-03-15 NOTE — DISCHARGE INSTRUCTIONS
RECOMMENDATIONS:    1. Clear liquid diet today with advancing diet tomorrow as tolerated. 2.  Repeat ERCP in 4 months for stent removal  3.   Follow-up with Dr Parminder Gonzalez in the next 7-10 days to discuss external drain removal.

## 2023-03-15 NOTE — ANESTHESIA PRE PROCEDURE
Department of Anesthesiology  Preprocedure Note       Name:  Pam Elias   Age:  40 y.o.  :  1985                                          MRN:  [id number]         Date:  3/15/2023      Surgeon: Carmine Eduardo):  Julia Mas MD    Procedure: Procedure(s):  ERCP ENDOSCOPIC RETROGRADE CHOLANGIOPANCREATOGRAPHY    Medications prior to admission:   Prior to Admission medications    Medication Sig Start Date End Date Taking? Authorizing Provider   metroNIDAZOLE (FLAGYL) 500 MG tablet TAKE 1 TABLET BY MOUTH EVERY 8 HOURS 23   Historical Provider, MD   tamsulosin (FLOMAX) 0.4 MG capsule Take 1 capsule by mouth daily 3/8/23   Nohelia Kinney APRN - CNP   ondansetron (ZOFRAN) 4 MG tablet Take 1 tablet by mouth every 8 hours as needed for Nausea or Vomiting 23   Sachin Rojas DO       Current medications:    No current facility-administered medications for this visit. No current outpatient medications on file. 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 \"       Past Surgical History: Procedure Laterality Date    BREAST [place]OPSY [place]     [place], benign     SECTION      x2    CHOLECYSTECTOMY, LAPAROSCOPIC N/A 2023    LAPAROSCOPIC CHOLECYSTECTOMY performed by Fabienne Quevedo DO at [facility] ERCP N/A 2023    Dr DELL Samayoa-w/1 cm biliary sphincterotomy, placement of a 10F x 7cm plastic stent-Op bile leak, 3 month repeat    TUBAL LIGATION         Social History:    Social History     Tobacco Use    Smoking status: Every Day     Packs/day: 0.50     Types: Cigarettes     Start date:     Smokeless tobacco: Never   Substance Use Topics    Alcohol use: Not Currently                                Ready to quit: Not Answered  Counseling given: Not Answered      Vital Signs (Current): There were no vitals filed for this visit.                                            BP Readings from Last 3 Encounters:   23 95/76   23 118/70   23 110/72       NPO Status:                                                                                 BMI:   Wt Readings from Last 3 Encounters:   23 136 lb (61.7 kg)   23 136 lb 12.8 oz (62.1 kg)   23 130 lb (59 kg)     There is no height or weight on file to calculate BMI.    CBC:   Lab Results   Component Value Date/Time    WBC 9.6 2023 10:39 AM    RBC 3.37 2023 10:39 AM    HGB 10.3 2023 10:39 AM    HCT 32.0 2023 10:39 AM    MCV 95.0 2023 10:39 AM    RDW 13.0 2023 10:39 AM     2023 10:39 AM       CMP:   Lab Results   Component Value Date/Time     2023 10:39 AM    K 3.8 2023 10:39 AM     2023 10:39 AM    CO2 27 2023 10:39 AM    BUN 4 2023 10:39 AM    CREATININE 0.5 2023 10:39 AM    GFRAA >59 2022 08:05 PM    LABGLOM >60 2023 10:39 AM    GLUCOSE 96 2023 10:39 AM    PROT 5.9 2023 10:39 AM    CALCIUM 8.1 2023 10:39 AM    BILITOT 0.5 2023 10:39 AM    ALKPHOS 106 02/16/2023 10:39 AM    AST 19 02/16/2023 10:39 AM    ALT 18 02/16/2023 10:39 AM       POC Tests: No results for input(s): POCGLU, POCNA, POCK, POCCL, POCBUN, POCHEMO, POCHCT in the last 72 hours. Coags:   Lab Results   Component Value Date/Time    PROTIME 15.5 02/15/2023 02:29 AM    INR 1.22 02/15/2023 02:29 AM       HCG (If Applicable):   Lab Results   Component Value Date    PREGTESTUR Negative 01/27/2023        ABGs: No results found for: PHART, PO2ART, OYE2YFQ, HXR2MVR, BEART, S2EVKAXD     Type & Screen (If Applicable):  No results found for: LABABO, LABRH    Drug/Infectious Status (If Applicable):  No results found for: HIV, HEPCAB    COVID-19 Screening (If Applicable):   Lab Results   Component Value Date/Time    COVID19 Not Detected 02/14/2023 06:38 PM           Anesthesia Evaluation  Patient summary reviewed and Nursing notes reviewed no history of anesthetic complications:   Airway: Mallampati: II  TM distance: >3 FB   Neck ROM: full  Mouth opening: > = 3 FB   Dental: normal exam         Pulmonary:normal exam  breath sounds clear to auscultation  (+) current smoker    (-) shortness of breath          Patient did not smoke on day of surgery. Cardiovascular:  Exercise tolerance: no interval change,       (-) hypertension, CAD,  angina and  CHF      Rhythm: regular  Rate: normal           Beta Blocker:  Not on Beta Blocker         Neuro/Psych:   (+) psychiatric history:depression/anxiety    (-) seizures and CVA            ROS comment: Adult adhd, social phobia  GI/Hepatic/Renal:        (-) hiatal hernia and GERD      ROS comment: S/p lap monique 1/27/2023- ? Bile leak  S/p stent to CBD 1/30/2023- here today for possible stent exchange. Endo/Other: Negative Endo/Other ROS             Pt had PAT visit. Abdominal:       Abdomen: soft. Vascular: Other Findings:             Anesthesia Plan      general and TIVA     ASA 2       Induction: intravenous.     MIPS: Prophylactic antiemetics administered. Anesthetic plan and risks discussed with patient.         Attending anesthesiologist reviewed and agrees with Pre Eval content                OLIVIA Toth - CRNA   3/15/2023

## 2023-03-15 NOTE — H&P
Patient Name: Willie Mcintyre  : 1985  MRN: [id number]  DATE: 03/15/23    Allergies: Allergies   Allergen Reactions    Penicillins Shortness Of Breath    Zoloft [Sertraline Hcl] Itching        ENDOSCOPY  History and Physical    Procedure:    [] Diagnostic Colonoscopy       [] Screening Colonoscopy  [] EGD      [x] ERCP      [] EUS       [] Other    [x] Previous office notes/History and Physical reviewed from the patients chart. Please see EMR for further details of HPI. I have examined the patient's status immediately prior to the procedure and:      Indications/HPI:    []Stent upsizing for post op bile leak. Anesthesia:   [x] MAC [] Moderate Sedation   [] General   [] None     ROS: 12 pt Review of Symptoms was negative unless mentioned above    Medications:   Prior to Admission medications    Medication Sig Start Date End Date Taking? Authorizing Provider   melatonin 3 MG TABS tablet Take 3 mg by mouth nightly as needed   Yes Historical Provider, MD   tamsulosin (FLOMAX) 0.4 MG capsule Take 1 capsule by mouth daily 3/8/23   OLIVIA Awan - CNP   ondansetron (ZOFRAN) 4 MG tablet Take 1 tablet by mouth every 8 hours as needed for Nausea or Vomiting 23   Matthew Maciel DO       Past Medical History:  Past Medical History:   Diagnosis Date    Adult ADHD     Anxiety     Cholelithiasis     Gastritis     Hx of partial seizures     hx of seizure years ago; no meds    Idiopathic hypotension     Postoperative bile leak     Postoperative intra-abdominal abscess     Pyelonephritis     Right renal stone     UTI (urinary tract infection)     PT states she gets constant UTI's \"That is pretty much undercontrol now. \"       Past Surgical History:  Past Surgical History:   Procedure Laterality Date    BREAST BIOPSY Left     [place], benign     SECTION      x2    CHOLECYSTECTOMY, LAPAROSCOPIC N/A 2023    LAPAROSCOPIC CHOLECYSTECTOMY performed by Noé [place] Allan, DO at [facility] OR    ERCP N/A 01/30/2023    Dr DELL Samayoa-w/1 cm biliary sphincterotomy, placement of a 10F x 7cm plastic stent-Op bile leak, 3 month repeat    TUBAL LIGATION         Social History:  Social History     Tobacco Use    Smoking status: Every Day     Packs/day: 0.50     Types: Cigarettes     Start date: 2003    Smokeless tobacco: Never   Vaping Use    Vaping Use: Former    Substances: Always   Substance Use Topics    Alcohol use: Not Currently    Drug use: Not Currently     Types: Marijuana (Merryl Layman)     Comment: last use was around age 33       Vital Signs:   Vitals:    03/15/23 1325   BP: 114/84   Pulse: (!) 104   Resp: 16   Temp: 98 °F (36.7 °C)   SpO2: 98%        Physical Exam:  Cardiac:  [x]WNL  []Comments:  Pulmonary:  [x]WNL   []Comments:  Neuro/Mental Status:  [x]WNL  []Comments:  Abdominal:  [x]WNL    []Comments:  Other:   []WNL  []Comments:    Informed Consent:  The risks and benefits of the procedure have been discussed with either the patient or if they cannot consent, their representative. Assessment:  Patient examined and appropriate for planned sedation and procedure. Plan:  Proceed with planned sedation and procedure as above.          Nicole Desai MD

## 2023-03-15 NOTE — OP NOTE
Endoscopic Procedure Note    Patient: Alyson Verdugo : 1985  Med Rec#: [id number] Acc#: [id number]     Primary Care Provider Paul Mahajan DO  Referring Provider:  Rich Norris DO    Endoscopist: Doug Arnold MD    Date of Procedure:  3/15/2023     Procedure:   1. ERCP with stent exchange   2. Intra procedure interpretation of biliary cholangiogram [id number]    Indications:   1. Post op bile leak with plastic stent in place  2. Patient had external biliary drain place due to fluid collection in GB fossa     Anesthesia:  General     Estimated Blood Loss: minimal    Procedure:   Prior to the procedure the patient's chart was reviewed and informed consent was obtained. Risk and Benefits (Risks including but not limited to bleeding, perforation, infection, 8 to 10% risk of post ERCP pancreatitis, and even death) were discussed with the patient. They were agreeable to continue. Patient was brought to the operating room, underwent general anesthesia, and placed in the prone position. A side-viewing duodenoscope was advanced from the oropharynx down to the distal duodenum and reduced into a short position opposite the ampulla. The ampulla appeared c/w previous sphincterotomy and stenting. A  film was obtained which showed stent in proper position. Stent removal:  An existing stent was seen emanating from the ampulla. This was grasped and removed with a polypectomy snare and removed in its entirety through the scope. The bile duct was then cannulated using a 0.035 x 260 cm straight Dreamwire. A short nose traction sphincterotome was advanced over the wire and the bile duct was deeply cannulated. Contrast was injected. I personally interpreted the bile duct images. The flow of contrast was adequate. Contrast injection showed no obvious filling defects in the biliary tree. The pancreatic duct was not cannulated nor injected.      The bile duct was swept multiple times starting the bifurcation with a 12mm biliary extraction balloon. No stones/sludge was removed. Multiple occlusion cholangiograms were obtained stepwise through the intrahepatic duct, the common hepatic duct and the entire biliary tree. No obvious leak was noted. The biliary drain was clamped during exam.     Stent placement - as discussed with referring surgeon, a larger caliber stent was placed to insure a path of least resistance as well to occlude the cystic duct take off. A full covered SEMS measuring 10mm * 60mm was placed into the common bile duct. This was placed under fluoroscopic and endoscopic control. Bile was draining through the stent at the end of the procedure. At the end of the procedure the biliary tree was draining well. IMPRESSION:  1. Successful stent exchange to a fully covered biliary stent as described above. RECOMMENDATIONS:    1. Clear liquid diet today with advancing diet tomorrow as tolerated. 2.  Repeat ERCP in 4 months for stent removal  3. Follow-up with Dr Darnell Mcelroy in the next 7-10 days to discuss external drain removal.     The results were discussed with the patient and family. A copy of the images obtained were given to the patient.      Brea Pagan MD  3/15/2023  3:32 PM

## 2023-03-15 NOTE — ANESTHESIA POSTPROCEDURE EVALUATION
Department of Anesthesiology  Postprocedure Note    Patient: Zoie Callejas  MRN: [id number]  YOB: 1985  Date of evaluation: 3/15/2023      Procedure Summary     Date: 03/15/23 Room / Location: [address]    Anesthesia Start: 1721 Anesthesia Stop:     Procedure: ERCP ENDOSCOPIC RETROGRADE CHOLANGIOPANCREATOGRAPHY Diagnosis:       Bile leak      (Encounter for removal of biliary stent [Z46.89])    Surgeons: Maira Whittaker MD Responsible Provider: OLIVIA Pierce CRNA    Anesthesia Type: general, TIVA ASA Status: 2          Anesthesia Type: No value filed.     Jd Phase I: Jd Score: 10    Jd Phase II:        Anesthesia Post Evaluation    Patient location during evaluation: bedside  Patient participation: complete - patient participated  Level of consciousness: sleepy but conscious  Pain score: 0  Airway patency: patent  Nausea & Vomiting: no nausea and no vomiting  Complications: no  Cardiovascular status: hemodynamically stable and blood pressure returned to baseline  Respiratory status: acceptable and nasal cannula  Hydration status: stable

## 2023-03-16 ENCOUNTER — TELEPHONE (OUTPATIENT)
Dept: SURGERY | Age: 38
End: 2023-03-16

## 2023-03-16 ENCOUNTER — OFFICE VISIT (OUTPATIENT)
Dept: GASTROENTEROLOGY | Age: 38
End: 2023-03-16
Payer: COMMERCIAL

## 2023-03-16 ENCOUNTER — TELEPHONE (OUTPATIENT)
Dept: GASTROENTEROLOGY | Age: 38
End: 2023-03-16

## 2023-03-16 VITALS
HEIGHT: 61 IN | BODY MASS INDEX: 25.49 KG/M2 | DIASTOLIC BLOOD PRESSURE: 80 MMHG | OXYGEN SATURATION: 98 % | SYSTOLIC BLOOD PRESSURE: 120 MMHG | WEIGHT: 135 LBS | HEART RATE: 108 BPM

## 2023-03-16 DIAGNOSIS — K76.9 LIVER LESION: Primary | ICD-10-CM

## 2023-03-16 DIAGNOSIS — R74.8 ELEVATED LIVER ENZYMES: ICD-10-CM

## 2023-03-16 PROCEDURE — 99214 OFFICE O/P EST MOD 30 MIN: CPT | Performed by: NURSE PRACTITIONER

## 2023-03-16 ASSESSMENT — ENCOUNTER SYMPTOMS
ABDOMINAL PAIN: 0
ANAL BLEEDING: 0
TROUBLE SWALLOWING: 0
CONSTIPATION: 0
SHORTNESS OF BREATH: 0
ABDOMINAL DISTENTION: 0
NAUSEA: 0
VOMITING: 0
RECTAL PAIN: 0
CHOKING: 0
COUGH: 0
DIARRHEA: 0
BLOOD IN STOOL: 0

## 2023-03-16 NOTE — TELEPHONE ENCOUNTER
I s/w pt & told her I scheduled her sinogram in radiology here on 3/17/23 @ 0900, arrive at [address]  reg @ 0830. She said ok she'll be here for it.

## 2023-03-16 NOTE — TELEPHONE ENCOUNTER
Miko,    Per Dr Pascual Aramubla yesterday:       IMPRESSION:  1. Successful stent exchange to a fully covered biliary stent as described above. RECOMMENDATIONS:    1. Clear liquid diet today with advancing diet tomorrow as tolerated. 2.  Repeat ERCP in 4 months for stent removal  3. Follow-up with Dr Arias Beal in the next 7-10 days to discuss external drain removal.      The results were discussed with the patient and family. A copy of the images obtained were given to the patient.       Aleida Savage MD  3/15/2023  3:32 PM

## 2023-03-16 NOTE — PROGRESS NOTES
Subjective:     Patient ID: Zoie Callejas is a 40 y.o. female  PCP: Odette Adkins DO  Referring Provider: Ernesto BONNER  Patient presents to the office today with the following complaints: New Patient    Patient seen in the office today due to abnormal CT scan showing    small hepatic hypodensities in the liver. She had a cholecystectomy in January and she currently has an external biliary drain in place due to fluid collection in the GB fossa. She had an ERCP with stent exchange yesterday. Reports she is doing ok just has slight discomfort at the drain site. Liver labs and CT scan reviewed, results are in Epic. CMP 2/16/2023:  Lab Results   Component Value Date     02/16/2023    K 3.8 02/16/2023     02/16/2023    CO2 27 02/16/2023    BUN 4 (L) 02/16/2023    CREATININE 0.5 02/16/2023    GLUCOSE 96 02/16/2023    CALCIUM 8.1 (L) 02/16/2023    PROT 5.9 (L) 02/16/2023    LABALBU 3.2 (L) 02/16/2023    BILITOT 0.5 02/16/2023    ALKPHOS 106 (H) 02/16/2023    AST 19 02/16/2023    ALT 18 02/16/2023    LABGLOM >60 02/16/2023    GFRAA >59 09/19/2022    GLOB 2.7 03/05/2017        CT Result (most recent):  CT ABDOMEN PELVIS W IV CONTRAST 03/09/2023    Narrative  EXAM: CT ABDOMEN AND PELVIS WITH INTRAVENOUS CONTRAST  CLINICAL INDICATION: Bile leak  TECHNIQUE: Axial computed tomography images of the abdomen and pelvis with  intravenous contrast.This CT exam was performed using one or more of the  following dose reduction techniques: automated exposure control, adjustment of  the mA and/or kV according to patient size, and/or use of iterative  reconstruction technique. CONTRAST: 70 mL Isovue 370  COMPARISON: CT 02/14/2023  FINDINGS:  Lung bases: Unremarkable. No mass. No consolidation. ABDOMEN:  Liver: There are small hepatic hypodensities. Follow up imaging is suggested. Gallbladder and bile ducts: Since the prior study there has been a  cholecystectomy. There is a pigtail catheter within the gallbladder fossa  without a fluid collection. No ductal dilation. Pancreas: Unremarkable. No mass. No ductal dilation. Spleen: Unremarkable. No splenomegaly. Adrenals: Unremarkable. No mass. Kidneys and ureters: There is a non-obstructive calculus in the superior right  renal corticomedullary junction. Stomach and bowel: There is a moderate large amount of rectosigmoid stool. No  mucosal thickening. PELVIS:  Appendix: No findings to suggest acute appendicitis. Bladder: Unremarkable. No mass. Reproductive: Unremarkable as visualized. ABDOMEN and PELVIS:  Intraperitoneal space: Unremarkable. No free air. No significant fluid  collection. Bones/joints: No acute fracture. No dislocation. Soft tissues: Unremarkable. Vasculature: Unremarkable. No abdominal aortic aneurysm. Lymph nodes: Unremarkable. No enlarged lymph nodes. Tubes, lines and devices: There is a pigtail catheter within the gallbladder  fossa without a fluid collection. Impression  1. There is a pigtail catheter within the gallbladder fossa without a fluid  collection. 2.There are small hepatic hypodensities. Follow up imaging is suggested. Assessment:     1. Liver lesion  2. Elevated liver enzymes         Plan:   Schedule MRCP  with iv contrast   Liver labs   ERCP to be repeated in 4 months with stent removal   Orders  No orders of the defined types were placed in this encounter. Medications  No orders of the defined types were placed in this encounter. Patient History:     Past Medical History:   Diagnosis Date    Adult ADHD     Anxiety     Cholelithiasis     Gastritis     Hx of partial seizures 2013    hx of seizure years ago; no meds    Idiopathic hypotension     Postoperative bile leak     Postoperative intra-abdominal abscess     Pyelonephritis     Right renal stone     UTI (urinary tract infection)     PT states she gets constant UTI's \"That is pretty much undercontrol now. \"       Past Surgical History:   Procedure Laterality Date    BREAST BIOPSY Left     [place], benign     SECTION      x2    CHOLECYSTECTOMY, LAPAROSCOPIC N/A 2023    LAPAROSCOPIC CHOLECYSTECTOMY performed by Helene Astorga DO at [facility] OR    ERCP N/A 2023    Dr DELL Samayoa-w/1 cm biliary sphincterotomy, placement of a 10F x 7cm plastic stent-Op bile leak, 3 month repeat    ERCP N/A 03/15/2023    Dr DELL Samayoa-Successful stent exchange to a fully covered SEMS measuring 10mm * 60mm was placed into the common bile duct    TUBAL LIGATION         Family History   Problem Relation Age of Onset    Heart Attack Mother     Stroke Mother     High Blood Pressure Mother     High Cholesterol Mother     Diabetes Mother     Cancer Mother         thyroid    No Known Problems Father     Alcohol Abuse Brother         recently out of rehab from what she was told    Liver Cancer Maternal Grandmother 28    Diabetes Maternal Grandmother     Pancreatic Cancer Maternal Grandmother 28    ADHD Son         with Trouettes    No Known Problems Son     Colon Cancer Neg Hx     Colon Polyps Neg Hx        Social History     Socioeconomic History    Marital status: Single    Number of children: 3    Years of education: 12+   Occupational History     Comment:    Tobacco Use    Smoking status: Every Day     Packs/day: 0.50     Types: Cigarettes     Start date:     Smokeless tobacco: Never   Vaping Use    Vaping Use: Former    Substances: Always   Substance and Sexual Activity    Alcohol use: Not Currently    Drug use: Not Currently     Types: Marijuana Kenneth Spina)     Comment: last use was around age 33    Sexual activity: Yes     Partners: Male     Birth control/protection: Surgical   Social History Narrative    Was admitted to an inpatient mental health setting as a teenager. Was admitted in [place]. I was having breakup with boyfriend and stress with mother. Was in the crisis clinic several times in the same unit. 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 \"        Has been on a few different antidepressants. \"I don't respond well to SSRIs. I have been on a few antianxiety medications. \"         I don't tend to be too pelletier unless there is a lot going on. My sleep in a little touch and go. I have a lot of to do list. 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 Social Determinants of Health     Financial Resource Strain: Medium Risk    Difficulty of Paying Living Expenses: Somewhat hard   Food Insecurity: No Food Insecurity    Worried About Running Out of Food in the Last Year: Never true    Ran Out of Food in the Last Year: Never true   Transportation Needs: Unknown    Lack of Transportation (Non-Medical): No   Housing Stability: Unknown    Unstable Housing in the Last Year: No       Current Outpatient Medications   Medication Sig Dispense Refill    melatonin 3 MG TABS tablet Take 3 mg by mouth nightly as needed      tamsulosin (FLOMAX) 0.4 MG capsule Take 1 capsule by mouth daily 90 capsule 1     No current facility-administered medications for this visit. Allergies   Allergen Reactions    Penicillins Shortness Of Breath    Zoloft [Sertraline Hcl] Itching       Review of Systems   Constitutional:  Negative for activity change, appetite change, fatigue, fever and unexpected weight change. HENT:  Negative for trouble swallowing. Respiratory:  Negative for cough, choking and shortness of breath. Cardiovascular:  Negative for chest pain. Gastrointestinal:  Negative for abdominal distention, abdominal pain, anal bleeding, blood in stool, constipation, diarrhea, nausea, rectal pain and vomiting. Allergic/Immunologic: Negative for food allergies. All other systems reviewed and are negative. Objective:     /80 (Site: Left Upper Arm)   Pulse (!) 108   Ht 5' 1\" (1.549 m)   Wt 135 lb (61.2 kg)   LMP 03/13/2023 (Exact Date)   SpO2 98%   BMI 25.51 kg/m²     Physical Exam  Vitals reviewed.    Constitutional:       General: She is not in acute distress. Appearance: She is well-developed. HENT:      Head: Normocephalic and atraumatic. Right Ear: External ear normal.      Left Ear: External ear normal.      Nose: Nose normal.   Eyes:      General: No scleral icterus. Right eye: No discharge. Left eye: No discharge. Conjunctiva/sclera: Conjunctivae normal.      Pupils: Pupils are equal, round, and reactive to light. Cardiovascular:      Rate and Rhythm: Normal rate and regular rhythm. Heart sounds: Normal heart sounds. No murmur heard. Pulmonary:      Effort: Pulmonary effort is normal. No respiratory distress. Breath sounds: Normal breath sounds. No wheezing or rales. Abdominal:      General: Bowel sounds are normal. There is no distension. Palpations: Abdomen is soft. There is no mass. Tenderness: There is no abdominal tenderness. There is no guarding or rebound. Musculoskeletal:         General: Normal range of motion. Cervical back: Normal range of motion and neck supple. Skin:     General: Skin is warm and dry. Coloration: Skin is not pale. Neurological:      Mental Status: She is alert and oriented to person, place, and time.    Psychiatric:         Behavior: Behavior normal.

## 2023-03-17 ENCOUNTER — HOSPITAL ENCOUNTER (OUTPATIENT)
Dept: GENERAL RADIOLOGY | Age: 38
Discharge: HOME | End: 2023-03-17
Payer: COMMERCIAL

## 2023-03-17 DIAGNOSIS — K83.9 BILE LEAK: ICD-10-CM

## 2023-03-17 PROCEDURE — 20501 NJX SINUS TRACT DIAGNOSTIC: CPT

## 2023-03-17 PROCEDURE — 6360000004 HC RX CONTRAST MEDICATION: Performed by: SURGERY

## 2023-03-17 RX ADMIN — IOPAMIDOL 10 ML: 408 INJECTION, SOLUTION INTRATHECAL at 10:02

## 2023-03-22 ENCOUNTER — OFFICE VISIT (OUTPATIENT)
Dept: SURGERY | Age: 38
End: 2023-03-22

## 2023-03-22 VITALS
HEART RATE: 87 BPM | WEIGHT: 138.6 LBS | BODY MASS INDEX: 26.17 KG/M2 | OXYGEN SATURATION: 100 % | HEIGHT: 61 IN | TEMPERATURE: 98.6 F

## 2023-03-22 DIAGNOSIS — Z09 POSTOPERATIVE EXAMINATION: Primary | ICD-10-CM

## 2023-03-22 DIAGNOSIS — K83.9 BILE LEAK: ICD-10-CM

## 2023-03-22 PROCEDURE — 99024 POSTOP FOLLOW-UP VISIT: CPT | Performed by: SURGERY

## 2023-03-22 NOTE — PROGRESS NOTES
Requested Specialty:   General Surgery     Number of Visits Requested:   1           Electronically signed by Calvin Giraldo DO on 2/85/7294 at 1:54 PM

## 2023-03-23 DIAGNOSIS — R74.8 ELEVATED LIVER ENZYMES: ICD-10-CM

## 2023-03-23 DIAGNOSIS — K76.9 LIVER LESION: ICD-10-CM

## 2023-03-23 LAB
ALBUMIN SERPL-MCNC: 3.9 G/DL (ref 3.5–5.2)
ALBUMIN SERPL-MCNC: 3.9 G/DL (ref 3.5–5.2)
ALP SERPL-CCNC: 80 U/L (ref 35–104)
ALP SERPL-CCNC: 81 U/L (ref 35–104)
ALT SERPL-CCNC: 14 U/L (ref 5–33)
ALT SERPL-CCNC: 15 U/L (ref 5–33)
ANION GAP SERPL CALCULATED.3IONS-SCNC: 10 MMOL/L (ref 7–19)
AST SERPL-CCNC: 14 U/L (ref 5–32)
AST SERPL-CCNC: 15 U/L (ref 5–32)
BASOPHILS # BLD: 0.1 K/UL (ref 0–0.2)
BASOPHILS NFR BLD: 0.9 % (ref 0–1)
BILIRUB DIRECT SERPL-MCNC: 0.1 MG/DL (ref 0–0.3)
BILIRUB INDIRECT SERPL-MCNC: 0.2 MG/DL (ref 0.1–1)
BILIRUB SERPL-MCNC: 0.3 MG/DL (ref 0.2–1.2)
BILIRUB SERPL-MCNC: 0.3 MG/DL (ref 0.2–1.2)
BUN SERPL-MCNC: 6 MG/DL (ref 6–20)
CALCIUM SERPL-MCNC: 8.3 MG/DL (ref 8.6–10)
CHLORIDE SERPL-SCNC: 101 MMOL/L (ref 98–111)
CO2 SERPL-SCNC: 27 MMOL/L (ref 22–29)
CREAT SERPL-MCNC: 0.6 MG/DL (ref 0.5–0.9)
EOSINOPHIL # BLD: 0.3 K/UL (ref 0–0.6)
EOSINOPHIL NFR BLD: 4.8 % (ref 0–5)
ERYTHROCYTE [DISTWIDTH] IN BLOOD BY AUTOMATED COUNT: 14 % (ref 11.5–14.5)
GLUCOSE SERPL-MCNC: 71 MG/DL (ref 74–109)
HCT VFR BLD AUTO: 39.3 % (ref 37–47)
HGB BLD-MCNC: 12.5 G/DL (ref 12–16)
IMM GRANULOCYTES # BLD: 0 K/UL
LYMPHOCYTES # BLD: 2 K/UL (ref 1.1–4.5)
LYMPHOCYTES NFR BLD: 34.4 % (ref 20–40)
MCH RBC QN AUTO: 29.6 PG (ref 27–31)
MCHC RBC AUTO-ENTMCNC: 31.8 G/DL (ref 33–37)
MCV RBC AUTO: 93.1 FL (ref 81–99)
MONOCYTES # BLD: 0.4 K/UL (ref 0–0.9)
MONOCYTES NFR BLD: 6.4 % (ref 0–10)
NEUTROPHILS # BLD: 3.1 K/UL (ref 1.5–7.5)
NEUTS SEG NFR BLD: 53.2 % (ref 50–65)
PLATELET # BLD AUTO: 251 K/UL (ref 130–400)
PMV BLD AUTO: 10.1 FL (ref 9.4–12.3)
POTASSIUM SERPL-SCNC: 4 MMOL/L (ref 3.5–5)
PROLACTIN SERPL-MCNC: 12.49 NG/ML (ref 4.79–23.3)
PROT SERPL-MCNC: 6.3 G/DL (ref 6.6–8.7)
PROT SERPL-MCNC: 6.7 G/DL (ref 6.6–8.7)
RBC # BLD AUTO: 4.22 M/UL (ref 4.2–5.4)
SODIUM SERPL-SCNC: 138 MMOL/L (ref 136–145)
TSH SERPL DL<=0.005 MIU/L-ACNC: 2.31 UIU/ML (ref 0.27–4.2)
WBC # BLD AUTO: 5.8 K/UL (ref 4.8–10.8)

## 2023-03-24 ENCOUNTER — TELEPHONE (OUTPATIENT)
Dept: GASTROENTEROLOGY | Age: 38
End: 2023-03-24

## 2023-03-24 NOTE — TELEPHONE ENCOUNTER
----- Message from OLIVIA Arredondo sent at 3/24/2023 12:57 PM CDT -----  Liver enzymes within normal limits.

## 2023-03-27 ENCOUNTER — TELEPHONE (OUTPATIENT)
Dept: SURGERY | Age: 38
End: 2023-03-27

## 2023-03-31 ENCOUNTER — TELEPHONE (OUTPATIENT)
Dept: SURGERY | Age: 38
End: 2023-03-31

## 2023-03-31 NOTE — TELEPHONE ENCOUNTER
3/31/2023 Norbertogema Jacobson called in regards to they went to [place] where they were referred and they do not take patient's insurance. Please return call.     Callback # [phone]  endy

## 2023-03-31 NOTE — TELEPHONE ENCOUNTER
Returned call to patient, she states they got assisted to their appt on Thursday and received a call that the office did not accept their insurance. I apologized to the patient and told her insurance was verified by the office and they said they accepted it. I then called Zane Riteshjosiane  at [phone] and asked for medical records, she directed me to [facility] in Medical records at [phone]. We spoke and I gave her the patient information. She found the medical records on patient. She told me Dr. Bebe Vogt placed the drain , his office number is [phone]. No answer there and no way to leave vm. I asked if she knew of [facility], she gave me the  names of Dr. Melodi Harada and Dr. Mynor King, their office number is [phone]. Called that number and left vm as office was closed, left message for them to call our office on Monday to set up and appt. Will route this phone call to [facility] for informational purposes.

## 2023-04-04 ENCOUNTER — TELEPHONE (OUTPATIENT)
Dept: SURGERY | Age: 38
End: 2023-04-04

## 2023-04-04 NOTE — TELEPHONE ENCOUNTER
Called and left a second vm with Dr. Elvia Horn, Dr. Brea Pagan office, vm went to Taisha Drummond whom I had already left a message on Friday. Asked for a call back.

## 2023-04-04 NOTE — TELEPHONE ENCOUNTER
Received message that Dr. Brandi Mancini office was returning call to me and ask for me to call back and speak with Estephanie Collins [phone], she asked that I fax records to their office at [phone] and she would get patient and appt. Records have been faxd.

## 2023-04-05 ENCOUNTER — HOSPITAL ENCOUNTER (OUTPATIENT)
Dept: MRI IMAGING | Age: 38
Discharge: HOME | End: 2023-04-05
Payer: COMMERCIAL

## 2023-04-05 ENCOUNTER — TELEPHONE (OUTPATIENT)
Dept: SURGERY | Age: 38
End: 2023-04-05

## 2023-04-05 DIAGNOSIS — K76.9 LIVER LESION: ICD-10-CM

## 2023-04-05 DIAGNOSIS — K83.9 BILE LEAK: Primary | ICD-10-CM

## 2023-04-05 DIAGNOSIS — R74.8 ELEVATED LIVER ENZYMES: ICD-10-CM

## 2023-04-05 PROCEDURE — 6360000004 HC RX CONTRAST MEDICATION: Performed by: NURSE PRACTITIONER

## 2023-04-05 PROCEDURE — 74183 MRI ABD W/O CNTR FLWD CNTR: CPT | Performed by: RADIOLOGY

## 2023-04-05 PROCEDURE — 74183 MRI ABD W/O CNTR FLWD CNTR: CPT

## 2023-04-05 PROCEDURE — A9577 INJ MULTIHANCE: HCPCS | Performed by: NURSE PRACTITIONER

## 2023-04-05 RX ADMIN — GADOBENATE DIMEGLUMINE 13 ML: 529 INJECTION, SOLUTION INTRAVENOUS at 12:56

## 2023-04-05 NOTE — TELEPHONE ENCOUNTER
Call placed to Dr. Megan Longo, radiologist at [facility] who placed Mirtha's drain. Discussed her case and her ongoing biliary drainage. Reviewed our drain study with him. He recommends radiology referral to him and he will evaluate the drain and possibly exchange it on the same visit. Order placed and drain exchange will be scheduled as soon as possible. Orders Placed This Encounter   Procedures    CT ABSCESS DRAINAGE W CATH PLACEMENT S&I     Standing Status:   Future     Standing Expiration Date:   4/5/2024     Scheduling Instructions:      to be scheduled at [address] [facility] with DR. Ignacio. Patient with previous drain placement by him there. Order Specific Question:   Reason for exam:     Answer:   to be scheduled at [address] [facility] with DR. Ignacio. Patient with previous drain placement by him there. He states he will also reach out to Dr. Deena Santana, Surgical Oncology to see about getting her seen soon in their office to evaluate if surgical repair is needed. He did so, and states these surgeons do not do bile duct repair, and recommend an academic center. Reached out to [facility] for advice on referral, given she is [facility] and benefits will be covered within the state. Provided my personal cell phone number to him to provide to any physician who may need it to expedite her care.      DO Emmett   48/43/67  10:35 AM

## 2023-04-05 NOTE — TELEPHONE ENCOUNTER
Called patient and gave her the following appt information:  Patient scheduled to see Dr. Maria Luisa Cobian for evaluation and possible drain change on 04/10/12 arrive at [facility] [address], [address], phone is [phone]. Patient to arrive at 7:00 npo after midnight for a 9:00 appt. Patient instructed to bring a . Patient states she is not on any blood thinners. Patient voiced understanding and  said thank you. Order for test faxd to [phone].

## 2023-04-05 NOTE — PROGRESS NOTES
Case discussed with Gricel Horan MD, Surgical Oncologist who will see the patient in consultation. He recommends proceeding with drain exchange as scheduled for Monday. Will likely plan for high-resolution MRCP while there with further plan pending those results.      DO Emmett   0/8/43   12:16 PM

## 2023-04-05 NOTE — TELEPHONE ENCOUNTER
Spoke with patient per Dr. Johnny Stewart request to let her know that Dr. Butch Reinoso had personally spoken with the physician who placed her drain and that he will be doing an evaluation and maybe a change to the drain as well as her consultation with the Surgeon in [place],   He is personally speaking with them today to expedite the appt asap. She voiced understanding and said thank you.

## 2023-04-06 ENCOUNTER — TELEPHONE (OUTPATIENT)
Dept: SURGERY | Age: 38
End: 2023-04-06

## 2023-04-06 NOTE — TELEPHONE ENCOUNTER
Called patient and told her that I saw that she was scheduled with Dr. Cruz Chaudhari on the 18th. I asked her per Dr. Darnell Mcelroy if she wanted to keep her appt with Dr. Darnell Mcelroy that is scheduled with her on the 19th to discuss what she found out with Dr. Cruz Chaudhari or if she wanted to scheduled a follow up with Dr. Zahraa Lawson or if she just wanted to check back in with us if she needed us and she said she would have to talk to [facility] and let us know. I told her ok and thank you.

## 2023-04-12 ENCOUNTER — APPOINTMENT (OUTPATIENT)
Dept: CT IMAGING | Age: 38
End: 2023-04-12
Payer: COMMERCIAL

## 2023-04-12 ENCOUNTER — HOSPITAL ENCOUNTER (OUTPATIENT)
Age: 38
Setting detail: OBSERVATION
Discharge: ANOTHER HOSPITAL | End: 2023-04-14
Attending: PEDIATRICS | Admitting: SURGERY
Payer: COMMERCIAL

## 2023-04-12 DIAGNOSIS — K65.1 INTRA-ABDOMINAL ABSCESS (HCC): Primary | ICD-10-CM

## 2023-04-12 LAB
ALBUMIN SERPL-MCNC: 4.1 G/DL (ref 3.5–5.2)
ALP SERPL-CCNC: 80 U/L (ref 35–104)
ALT SERPL-CCNC: 14 U/L (ref 5–33)
ANION GAP SERPL CALCULATED.3IONS-SCNC: 11 MMOL/L (ref 7–19)
AST SERPL-CCNC: 15 U/L (ref 5–32)
BASOPHILS # BLD: 0.1 K/UL (ref 0–0.2)
BASOPHILS NFR BLD: 0.5 % (ref 0–1)
BILIRUB SERPL-MCNC: <0.2 MG/DL (ref 0.2–1.2)
BUN SERPL-MCNC: 6 MG/DL (ref 6–20)
CALCIUM SERPL-MCNC: 9 MG/DL (ref 8.6–10)
CHLORIDE SERPL-SCNC: 101 MMOL/L (ref 98–111)
CO2 SERPL-SCNC: 26 MMOL/L (ref 22–29)
CREAT SERPL-MCNC: 0.5 MG/DL (ref 0.5–0.9)
EOSINOPHIL # BLD: 0.2 K/UL (ref 0–0.6)
EOSINOPHIL NFR BLD: 1.7 % (ref 0–5)
ERYTHROCYTE [DISTWIDTH] IN BLOOD BY AUTOMATED COUNT: 14.2 % (ref 11.5–14.5)
GLUCOSE SERPL-MCNC: 96 MG/DL (ref 74–109)
HCG SERPL QL: NEGATIVE
HCT VFR BLD AUTO: 41.3 % (ref 37–47)
HGB BLD-MCNC: 13.6 G/DL (ref 12–16)
IMM GRANULOCYTES # BLD: 0 K/UL
LACTATE BLDV-SCNC: 0.7 MMOL/L (ref 0.5–1.9)
LYMPHOCYTES # BLD: 1.3 K/UL (ref 1.1–4.5)
LYMPHOCYTES NFR BLD: 10.8 % (ref 20–40)
MCH RBC QN AUTO: 29.7 PG (ref 27–31)
MCHC RBC AUTO-ENTMCNC: 32.9 G/DL (ref 33–37)
MCV RBC AUTO: 90.2 FL (ref 81–99)
MONOCYTES # BLD: 0.7 K/UL (ref 0–0.9)
MONOCYTES NFR BLD: 5.6 % (ref 0–10)
NEUTROPHILS # BLD: 9.8 K/UL (ref 1.5–7.5)
NEUTS SEG NFR BLD: 81.1 % (ref 50–65)
PLATELET # BLD AUTO: 251 K/UL (ref 130–400)
PMV BLD AUTO: 9.6 FL (ref 9.4–12.3)
POTASSIUM SERPL-SCNC: 4 MMOL/L (ref 3.5–5)
PROT SERPL-MCNC: 6.9 G/DL (ref 6.6–8.7)
RBC # BLD AUTO: 4.58 M/UL (ref 4.2–5.4)
SARS-COV-2 RDRP RESP QL NAA+PROBE: NOT DETECTED
SODIUM SERPL-SCNC: 138 MMOL/L (ref 136–145)
TROPONIN T SERPL-MCNC: <0.01 NG/ML (ref 0–0.03)
WBC # BLD AUTO: 12.1 K/UL (ref 4.8–10.8)

## 2023-04-12 PROCEDURE — 74177 CT ABD & PELVIS W/CONTRAST: CPT

## 2023-04-12 PROCEDURE — 93005 ELECTROCARDIOGRAM TRACING: CPT | Performed by: PEDIATRICS

## 2023-04-12 PROCEDURE — 87635 SARS-COV-2 COVID-19 AMP PRB: CPT

## 2023-04-12 PROCEDURE — 87040 BLOOD CULTURE FOR BACTERIA: CPT

## 2023-04-12 PROCEDURE — C9113 INJ PANTOPRAZOLE SODIUM, VIA: HCPCS | Performed by: PEDIATRICS

## 2023-04-12 PROCEDURE — 99285 EMERGENCY DEPT VISIT HI MDM: CPT

## 2023-04-12 PROCEDURE — 36415 COLL VENOUS BLD VENIPUNCTURE: CPT

## 2023-04-12 PROCEDURE — 6360000002 HC RX W HCPCS: Performed by: PEDIATRICS

## 2023-04-12 PROCEDURE — 96375 TX/PRO/DX INJ NEW DRUG ADDON: CPT

## 2023-04-12 RX ORDER — HYDROMORPHONE HYDROCHLORIDE 1 MG/ML
0.5 INJECTION, SOLUTION INTRAMUSCULAR; INTRAVENOUS; SUBCUTANEOUS EVERY 30 MIN PRN
Status: DISCONTINUED | OUTPATIENT
Start: 2023-04-12 | End: 2023-04-13

## 2023-04-12 RX ORDER — PANTOPRAZOLE SODIUM 40 MG/10ML
40 INJECTION, POWDER, LYOPHILIZED, FOR SOLUTION INTRAVENOUS ONCE
Status: COMPLETED | OUTPATIENT
Start: 2023-04-12 | End: 2023-04-12

## 2023-04-12 RX ADMIN — PANTOPRAZOLE SODIUM 40 MG: 40 INJECTION, POWDER, FOR SOLUTION INTRAVENOUS at 22:50

## 2023-04-12 RX ADMIN — HYDROMORPHONE HYDROCHLORIDE 0.5 MG: 1 INJECTION, SOLUTION INTRAMUSCULAR; INTRAVENOUS; SUBCUTANEOUS at 22:48

## 2023-04-12 ASSESSMENT — PAIN DESCRIPTION - ORIENTATION: ORIENTATION: RIGHT;UPPER

## 2023-04-12 ASSESSMENT — PAIN DESCRIPTION - LOCATION: LOCATION: ABDOMEN;BACK

## 2023-04-12 ASSESSMENT — PAIN SCALES - GENERAL
PAINLEVEL_OUTOF10: 7
PAINLEVEL_OUTOF10: 7

## 2023-04-12 ASSESSMENT — PAIN - FUNCTIONAL ASSESSMENT: PAIN_FUNCTIONAL_ASSESSMENT: 0-10

## 2023-04-13 PROBLEM — R10.11 ABDOMINAL PAIN, RIGHT UPPER QUADRANT: Status: ACTIVE | Noted: 2023-04-13

## 2023-04-13 LAB
BILIRUB UR QL STRIP: NEGATIVE
CLARITY UR: CLEAR
COLOR UR: YELLOW
GLUCOSE UR STRIP.AUTO-MCNC: NEGATIVE MG/DL
HGB UR STRIP.AUTO-MCNC: NEGATIVE MG/L
KETONES UR STRIP.AUTO-MCNC: NEGATIVE MG/DL
LEUKOCYTE ESTERASE UR QL STRIP.AUTO: NEGATIVE
NITRITE UR QL STRIP.AUTO: NEGATIVE
PH UR STRIP.AUTO: 7 [PH] (ref 5–8)
PROT UR STRIP.AUTO-MCNC: NEGATIVE MG/DL
SP GR UR STRIP.AUTO: >=1.045 (ref 1–1.03)
UROBILINOGEN UR STRIP.AUTO-MCNC: 0.2 E.U./DL

## 2023-04-13 PROCEDURE — 96376 TX/PRO/DX INJ SAME DRUG ADON: CPT

## 2023-04-13 PROCEDURE — G0378 HOSPITAL OBSERVATION PER HR: HCPCS

## 2023-04-13 PROCEDURE — 2500000003 HC RX 250 WO HCPCS: Performed by: SURGERY

## 2023-04-13 PROCEDURE — 96375 TX/PRO/DX INJ NEW DRUG ADDON: CPT

## 2023-04-13 PROCEDURE — 6360000002 HC RX W HCPCS: Performed by: SURGERY

## 2023-04-13 PROCEDURE — 6370000000 HC RX 637 (ALT 250 FOR IP): Performed by: SURGERY

## 2023-04-13 PROCEDURE — 99222 1ST HOSP IP/OBS MODERATE 55: CPT | Performed by: SURGERY

## 2023-04-13 PROCEDURE — 96367 TX/PROPH/DG ADDL SEQ IV INF: CPT

## 2023-04-13 PROCEDURE — 85025 COMPLETE CBC W/AUTO DIFF WBC: CPT

## 2023-04-13 PROCEDURE — 2580000003 HC RX 258: Performed by: PEDIATRICS

## 2023-04-13 PROCEDURE — 81003 URINALYSIS AUTO W/O SCOPE: CPT

## 2023-04-13 PROCEDURE — 84484 ASSAY OF TROPONIN QUANT: CPT

## 2023-04-13 PROCEDURE — 6360000004 HC RX CONTRAST MEDICATION: Performed by: PEDIATRICS

## 2023-04-13 PROCEDURE — 96366 THER/PROPH/DIAG IV INF ADDON: CPT

## 2023-04-13 PROCEDURE — 80053 COMPREHEN METABOLIC PANEL: CPT

## 2023-04-13 PROCEDURE — 83605 ASSAY OF LACTIC ACID: CPT

## 2023-04-13 PROCEDURE — 2500000003 HC RX 250 WO HCPCS: Performed by: PEDIATRICS

## 2023-04-13 PROCEDURE — 2580000003 HC RX 258: Performed by: SURGERY

## 2023-04-13 PROCEDURE — 36415 COLL VENOUS BLD VENIPUNCTURE: CPT

## 2023-04-13 PROCEDURE — 96372 THER/PROPH/DIAG INJ SC/IM: CPT

## 2023-04-13 PROCEDURE — 84703 CHORIONIC GONADOTROPIN ASSAY: CPT

## 2023-04-13 PROCEDURE — 6360000002 HC RX W HCPCS: Performed by: PEDIATRICS

## 2023-04-13 PROCEDURE — 87040 BLOOD CULTURE FOR BACTERIA: CPT

## 2023-04-13 PROCEDURE — 96365 THER/PROPH/DIAG IV INF INIT: CPT

## 2023-04-13 RX ORDER — SODIUM CHLORIDE 9 MG/ML
INJECTION, SOLUTION INTRAVENOUS PRN
Status: DISCONTINUED | OUTPATIENT
Start: 2023-04-13 | End: 2023-04-15 | Stop reason: HOSPADM

## 2023-04-13 RX ORDER — ONDANSETRON 4 MG/1
4 TABLET, ORALLY DISINTEGRATING ORAL EVERY 8 HOURS PRN
Status: DISCONTINUED | OUTPATIENT
Start: 2023-04-13 | End: 2023-04-15 | Stop reason: HOSPADM

## 2023-04-13 RX ORDER — METOPROLOL SUCCINATE 25 MG/1
25 TABLET, EXTENDED RELEASE ORAL DAILY
Status: DISCONTINUED | OUTPATIENT
Start: 2023-04-13 | End: 2023-04-15 | Stop reason: HOSPADM

## 2023-04-13 RX ORDER — DEXTROSE AND SODIUM CHLORIDE 5; .45 G/100ML; G/100ML
INJECTION, SOLUTION INTRAVENOUS CONTINUOUS
Status: DISCONTINUED | OUTPATIENT
Start: 2023-04-13 | End: 2023-04-13

## 2023-04-13 RX ORDER — DEXTROSE AND SODIUM CHLORIDE 5; .45 G/100ML; G/100ML
INJECTION, SOLUTION INTRAVENOUS CONTINUOUS
Status: DISCONTINUED | OUTPATIENT
Start: 2023-04-13 | End: 2023-04-15 | Stop reason: HOSPADM

## 2023-04-13 RX ORDER — METRONIDAZOLE 500 MG/100ML
500 INJECTION, SOLUTION INTRAVENOUS ONCE
Status: COMPLETED | OUTPATIENT
Start: 2023-04-13 | End: 2023-04-13

## 2023-04-13 RX ORDER — METRONIDAZOLE 500 MG/100ML
500 INJECTION, SOLUTION INTRAVENOUS EVERY 8 HOURS
Status: DISCONTINUED | OUTPATIENT
Start: 2023-04-13 | End: 2023-04-15 | Stop reason: HOSPADM

## 2023-04-13 RX ORDER — ONDANSETRON 2 MG/ML
4 INJECTION INTRAMUSCULAR; INTRAVENOUS EVERY 6 HOURS PRN
Status: DISCONTINUED | OUTPATIENT
Start: 2023-04-13 | End: 2023-04-15 | Stop reason: HOSPADM

## 2023-04-13 RX ORDER — HYDROMORPHONE HYDROCHLORIDE 1 MG/ML
0.5 INJECTION, SOLUTION INTRAMUSCULAR; INTRAVENOUS; SUBCUTANEOUS EVERY 4 HOURS PRN
Status: DISCONTINUED | OUTPATIENT
Start: 2023-04-13 | End: 2023-04-13

## 2023-04-13 RX ORDER — PROCHLORPERAZINE EDISYLATE 5 MG/ML
10 INJECTION INTRAMUSCULAR; INTRAVENOUS EVERY 6 HOURS PRN
Status: DISCONTINUED | OUTPATIENT
Start: 2023-04-13 | End: 2023-04-13

## 2023-04-13 RX ORDER — SODIUM CHLORIDE 0.9 % (FLUSH) 0.9 %
5-40 SYRINGE (ML) INJECTION EVERY 12 HOURS SCHEDULED
Status: DISCONTINUED | OUTPATIENT
Start: 2023-04-13 | End: 2023-04-15 | Stop reason: HOSPADM

## 2023-04-13 RX ORDER — ENOXAPARIN SODIUM 100 MG/ML
40 INJECTION SUBCUTANEOUS EVERY 24 HOURS
Status: DISCONTINUED | OUTPATIENT
Start: 2023-04-13 | End: 2023-04-15 | Stop reason: HOSPADM

## 2023-04-13 RX ORDER — HYDROMORPHONE HYDROCHLORIDE 1 MG/ML
0.5 INJECTION, SOLUTION INTRAMUSCULAR; INTRAVENOUS; SUBCUTANEOUS
Status: DISCONTINUED | OUTPATIENT
Start: 2023-04-13 | End: 2023-04-15 | Stop reason: HOSPADM

## 2023-04-13 RX ORDER — SODIUM CHLORIDE 0.9 % (FLUSH) 0.9 %
5-40 SYRINGE (ML) INJECTION PRN
Status: DISCONTINUED | OUTPATIENT
Start: 2023-04-13 | End: 2023-04-15 | Stop reason: HOSPADM

## 2023-04-13 RX ADMIN — PROCHLORPERAZINE EDISYLATE 10 MG: 5 INJECTION INTRAMUSCULAR; INTRAVENOUS at 08:05

## 2023-04-13 RX ADMIN — METRONIDAZOLE 500 MG: 500 INJECTION, SOLUTION INTRAVENOUS at 08:08

## 2023-04-13 RX ADMIN — DEXTROSE AND SODIUM CHLORIDE: 5; 450 INJECTION, SOLUTION INTRAVENOUS at 14:20

## 2023-04-13 RX ADMIN — ENOXAPARIN SODIUM 40 MG: 100 INJECTION SUBCUTANEOUS at 14:22

## 2023-04-13 RX ADMIN — IOPAMIDOL 90 ML: 755 INJECTION, SOLUTION INTRAVENOUS at 00:11

## 2023-04-13 RX ADMIN — ONDANSETRON 4 MG: 4 TABLET, ORALLY DISINTEGRATING ORAL at 23:06

## 2023-04-13 RX ADMIN — METRONIDAZOLE 500 MG: 500 INJECTION, SOLUTION INTRAVENOUS at 23:02

## 2023-04-13 RX ADMIN — HYDROMORPHONE HYDROCHLORIDE 0.5 MG: 1 INJECTION, SOLUTION INTRAMUSCULAR; INTRAVENOUS; SUBCUTANEOUS at 05:16

## 2023-04-13 RX ADMIN — CEFEPIME 2000 MG: 2 INJECTION, POWDER, FOR SOLUTION INTRAVENOUS at 16:52

## 2023-04-13 RX ADMIN — CEFEPIME 2000 MG: 2 INJECTION, POWDER, FOR SOLUTION INTRAVENOUS at 07:09

## 2023-04-13 RX ADMIN — HYDROMORPHONE HYDROCHLORIDE 0.5 MG: 1 INJECTION, SOLUTION INTRAMUSCULAR; INTRAVENOUS; SUBCUTANEOUS at 11:30

## 2023-04-13 RX ADMIN — METOPROLOL SUCCINATE 25 MG: 25 TABLET, EXTENDED RELEASE ORAL at 14:22

## 2023-04-13 RX ADMIN — METRONIDAZOLE 500 MG: 500 INJECTION, SOLUTION INTRAVENOUS at 14:21

## 2023-04-13 ASSESSMENT — PAIN SCALES - GENERAL: PAINLEVEL_OUTOF10: 7

## 2023-04-13 ASSESSMENT — PAIN DESCRIPTION - LOCATION: LOCATION: ABDOMEN

## 2023-04-13 NOTE — PROGRESS NOTES
Rocio Prakash arrived to room # 533. Presented with: abdominal pain  Mental Status: Patient is oriented and alert. Vitals:    04/13/23 1330   BP: (!) 92/58   Pulse: 87   Resp: 16   Temp: 98.7 °F (37.1 °C)   SpO2: 98%     Patient safety contract and falls prevention contract reviewed with patient Yes. Oriented Patient to room. Call light within reach. Yes.   Needs, issues or concerns expressed at this time: no.      Electronically signed by Jennifer Gilbert RN on 4/13/2023 at 1:56 PM

## 2023-04-13 NOTE — ED NOTES
Coby Esquivel called to receive an update on pt condition, no beds available at [facility] at this time     [facility]  04/13/23 9938

## 2023-04-13 NOTE — H&P
[facility] Surgery History & Physical    Chief Complaint:  Chief Complaint   Patient presents with    Abdominal Pain     Biliary drain removed two days ago at  [facility]. C/O RUQ pain, fever starting today. SUBJECTIVE:  Ms. Adonis Robertson is a 40 y.o. female who presents today with abdominal pain and nausea. Patient has been unable to tolerate her diet. Recently underwent removal of her biliary drain in [place]. Does have a large biliary stent. Patient is scheduled to follow-up with hepatobiliary surgery in [place]. Presented to the ER with concerns of hepatic abscess. Patient was accepted to hepatobiliary service at [facility] yet is awaiting a bed. Past Medical History:   Diagnosis Date    Adult ADHD     Anxiety     Cholelithiasis     Gastritis     Hx of partial seizures     hx of seizure years ago; no meds    Idiopathic hypotension     Postoperative bile leak     Postoperative intra-abdominal abscess     Pyelonephritis     Right renal stone     UTI (urinary tract infection)     PT states she gets constant UTI's \"That is pretty much undercontrol now. \"     Past Surgical History:   Procedure Laterality Date    BREAST BIOPSY Left     [place]x, benign     SECTION      x2    CHOLECYSTECTOMY, LAPAROSCOPIC N/A 2023    LAPAROSCOPIC CHOLECYSTECTOMY performed by Amari Alvares DO at [facility] OR    ERCP N/A 2023    Dr DELL Samayoa-w/1 cm biliary sphincterotomy, placement of a 10F x 7cm plastic stent-Op bile leak, 3 month repeat    ERCP N/A 03/15/2023    Dr DELL Samayoa-Successful stent exchange to a fully covered SEMS measuring 10mm * 60mm was placed into the common bile duct    TUBAL LIGATION       Current Facility-Administered Medications   Medication Dose Route Frequency Provider Last Rate Last Admin    metronidazole (FLAGYL) 500 mg in 0.9% NaCl 100 mL IVPB premix  500 mg IntraVENous Q8H Catrina Benjamin DO   Stopped at 23 0619    cefepime (MAXIPIME) 2,000 mg in

## 2023-04-13 NOTE — ED NOTES
[facility] contacted for Dr Tyesha Thibodeaux at [phone]    Contacted Dr. Tyesha Thibodeaux connected to Dr. Darci Alicea at [facility]  04/13/23 9736

## 2023-04-14 VITALS
HEIGHT: 61 IN | HEART RATE: 86 BPM | TEMPERATURE: 97.7 F | OXYGEN SATURATION: 97 % | SYSTOLIC BLOOD PRESSURE: 100 MMHG | DIASTOLIC BLOOD PRESSURE: 66 MMHG | WEIGHT: 134.6 LBS | BODY MASS INDEX: 25.41 KG/M2 | RESPIRATION RATE: 14 BRPM

## 2023-04-14 PROBLEM — K75.0 HEPATIC ABSCESS: Status: ACTIVE | Noted: 2023-04-14

## 2023-04-14 LAB
EKG P AXIS: 51 DEGREES
EKG P-R INTERVAL: 154 MS
EKG Q-T INTERVAL: 322 MS
EKG QRS DURATION: 80 MS
EKG QTC CALCULATION (BAZETT): 413 MS
EKG T AXIS: 38 DEGREES

## 2023-04-14 PROCEDURE — 2580000003 HC RX 258: Performed by: SURGERY

## 2023-04-14 PROCEDURE — 99232 SBSQ HOSP IP/OBS MODERATE 35: CPT | Performed by: SURGERY

## 2023-04-14 PROCEDURE — 6360000002 HC RX W HCPCS: Performed by: SURGERY

## 2023-04-14 PROCEDURE — 96372 THER/PROPH/DIAG INJ SC/IM: CPT

## 2023-04-14 PROCEDURE — 94760 N-INVAS EAR/PLS OXIMETRY 1: CPT

## 2023-04-14 PROCEDURE — 2500000003 HC RX 250 WO HCPCS: Performed by: SURGERY

## 2023-04-14 PROCEDURE — 96366 THER/PROPH/DIAG IV INF ADDON: CPT

## 2023-04-14 PROCEDURE — G0378 HOSPITAL OBSERVATION PER HR: HCPCS

## 2023-04-14 RX ADMIN — CEFEPIME 2000 MG: 2 INJECTION, POWDER, FOR SOLUTION INTRAVENOUS at 06:14

## 2023-04-14 RX ADMIN — SODIUM CHLORIDE, PRESERVATIVE FREE 10 ML: 5 INJECTION INTRAVENOUS at 09:00

## 2023-04-14 RX ADMIN — METRONIDAZOLE 500 MG: 500 INJECTION, SOLUTION INTRAVENOUS at 05:35

## 2023-04-14 RX ADMIN — METRONIDAZOLE 500 MG: 500 INJECTION, SOLUTION INTRAVENOUS at 14:09

## 2023-04-14 RX ADMIN — DEXTROSE AND SODIUM CHLORIDE: 5; 450 INJECTION, SOLUTION INTRAVENOUS at 10:28

## 2023-04-14 RX ADMIN — METRONIDAZOLE 500 MG: 500 INJECTION, SOLUTION INTRAVENOUS at 20:45

## 2023-04-14 RX ADMIN — ENOXAPARIN SODIUM 40 MG: 100 INJECTION SUBCUTANEOUS at 14:12

## 2023-04-14 RX ADMIN — CEFEPIME 2000 MG: 2 INJECTION, POWDER, FOR SOLUTION INTRAVENOUS at 18:12

## 2023-04-14 ASSESSMENT — ENCOUNTER SYMPTOMS
CHEST TIGHTNESS: 0
WHEEZING: 0
VOMITING: 1
EYE DISCHARGE: 0
ABDOMINAL DISTENTION: 1
SORE THROAT: 0
DIARRHEA: 0
CONSTIPATION: 0
SHORTNESS OF BREATH: 0
ABDOMINAL PAIN: 0
EYE REDNESS: 0
BACK PAIN: 0
NAUSEA: 1
CHOKING: 0
EYE PAIN: 0
FACIAL SWELLING: 0

## 2023-04-14 NOTE — DISCHARGE SUMMARY
Physician Discharge Summary     Patient ID:  Pa Stanley  [id number]  36 y.o. Admit date: 4/12/2023    Discharge date and time: 4/14/2023     Admitting Physician: Padmini Garcia DO     Admission Diagnoses: Intra-abdominal abscess ([address] [K65.1]  Abdominal pain, right upper quadrant [R10.11]    Discharge Diagnoses:   Patient Active Problem List   Diagnosis    Idiopathic hypotension    Phobia, social    Adult ADHD    Bile leak, postoperative    Constipation    Gastritis    Intra-abdominal abscess post-procedure    Abdominal pain, right upper quadrant    Hepatic abscess       Discharged Condition: fair    Hospital Course: Patient admitted with concern for hepatic abscesses. One of the surgical service. Patient was scheduled to follow-up with with hepatobiliary at [facility] for history of biliary leak and recurrent abscesses. Prior to being seen at [facility], was seen in the emergency room for increased nausea and pain. Repeat CT scan did show questionable hepatic abscesses. Patient was transferred to [address] [place] [address] under the hepatobiliary service. Consults: none    Significant Diagnostic Studies:     Patient: [address]  Time Out: 01:32Exam(s): CT ABDOMEN + PELVIS With Contrast EXAM:  CT Abdomen and Pelvis With Intravenous ContrastCLINICAL HISTORY:   Reason for exam: abdominal pain, fever, h/o abscess post CCK drain pulled 2 day ago. TECHNIQUE:     Axial computed tomography images of the abdomen and pelvis with intravenous contrast.COMPARISON:  No relevant prior studies available. FINDINGS:  Lung bases:  Unremarkable. No mass. No consolidation. ABDOMEN:  Liver:  A 12 mm low-density lesion in    segment 7 of the right lobe of the liver. This does not have an appearance of a simple cyst.  Additional 7 mm low-density area peripherally in segment 4A of the liver. Gallbladder and bile ducts:  Previous cholecystectomy. There is a fluid collection    in the gallbladder fossa measuring 1.5 x 3.7 x 1.6 cm.   There is

## 2023-04-14 NOTE — PROGRESS NOTES
[facility] General Surgery Progress Note    Chief Complaint:   Chief Complaint   Patient presents with    Abdominal Pain     Biliary drain removed two days ago at [facility]. C/O RUQ pain, fever starting today. SUBJECTIVE:  Ms. Zachariah Anderson is a 40 y.o. female is seen and examined at bedside. Pain improved. Minimal nausea. OBJECTIVE:  /64   Pulse 83   Temp 98.1 °F (36.7 °C) (Temporal)   Resp 14   Ht 5' 1\" (1.549 m)   Wt 134 lb 9.6 oz (61.1 kg)   LMP 04/04/2023   SpO2 100%   BMI 25.43 kg/m²   CONSTITUTIONAL: Alert, appropriate, no acute distress  SKIN: warm, dry with no rashes or lesions  HEENT: NCAT, Non icteric, PERR. Trachea Midline. CHEST/LUNGS: CTA bilaterally. Normal respiratory effort. CARDIOVASCULAR: RRR, No edema. ABDOMEN: soft,  NEUROLOGIC: CN II-XI grossly intact, no motor or sensory deficits   MUSCULOSKELETAL: No clubbing or cyanosis. PSYCHIATRIC:  Normal Mood and Affect. Alert and Oriented. Labs:  CBC:   Recent Labs     04/12/23  2300   WBC 12.1*   HGB 13.6        BMP:    Recent Labs     04/12/23  2300      K 4.0      CO2 26   BUN 6   CREATININE 0.5   GLUCOSE 96       ASSESSMENT:  Principal Problem:    Abdominal pain, right upper quadrant  Active Problems:    Bile leak, postoperative    Hepatic abscess  Resolved Problems:    * No resolved hospital problems.  *      PLAN:    Continue antibiotics  Clear liquid  Await open bed at [facility]  Transfer to [facility] at [facility] when a bed is available    Julia Bae DO   Electronically Signed on 4/14/2023 at 10:56 AM

## 2023-04-14 NOTE — PROGRESS NOTES
Nursing received call from [facility] [address] with Zuhair Castaneda, who was calling to update system with latest pt status and Helen Elder confirmed that patient is accepted for transfer pending availability and that the facility will continue to call for updates each shift.

## 2023-04-15 NOTE — PROGRESS NOTES
Nursing has called report to [facility] - accepting RN Alma Cabrera at [phone]. Pt is accepted by Dr. Paul Benito. Transfer form signed with medical transport form and nursing will call [facility] for transport at this time.

## 2023-04-15 NOTE — PROGRESS NOTES
Patient being transferred to [facility] per [facility]. Vital signs stable. Alert and oriented.

## 2023-04-18 LAB
BACTERIA BLD CULT ORG #2: NORMAL
BACTERIA BLD CULT: NORMAL

## 2023-04-19 ASSESSMENT — ENCOUNTER SYMPTOMS
ABDOMINAL PAIN: 1
BACK PAIN: 1
RHINORRHEA: 0
NAUSEA: 1
COLOR CHANGE: 0
VOMITING: 0
COUGH: 0
SHORTNESS OF BREATH: 0

## 2023-04-26 ENCOUNTER — OFFICE VISIT (OUTPATIENT)
Dept: SURGERY | Age: 38
End: 2023-04-26
Payer: COMMERCIAL

## 2023-04-26 VITALS
OXYGEN SATURATION: 99 % | HEART RATE: 129 BPM | WEIGHT: 137.2 LBS | HEIGHT: 61 IN | BODY MASS INDEX: 25.9 KG/M2 | TEMPERATURE: 98.1 F

## 2023-04-26 DIAGNOSIS — N64.52 NIPPLE DISCHARGE: Primary | ICD-10-CM

## 2023-04-26 PROCEDURE — 99212 OFFICE O/P EST SF 10 MIN: CPT | Performed by: PHYSICIAN ASSISTANT

## 2023-04-26 RX ORDER — OXYCODONE HYDROCHLORIDE 5 MG/1
5 TABLET ORAL EVERY 4 HOURS PRN
COMMUNITY
Start: 2023-04-23 | End: 2023-04-26

## 2023-04-26 RX ORDER — METHOCARBAMOL 500 MG/1
500 TABLET, FILM COATED ORAL 3 TIMES DAILY PRN
COMMUNITY
Start: 2023-04-24 | End: 2023-05-04

## 2023-04-26 RX ORDER — AMOXICILLIN 250 MG
1 CAPSULE ORAL DAILY
Qty: 15 TABLET | Refills: 0 | COMMUNITY
Start: 2023-04-24 | End: 2023-05-09

## 2023-04-28 NOTE — PROGRESS NOTES
Subjective:      Patient ID: Endy Ordonez is a 40 y.o. female. HPI  Ms. Pita Solano presents to establish care for left nipple drainage that has been ongoing since 10/22. She describes it as milky from a single duct. She states sometimes she notices it in her bra and sometimes she checks for it. She has several prolactin levels noted on her chart and all are normal.  She had a normal mammogram and left US in 11/2023. Endy Ordonez is a 40 y.o. female with the following history as recorded in [facility]:  Patient Active Problem List    Diagnosis Date Noted    Constipation 02/15/2023    Gastritis 02/15/2023    Intra-abdominal abscess post-procedure 02/15/2023    Bile leak, postoperative 01/29/2023    Hepatic abscess 04/14/2023    Abdominal pain, right upper quadrant 04/13/2023    Adult ADHD 08/28/2018    Phobia, social 07/16/2018    Idiopathic hypotension 03/06/2017     Current Outpatient Medications   Medication Sig Dispense Refill    methocarbamol (ROBAXIN) 500 MG tablet Take 1 tablet by mouth 3 times daily as needed      senna-docusate (PERICOLACE) 8.6-50 MG per tablet Take 1 tablet by mouth daily 15 tablet 0    metoprolol succinate (TOPROL XL) 25 MG extended release tablet Take 1 tablet by mouth daily      melatonin 3 MG TABS tablet Take 1 tablet by mouth nightly as needed      tamsulosin (FLOMAX) 0.4 MG capsule Take 1 capsule by mouth daily 90 capsule 1     No current facility-administered medications for this visit. Allergies: Penicillins and Zoloft [sertraline hcl]  Past Medical History:   Diagnosis Date    Adult ADHD     Anxiety     Cholelithiasis     Gastritis     Hx of partial seizures 2013    hx of seizure years ago; no meds    Idiopathic hypotension     Postoperative bile leak     Postoperative intra-abdominal abscess     Pyelonephritis     Right renal stone     UTI (urinary tract infection)     PT states she gets constant UTI's \"That is pretty much undercontrol now. \"     Past Surgical History:

## 2023-06-02 ENCOUNTER — TELEPHONE (OUTPATIENT)
Dept: GASTROENTEROLOGY | Age: 38
End: 2023-06-02

## 2023-06-02 NOTE — TELEPHONE ENCOUNTER
Spoke to patient's  who called to cancel the ERCP scheduled for 6/7/23 for stent removal.  Patient had stent removed/ exchanged at [facility] in [place] and they are following for her continued care.     I have cancelled procedure

## 2023-06-07 ENCOUNTER — OFFICE VISIT (OUTPATIENT)
Dept: NEUROLOGY | Age: 38
End: 2023-06-07
Payer: COMMERCIAL

## 2023-06-07 VITALS
HEIGHT: 61 IN | SYSTOLIC BLOOD PRESSURE: 130 MMHG | HEART RATE: 107 BPM | OXYGEN SATURATION: 98 % | BODY MASS INDEX: 25.86 KG/M2 | DIASTOLIC BLOOD PRESSURE: 91 MMHG | WEIGHT: 137 LBS

## 2023-06-07 DIAGNOSIS — Z79.899 MEDICATION MANAGEMENT: ICD-10-CM

## 2023-06-07 DIAGNOSIS — G43.009 MIGRAINE WITHOUT AURA AND WITHOUT STATUS MIGRAINOSUS, NOT INTRACTABLE: Primary | ICD-10-CM

## 2023-06-07 LAB
AMPHET UR QL SCN: NEGATIVE
BARBITURATES UR QL SCN: NEGATIVE
BENZODIAZ UR QL SCN: POSITIVE
BUPRENORPHINE URINE: NEGATIVE
CANNABINOIDS UR QL SCN: NEGATIVE
COCAINE UR QL SCN: NEGATIVE
DRUG SCREEN COMMENT UR-IMP: ABNORMAL
METHADONE UR QL SCN: NEGATIVE
METHAMPHETAMINE, URINE: NEGATIVE
OPIATES UR QL SCN: NEGATIVE
OXYCODONE UR QL SCN: NEGATIVE
PCP UR QL SCN: NEGATIVE
PROPOXYPH UR QL SCN: NEGATIVE
TRICYCLIC, URINE: NEGATIVE

## 2023-06-07 PROCEDURE — 99214 OFFICE O/P EST MOD 30 MIN: CPT | Performed by: NURSE PRACTITIONER

## 2023-06-07 NOTE — PROGRESS NOTES
REVIEW OF SYSTEMS    Constitutional: []Fever []Sweat []Chills [] Recent Injury [x] Denies all unless marked  HEENT:[x]Headache  [] Head Injury/Hearing Loss  [] Sore Throat  [] Ear Ache/Dizziness  [x] Denies all unless marked  Spine:  [] Neck pain  [] Back pain  [] Sciaticia  [x] Denies all unless marked  Cardiovascular:[]Heart Disease []Chest Pain [] Palpitations  [x] Denies all unless marked  Pulmonary: []Shortness of Breath []Cough   [x] Denies all unless marke  Gastrointestinal: []Nausea  []Vomiting  []Abdominal Pain  []Constipation  []Diarrhea  []Dark Bloody Stools  [x] Denies all unless marked  Psychiatric/Behavioral:[x] Depression [] Anxiety [x] Denies all unless marked  Genitourinary:   [] Frequency  [] Urgency  [] Incontinence [] Pain with Urination  [x] Denies all unless marked  Extremities: []Pain  []Swelling  [x] Denies all unless marked  Musculoskeletal: [] Muscle Pain  [] Joint Pain  [] Arthritis [] Muscle Cramps [] Muscle Twitches  [x] Denies all unless marked  Sleep: [] Insomnia [] Snoring [] Restless Legs [] Sleep Apnea  [] Daytime Sleepiness  [x] Denies all unless marked  Skin:[] Rash [] Skin Discoloration [x] Denies all unless marked   Neurological: []Visual Disturbance/Memory Loss [] Loss of Balance [] Slurred Speech/Weakness [] Seizures  [] Vertigo/Dizziness [x] Denies all unless marked
Basilar artery is unremarkable. Intracranial internal carotid arteries demonstrate normal enhancement. No evidence of aneurysm (greater than 4 mm) orarteriovenous lesion. Impression   CTA of the intracranial arteries unremarkable. Recommendation:   Follow up as clinically indicated. All CT scans at this facility utilize dose modulation, iterative reconstruction, and/or weight based dosing when appropriate to reduce radiation dose to as low as reasonably achievable. Exam: CTA OF THE CAROTID ARTERIES   Clinical data:Headache, right-side numbness/tingling. Technique: Axial CT angiography of the carotid arteries within the neck was performed with the administration of intravenous contrast for evaluation of the carotid bifurcation. Oral contrast was not utilized. Coronal, sagittal, and 3D volume    reconstructions of the carotid arteries were performed. Reformatted/3D-MPR images were performed. NASCET Criteria was used in evaluating the study. Radiation dose: CTDIvol = 97.18 mGy, Total DLP = 1518 mGy x cm. Prior studies: No prior studies submitted. Findings:   No definite abnormality seen on 3D reformatted images. CCA, Carotid Bulb, ICA, and origin of the ECA are well opacified. Vertebral arteries are well opacified. The right vertebral artery is small in caliber. Jugular veins are well opacified   Included great vessels of the aortic arch are grossly unremarkable. Included lung apices are grossly unremarkable. Bony structures: No acute or destructive abnormality. IMPRESSION:   CTA of the carotid arteries unremarkable. MRV Head 10/12/2022  Narrative   NO PRIOR REPORT AVAILABLE   EXAM: MRV OF THE BRAIN    CLINICAL DATA:Intractable headache, worse with dependent position. Vision changes. Last known seizure 10 years ago. Injury about 12-13 years ago. TECHNIQUE: 3-D time of flight imaging of the intracranial circulation. Maximum intensity projection reconstructed imaging.

## 2023-06-09 DIAGNOSIS — G43.009 MIGRAINE WITHOUT AURA AND WITHOUT STATUS MIGRAINOSUS, NOT INTRACTABLE: Primary | ICD-10-CM

## 2023-06-09 RX ORDER — BUTALBITAL, ACETAMINOPHEN AND CAFFEINE 50; 325; 40 MG/1; MG/1; MG/1
TABLET ORAL
Qty: 30 TABLET | Refills: 0 | Status: SHIPPED | OUTPATIENT
Start: 2023-06-09

## 2023-06-09 NOTE — TELEPHONE ENCOUNTER
Requested Prescriptions     Pending Prescriptions Disp Refills    butalbital-acetaminophen-caffeine (FIORICET, ESGIC) -40 MG per tablet 30 tablet 0     Sig: Take on tablet every 6 hours PRN. 10 tablets for 30 days.  30 tablets for 3 month supply       Last Office Visit:  6/7/2023  Next Office Visit:  10/11/2023  Last Medication Refill:  N/A  Jessee Verdugo up to date:  06/09/2023    *RX updated to reflect   06/09/2023  fill date*

## 2023-07-16 ENCOUNTER — HOSPITAL ENCOUNTER (EMERGENCY)
Age: 38
Discharge: ANOTHER HOSPITAL | End: 2023-07-17
Attending: EMERGENCY MEDICINE
Payer: COMMERCIAL

## 2023-07-16 ENCOUNTER — APPOINTMENT (OUTPATIENT)
Dept: CT IMAGING | Age: 38
End: 2023-07-16
Payer: COMMERCIAL

## 2023-07-16 VITALS
BODY MASS INDEX: 25.86 KG/M2 | WEIGHT: 137 LBS | RESPIRATION RATE: 18 BRPM | DIASTOLIC BLOOD PRESSURE: 76 MMHG | OXYGEN SATURATION: 96 % | SYSTOLIC BLOOD PRESSURE: 104 MMHG | HEIGHT: 61 IN | HEART RATE: 102 BPM | TEMPERATURE: 99 F

## 2023-07-16 DIAGNOSIS — R10.11 RIGHT UPPER QUADRANT ABDOMINAL PAIN: Primary | ICD-10-CM

## 2023-07-16 DIAGNOSIS — R93.89 ABNORMAL CT SCAN: ICD-10-CM

## 2023-07-16 DIAGNOSIS — K65.1 INTRA-ABDOMINAL ABSCESS (HCC): ICD-10-CM

## 2023-07-16 LAB
ALBUMIN SERPL-MCNC: 3.8 G/DL (ref 3.5–5.2)
ALP SERPL-CCNC: 109 U/L (ref 35–104)
ALT SERPL-CCNC: 9 U/L (ref 5–33)
ANION GAP SERPL CALCULATED.3IONS-SCNC: 15 MMOL/L (ref 7–19)
AST SERPL-CCNC: 12 U/L (ref 5–32)
BASOPHILS # BLD: 0 K/UL (ref 0–0.2)
BASOPHILS NFR BLD: 0.3 % (ref 0–1)
BILIRUB SERPL-MCNC: 0.4 MG/DL (ref 0.2–1.2)
BUN SERPL-MCNC: 5 MG/DL (ref 6–20)
CALCIUM SERPL-MCNC: 8.4 MG/DL (ref 8.6–10)
CHLORIDE SERPL-SCNC: 97 MMOL/L (ref 98–111)
CO2 SERPL-SCNC: 21 MMOL/L (ref 22–29)
CREAT SERPL-MCNC: 0.5 MG/DL (ref 0.5–0.9)
EOSINOPHIL # BLD: 0.1 K/UL (ref 0–0.6)
EOSINOPHIL NFR BLD: 1.4 % (ref 0–5)
ERYTHROCYTE [DISTWIDTH] IN BLOOD BY AUTOMATED COUNT: 13.8 % (ref 11.5–14.5)
GLUCOSE SERPL-MCNC: 94 MG/DL (ref 74–109)
HCG SERPL QL: NEGATIVE
HCT VFR BLD AUTO: 39.8 % (ref 37–47)
HGB BLD-MCNC: 13 G/DL (ref 12–16)
IMM GRANULOCYTES # BLD: 0 K/UL
LIPASE SERPL-CCNC: 10 U/L (ref 13–60)
LYMPHOCYTES # BLD: 0.8 K/UL (ref 1.1–4.5)
LYMPHOCYTES NFR BLD: 9 % (ref 20–40)
MCH RBC QN AUTO: 30.3 PG (ref 27–31)
MCHC RBC AUTO-ENTMCNC: 32.7 G/DL (ref 33–37)
MCV RBC AUTO: 92.8 FL (ref 81–99)
MONOCYTES # BLD: 0.6 K/UL (ref 0–0.9)
MONOCYTES NFR BLD: 6.7 % (ref 0–10)
NEUTROPHILS # BLD: 7.6 K/UL (ref 1.5–7.5)
NEUTS SEG NFR BLD: 82.2 % (ref 50–65)
PLATELET # BLD AUTO: 203 K/UL (ref 130–400)
PMV BLD AUTO: 10.1 FL (ref 9.4–12.3)
POTASSIUM SERPL-SCNC: 4.2 MMOL/L (ref 3.5–5)
PROT SERPL-MCNC: 6.9 G/DL (ref 6.6–8.7)
RBC # BLD AUTO: 4.29 M/UL (ref 4.2–5.4)
SODIUM SERPL-SCNC: 133 MMOL/L (ref 136–145)
WBC # BLD AUTO: 9.3 K/UL (ref 4.8–10.8)

## 2023-07-16 PROCEDURE — 96376 TX/PRO/DX INJ SAME DRUG ADON: CPT

## 2023-07-16 PROCEDURE — 74177 CT ABD & PELVIS W/CONTRAST: CPT

## 2023-07-16 PROCEDURE — 2580000003 HC RX 258: Performed by: EMERGENCY MEDICINE

## 2023-07-16 PROCEDURE — 99285 EMERGENCY DEPT VISIT HI MDM: CPT

## 2023-07-16 PROCEDURE — 83690 ASSAY OF LIPASE: CPT

## 2023-07-16 PROCEDURE — 96365 THER/PROPH/DIAG IV INF INIT: CPT

## 2023-07-16 PROCEDURE — 96366 THER/PROPH/DIAG IV INF ADDON: CPT

## 2023-07-16 PROCEDURE — 6360000002 HC RX W HCPCS: Performed by: EMERGENCY MEDICINE

## 2023-07-16 PROCEDURE — 85025 COMPLETE CBC W/AUTO DIFF WBC: CPT

## 2023-07-16 PROCEDURE — 36415 COLL VENOUS BLD VENIPUNCTURE: CPT

## 2023-07-16 PROCEDURE — 84703 CHORIONIC GONADOTROPIN ASSAY: CPT

## 2023-07-16 PROCEDURE — 6360000004 HC RX CONTRAST MEDICATION: Performed by: EMERGENCY MEDICINE

## 2023-07-16 PROCEDURE — 96375 TX/PRO/DX INJ NEW DRUG ADDON: CPT

## 2023-07-16 PROCEDURE — 80053 COMPREHEN METABOLIC PANEL: CPT

## 2023-07-16 RX ORDER — ONDANSETRON 2 MG/ML
4 INJECTION INTRAMUSCULAR; INTRAVENOUS ONCE
Status: COMPLETED | OUTPATIENT
Start: 2023-07-16 | End: 2023-07-16

## 2023-07-16 RX ADMIN — ONDANSETRON 4 MG: 2 INJECTION INTRAMUSCULAR; INTRAVENOUS at 18:05

## 2023-07-16 RX ADMIN — ONDANSETRON 4 MG: 2 INJECTION INTRAMUSCULAR; INTRAVENOUS at 20:35

## 2023-07-16 RX ADMIN — IOPAMIDOL 70 ML: 755 INJECTION, SOLUTION INTRAVENOUS at 16:04

## 2023-07-16 RX ADMIN — MEROPENEM 1000 MG: 1 INJECTION, POWDER, FOR SOLUTION INTRAVENOUS at 18:05

## 2023-07-16 ASSESSMENT — ENCOUNTER SYMPTOMS
VOMITING: 1
RHINORRHEA: 0
DIARRHEA: 1
COUGH: 0
SHORTNESS OF BREATH: 0
NAUSEA: 1
ABDOMINAL PAIN: 1
SORE THROAT: 0
BACK PAIN: 0

## 2023-07-17 NOTE — ED NOTES
Nurse report called to [facility] [address] at [place].       Amira Velasco RN  07/16/23 2033

## 2023-08-29 DIAGNOSIS — R33.9 INCOMPLETE EMPTYING OF BLADDER: ICD-10-CM

## 2023-08-29 DIAGNOSIS — R39.16 STRAINING ON URINATION: ICD-10-CM

## 2023-08-29 RX ORDER — TAMSULOSIN HYDROCHLORIDE 0.4 MG/1
CAPSULE ORAL
Qty: 90 CAPSULE | Refills: 1 | OUTPATIENT
Start: 2023-08-29

## 2024-02-07 ENCOUNTER — OFFICE VISIT (OUTPATIENT)
Dept: INTERNAL MEDICINE | Facility: CLINIC | Age: 39
End: 2024-02-07
Payer: COMMERCIAL

## 2024-02-07 VITALS
OXYGEN SATURATION: 98 % | BODY MASS INDEX: 25.24 KG/M2 | WEIGHT: 133.7 LBS | SYSTOLIC BLOOD PRESSURE: 120 MMHG | HEART RATE: 109 BPM | TEMPERATURE: 98 F | HEIGHT: 61 IN | DIASTOLIC BLOOD PRESSURE: 80 MMHG

## 2024-02-07 DIAGNOSIS — K59.1 FUNCTIONAL DIARRHEA: ICD-10-CM

## 2024-02-07 DIAGNOSIS — K83.8 BILE LEAK, POSTOPERATIVE: ICD-10-CM

## 2024-02-07 DIAGNOSIS — K91.89 BILE LEAK, POSTOPERATIVE: ICD-10-CM

## 2024-02-07 DIAGNOSIS — F41.9 ANXIETY: Primary | ICD-10-CM

## 2024-02-07 DIAGNOSIS — Z90.49 S/P CHOLECYSTECTOMY: ICD-10-CM

## 2024-02-07 PROCEDURE — 1159F MED LIST DOCD IN RCRD: CPT | Performed by: INTERNAL MEDICINE

## 2024-02-07 PROCEDURE — 1160F RVW MEDS BY RX/DR IN RCRD: CPT | Performed by: INTERNAL MEDICINE

## 2024-02-07 PROCEDURE — 99204 OFFICE O/P NEW MOD 45 MIN: CPT | Performed by: INTERNAL MEDICINE

## 2024-02-07 RX ORDER — CHOLESTYRAMINE 4 G/9G
1 POWDER, FOR SUSPENSION ORAL
Qty: 90 EACH | Refills: 3 | Status: SHIPPED | OUTPATIENT
Start: 2024-02-07

## 2024-02-07 RX ORDER — BUSPIRONE HYDROCHLORIDE 15 MG/1
15 TABLET ORAL 3 TIMES DAILY
COMMUNITY

## 2024-02-07 RX ORDER — HYDROXYZINE HYDROCHLORIDE 25 MG/1
25 TABLET, FILM COATED ORAL
COMMUNITY

## 2024-02-07 RX ORDER — NALOXONE HYDROCHLORIDE 4 MG/.1ML
SPRAY NASAL
COMMUNITY
Start: 2023-07-27 | End: 2024-07-26

## 2024-02-07 RX ORDER — METOPROLOL SUCCINATE 25 MG/1
25 TABLET, EXTENDED RELEASE ORAL DAILY
COMMUNITY

## 2024-02-07 RX ORDER — QUETIAPINE FUMARATE 25 MG/1
25 TABLET, FILM COATED ORAL
COMMUNITY

## 2024-02-07 NOTE — PROGRESS NOTES
Chief Complaint   Patient presents with    [facility]    Anxiety     JAXSON-7=21         History:  Taylor Galindo is a 38 y.o. female who presents today for evaluation of the above problems.      Taylor presents today to [facility].  She has a past medical history for bile leak after cholecystecomy 1/2023 followed by [facility]. Last stent removed a couple weeks ago. Doing well so far.    She has PTSD from the last year and her medical issues since then.    She is seeing behavioral health through telehealth.  They are managing her medications including buspirone, hydroxyzine and Seroquel.  She prefers not to leave the house and would rather see someone through telehealth than in person.    Her previous PCP, Yun Echevarria in [place] put her on Toprol-XL for tachycardia.  She states there was a Holter monitor for a couple days and after those results she was placed on Toprol-XL.  We will try to get those records.    There is no family history of breast or colon cancer.  Her mother has thyroid cancer but she is not sure what type.    She had constant diarrhea since having her cholecystectomy.  Course of Protonix did not help.  She has not been on any bile acid binders.    HPI  PHQ-9 Depression Screening  Little interest or pleasure in doing things? 0-->not at all   Feeling down, depressed, or hopeless? 0-->not at all   Trouble falling or staying asleep, or sleeping too much?     Feeling tired or having little energy?     Poor appetite or overeating?     Feeling bad about yourself - or that you are a failure or have let yourself or your family down?     Trouble concentrating on things, such as reading the newspaper or watching television?     Moving or speaking so slowly that other people could have noticed? Or the opposite - being so fidgety or restless that you have been moving around a lot more than usual?     Thoughts that you would be better off dead, or of hurting yourself in some way?     PHQ-9 Total  Score 0   If you checked off any problems, how difficult have these problems made it for you to do your work, take care of things at home, or get along with other people?       JAXSON-7 21    Social History     Socioeconomic History    Marital status: Single   Tobacco Use    Smoking status: Some Days     Packs/day: 0.25     Years: 15.00     Additional pack years: 0.00     Total pack years: 3.75     Types: Cigarettes     Last attempt to quit: 2017     Years since quittin.6    Smokeless tobacco: Never   Substance and Sexual Activity    Alcohol use: Not Currently     Comment: 1-2 drinks usually on weekends    Drug use: No    Sexual activity: Yes     Partners: Male     Birth control/protection: Other, None     Comment: Salpingectomy         ROS:  Review of Systems  See above    Current Outpatient Medications:     naloxone (NARCAN) 4 MG/0.1ML nasal spray, 1. Give 1 spray in nostril for no/slow breathing or cannot wake after opioid use  2. Call 911  3. Repeat in other nostril if symptoms continue ., Disp: , Rfl:     ondansetron ODT (ZOFRAN-ODT) 4 MG disintegrating tablet, Place 1 tablet on the tongue Every 8 (Eight) Hours As Needed for Nausea or Vomiting., Disp: 12 tablet, Rfl: 0    busPIRone (BUSPAR) 15 MG tablet, Take 1 tablet by mouth 3 (Three) Times a Day. Please see attached for detailed directions, Disp: , Rfl:     cholestyramine (Questran) 4 g packet, Take 1 packet by mouth 3 (Three) Times a Day With Meals., Disp: 90 each, Rfl: 3    hydrOXYzine (ATARAX) 25 MG tablet, Take 1 tablet by mouth., Disp: , Rfl:     metoprolol succinate XL (TOPROL-XL) 25 MG 24 hr tablet, Take 1 tablet by mouth Daily., Disp: , Rfl:     QUEtiapine (SEROquel) 25 MG tablet, Take 1 tablet by mouth., Disp: , Rfl:     Lab Results   Component Value Date    GLUCOSE 112 (H) 2023    BUN 4 (L) 2023    CREATININE 0.50 (L) 2023    EGFR 124.8 2023    BCR 8.0 2023    K 4.3 2023    CO2 27.0 2023    CALCIUM  8.9 02/01/2023    ALBUMIN 3.9 03/23/2023    BILITOT 0.3 03/23/2023    AST 14 03/23/2023    ALT 15 03/23/2023       WBC   Date Value Ref Range Status   11/11/2023 4.95 3.70 - 10.30 10*3/uL Final     RBC   Date Value Ref Range Status   11/11/2023 3.55 (L) 3.90 - 5.20 10*6/uL Final     Hemoglobin   Date Value Ref Range Status   11/11/2023 11.1 (L) 11.2 - 15.7 g/dL Final     Hematocrit   Date Value Ref Range Status   11/11/2023 34.9 34.0 - 45.0 % Final     MCV   Date Value Ref Range Status   11/11/2023 91 79 - 98 fL Final     MCH   Date Value Ref Range Status   11/11/2023 29.3 26.0 - 32.0 pg Final     MCHC   Date Value Ref Range Status   11/11/2023 32.1 30.7 - 35.5 g/dL Final     RDW   Date Value Ref Range Status   11/11/2023 13.4 11.5 - 14.5 % Final     RDW-SD   Date Value Ref Range Status   02/01/2023 46.6 37.0 - 54.0 fl Final     MPV   Date Value Ref Range Status   11/11/2023 10.3 8.8 - 12.5 fL Final     Platelets   Date Value Ref Range Status   11/11/2023 231 155 - 369 10*3/uL Final     Neutrophil Rel %   Date Value Ref Range Status   11/10/2023 81.0 % Final     Lymphocyte Rel %   Date Value Ref Range Status   11/10/2023 10.0 % Final   07/16/2023 9.0 (L) 20.0 - 40.0 % Final     Monocyte Rel %   Date Value Ref Range Status   11/10/2023 8.0 % Final     Eosinophil Rel %   Date Value Ref Range Status   07/16/2023 1.4 0.0 - 5.0 % Final     Eosinophil %   Date Value Ref Range Status   11/10/2023 1.0 % Final     Basophil Rel %   Date Value Ref Range Status   11/10/2023 0.0 % Final     Immature Grans %   Date Value Ref Range Status   11/10/2023 0.0 % Final     Neutrophils Absolute   Date Value Ref Range Status   11/10/2023 7.76 (H) 1.60 - 6.10 10*3/uL Final     Lymphocytes Absolute   Date Value Ref Range Status   11/10/2023 0.98 (L) 1.20 - 3.90 10*3/uL Final     Monocytes Absolute   Date Value Ref Range Status   11/10/2023 0.72 0.30 - 0.90 10*3/uL Final     Eosinophils Absolute   Date Value Ref Range Status   11/10/2023  "0.11 0.00 - 0.50 10*3/uL Final     Basophils Absolute   Date Value Ref Range Status   11/10/2023 0.03 0.00 - 0.10 10*3/uL Final     Immature Grans, Absolute   Date Value Ref Range Status   11/10/2023 0.03 0.00 - 0.06 10*3/uL Final   07/16/2023 0.0 K/uL Final     nRBC   Date Value Ref Range Status   11/11/2023 0.0 <=0.0 per 100 WBCs Final   02/01/2023 0.0 0.0 - 0.2 /100 WBC Final         OBJECTIVE:  Visit Vitals  /80 (BP Location: Left arm, Patient Position: Sitting, Cuff Size: Adult)   Pulse 109   Temp 98 °F (36.7 °C) (Temporal)   Ht 154.9 cm (61\")   Wt 60.6 kg (133 lb 11.2 oz)   SpO2 98%   Breastfeeding No   BMI 25.26 kg/m²      Physical Exam  Vitals and nursing note reviewed.   Constitutional:       General: She is not in acute distress.     Appearance: Normal appearance. She is well-developed. She is not diaphoretic.   HENT:      Head: Normocephalic and atraumatic.   Eyes:      Pupils: Pupils are equal, round, and reactive to light.   Neck:      Thyroid: No thyromegaly.      Trachea: Phonation normal.   Cardiovascular:      Rate and Rhythm: Regular rhythm. Tachycardia present.      Heart sounds: No murmur heard.  Pulmonary:      Effort: No respiratory distress.   Abdominal:      Tenderness: There is abdominal tenderness in the right upper quadrant.   Skin:     Coloration: Skin is not pale.      Findings: No erythema.   Neurological:      Mental Status: She is alert.   Psychiatric:         Behavior: Behavior normal.         Thought Content: Thought content normal.         Judgment: Judgment normal.         Assessment/Plan    Diagnoses and all orders for this visit:    1. Anxiety (Primary)    2. Bile leak, postoperative    3. S/P cholecystectomy  -     cholestyramine (Questran) 4 g packet; Take 1 packet by mouth 3 (Three) Times a Day With Meals.  Dispense: 90 each; Refill: 3    4. Functional diarrhea  -     cholestyramine (Questran) 4 g packet; Take 1 packet by mouth 3 (Three) Times a Day With Meals.  " Dispense: 90 each; Refill: 3      I suspect Taylor is experiencing functional diarrhea after her cholecystectomy.  Continue to start cholestyramine with her symptoms.    She prefers to continue seeing behavioral health through telehealth.  Defer further treatment for her anxiety to them.  Her JAXSON-7 was very high today at 21.    Continue seeing her specialists at  for bile leak after cholecystectomy.    Return in about 6 months (around 8/7/2024) for Annual physical.      KAYLA Gutiérrez MD  14:17 CST  2/7/2024   Electronically signed

## 2024-02-08 ENCOUNTER — TELEPHONE (OUTPATIENT)
Dept: INTERNAL MEDICINE | Facility: CLINIC | Age: 39
End: 2024-02-08
Payer: COMMERCIAL

## 2024-02-23 ENCOUNTER — HOSPITAL ENCOUNTER (OUTPATIENT)
Dept: GENERAL RADIOLOGY | Facility: HOSPITAL | Age: 39
Discharge: HOME | End: 2024-02-23
Payer: COMMERCIAL

## 2024-02-23 ENCOUNTER — OFFICE VISIT (OUTPATIENT)
Dept: INTERNAL MEDICINE | Facility: CLINIC | Age: 39
End: 2024-02-23
Payer: COMMERCIAL

## 2024-02-23 VITALS
OXYGEN SATURATION: 98 % | HEART RATE: 110 BPM | DIASTOLIC BLOOD PRESSURE: 78 MMHG | SYSTOLIC BLOOD PRESSURE: 108 MMHG | HEIGHT: 61 IN | WEIGHT: 129 LBS | TEMPERATURE: 98.9 F | BODY MASS INDEX: 24.35 KG/M2

## 2024-02-23 DIAGNOSIS — K91.89 BILE LEAK, POSTOPERATIVE: ICD-10-CM

## 2024-02-23 DIAGNOSIS — M53.3 DISORDER OF SACRUM: ICD-10-CM

## 2024-02-23 DIAGNOSIS — M54.50 LUMBAR BACK PAIN: ICD-10-CM

## 2024-02-23 DIAGNOSIS — M54.50 LUMBAR BACK PAIN: Primary | ICD-10-CM

## 2024-02-23 DIAGNOSIS — F41.9 ANXIETY: ICD-10-CM

## 2024-02-23 DIAGNOSIS — K83.8 BILE LEAK, POSTOPERATIVE: ICD-10-CM

## 2024-02-23 DIAGNOSIS — Z90.49 S/P CHOLECYSTECTOMY: ICD-10-CM

## 2024-02-23 PROCEDURE — 72220 X-RAY EXAM SACRUM TAILBONE: CPT

## 2024-02-23 PROCEDURE — 72100 X-RAY EXAM L-S SPINE 2/3 VWS: CPT

## 2024-02-23 PROCEDURE — 99214 OFFICE O/P EST MOD 30 MIN: CPT | Performed by: NURSE PRACTITIONER

## 2024-02-23 RX ORDER — VILOXAZINE HYDROCHLORIDE 100 MG/1
1 CAPSULE, EXTENDED RELEASE ORAL DAILY
COMMUNITY
Start: 2024-02-19

## 2024-02-23 NOTE — PROGRESS NOTES
Xrays are showing facet arthropathy, which is when joints can rub together and become painful. I will put in an order for PT as we discussed

## 2024-02-23 NOTE — PROGRESS NOTES
BOBBY Kinney  [facility]   Family Medicine  [address]  [place] [address]  Phone: [phone]  Fax: [phone]         Chief Complaint:  Chief Complaint   Patient presents with    Back Pain     For approx 1 year but worse lately        History:  Taylor Galindo is a 38 y.o. female that is an established patient. She  is here for evaluation of the above complaint.    HPI     Taylor is here for lumbar back pain.  States that in 2020 she fell down stairs but did not have any evaluation or medical intervention.  She states she did have imaging in 2021 that showed herniated disks.  She did have PT in 2021 which helped.  Now the back pain has started again since 2022, is midline a dull ache across the back.  States that when she is more active or bending over it is more painful.  No radiation down either leg.    She states she noticed a lump in the area of the sacrum right at the gluteal crease, prominent the left than the right.  Is very deep on palpation, no skin lesion no area of drainage.  I suspect it is prominent part of the sacrum and could have had a previous fracture when she fell down the stairs in 2020.  It is not reminiscent of a cyst.    Is Dr. Gutiérrez documented in his last visit note of 2/7/2024, she had her last stent removed a few weeks ago, and has follow-up for the bile leak after cholecystectomy at .    She continues with behavioral health via video visits.            ROS:  Review of Systems   Constitutional:  Negative for fever.   Cardiovascular:  Negative for chest pain.   Gastrointestinal:  Negative for abdominal pain.   Genitourinary:  Negative for dysuria and pelvic pain.   Musculoskeletal:  Positive for back pain.   Neurological:  Positive for numbness. Negative for weakness.         reports that she has been smoking cigarettes. She has a 3.75 pack-year smoking history. She has never used smokeless tobacco. She reports that she does not currently use  "alcohol. She reports that she does not use drugs.    Current Outpatient Medications   Medication Instructions    busPIRone (BUSPAR) 15 mg, Oral, 3 Times Daily, Please see attached for detailed directions    cholestyramine (Questran) 4 g packet 1 packet, Oral, 3 Times Daily With Meals    hydrOXYzine (ATARAX) 25 mg, Oral    metoprolol succinate XL (TOPROL-XL) 25 mg, Oral, Daily    naloxone (NARCAN) 4 MG/0.1ML nasal spray 1. Give 1 spray in nostril for no/slow breathing or cannot wake after opioid use  2. Call 911  3. Repeat in other nostril if symptoms continue .    ondansetron ODT (ZOFRAN-ODT) 4 mg, Translingual, Every 8 Hours PRN    Qelbree 100 MG capsule sustained-release 24 hr 1 capsule, Oral, Daily    QUEtiapine (SEROQUEL) 25 mg, Oral       OBJECTIVE:  /78 (BP Location: Left arm, Patient Position: Sitting, Cuff Size: Adult)   Pulse 110   Temp 98.9 °F (37.2 °C) (Oral)   Ht 154.9 cm (61\")   Wt 58.5 kg (129 lb)   SpO2 98%   BMI 24.37 kg/m²    Physical Exam  Vitals and nursing note reviewed.   Constitutional:       Appearance: Normal appearance.   HENT:      Head: Normocephalic and atraumatic.      Nose: Nose normal.   Eyes:      Conjunctiva/sclera: Conjunctivae normal.   Cardiovascular:      Rate and Rhythm: Normal rate and regular rhythm.   Pulmonary:      Effort: Pulmonary effort is normal.      Breath sounds: Normal breath sounds.   Musculoskeletal:      Lumbar back: Bony tenderness present. No tenderness. Negative right straight leg raise test and negative left straight leg raise test.        Back:       Comments: Dull pain described, no pain on palpation    Fixed, non-mobile lump, appears to be bone on palpation   Neurological:      Mental Status: She is alert.         Procedures    Assessment/Plan:     Diagnoses and all orders for this visit:    1. Lumbar back pain (Primary)  -     XR Spine Lumbar 2 or 3 View; Future    2. Disorder of sacrum  -     XR sacrum and coccyx; Future    3. Anxiety    4. " Bile leak, postoperative    5. S/P cholecystectomy      BMI is within normal parameters. No other follow-up for BMI required.       An After Visit Summary was printed and given to the patient at discharge.  No follow-ups on file.       There are no Patient Instructions on file for this visit.      Discussion:     Because of taking Seroquel at night, as well as prn Hydroxyzine, we decided against a muscle relaxer that could make her more sleepy. Similarly, I'm hesitant to recommend high doses of NSAIDs with previous stomach issues. She is amenable to PT in the future.    I spent 35 minutes caring for Taylor on this date of service. This time includes time spent by me in the following activities: preparing for the visit, reviewing tests, obtaining and/or reviewing a separately obtained history, performing a medically appropriate examination and/or evaluation, counseling and educating the patient/family/caregiver, ordering medications, tests, or procedures, documenting information in the medical record, and independently interpreting results and communicating that information with the patient/family/caregiver     Meg ROSALES 2/23/2024   Electronically signed.  Answers submitted by the patient for this visit:  Primary Reason for Visit (Submitted on 2/20/2024)  What is the primary reason for your visit?: Back Pain

## 2024-02-26 ENCOUNTER — TELEPHONE (OUTPATIENT)
Dept: INTERNAL MEDICINE | Facility: CLINIC | Age: 39
End: 2024-02-26
Payer: COMMERCIAL

## 2024-02-26 NOTE — TELEPHONE ENCOUNTER
"Pt has called. I gave her the XR notes earlier today but she is concerned about he \"lump\" on her tailbone. She states that sometimes XR doesn't show everything and she can feel it so she knows that something is there. She is asking for a CT or MRI of this area. Please advise  "

## 2024-05-06 NOTE — TELEPHONE ENCOUNTER
PDMP Monitoring:    Last PDMP Licha Lutz as Reviewed [facility]):  Review User Review Instant Review Result   Brenton Mejia 11/23/2021 10:21 AM Reviewed PDMP [1]     Urine Drug Screenings (1 yr)     POCT Rapid Drug Screen  Collected: 7/8/2021 (Final result)    Complete Results          POCT Rapid Drug Screen  Collected: 12/17/2020 (Final result)    Complete Results          Urine Drug Screen  Collected: 3/8/2019  9:04 AM (Final result)    Complete Results          Urine Drug Screen  Collected: 10/15/2018  2:22 PM (Final result)    Complete Results              Medication Contract and Consent for Opioid Use Documents Filed     Patient Documents     Type of Document Status Date Received Received By Description    Medication Contract Signed 7/16/2018  8:57 AM Concepcion KNOWLES benzo & stimulant agreement    Medication Contract Received 12/17/2020  5:15 PM Concepcion KNOWLES Medication Contract 12/17/20
,

## 2024-08-07 ENCOUNTER — OFFICE VISIT (OUTPATIENT)
Dept: INTERNAL MEDICINE | Facility: CLINIC | Age: 39
End: 2024-08-07
Payer: COMMERCIAL

## 2024-08-07 ENCOUNTER — LAB (OUTPATIENT)
Dept: LAB | Facility: HOSPITAL | Age: 39
End: 2024-08-07
Payer: COMMERCIAL

## 2024-08-07 VITALS
SYSTOLIC BLOOD PRESSURE: 120 MMHG | BODY MASS INDEX: 25.71 KG/M2 | TEMPERATURE: 97.3 F | HEIGHT: 61 IN | DIASTOLIC BLOOD PRESSURE: 80 MMHG | HEART RATE: 89 BPM | WEIGHT: 136.2 LBS | OXYGEN SATURATION: 98 %

## 2024-08-07 DIAGNOSIS — Z72.0 TOBACCO USE: ICD-10-CM

## 2024-08-07 DIAGNOSIS — Z13.31 DEPRESSION SCREEN: ICD-10-CM

## 2024-08-07 DIAGNOSIS — E66.3 OVERWEIGHT (BMI 25.0-29.9): ICD-10-CM

## 2024-08-07 DIAGNOSIS — Z00.00 ENCOUNTER FOR PREVENTIVE CARE: ICD-10-CM

## 2024-08-07 DIAGNOSIS — Z90.49 S/P CHOLECYSTECTOMY: ICD-10-CM

## 2024-08-07 DIAGNOSIS — L02.32 BOIL OF BUTTOCK: ICD-10-CM

## 2024-08-07 DIAGNOSIS — F41.9 ANXIETY: ICD-10-CM

## 2024-08-07 DIAGNOSIS — Z00.00 ENCOUNTER FOR PREVENTIVE CARE: Primary | ICD-10-CM

## 2024-08-07 DIAGNOSIS — K59.1 FUNCTIONAL DIARRHEA: ICD-10-CM

## 2024-08-07 DIAGNOSIS — Z13.6 HYPERTENSION SCREEN: ICD-10-CM

## 2024-08-07 LAB
ALBUMIN SERPL-MCNC: 4.3 G/DL (ref 3.5–5.2)
ALBUMIN/GLOB SERPL: 1.4 G/DL
ALP SERPL-CCNC: 78 U/L (ref 39–117)
ALT SERPL W P-5'-P-CCNC: 13 U/L (ref 1–33)
ANION GAP SERPL CALCULATED.3IONS-SCNC: 10 MMOL/L (ref 5–15)
AST SERPL-CCNC: 17 U/L (ref 1–32)
BILIRUB SERPL-MCNC: <0.2 MG/DL (ref 0–1.2)
BUN SERPL-MCNC: 16 MG/DL (ref 6–20)
BUN/CREAT SERPL: 22.5 (ref 7–25)
CALCIUM SPEC-SCNC: 9.4 MG/DL (ref 8.6–10.5)
CHLORIDE SERPL-SCNC: 102 MMOL/L (ref 98–107)
CHOLEST SERPL-MCNC: 191 MG/DL (ref 0–200)
CO2 SERPL-SCNC: 29 MMOL/L (ref 22–29)
CREAT SERPL-MCNC: 0.71 MG/DL (ref 0.57–1)
DEPRECATED RDW RBC AUTO: 47.8 FL (ref 37–54)
EGFRCR SERPLBLD CKD-EPI 2021: 111.8 ML/MIN/1.73
ERYTHROCYTE [DISTWIDTH] IN BLOOD BY AUTOMATED COUNT: 14.7 % (ref 12.3–15.4)
GLOBULIN UR ELPH-MCNC: 3 GM/DL
GLUCOSE SERPL-MCNC: 116 MG/DL (ref 65–99)
HBA1C MFR BLD: 5.3 % (ref 4.8–5.6)
HCT VFR BLD AUTO: 39.9 % (ref 34–46.6)
HDLC SERPL-MCNC: 77 MG/DL (ref 40–60)
HGB BLD-MCNC: 13 G/DL (ref 12–15.9)
LDLC SERPL CALC-MCNC: 97 MG/DL (ref 0–100)
LDLC/HDLC SERPL: 1.23 {RATIO}
MCH RBC QN AUTO: 28.8 PG (ref 26.6–33)
MCHC RBC AUTO-ENTMCNC: 32.6 G/DL (ref 31.5–35.7)
MCV RBC AUTO: 88.3 FL (ref 79–97)
PLATELET # BLD AUTO: 313 10*3/MM3 (ref 140–450)
PMV BLD AUTO: 9.5 FL (ref 6–12)
POTASSIUM SERPL-SCNC: 4.3 MMOL/L (ref 3.5–5.2)
PROT SERPL-MCNC: 7.3 G/DL (ref 6–8.5)
RBC # BLD AUTO: 4.52 10*6/MM3 (ref 3.77–5.28)
SODIUM SERPL-SCNC: 141 MMOL/L (ref 136–145)
TRIGL SERPL-MCNC: 96 MG/DL (ref 0–150)
VLDLC SERPL-MCNC: 17 MG/DL (ref 5–40)
WBC NRBC COR # BLD AUTO: 10.16 10*3/MM3 (ref 3.4–10.8)

## 2024-08-07 PROCEDURE — 1160F RVW MEDS BY RX/DR IN RCRD: CPT | Performed by: INTERNAL MEDICINE

## 2024-08-07 PROCEDURE — 83036 HEMOGLOBIN GLYCOSYLATED A1C: CPT

## 2024-08-07 PROCEDURE — 80061 LIPID PANEL: CPT

## 2024-08-07 PROCEDURE — 80053 COMPREHEN METABOLIC PANEL: CPT

## 2024-08-07 PROCEDURE — 85027 COMPLETE CBC AUTOMATED: CPT

## 2024-08-07 PROCEDURE — 2014F MENTAL STATUS ASSESS: CPT | Performed by: INTERNAL MEDICINE

## 2024-08-07 PROCEDURE — 36415 COLL VENOUS BLD VENIPUNCTURE: CPT

## 2024-08-07 PROCEDURE — 1159F MED LIST DOCD IN RCRD: CPT | Performed by: INTERNAL MEDICINE

## 2024-08-07 PROCEDURE — 99395 PREV VISIT EST AGE 18-39: CPT | Performed by: INTERNAL MEDICINE

## 2024-08-07 RX ORDER — CHOLESTYRAMINE 4 G/9G
1 POWDER, FOR SUSPENSION ORAL 2 TIMES DAILY WITH MEALS
Qty: 90 EACH | Refills: 3 | Status: SHIPPED | OUTPATIENT
Start: 2024-08-07

## 2024-08-07 RX ORDER — METOPROLOL SUCCINATE 25 MG/1
25 TABLET, EXTENDED RELEASE ORAL DAILY
Qty: 90 TABLET | Refills: 3 | Status: SHIPPED | OUTPATIENT
Start: 2024-08-07

## 2024-08-07 RX ORDER — AMOXICILLIN AND CLAVULANATE POTASSIUM 875; 125 MG/1; MG/1
1 TABLET, FILM COATED ORAL 2 TIMES DAILY
Qty: 14 TABLET | Refills: 0 | Status: SHIPPED | OUTPATIENT
Start: 2024-08-07 | End: 2024-08-14

## 2024-08-07 NOTE — PROGRESS NOTES
Chief Complaint   Patient presents with    Annual Exam         History:  Taylor Galindo is a 39 y.o. female who presents today for evaluation of the above problems.      HASEEB Vazquez presents today for her annual physical.    At the last visit I started cholestyramine to help with functional diarrhea related to a cholecystectomy.  The medication was effective but she then developed constipation and ultimately stopped taking it.  She may be interested in restarting medication at a lower dose.    For the last few weeks she has noticed a cyst or a knot on her tailbone which drains intermittently.  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    She is not currently up-to-date on her eye or dental exam    She continues to see her psychiatrist through telemedicine.  She has had some change in her medications but feels like her medications are maximized.    She smokes anywhere between 2 and 10 cigarettes/day depending on how much stress she is experiencing.  She is often on trying to quit but is not currently trying to quit.  She denies any recreational drug use.  Drinks a couple drinks on the weekend.    She states that her mom was diagnosed with diabetes since last visit.    Social History     Socioeconomic History    Marital status: Single   Tobacco Use    Smoking status: Some Days     Current packs/day: 0.00     Average packs/day: 0.3 packs/day for 15.0 years (3.8 ttl pk-yrs)     Types: Cigarettes     Start date: 2002     Last attempt to quit: 2017     Years since quittin.1    Smokeless tobacco: Never   Vaping Use    Vaping status: Never Used   Substance and Sexual Activity    Alcohol use: Not Currently     Comment: 1-2 drinks usually on weekends    Drug use: No    Sexual activity: Yes     Partners: Male     Birth control/protection: Other, None     Comment: Salpingectomy       PHQ-9 Depression Screening  Little interest or pleasure in doing things? 0-->not at all   Feeling down, depressed, or hopeless?  0-->not at all   Trouble falling or staying asleep, or sleeping too much?     Feeling tired or having little energy?     Poor appetite or overeating?     Feeling bad about yourself - or that you are a failure or have let yourself or your family down?     Trouble concentrating on things, such as reading the newspaper or watching television?     Moving or speaking so slowly that other people could have noticed? Or the opposite - being so fidgety or restless that you have been moving around a lot more than usual?     Thoughts that you would be better off dead, or of hurting yourself in some way?     PHQ-9 Total Score 0   If you checked off any problems, how difficult have these problems made it for you to do your work, take care of things at home, or get along with other people?         PHQ-9 Total Score: 0    ROS:  Review of Systems   Constitutional:  Negative for activity change, appetite change, fatigue, fever and unexpected weight change.   HENT: Negative.     Eyes:  Negative for pain and visual disturbance.   Respiratory:  Negative for cough, chest tightness and shortness of breath.    Cardiovascular:  Negative for chest pain, palpitations and leg swelling.   Gastrointestinal:  Negative for abdominal pain, constipation, diarrhea, nausea and vomiting.   Endocrine: Negative for cold intolerance and heat intolerance.   Genitourinary: Negative.    Musculoskeletal: Negative.  Negative for back pain, neck pain and neck stiffness.   Skin:  Negative for rash and wound.   Neurological:  Negative for dizziness, syncope, weakness, light-headedness and headaches.   Hematological:  Negative for adenopathy. Does not bruise/bleed easily.   Psychiatric/Behavioral:  Negative for agitation, behavioral problems and confusion.          Current Outpatient Medications:     busPIRone (BUSPAR) 15 MG tablet, Take 1 tablet by mouth 3 (Three) Times a Day. Please see attached for detailed directions, Disp: , Rfl:     cholestyramine  (Questran) 4 g packet, Take 1 packet by mouth 2 (Two) Times a Day With Meals., Disp: 90 each, Rfl: 3    hydrOXYzine (ATARAX) 25 MG tablet, Take 1 tablet by mouth., Disp: , Rfl:     metoprolol succinate XL (TOPROL-XL) 25 MG 24 hr tablet, Take 1 tablet by mouth Daily., Disp: 90 tablet, Rfl: 3    Qelbree 100 MG capsule sustained-release 24 hr, Take 1 capsule by mouth Daily., Disp: , Rfl:     QUEtiapine (SEROquel) 25 MG tablet, Take 1 tablet by mouth., Disp: , Rfl:     amoxicillin-clavulanate (AUGMENTIN) 875-125 MG per tablet, Take 1 tablet by mouth 2 (Two) Times a Day for 7 days., Disp: 14 tablet, Rfl: 0    Lab Results   Component Value Date    GLUCOSE 112 (H) 07/17/2023    BUN 4 (L) 02/01/2023    CREATININE 0.50 (L) 02/01/2023     07/17/2023    K 4.3 07/17/2023    CL 99 02/01/2023    CALCIUM 8.9 02/01/2023    PROTEINTOT 6.3 (L) 03/23/2023    ALBUMIN 3.9 03/23/2023    ALT 15 03/23/2023    AST 14 03/23/2023    ALKPHOS 80 03/23/2023    BILITOT 0.3 03/23/2023    GLOB 2.7 02/01/2023    AGRATIO 1.4 02/01/2023    BCR 8.0 02/01/2023    ANIONGAP 11.0 02/01/2023    EGFR 124.8 02/01/2023       WBC   Date Value Ref Range Status   11/11/2023 4.95 3.70 - 10.30 10*3/uL Final     RBC   Date Value Ref Range Status   11/11/2023 3.55 (L) 3.90 - 5.20 10*6/uL Final     Hemoglobin   Date Value Ref Range Status   11/11/2023 11.1 (L) 11.2 - 15.7 g/dL Final     Hematocrit   Date Value Ref Range Status   11/11/2023 34.9 34.0 - 45.0 % Final     MCV   Date Value Ref Range Status   11/11/2023 91 79 - 98 fL Final     MCH   Date Value Ref Range Status   11/11/2023 29.3 26.0 - 32.0 pg Final     MCHC   Date Value Ref Range Status   11/11/2023 32.1 30.7 - 35.5 g/dL Final     RDW   Date Value Ref Range Status   11/11/2023 13.4 11.5 - 14.5 % Final     RDW-SD   Date Value Ref Range Status   02/01/2023 46.6 37.0 - 54.0 fl Final     MPV   Date Value Ref Range Status   11/11/2023 10.3 8.8 - 12.5 fL Final     Platelets   Date Value Ref Range Status  "  11/11/2023 231 155 - 369 10*3/uL Final     Neutrophil Rel %   Date Value Ref Range Status   11/10/2023 81.0 % Final     Lymphocyte Rel %   Date Value Ref Range Status   11/10/2023 10.0 % Final   07/16/2023 9.0 (L) 20.0 - 40.0 % Final     Monocyte Rel %   Date Value Ref Range Status   11/10/2023 8.0 % Final     Eosinophil Rel %   Date Value Ref Range Status   07/16/2023 1.4 0.0 - 5.0 % Final     Eosinophil %   Date Value Ref Range Status   11/10/2023 1.0 % Final     Basophil Rel %   Date Value Ref Range Status   11/10/2023 0.0 % Final     Immature Grans %   Date Value Ref Range Status   11/10/2023 0.0 % Final     Neutrophils Absolute   Date Value Ref Range Status   11/10/2023 7.76 (H) 1.60 - 6.10 10*3/uL Final     Lymphocytes Absolute   Date Value Ref Range Status   11/10/2023 0.98 (L) 1.20 - 3.90 10*3/uL Final     Monocytes Absolute   Date Value Ref Range Status   11/10/2023 0.72 0.30 - 0.90 10*3/uL Final     Eosinophils Absolute   Date Value Ref Range Status   11/10/2023 0.11 0.00 - 0.50 10*3/uL Final     Basophils Absolute   Date Value Ref Range Status   11/10/2023 0.03 0.00 - 0.10 10*3/uL Final     Immature Grans, Absolute   Date Value Ref Range Status   11/10/2023 0.03 0.00 - 0.06 10*3/uL Final   07/16/2023 0.0 K/uL Final     nRBC   Date Value Ref Range Status   11/11/2023 0.0 <=0.0 per 100 WBCs Final   02/01/2023 0.0 0.0 - 0.2 /100 WBC Final         OBJECTIVE:  Visit Vitals  /80 (BP Location: Left arm, Patient Position: Sitting, Cuff Size: Adult)   Pulse 89   Temp 97.3 °F (36.3 °C) (Temporal)   Ht 154.9 cm (61\")   Wt 61.8 kg (136 lb 3.2 oz)   SpO2 98%   BMI 25.73 kg/m²      Physical Exam  Vitals and nursing note reviewed. Exam conducted with a chaperone present.   Constitutional:       General: She is not in acute distress.     Appearance: Normal appearance. She is well-developed. She is not ill-appearing or toxic-appearing.   HENT:      Head: Normocephalic and atraumatic.      Right Ear: Tympanic " membrane, ear canal and external ear normal. There is no impacted cerumen.      Left Ear: Tympanic membrane, ear canal and external ear normal. There is no impacted cerumen.   Eyes:      General: No scleral icterus.     Conjunctiva/sclera: Conjunctivae normal.      Pupils: Pupils are equal, round, and reactive to light.   Neck:      Thyroid: No thyromegaly.      Vascular: No JVD.   Cardiovascular:      Rate and Rhythm: Normal rate and regular rhythm.      Heart sounds: Normal heart sounds. No murmur heard.     No friction rub. No gallop.   Pulmonary:      Effort: Pulmonary effort is normal. No respiratory distress.      Breath sounds: Normal breath sounds. No stridor. No wheezing, rhonchi or rales.   Abdominal:      General: Abdomen is flat. There is no distension.      Palpations: Abdomen is soft.      Tenderness: There is no abdominal tenderness.   Musculoskeletal:      Right lower leg: No edema.      Left lower leg: No edema.        Legs:       Comments: Jasmina VASQUEZ  Was chaperone during the examination of the gluteal cleft lesions   Skin:     General: Skin is warm and dry.      Coloration: Skin is not jaundiced.   Neurological:      Mental Status: She is alert.      Cranial Nerves: No cranial nerve deficit.   Psychiatric:         Mood and Affect: Mood normal.         Behavior: Behavior normal.         Thought Content: Thought content normal.         Judgment: Judgment normal.       Assessment/Plan    Diagnoses and all orders for this visit:    1. Encounter for preventive care (Primary)  -     CBC (No Diff); Future  -     Comprehensive Metabolic Panel; Future  -     Hemoglobin A1c; Future  -     Lipid Panel; Future    2. Depression screen    3. Hypertension screen    4. Anxiety    5. Tobacco use    6. S/P cholecystectomy  -     cholestyramine (Questran) 4 g packet; Take 1 packet by mouth 2 (Two) Times a Day With Meals.  Dispense: 90 each; Refill: 3    7. Functional diarrhea  -     cholestyramine (Questran) 4 g  packet; Take 1 packet by mouth 2 (Two) Times a Day With Meals.  Dispense: 90 each; Refill: 3    8. Overweight (BMI 25.0-29.9)    9. Boil of buttock  -     amoxicillin-clavulanate (AUGMENTIN) 875-125 MG per tablet; Take 1 tablet by mouth 2 (Two) Times a Day for 7 days.  Dispense: 14 tablet; Refill: 0    Other orders  -     metoprolol succinate XL (TOPROL-XL) 25 MG 24 hr tablet; Take 1 tablet by mouth Daily.  Dispense: 90 tablet; Refill: 3      Would like to check some blood work today for preventative care.  Depression screen negative with a PHQ 2 of 0.  Hypertension screen is negative for the blood pressure 120/80.    Continue seeing her psychiatrist through telemedicine.    Refill Toprol-XL.    Prescribe a course of Augmentin.  She tolerated this well in the past.    Try cholestyramine again, but she will take it either once or twice per day.  She developed constipation with 3 times per day.    Recommend tobacco cessation    She is not interested in vaccines at this time.    Counseling/Anticipatory Guidance Discussed: misuse of tobacco, alcohol and drugs, dental health, mental health, and immunizations      Return in about 6 months (around 2/7/2025) for Recheck.    Patient was given instructions and counseling regarding his/her condition or for health maintenance advice. Please see specific information pulled into the AVS if appropriate.     KAYLA Gutiérrez MD  13:02 CDT  8/7/2024   Electronically signed

## 2025-02-10 ENCOUNTER — HOSPITAL ENCOUNTER (OUTPATIENT)
Dept: GENERAL RADIOLOGY | Facility: HOSPITAL | Age: 40
Discharge: HOME | End: 2025-02-10
Payer: COMMERCIAL

## 2025-02-10 ENCOUNTER — OFFICE VISIT (OUTPATIENT)
Dept: INTERNAL MEDICINE | Facility: CLINIC | Age: 40
End: 2025-02-10
Payer: COMMERCIAL

## 2025-02-10 VITALS
HEIGHT: 61 IN | BODY MASS INDEX: 26.24 KG/M2 | DIASTOLIC BLOOD PRESSURE: 74 MMHG | SYSTOLIC BLOOD PRESSURE: 108 MMHG | HEART RATE: 94 BPM | WEIGHT: 139 LBS | OXYGEN SATURATION: 99 %

## 2025-02-10 DIAGNOSIS — M54.42 CHRONIC MIDLINE LOW BACK PAIN WITH BILATERAL SCIATICA: Primary | ICD-10-CM

## 2025-02-10 DIAGNOSIS — M54.41 CHRONIC MIDLINE LOW BACK PAIN WITH BILATERAL SCIATICA: Primary | ICD-10-CM

## 2025-02-10 DIAGNOSIS — G89.29 CHRONIC MIDLINE LOW BACK PAIN WITH BILATERAL SCIATICA: Primary | ICD-10-CM

## 2025-02-10 PROCEDURE — 99214 OFFICE O/P EST MOD 30 MIN: CPT

## 2025-02-10 PROCEDURE — 72220 X-RAY EXAM SACRUM TAILBONE: CPT

## 2025-02-10 NOTE — PROGRESS NOTES
"Chief Complaint   Patient presents with    Pain     \"Knot\" on back. Pt states it has been there for approx 3 years        History:      Taylor Galindo is a 39 y.o. female who presents today for evaluation of the above problems.      HPI  History of Present Illness  The patient presents for evaluation of a knot on her spine.    She reports persistent discomfort in the same area, which occasionally becomes inflamed. The pain is located in the lower back. 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 She has not sought medical attention for this issue recently. She is not currently taking any medication for the pain. She attempted to manage the pain with over-the-counter ibuprofen 200 mg, taking 2 to 3 tablets 2 to 3 times per week, but found no relief. She had an x-ray last year. She was recommended to undergo physical therapy last year, but she did not do it.    She also reports new onset of paresthesia in her legs and feet, described as a pins-and-needles sensation, which started 2 weeks ago. This symptom is consistent and prevents her from sleeping on her back or sitting comfortably. She reports no recent falls, injuries, or accidents. The paresthesia affects both legs, sometimes extending to her feet, and is always bilateral when present.    MEDICATIONS  Current: Ibuprofen.      ROS:  Review of Systems   Musculoskeletal:  Positive for back pain.   Neurological:  Positive for numbness. Negative for weakness.         Current Outpatient Medications:     busPIRone (BUSPAR) 15 MG tablet, Take 1 tablet by mouth 3 (Three) Times a Day. Please see attached for detailed directions, Disp: , Rfl:     hydrOXYzine (ATARAX) 25 MG tablet, Take 1 tablet by mouth., Disp: , Rfl:     metoprolol succinate XL (TOPROL-XL) 25 MG 24 hr tablet, Take 1 tablet by mouth Daily., Disp: 90 tablet, Rfl: 3    Qelbree 100 MG capsule sustained-release 24 hr, Take 1 " capsule by mouth Daily., Disp: , Rfl:     QUEtiapine (SEROquel) 25 MG tablet, Take 1 tablet by mouth., Disp: , Rfl:     cholestyramine (Questran) 4 g packet, Take 1 packet by mouth 2 (Two) Times a Day With Meals. (Patient not taking: Reported on 2/10/2025), Disp: 90 each, Rfl: 3    Lab Results   Component Value Date    GLUCOSE 116 (H) 08/07/2024    BUN 16 08/07/2024    CREATININE 0.71 08/07/2024     08/07/2024    K 4.3 08/07/2024     08/07/2024    CALCIUM 9.4 08/07/2024    PROTEINTOT 7.3 08/07/2024    ALBUMIN 4.3 08/07/2024    ALT 13 08/07/2024    AST 17 08/07/2024    ALKPHOS 78 08/07/2024    BILITOT <0.2 08/07/2024    GLOB 3.0 08/07/2024    AGRATIO 1.4 08/07/2024    BCR 22.5 08/07/2024    ANIONGAP 10.0 08/07/2024    EGFR 111.8 08/07/2024       WBC   Date Value Ref Range Status   08/07/2024 10.16 3.40 - 10.80 10*3/mm3 Final   11/11/2023 4.95 3.70 - 10.30 10*3/uL Final     RBC   Date Value Ref Range Status   08/07/2024 4.52 3.77 - 5.28 10*6/mm3 Final   11/11/2023 3.55 (L) 3.90 - 5.20 10*6/uL Final     Hemoglobin   Date Value Ref Range Status   08/07/2024 13.0 12.0 - 15.9 g/dL Final   11/11/2023 11.1 (L) 11.2 - 15.7 g/dL Final     Hematocrit   Date Value Ref Range Status   08/07/2024 39.9 34.0 - 46.6 % Final   11/11/2023 34.9 34.0 - 45.0 % Final     MCV   Date Value Ref Range Status   08/07/2024 88.3 79.0 - 97.0 fL Final   11/11/2023 91 79 - 98 fL Final     MCH   Date Value Ref Range Status   08/07/2024 28.8 26.6 - 33.0 pg Final   11/11/2023 29.3 26.0 - 32.0 pg Final     MCHC   Date Value Ref Range Status   08/07/2024 32.6 31.5 - 35.7 g/dL Final   11/11/2023 32.1 30.7 - 35.5 g/dL Final     RDW   Date Value Ref Range Status   08/07/2024 14.7 12.3 - 15.4 % Final   11/11/2023 13.4 11.5 - 14.5 % Final     RDW-SD   Date Value Ref Range Status   08/07/2024 47.8 37.0 - 54.0 fl Final     MPV   Date Value Ref Range Status   08/07/2024 9.5 6.0 - 12.0 fL Final   11/11/2023 10.3 8.8 - 12.5 fL Final     Platelets  "  Date Value Ref Range Status   08/07/2024 313 140 - 450 10*3/mm3 Final   11/11/2023 231 155 - 369 10*3/uL Final     Neutrophil Rel %   Date Value Ref Range Status   11/10/2023 81.0 % Final     Lymphocyte Rel %   Date Value Ref Range Status   11/10/2023 10.0 % Final   07/16/2023 9.0 (L) 20.0 - 40.0 % Final     Monocyte Rel %   Date Value Ref Range Status   11/10/2023 8.0 % Final     Eosinophil Rel %   Date Value Ref Range Status   07/16/2023 1.4 0.0 - 5.0 % Final     Eosinophil %   Date Value Ref Range Status   11/10/2023 1.0 % Final     Basophil Rel %   Date Value Ref Range Status   11/10/2023 0.0 % Final     Immature Grans %   Date Value Ref Range Status   11/10/2023 0.0 % Final     Neutrophils Absolute   Date Value Ref Range Status   11/10/2023 7.76 (H) 1.60 - 6.10 10*3/uL Final     Lymphocytes Absolute   Date Value Ref Range Status   11/10/2023 0.98 (L) 1.20 - 3.90 10*3/uL Final     Monocytes Absolute   Date Value Ref Range Status   11/10/2023 0.72 0.30 - 0.90 10*3/uL Final     Eosinophils Absolute   Date Value Ref Range Status   11/10/2023 0.11 0.00 - 0.50 10*3/uL Final     Basophils Absolute   Date Value Ref Range Status   11/10/2023 0.03 0.00 - 0.10 10*3/uL Final     Immature Grans, Absolute   Date Value Ref Range Status   11/10/2023 0.03 0.00 - 0.06 10*3/uL Final   07/16/2023 0.0 K/uL Final     nRBC   Date Value Ref Range Status   11/11/2023 0.0 <=0.0 per 100 WBCs Final   02/01/2023 0.0 0.0 - 0.2 /100 WBC Final       OBJECTIVE:  Visit Vitals  /74 (BP Location: Right arm, Patient Position: Sitting, Cuff Size: Adult)   Pulse 94   Ht 154.9 cm (61\")   Wt 63 kg (139 lb)   SpO2 99%   BMI 26.26 kg/m²      Physical Exam  Constitutional:       Appearance: Normal appearance.   HENT:      Head: Normocephalic.   Cardiovascular:      Rate and Rhythm: Normal rate and regular rhythm.      Pulses: Normal pulses.      Heart sounds: Normal heart sounds.   Pulmonary:      Effort: Pulmonary effort is normal.      Breath " sounds: Normal breath sounds.   Musculoskeletal:         General: Normal range of motion.        Back:    Skin:     General: Skin is warm and dry.   Neurological:      Mental Status: She is alert and oriented to person, place, and time.   Psychiatric:         Mood and Affect: Mood normal.         Behavior: Behavior normal.         Thought Content: Thought content normal.         Judgment: Judgment normal.       Physical Exam  Back was examined.    Results  Imaging  X-ray of the lumbar spine showed no bony abnormality in the sacrum and coccyx, some arthritis, but no acute abnormality in the lumbar spine.    Assessment/Plan    Diagnoses and all orders for this visit:    1. Chronic midline low back pain with bilateral sciatica (Primary)  -     XR Spine Lumbar 2 or 3 View; Future  -     XR Sacrum & Coccyx (In Office)      Assessment & Plan  1. Low back pain.  The patient reports a persistent knot or mass on her lower back. She experiences consistent discomfort, especially when sitting. Last year's x-rays showed no bony abnormalities in the sacrum and coccyx, but some arthritis was noted. A repeat imaging of the lumbar spine, sacrum, and coccyx will be ordered to assess any changes since the last evaluation. She is advised to take ibuprofen 600 mg three times daily for 4 to 5 days to reduce inflammation and then use it as needed. Physical therapy is recommended to help alleviate symptoms. If the imaging reveals any significant findings, a referral to neurosurgery may be considered.    2. Paresthesia in legs and feet.  The patient reports new onset of pins-and-needles sensation in her legs and feet, which started 2 weeks ago. The sensation varies, sometimes affecting just her feet and other times both legs.  There is no weakness of the lower extremities present.  There was no fall, injury, or accident associated with the onset. Physical therapy is recommended to address these symptoms. If the imaging reveals any  significant findings, a referral to neurosurgery may be considered.    Follow-up  The patient will follow up in 6 months.    I spent 46 minutes caring for Taylor on this date of service. This time includes time spent by me in the following activities: preparing for the visit, reviewing tests, performing a medically appropriate examination and/or evaluation, counseling and educating the patient/family/caregiver, documenting information in the medical record, and ordering test(s)      Return in about 6 months (around 8/10/2025) for Annual physical.      BOBBY Luis  13:15 CST  2/10/2025   Electronically signed    Patient or patient representative verbalized consent for the use of Ambient Listening during the visit with  BOBBY Luis for chart documentation. 2/10/2025  15:53 CST

## 2025-08-12 ENCOUNTER — LAB (OUTPATIENT)
Dept: LAB | Facility: HOSPITAL | Age: 40
End: 2025-08-12
Payer: COMMERCIAL

## 2025-08-12 ENCOUNTER — OFFICE VISIT (OUTPATIENT)
Dept: INTERNAL MEDICINE | Facility: CLINIC | Age: 40
End: 2025-08-12
Payer: COMMERCIAL

## 2025-08-12 VITALS
SYSTOLIC BLOOD PRESSURE: 109 MMHG | TEMPERATURE: 98 F | BODY MASS INDEX: 28.55 KG/M2 | HEIGHT: 61 IN | OXYGEN SATURATION: 97 % | WEIGHT: 151.2 LBS | DIASTOLIC BLOOD PRESSURE: 76 MMHG | HEART RATE: 98 BPM

## 2025-08-12 DIAGNOSIS — Z72.0 TOBACCO USE: ICD-10-CM

## 2025-08-12 DIAGNOSIS — E66.3 OVERWEIGHT (BMI 25.0-29.9): ICD-10-CM

## 2025-08-12 DIAGNOSIS — F41.9 ANXIETY: ICD-10-CM

## 2025-08-12 DIAGNOSIS — Z00.00 ENCOUNTER FOR PREVENTIVE CARE: Primary | ICD-10-CM

## 2025-08-12 DIAGNOSIS — N81.4 UTERINE PROLAPSE: ICD-10-CM

## 2025-08-12 DIAGNOSIS — Z00.00 ENCOUNTER FOR PREVENTIVE CARE: ICD-10-CM

## 2025-08-12 DIAGNOSIS — Z13.31 DEPRESSION SCREEN: ICD-10-CM

## 2025-08-12 LAB
ALBUMIN SERPL-MCNC: 4.2 G/DL (ref 3.5–5.2)
ALBUMIN/GLOB SERPL: 1.8 G/DL
ALP SERPL-CCNC: 72 U/L (ref 39–117)
ALT SERPL W P-5'-P-CCNC: 16 U/L (ref 1–33)
ANION GAP SERPL CALCULATED.3IONS-SCNC: 11 MMOL/L (ref 5–15)
AST SERPL-CCNC: 30 U/L (ref 1–32)
BILIRUB SERPL-MCNC: <0.2 MG/DL (ref 0–1.2)
BUN SERPL-MCNC: 11.9 MG/DL (ref 6–20)
BUN/CREAT SERPL: 17 (ref 7–25)
CALCIUM SPEC-SCNC: 9.4 MG/DL (ref 8.6–10.5)
CHLORIDE SERPL-SCNC: 106 MMOL/L (ref 98–107)
CHOLEST SERPL-MCNC: 172 MG/DL (ref 0–200)
CO2 SERPL-SCNC: 25 MMOL/L (ref 22–29)
CREAT SERPL-MCNC: 0.7 MG/DL (ref 0.57–1)
DEPRECATED RDW RBC AUTO: 49.9 FL (ref 37–54)
EGFRCR SERPLBLD CKD-EPI 2021: 113 ML/MIN/1.73
ERYTHROCYTE [DISTWIDTH] IN BLOOD BY AUTOMATED COUNT: 15.1 % (ref 12.3–15.4)
GLOBULIN UR ELPH-MCNC: 2.3 GM/DL
GLUCOSE SERPL-MCNC: 97 MG/DL (ref 65–99)
HBA1C MFR BLD: 5 % (ref 4.8–5.6)
HCT VFR BLD AUTO: 36.9 % (ref 34–46.6)
HDLC SERPL-MCNC: 67 MG/DL (ref 40–60)
HGB BLD-MCNC: 12.2 G/DL (ref 12–15.9)
LDLC SERPL CALC-MCNC: 81 MG/DL (ref 0–100)
LDLC/HDLC SERPL: 1.14 {RATIO}
MCH RBC QN AUTO: 29.5 PG (ref 26.6–33)
MCHC RBC AUTO-ENTMCNC: 33.1 G/DL (ref 31.5–35.7)
MCV RBC AUTO: 89.1 FL (ref 79–97)
PLATELET # BLD AUTO: 240 10*3/MM3 (ref 140–450)
PMV BLD AUTO: 10.1 FL (ref 6–12)
POTASSIUM SERPL-SCNC: 3.5 MMOL/L (ref 3.5–5.2)
PROT SERPL-MCNC: 6.5 G/DL (ref 6–8.5)
RBC # BLD AUTO: 4.14 10*6/MM3 (ref 3.77–5.28)
SODIUM SERPL-SCNC: 142 MMOL/L (ref 136–145)
TRIGL SERPL-MCNC: 143 MG/DL (ref 0–150)
TSH SERPL DL<=0.05 MIU/L-ACNC: 0.8 UIU/ML (ref 0.27–4.2)
VLDLC SERPL-MCNC: 24 MG/DL (ref 5–40)
WBC NRBC COR # BLD AUTO: 6.67 10*3/MM3 (ref 3.4–10.8)

## 2025-08-12 PROCEDURE — 36415 COLL VENOUS BLD VENIPUNCTURE: CPT

## 2025-08-12 PROCEDURE — 80053 COMPREHEN METABOLIC PANEL: CPT

## 2025-08-12 PROCEDURE — 83036 HEMOGLOBIN GLYCOSYLATED A1C: CPT

## 2025-08-12 PROCEDURE — 84443 ASSAY THYROID STIM HORMONE: CPT

## 2025-08-12 PROCEDURE — 85027 COMPLETE CBC AUTOMATED: CPT

## 2025-08-12 PROCEDURE — 80061 LIPID PANEL: CPT

## 2025-08-26 ENCOUNTER — OFFICE VISIT (OUTPATIENT)
Age: 40
End: 2025-08-26
Payer: COMMERCIAL

## 2025-08-26 VITALS
WEIGHT: 151 LBS | BODY MASS INDEX: 28.51 KG/M2 | DIASTOLIC BLOOD PRESSURE: 74 MMHG | SYSTOLIC BLOOD PRESSURE: 116 MMHG | HEIGHT: 61 IN

## 2025-08-26 DIAGNOSIS — N92.0 MENORRHAGIA WITH REGULAR CYCLE: Primary | ICD-10-CM

## 2025-08-26 DIAGNOSIS — F17.200 SMOKER: ICD-10-CM

## 2025-08-26 DIAGNOSIS — N81.6 CYSTOCELE WITH RECTOCELE: ICD-10-CM

## 2025-08-26 DIAGNOSIS — N81.10 CYSTOCELE WITH RECTOCELE: ICD-10-CM

## 2025-08-26 DIAGNOSIS — N94.6 DYSMENORRHEA: ICD-10-CM

## (undated) DEVICE — CONTRAST IOTHALAMATE MEGLUMINE 60% 50 ML INJ CONRAY 60

## (undated) DEVICE — SYRINGE 10CC LUER LOCK: Brand: CARDINAL HEALTH

## (undated) DEVICE — APPLIER CLP M/L SHFT DIA5MM 15 LIG LIGAMAX 5

## (undated) DEVICE — SOLUTION ANTIFOG VIS SYS CLEARIFY LAPSCP

## (undated) DEVICE — VCARE MEDIUM, UTERINE MANIPULATOR, VAGINAL-CERVICAL-AHLUWALIA'S-RETRACTOR-ELEVATOR: Brand: VCARE

## (undated) DEVICE — MAX-A-L MAX ADULT LONG PROBE: Brand: MEDLINE

## (undated) DEVICE — ENDO KIT,LOURDES HOSPITAL: Brand: MEDLINE INDUSTRIES, INC.

## (undated) DEVICE — SPHINCTEROTOME: Brand: DREAMTOME™ RX 44

## (undated) DEVICE — GENERAL LAP CDS

## (undated) DEVICE — TUBE ET 7MM NSL ORAL BASIC CUF INTMED MURPHY EYE RADPQ MRK

## (undated) DEVICE — SUT ETHLN 3/0 FS1 30IN 669H

## (undated) DEVICE — GLV SURG TRIUMPH MICRO PF LTX 7.5 STRL

## (undated) DEVICE — APPL CHLORAPREP W/TINT 26ML ORNG

## (undated) DEVICE — DRSNG BRDR MEPILEX P/OP SIL 4X12IN

## (undated) DEVICE — SINGLE USE DISTAL COVER MAJ-2315: Brand: SINGLE USE DISTAL COVER

## (undated) DEVICE — PK TURNOVER RM ADV

## (undated) DEVICE — SUTURE MCRYL SZ 4-0 L18IN ABSRB UD L19MM PS-2 3/8 CIR PRIM Y496G

## (undated) DEVICE — RETRIEVAL BALLOON CATHETER: Brand: EXTRACTOR™ PRO RX

## (undated) DEVICE — PK LAP GYN 30

## (undated) DEVICE — SLV SCD KN ADJ EXPRSS LG

## (undated) DEVICE — ADHESIVE SKIN CLSR 0.7ML TOP DERMBND ADV

## (undated) DEVICE — BAG SPEC REM 224ML W4XL6IN DIA10MM 1 HND GYN DISP ENDOPCH

## (undated) DEVICE — TROCAR: Brand: KII® SLEEVE

## (undated) DEVICE — HARMONIC ACE +7 SHEARS ADVANCED HEMOSTASIS 5MM DIAMETER 23CM SHAFT LENGTH  FOR USE WITH GRAY HAND PIECE ONLY: Brand: HARMONIC ACE

## (undated) DEVICE — INTENDED FOR TISSUE SEPARATION, AND OTHER PROCEDURES THAT REQUIRE A SHARP SURGICAL BLADE TO PUNCTURE OR CUT.: Brand: BARD-PARKER ®  SAFETY SCALPED

## (undated) DEVICE — TROCAR: Brand: KII FIOS FIRST ENTRY

## (undated) DEVICE — SYSTEM BX CAP BILI RAP EXCHG CAP LOK DEV COMPATIBLE W/ OLY

## (undated) DEVICE — SUT VIC 0 CT1 36IN J946H

## (undated) DEVICE — WIPE THERAWASH SLV SPEC CARE 2PK

## (undated) DEVICE — GLV SURG TRIUMPH ORTHO W/ALOE PF LTX 7.0

## (undated) DEVICE — DECANTER VI VENT W/ VLV FOR ASEP TRNSF OF FLD

## (undated) DEVICE — ANESTHESIA CIRCUIT ADULT-LF: Brand: MEDLINE INDUSTRIES, INC.

## (undated) DEVICE — GOWN,PREVENTION PLUS,XL,ST,24/CS: Brand: MEDLINE

## (undated) DEVICE — SNARE ENDOSCP L240CM LOOP W13MM SHTH DIA2.4MM SM OVL FLX

## (undated) DEVICE — Device

## (undated) DEVICE — GLOVE SURG SZ 7 CRM LTX FREE POLYISOPRENE POLYMER BEAD ANTI

## (undated) DEVICE — ADHS LIQ MASTISOL 2/3ML

## (undated) DEVICE — SUTURE VCRL SZ 0 L27IN ABSRB VLT L36MM UR-5 5/8 CIR J376H

## (undated) DEVICE — LARYNGOSCOPE EXAM MAC 2 ALL MTL BLDE VIVID LED AND HNDL DISP

## (undated) DEVICE — SOLUTION IV IRRIG POUR BRL 0.9% SODIUM CHL 2F7124

## (undated) DEVICE — SYSTEM VENT MED AD NASAL PAP SUPERNO2VA

## (undated) DEVICE — 3M™ STERI-STRIP™ REINFORCED ADHESIVE SKIN CLOSURES, R1547, 1/2 IN X 4 IN (12 MM X 100 MM), 6 STRIPS/ENVELOPE: Brand: 3M™ STERI-STRIP™